# Patient Record
Sex: FEMALE | Race: WHITE | NOT HISPANIC OR LATINO | Employment: OTHER | URBAN - METROPOLITAN AREA
[De-identification: names, ages, dates, MRNs, and addresses within clinical notes are randomized per-mention and may not be internally consistent; named-entity substitution may affect disease eponyms.]

---

## 2017-01-18 ENCOUNTER — OFFICE VISIT (OUTPATIENT)
Dept: OPTOMETRY | Facility: CLINIC | Age: 67
End: 2017-01-18
Payer: COMMERCIAL

## 2017-01-18 DIAGNOSIS — H04.123 BILATERAL DRY EYES: ICD-10-CM

## 2017-01-18 DIAGNOSIS — H52.7 EYE REFRACTION DISORDER: Primary | ICD-10-CM

## 2017-01-18 PROCEDURE — 99999 PR PBB SHADOW E&M-EST. PATIENT-LVL II: CPT | Mod: PBBFAC,,, | Performed by: OPTOMETRIST

## 2017-01-18 PROCEDURE — 99499 UNLISTED E&M SERVICE: CPT | Mod: S$GLB,,, | Performed by: OPTOMETRIST

## 2017-01-18 NOTE — PROGRESS NOTES
HPI     Contact Lens Follow Up    Additional comments: CFLU           Comments   DLS: 12/7/16    Pt states has a hard time putting contact in eyes, takes her 2-3 tries. +   contacts were bothering her yesterday and she had to remove in middle of   day but seem to be ok today.        Last edited by Cheryle Quintana on 1/18/2017  8:23 AM. (History)        ROS     Negative for: Constitutional, Gastrointestinal, Neurological, Skin,   Genitourinary, Musculoskeletal, HENT, Endocrine, Cardiovascular, Eyes,   Respiratory, Psychiatric, Allergic/Imm, Heme/Lymph    Last edited by Yuri Kerr, OD on 1/18/2017  8:34 AM. (History)        Assessment /Plan     For exam results, see Encounter Report.    Eye refraction disorder    Bilateral dry eyes      1. Contact lens Rx released to pt. Daily wear only advised, with education to risks of extended wear.  Discussed lens care, compliance and solutions. RTC yearly contact lens follow up.   2. Recommend rewet drops as needed. Chronicity of disease and treatment discussed.

## 2017-01-30 RX ORDER — LEVOTHYROXINE SODIUM 112 UG/1
112 TABLET ORAL
Qty: 30 TABLET | Refills: 3 | Status: SHIPPED | OUTPATIENT
Start: 2017-01-30 | End: 2017-02-24

## 2017-02-01 RX ORDER — OMEPRAZOLE 20 MG/1
20 CAPSULE, DELAYED RELEASE ORAL DAILY
Qty: 30 CAPSULE | Refills: 1 | Status: SHIPPED | OUTPATIENT
Start: 2017-02-01 | End: 2017-03-22 | Stop reason: SDUPTHER

## 2017-02-10 ENCOUNTER — DOCUMENTATION ONLY (OUTPATIENT)
Dept: ADMINISTRATIVE | Facility: HOSPITAL | Age: 67
End: 2017-02-10

## 2017-02-10 NOTE — PROGRESS NOTES
PRE-VISIT CHART REVIEW    Appointment Scheduled on 2/24/17    Department stratifications & guidelines reviewed:yes    Target Chronic Diagnosis: obesity    Chronic Diagnosis Intervention Due: no    Goals Updated:N/A    There are no preventive care reminders to display for this patient.    Advanced Directives:   65 years of age or older?  No  Directive on file?  N\A                                      Pre-visit patient communication:  In Person    Studies or screenings scheduled pre-visit: no    Educate patient on obesity (32.59)

## 2017-02-15 ENCOUNTER — HOSPITAL ENCOUNTER (OUTPATIENT)
Dept: RADIOLOGY | Facility: HOSPITAL | Age: 67
Discharge: HOME | End: 2017-02-15
Attending: OBSTETRICS & GYNECOLOGY
Payer: COMMERCIAL

## 2017-02-15 DIAGNOSIS — N64.4 MASTODYNIA OF LEFT BREAST: ICD-10-CM

## 2017-02-15 PROCEDURE — 76642 ULTRASOUND BREAST LIMITED: CPT | Mod: 26,LT,, | Performed by: RADIOLOGY

## 2017-02-15 PROCEDURE — 77061 BREAST TOMOSYNTHESIS UNI: CPT | Mod: TC,LT

## 2017-02-15 PROCEDURE — 77065 DX MAMMO INCL CAD UNI: CPT | Mod: 26,LT,, | Performed by: RADIOLOGY

## 2017-02-15 PROCEDURE — 77061 BREAST TOMOSYNTHESIS UNI: CPT | Mod: 26,LT,, | Performed by: RADIOLOGY

## 2017-02-15 PROCEDURE — 76642 ULTRASOUND BREAST LIMITED: CPT | Mod: TC,PO,LT

## 2017-02-17 ENCOUNTER — LAB VISIT (OUTPATIENT)
Dept: LAB | Facility: HOSPITAL | Age: 67
End: 2017-02-17
Attending: FAMILY MEDICINE
Payer: COMMERCIAL

## 2017-02-17 DIAGNOSIS — E03.4 HYPOTHYROIDISM DUE TO ACQUIRED ATROPHY OF THYROID: ICD-10-CM

## 2017-02-17 DIAGNOSIS — E78.5 HYPERLIPIDEMIA, UNSPECIFIED HYPERLIPIDEMIA TYPE: ICD-10-CM

## 2017-02-17 LAB
ALBUMIN SERPL BCP-MCNC: 4.1 G/DL
ALP SERPL-CCNC: 84 U/L
ALT SERPL W/O P-5'-P-CCNC: 24 U/L
ANION GAP SERPL CALC-SCNC: 7 MMOL/L
AST SERPL-CCNC: 23 U/L
BILIRUB SERPL-MCNC: 0.4 MG/DL
BUN SERPL-MCNC: 20 MG/DL
CALCIUM SERPL-MCNC: 10.5 MG/DL
CHLORIDE SERPL-SCNC: 105 MMOL/L
CHOLEST/HDLC SERPL: 3 {RATIO}
CO2 SERPL-SCNC: 30 MMOL/L
CREAT SERPL-MCNC: 1.2 MG/DL
EST. GFR  (AFRICAN AMERICAN): 55.2 ML/MIN/1.73 M^2
EST. GFR  (NON AFRICAN AMERICAN): 47.9 ML/MIN/1.73 M^2
GLUCOSE SERPL-MCNC: 95 MG/DL
HDL/CHOLESTEROL RATIO: 33.5 %
HDLC SERPL-MCNC: 164 MG/DL
HDLC SERPL-MCNC: 55 MG/DL
LDLC SERPL CALC-MCNC: 93 MG/DL
NONHDLC SERPL-MCNC: 109 MG/DL
POTASSIUM SERPL-SCNC: 4.4 MMOL/L
PROT SERPL-MCNC: 7.4 G/DL
SODIUM SERPL-SCNC: 142 MMOL/L
T4 FREE SERPL-MCNC: 1.3 NG/DL
TRIGL SERPL-MCNC: 80 MG/DL
TSH SERPL DL<=0.005 MIU/L-ACNC: 4.39 UIU/ML

## 2017-02-17 PROCEDURE — 80053 COMPREHEN METABOLIC PANEL: CPT

## 2017-02-17 PROCEDURE — 80061 LIPID PANEL: CPT

## 2017-02-17 PROCEDURE — 84443 ASSAY THYROID STIM HORMONE: CPT

## 2017-02-17 PROCEDURE — 36415 COLL VENOUS BLD VENIPUNCTURE: CPT | Mod: PO

## 2017-02-17 PROCEDURE — 84439 ASSAY OF FREE THYROXINE: CPT

## 2017-02-20 ENCOUNTER — PATIENT MESSAGE (OUTPATIENT)
Dept: FAMILY MEDICINE | Facility: CLINIC | Age: 67
End: 2017-02-20

## 2017-02-24 ENCOUNTER — TELEPHONE (OUTPATIENT)
Dept: FAMILY MEDICINE | Facility: CLINIC | Age: 67
End: 2017-02-24

## 2017-02-24 ENCOUNTER — OFFICE VISIT (OUTPATIENT)
Dept: FAMILY MEDICINE | Facility: CLINIC | Age: 67
End: 2017-02-24
Payer: COMMERCIAL

## 2017-02-24 VITALS
RESPIRATION RATE: 16 BRPM | HEART RATE: 84 BPM | TEMPERATURE: 98 F | HEIGHT: 63 IN | SYSTOLIC BLOOD PRESSURE: 140 MMHG | DIASTOLIC BLOOD PRESSURE: 82 MMHG | BODY MASS INDEX: 33.98 KG/M2 | WEIGHT: 191.81 LBS

## 2017-02-24 DIAGNOSIS — E04.1 THYROID NODULE: ICD-10-CM

## 2017-02-24 DIAGNOSIS — E78.5 HYPERLIPIDEMIA, UNSPECIFIED HYPERLIPIDEMIA TYPE: ICD-10-CM

## 2017-02-24 DIAGNOSIS — R49.0 HOARSENESS: ICD-10-CM

## 2017-02-24 DIAGNOSIS — R79.9 ABNORMAL BLOOD CHEMISTRY: ICD-10-CM

## 2017-02-24 DIAGNOSIS — E03.4 HYPOTHYROIDISM DUE TO ACQUIRED ATROPHY OF THYROID: Primary | ICD-10-CM

## 2017-02-24 PROCEDURE — 99214 OFFICE O/P EST MOD 30 MIN: CPT | Mod: S$GLB,,, | Performed by: FAMILY MEDICINE

## 2017-02-24 PROCEDURE — 1160F RVW MEDS BY RX/DR IN RCRD: CPT | Mod: S$GLB,,, | Performed by: FAMILY MEDICINE

## 2017-02-24 RX ORDER — FERROUS SULFATE, DRIED 160(50) MG
1 TABLET, EXTENDED RELEASE ORAL 2 TIMES DAILY WITH MEALS
COMMUNITY

## 2017-02-24 RX ORDER — IBUPROFEN 800 MG/1
800 TABLET ORAL
COMMUNITY
Start: 2017-02-14 | End: 2017-03-13 | Stop reason: ALTCHOICE

## 2017-02-24 RX ORDER — LEVOTHYROXINE SODIUM 125 UG/1
125 TABLET ORAL DAILY
Qty: 30 TABLET | Refills: 1 | Status: SHIPPED | OUTPATIENT
Start: 2017-02-24 | End: 2017-04-25 | Stop reason: SDUPTHER

## 2017-02-24 NOTE — MR AVS SNAPSHOT
[facility]  [address] [address] [address]  [facility] [address]  Phone: [phone]  Fax: [phone]                  Curtis Navarro   2017 8:30 AM   Office Visit    Description:  Female : 1950   Provider:  Komal Del Castillo MD   Department:  [facility]           Reason for Visit     Follow-up           Diagnoses this Visit        Comments    Hypothyroidism due to acquired atrophy of thyroid    -  Primary     Hyperlipidemia, unspecified hyperlipidemia type         Abnormal blood chemistry         Hoarseness         Thyroid nodule                To Do List           Future Appointments        Provider Department Dept Phone    3/2/2017 8:45 AM LAB, [place] [facility] [phone]    3/3/2017 2:20 PM NURSE, [facility] [phone]    3/13/2017 12:30 PM AUDIO, [facility] - Audiology [phone]    3/13/2017 1:40 PM Jaimie Lepe NP [facility] [phone]    2017 10:15 AM LAB, [place] [facility] [phone]      Goals (5 Years of Data)     None       These Medications        Disp Refills Start End    levothyroxine (SYNTHROID) 125 MCG tablet 30 tablet 1 2017    Take 1 tablet (125 mcg total) by mouth once daily. - Oral    Pharmacy: [place] [address] [address] Ph #: [phone]         [facility] On Call     [facility] On [facility] Nurse Care Line -  Assistance  Registered nurses in the [facility] provide clinical advisement, health education, appointment booking, and other advisory services.  Call for this free service at [phone].             Medications           Message regarding Medications     Verify the changes and/or additions to your medication regime listed below are the same as discussed with your clinician today.  If any of these changes or additions are incorrect, please notify your healthcare provider.         START taking these NEW medications        Refills    levothyroxine (SYNTHROID) 125 MCG tablet 1    Sig: Take 1 tablet (125 mcg total) by mouth once daily.    Class: Normal    Route: Oral      STOP taking these medications     calcium-vitamin D 500-125 mg-unit tablet Take 1 tablet by mouth 3 (three) times daily.     ranitidine (ZANTAC) 300 MG tablet Take 1 tablet (300 mg total) by mouth every evening.           Verify that the below list of medications is an accurate representation of the medications you are currently taking.  If none reported, the list may be blank. If incorrect, please contact your healthcare provider. Carry this list with you in case of emergency.           Current Medications     aspirin (ECOTRIN) 81 MG EC tablet Take 81 mg by mouth once daily.    atorvastatin (LIPITOR) 20 MG tablet Take 0.5 tablets (10 mg total) by mouth once daily. Half tablet    B INFANTIS/B ANI/B JOSE M/B BIFID (PROBIOTIC 4X ORAL) Take 1 tablet by mouth once daily.    biotin 5 mg Cap Take 5 mg by mouth once daily.    calcium-vitamin D3 500 mg(1,250mg) -200 unit per tablet Take 1 tablet by mouth 2 (two) times daily with meals.    DIVIGEL 0.5 mg (0.1 %) GlPk Apply 0.5 mg topically every other day.    glucosamine-chondroitin 500-400 mg tablet Take 1 tablet by mouth 3 (three) times daily.      ibuprofen (ADVIL,MOTRIN) 800 MG tablet Take 800 mg by mouth as needed.     KRILL/OM-3/DHA/EPA/PHOSPHO/AST (MEGARED OMEGA-3 KRILL OIL ORAL) Take 1 capsule by mouth once daily.    MULTIVITAMIN ORAL Take by mouth.      omeprazole (PRILOSEC) 20 MG capsule Take 1 capsule (20 mg total) by mouth once daily.    progesterone (PROMETRIUM) 100 MG capsule Take 100 mg by mouth once daily.      levothyroxine (SYNTHROID) 125 MCG tablet Take 1 tablet (125 mcg total) by mouth once daily.           Clinical Reference Information           Your Vitals Were     BP                   140/82           Blood Pressure          Most Recent Value    BP  (!)  140/82       Allergies as of 2/24/2017     Azithromycin    Metronidazole Hcl    Moxifloxacin    Phenytoin Sodium Extended    Sulfa (Sulfonamide Antibiotics)      Immunizations Administered on Date of Encounter - 2/24/2017     None      Orders Placed During Today's Visit      Normal Orders This Visit    Ambulatory referral to ENT     Future Labs/Procedures Expected by Expires    Basic metabolic panel  2/24/2017 2/25/2018    TSH  2/24/2017 2/25/2018    US Soft Tissue Head Neck Thyroid  2/24/2017 2/24/2018      Language Assistance Services     ATTENTION: Language assistance services are available, free of charge. Please call [phone].      ATENCIÓN: Si habla español, tiene a holm disposición servicios gratuitos de asistencia lingüística. Llame al [phone].     CHÚ Ý: N?u b?n nói Ti?ng Vi?t, có các d?ch v? h? tr? ngôn ng? mi?n phí dành cho b?n. G?i s? [phone].         [facility] complies with applicable Federal civil rights laws and does not discriminate on the basis of race, color, national origin, age, disability, or sex.

## 2017-02-24 NOTE — TELEPHONE ENCOUNTER
Spoke w/ pt , clarified w/ Dr Del Castillo, BMP sched 3-2-17 does not need to be fasting . Pt aware--lp

## 2017-02-24 NOTE — TELEPHONE ENCOUNTER
----- Message from Dorothea Trammell sent at 2/24/2017  9:58 AM CST -----  Contact: elf  Patient would like to speak to a nurse  Has questions regarding her lab order   Please call  to advise.    Thanks

## 2017-02-25 NOTE — PROGRESS NOTES
Subjective:       Patient ID: Curtis Navarro is a 66 y.o. female.    Chief Complaint: Follow-up    HPI   The patient is a 66-year-old who is here today for chronic follow-up.  Today we discussed the followin)  hypothyroidism.  We did review her recent thyroid tests.  She would be interested in increasing the dose of her medicine to see if she feels better with the higher dose of Synthroid.  She is due for repeat thyroid ultrasound today in May and would like to get this scheduled  2)  GERD.  After stopping the Prilosec 40 mg and changing to the Zantac 300 mg at night, she noticed that her acid reflux symptoms progressively worsened.  She has returned to using Prilosec but is using a lower dose at 20 mg once a day.  She finds that the Prilosec 20 mg once a day does control her acid reflux  3)  right wrist pain.  She has been experiencing some right wrist pain for the past few months.  She describes her pain as a dull ache.  It started after she had done a lot of gardening work planting flowers.  She recently took a course of Motrin and noticed that her symptoms improved while she took the Motrin but the wrist pain is starting to recur now that she has stopped the Motrin.  4)  abnormal chemistry.  We did discuss her recent GFR 47.9.  This has not been noted previously  5)  hyperlipidemia.  She is doing well with her Lipitor.  We did review her recent cholesterol numbers which looked good    Of note, her blood pressure is high today at 140/82 and we did discuss this finding.  She notes that her blood pressures at home have been in the 120s to 140s over 70s to 80s.  She has not been taking good care of herself lately because she has been focusing on her mom and her 's health issues.  She has been discouraged by her weight gain and is starting to make progress to lose weight and take better care of herself.    Review of Systems   Constitutional: Negative for appetite change, chills, diaphoresis, fatigue,  fever and unexpected weight change.   HENT: Positive for voice change (present for months). Negative for congestion, ear pain, postnasal drip, rhinorrhea, sinus pressure, sneezing, sore throat and trouble swallowing.    Eyes: Negative for pain, discharge and visual disturbance.   Respiratory: Negative for cough, chest tightness, shortness of breath and wheezing.    Cardiovascular: Negative for chest pain, palpitations and leg swelling.   Gastrointestinal: Negative for abdominal distention, abdominal pain, blood in stool, constipation, diarrhea, nausea and vomiting.   Skin: Negative for rash.       Objective:      Physical Exam   Constitutional: She is oriented to person, place, and time. She appears well-developed and well-nourished. No distress.   Her voice is hoarse   HENT:   Head: Normocephalic and atraumatic.   Right Ear: Hearing, tympanic membrane, external ear and ear canal normal.   Left Ear: Hearing, tympanic membrane, external ear and ear canal normal.   Nose: Nose normal.   Mouth/Throat: Oropharynx is clear and moist and mucous membranes are normal. No oral lesions. No oropharyngeal exudate, posterior oropharyngeal edema or posterior oropharyngeal erythema.   Eyes: Conjunctivae, EOM and lids are normal. Pupils are equal, round, and reactive to light. No scleral icterus.   Neck: Normal range of motion. Neck supple. Carotid bruit is not present. No thyroid mass and no thyromegaly present.   Cardiovascular: Normal rate, regular rhythm and normal heart sounds.   No extrasystoles are present. PMI is not displaced.  Exam reveals no gallop.    No murmur heard.  Pulmonary/Chest: Effort normal and breath sounds normal. No accessory muscle usage. No respiratory distress.   Clear to auscultation bilaterally.   Abdominal: Soft. Normal appearance and bowel sounds are normal. She exhibits no abdominal bruit. There is no hepatosplenomegaly. There is no tenderness. There is no rebound.   Musculoskeletal:   Her right upper  "extremity is neurovascularly intact.  She has no point tenderness involving the elbow forearm wrist or hand.  She is tender over the brachial radialis muscle.   Lymphadenopathy:        Head (right side): No submental and no submandibular adenopathy present.        Head (left side): No submental and no submandibular adenopathy present.        Right cervical: No superficial cervical, no deep cervical and no posterior cervical adenopathy present.       Left cervical: No superficial cervical, no deep cervical and no posterior cervical adenopathy present.        Right: No supraclavicular adenopathy present.        Left: No supraclavicular adenopathy present.   Neurological: She is alert and oriented to person, place, and time.   Skin: Skin is warm, dry and intact.   Psychiatric: She has a normal mood and affect.     Blood pressure (!) 140/82, pulse 84, temperature 98.4 °F (36.9 °C), temperature source Oral, resp. rate 16, height 5' 3" (1.6 m), weight 87 kg (191 lb 12.8 oz).Body mass index is 33.98 kg/(m^2).        A/P:  1)  hypothyroidism.  Uncontrolled.  We will increase her Synthroid 225 µg once a day.  We will check a TSH in 6 weeks  2)  thyroid nodule.  Status unknown.  We'll schedule thyroid ultrasound from May  3)  GERD.  Well-controlled with Prilosec.  She understands the long-term risks associated with PPI use  4)  right brachial radialis strain.  Improving.  If her symptoms persist longer than the next 2-4 weeks, she will let me know  5)  abnormal GFR.  New.  We will recheck this this non-fasting next week  6)  elevated blood pressure without the diagnosis of hypertension.  She'll work on diet and exercise and weight loss.  We will reevaluate this at her next visit.  If her blood pressure remains elevated in the future, we will consider a medication  7)  hyperlipidemia.  Well-controlled.  Continue Lipitor.  We will check fasting labs again in 6 months  8)  hoarseness.  Persistent.  We will refer her to ENT for " further evaluation  9)  health maintenance issues.  She will have her colonoscopy later this summer as previously planned

## 2017-03-02 ENCOUNTER — PATIENT MESSAGE (OUTPATIENT)
Dept: FAMILY MEDICINE | Facility: CLINIC | Age: 67
End: 2017-03-02

## 2017-03-02 ENCOUNTER — LAB VISIT (OUTPATIENT)
Dept: LAB | Facility: HOSPITAL | Age: 67
End: 2017-03-02
Attending: FAMILY MEDICINE
Payer: COMMERCIAL

## 2017-03-02 DIAGNOSIS — R79.9 ABNORMAL BLOOD CHEMISTRY: ICD-10-CM

## 2017-03-02 LAB
ANION GAP SERPL CALC-SCNC: 7 MMOL/L
BUN SERPL-MCNC: 13 MG/DL
CALCIUM SERPL-MCNC: 9.8 MG/DL
CHLORIDE SERPL-SCNC: 105 MMOL/L
CO2 SERPL-SCNC: 29 MMOL/L
CREAT SERPL-MCNC: 0.9 MG/DL
EST. GFR  (AFRICAN AMERICAN): >60 ML/MIN/1.73 M^2
EST. GFR  (NON AFRICAN AMERICAN): >60 ML/MIN/1.73 M^2
GLUCOSE SERPL-MCNC: 94 MG/DL
POTASSIUM SERPL-SCNC: 4.3 MMOL/L
SODIUM SERPL-SCNC: 141 MMOL/L

## 2017-03-02 PROCEDURE — 36415 COLL VENOUS BLD VENIPUNCTURE: CPT | Mod: PO

## 2017-03-02 PROCEDURE — 80048 BASIC METABOLIC PNL TOTAL CA: CPT

## 2017-03-03 ENCOUNTER — CLINICAL SUPPORT (OUTPATIENT)
Dept: FAMILY MEDICINE | Facility: CLINIC | Age: 67
End: 2017-03-03
Payer: OTHER GOVERNMENT

## 2017-03-03 VITALS — SYSTOLIC BLOOD PRESSURE: 150 MMHG | DIASTOLIC BLOOD PRESSURE: 84 MMHG

## 2017-03-03 NOTE — PROGRESS NOTES
Pt here for BP check. Pt BP was take on right arm and was 158/82. Pt stated she has been running around all day and thinks that may be why her BP is high. Pt stated she took BP last night and was 130/80. Took pt BP here again and was 150/84.

## 2017-03-04 ENCOUNTER — TELEPHONE (OUTPATIENT)
Dept: FAMILY MEDICINE | Facility: CLINIC | Age: 67
End: 2017-03-04

## 2017-03-05 NOTE — TELEPHONE ENCOUNTER
Pls notify pt:    BP was high at recent nurse visit  It might be time to consider BP med.  What do you think?

## 2017-03-06 NOTE — TELEPHONE ENCOUNTER
Spoke w/ pt, pt agrees to try medication . Pt would like specific instructions on the medication bottle if she should take the med in the am or evening. --lp

## 2017-03-07 ENCOUNTER — TELEPHONE (OUTPATIENT)
Dept: FAMILY MEDICINE | Facility: CLINIC | Age: 67
End: 2017-03-07

## 2017-03-07 RX ORDER — LISINOPRIL 10 MG/1
10 TABLET ORAL DAILY
Qty: 30 TABLET | Refills: 0 | Status: SHIPPED | OUTPATIENT
Start: 2017-03-07 | End: 2017-03-22

## 2017-03-07 NOTE — TELEPHONE ENCOUNTER
----- Message from Addie Fernandez sent at 3/7/2017 12:12 PM CST -----  Contact: Alleantia [phone]  She is calling to follow up about the script that you were to send yesterday.  The patient is there to pick it up.  Please send it ASAP.  Thank you!

## 2017-03-08 ENCOUNTER — DOCUMENTATION ONLY (OUTPATIENT)
Dept: ADMINISTRATIVE | Facility: HOSPITAL | Age: 67
End: 2017-03-08

## 2017-03-08 NOTE — PROGRESS NOTES
PRE-VISIT CHART REVIEW    Appointment Scheduled on 3/22/17    Department stratifications & guidelines reviewed:yes    Target Chronic Diagnosis: obesity    Chronic Diagnosis Intervention Due: no    Goals Updated:No    There are no preventive care reminders to display for this patient.    Advanced Directives:   65 years of age or older?  No  Directive on file?  N\A                                      Pre-visit patient communication:  In Person    Studies or screenings scheduled pre-visit: no    Educate patient on obesity (33.98)

## 2017-03-13 ENCOUNTER — INITIAL CONSULT (OUTPATIENT)
Dept: OTOLARYNGOLOGY | Facility: CLINIC | Age: 67
End: 2017-03-13
Payer: COMMERCIAL

## 2017-03-13 VITALS
BODY MASS INDEX: 34.61 KG/M2 | HEIGHT: 63 IN | WEIGHT: 195.31 LBS | DIASTOLIC BLOOD PRESSURE: 75 MMHG | SYSTOLIC BLOOD PRESSURE: 137 MMHG | HEART RATE: 82 BPM

## 2017-03-13 DIAGNOSIS — K21.9 LPRD (LARYNGOPHARYNGEAL REFLUX DISEASE): ICD-10-CM

## 2017-03-13 DIAGNOSIS — R49.0 DYSPHONIA: Primary | ICD-10-CM

## 2017-03-13 PROCEDURE — 99214 OFFICE O/P EST MOD 30 MIN: CPT | Mod: 25,S$GLB,, | Performed by: NURSE PRACTITIONER

## 2017-03-13 PROCEDURE — 1160F RVW MEDS BY RX/DR IN RCRD: CPT | Mod: S$GLB,,, | Performed by: NURSE PRACTITIONER

## 2017-03-13 PROCEDURE — 31575 DIAGNOSTIC LARYNGOSCOPY: CPT | Mod: S$GLB,,, | Performed by: NURSE PRACTITIONER

## 2017-03-13 PROCEDURE — 99999 PR PBB SHADOW E&M-EST. PATIENT-LVL IV: CPT | Mod: PBBFAC,,, | Performed by: NURSE PRACTITIONER

## 2017-03-13 NOTE — MR AVS SNAPSHOT
[facility]  [address]  [facility] [address]  Phone: [phone]  Fax: [phone]                  Curtis Navarro   3/13/2017 1:40 PM   Initial consult    Description:  Female : 1950   Provider:  Jaimie Lepe NP   Department:  [facility]           Reason for Visit     Hoarse                To Do List           Future Appointments        Provider Department Dept Phone    3/22/2017 10:30 AM [facility] [phone]    2017 10:15 AM LAB, [place] [facility] [phone]    2017 10:00 AM [facility] [phone]      Goals (5 Years of Data)     None      [facility] On Call     [facility] On Call Nurse Care Line - / Assistance  Registered nurses in the [facility] provide clinical advisement, health education, appointment booking, and other advisory services.  Call for this free service at [phone].             Medications           Message regarding Medications     Verify the changes and/or additions to your medication regime listed below are the same as discussed with your clinician today.  If any of these changes or additions are incorrect, please notify your healthcare provider.        STOP taking these medications     ibuprofen (ADVIL,MOTRIN) 800 MG tablet Take 800 mg by mouth as needed.            Verify that the below list of medications is an accurate representation of the medications you are currently taking.  If none reported, the list may be blank. If incorrect, please contact your healthcare provider. Carry this list with you in case of emergency.           Current Medications     aspirin (ECOTRIN) 81 MG EC tablet Take 81 mg by mouth once daily.    atorvastatin (LIPITOR) 20 MG tablet Take 0.5 tablets (10 mg total) by mouth once daily. Half tablet    B INFANTIS/B ANI/B JOSE M/B BIFID (PROBIOTIC 4X ORAL) Take 1 tablet by mouth once daily.    biotin 5 mg Cap Take 5 mg  "by mouth once daily.    calcium-vitamin D3 500 mg(1,250mg) -200 unit per tablet Take 1 tablet by mouth 2 (two) times daily with meals.    DIVIGEL 0.5 mg (0.1 %) GlPk Apply 0.5 mg topically every other day.    glucosamine-chondroitin 500-400 mg tablet Take 1 tablet by mouth 3 (three) times daily.      KRILL/OM-3/DHA/EPA/PHOSPHO/AST (MEGARED OMEGA-3 KRILL OIL ORAL) Take 1 capsule by mouth once daily.    levothyroxine (SYNTHROID) 125 MCG tablet Take 1 tablet (125 mcg total) by mouth once daily.    lisinopril 10 MG tablet Take 1 tablet (10 mg total) by mouth once daily.    MULTIVITAMIN ORAL Take by mouth.      omeprazole (PRILOSEC) 20 MG capsule Take 1 capsule (20 mg total) by mouth once daily.    progesterone (PROMETRIUM) 100 MG capsule Take 100 mg by mouth once daily.             Clinical Reference Information           Your Vitals Were     BP Pulse Height Weight BMI    137/75 82 5' 3" (1.6 m) 88.6 kg (195 lb 5.2 oz) 34.6 kg/m2      Blood Pressure          Most Recent Value    BP  137/75      Allergies as of 3/13/2017     Azithromycin    Metronidazole Hcl    Moxifloxacin    Phenytoin Sodium Extended    Sulfa (Sulfonamide Antibiotics)      Immunizations Administered on Date of Encounter - 3/13/2017     None      Instructions      How Acid Reflux Affects Your Throat    Do you have to clear your throat or cough often? Are you hoarse? Do you have trouble swallowing? If you have these or other throat symptoms, you may have acid reflux. This occurs when stomach acid flows back up and irritates your throat.  Why you have throat symptoms  There are muscles (esophageal sphincters) at both ends of the tube that carries food to your stomach (the esophagus). These muscles relax to let food pass. Then they tighten to keep stomach acid down. When the lower esophageal sphincter (LES) doesnt tighten enough, acid can flow back (reflux) from your stomach into your esophagus. This may cause heartburn. In some cases the upper esophageal " "sphincter (UES) also doesnt work well. Then acid can travel higher and enter your throat (pharynx). In many cases, this causes throat symptoms.  Common throat symptoms  · Need to clear your throat often  · Feeling like youre choking  · Long-term (chronic) cough  · Hoarseness  · Trouble swallowing  · Feel like you have a lump in your throat  · Sour or acid taste  · Sore throat that keeps coming back     LARYNGOPHARYNGEAL REFLUX  (SILENT OR ATYPICAL REFLUX)    If you have any of the following symptoms you may have laryngopharyngeal reflux (LPR):  hoarseness, thick or too much mucus, chronic throat pain/irritation, chronic throat clearing, chronic cough, especially cough that wake you up from sleep, chronic "postnasal drip" without the need to blow your nose.     Many people with LPR do not have symptoms of heartburn. Compared to the esophagus, the voice box and the back of the throat are significantly more sensitive to the effects of acid on surrounding tissue. Acid passing quickly through the esophagus does not have a chance to irritate the area for too long.  However acid that pools in the throat or voice box can cause prolonged irritation resulting in the symptoms of LPR.    The symptoms of LPR can consist of a dry cough and the sensation of something being stuck in the throat.  Some people will complain of heartburn while others may have intermittent hoarseness or loss of voice.  Another major symptom of LPR is "postnasal drip."  Patients are often told symptoms are due to abnormal nasal drainage or sinus infection; however this is rarely the cause of chronic throat irritation. For post nasal drip to cause the complaints described, signs and symptoms of an active nasal infection should be present.     Treatments for LPR include:  postural changes, weight reduction, diet modification, medication to reduce stomach acid and promote normal motility, and surgery to prevent reflux. Most patients will begin to notice " some relief in her symptoms about 2 weeks after starting the medication; however it is generally recommended the medication should be continued for 2 months. If the symptoms completely resolve, the medication can then be tapered.  Some people will remain symptom free while others may have relapses which required treatment again.    Things you can do to prevent reflux include:  Do not smoke.  Smoking will cause reflux.  Avoid tight fitting clothes or belts around the waist.  Avoid eating at least 2 hours prior to bedtime.  In fact avoid eating your largest meal at night.  Weight loss.  For patient's with recent weight gain, shedding a few pounds is all that is required to improve reflux.  Avoid caffeine, cola beverages, citrus beverages, mints, alcoholic beverages, particularly at night, cheese, fried foods, spicy foods, eggs, and chocolate.  Sleep with the head of bed elevated at least 6 inches.    Take Omeprazole / Prilosec (PPI) every morning on an empty stomach (30-60 minutes before eating) 20-40 MG. At bedtime take Zantac (H2-blocker) 150-300 mg.  All are available over-the-counter without a prescription. See a Gastro doctor (GI) for further evaluation and continued management.    [facility]ist (Laryngologist) is Dr. Yuri Davison at [phone].               Language Assistance Services     ATTENTION: Language assistance services are available, free of charge. Please call [phone].      ATENCIÓN: Si habla esprick, tiene a holm disposición servicios gratuitos de asistencia lingüística. Llame al [phone].     [facility] Ý: N?u b?n nói Ti?ng Vi?t, có các d?ch v? h? tr? ngôn ng? mi?n phí dành cho b?n. G?i s? [phone].         [facility] complies with applicable Federal civil rights laws and does not discriminate on the basis of race, color, national origin, age, disability, or sex.

## 2017-03-13 NOTE — PROGRESS NOTES
Subjective:       Patient ID: Curtis Navarro is a 66 y.o. female.    Chief Complaint: No chief complaint on file.    HPI   Patient seen by me 4 months ago for vertigo which had resolved spontaneously prior to her appt. She was encouraged to return to ENT at first sign of vertigo without vestibular suppressants. She is referred back today for dysphonia. She reports chronic dysphonia waxing and waning X many years. 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 She has been retired X 1.5 years but voice issues have persisted. She states she has gotten used to it, does not notice it any more until someone mentions it to her. She states Dr. Mary checked her vocal cords approx 5-6 years ago and noted small nodules. She has not had it checked since then. She had an EGD in 2014 by Dr. Ceballos who noted reflux esophagitis. She has been taking omeprazole 20 mg QAM.     Review of Systems   Constitutional: Negative.    HENT: Positive for voice change.    Eyes: Negative.    Respiratory: Negative.    Cardiovascular: Negative.    Gastrointestinal: Negative.    Musculoskeletal: Negative.    Skin: Negative.    Neurological: Negative.    Hematological: Negative.    Psychiatric/Behavioral: Negative.        Objective:      Physical Exam   Constitutional: She is oriented to person, place, and time. Vital signs are normal. She appears well-developed and well-nourished. She is cooperative. She does not appear ill. No distress.   HENT:   Head: Normocephalic and atraumatic.   Right Ear: Hearing, tympanic membrane, external ear and ear canal normal. Tympanic membrane is not erythematous. No middle ear effusion.   Left Ear: Hearing, tympanic membrane, external ear and ear canal normal. Tympanic membrane is not erythematous.  No middle ear effusion.   Nose: Nose normal. No mucosal edema or rhinorrhea. Right sinus exhibits no maxillary sinus tenderness and no frontal sinus tenderness. Left sinus exhibits no maxillary sinus  tenderness and no frontal sinus tenderness.   Mouth/Throat: Uvula is midline, oropharynx is clear and moist and mucous membranes are normal. Mucous membranes are not pale, not dry and not cyanotic. No oral lesions. No oropharyngeal exudate, posterior oropharyngeal edema or posterior oropharyngeal erythema.   Eyes: Conjunctivae, EOM and lids are normal. Pupils are equal, round, and reactive to light. Right eye exhibits no discharge. Left eye exhibits no discharge. No scleral icterus.   Neck: Trachea normal and normal range of motion. Neck supple. No tracheal deviation present. No thyroid mass and no thyromegaly present.   Cardiovascular: Normal rate.    Pulmonary/Chest: Effort normal. No stridor. No respiratory distress. She has no wheezes.   Musculoskeletal: Normal range of motion.   Lymphadenopathy:        Head (right side): No submental, no submandibular, no tonsillar, no preauricular and no posterior auricular adenopathy present.        Head (left side): No submental, no submandibular, no tonsillar, no preauricular and no posterior auricular adenopathy present.     She has no cervical adenopathy.        Right cervical: No superficial cervical and no posterior cervical adenopathy present.       Left cervical: No superficial cervical and no posterior cervical adenopathy present.   Neurological: She is alert and oriented to person, place, and time. She has normal strength. Coordination and gait normal.   Skin: Skin is warm, dry and intact. No lesion and no rash noted. She is not diaphoretic. No cyanosis. No pallor.   Psychiatric: She has a normal mood and affect. Her speech is normal and behavior is normal. Judgment and thought content normal. Cognition and memory are normal.   Nursing note and vitals reviewed.      Procedure: Flexible laryngoscopy    In order to fully examine the upper aerodigestive tract, including the larynx, in a patient with a hyperactive gag reflex, and suboptimal visualization with indirect  mirror exam,  flexible endoscopy is required.   After explaining the procedure and obtaining verbal consent, a timeout was performed with the patient's participation according to the universal protocol. Both nasal cavities were anesthetized with 4% Xylocaine spray mixed with Gavin-Synephrine. The flexible laryngoscope  was inserted into the nasal cavity and advanced to visualize the nasal cavity, nasopharynx, the posterior oropharynx, hypopharynx, and the endolarynx with the  findings noted. The scope was removed and the procedure terminated. The patient tolerated this procedure well without apparent complication.     OVERALL FINDINGS  Nasopharynx - the torus is clear. There are no lesions of the posterior wall.   Oropharynx - no lesions of the tongue base. There is no obvious fullness or asymmetry.  Hypopharynx - there are no lesions of the pyriform sinuses or postcricoid region   Larynx - there are no lesions of the supraglottic or glottic larynx.  Vocal fold mobility is normal.     SPECIFIC FINDINGS  Adenoid tissue - normal   Nasopharynx & eustachian tube orifices - normal   Posterior pharyngeal wall - normal   Base of tongue - normal   Epiglottis - normal   Valleculae - normal   Pyriform sinuses - normal   False vocal cords - normal   True vocal cords - normal  Arytenoids - normal   Interarytenoid space - erythema, edema   Larynx    Larynx    Assessment:     LPRD/GERD    Dysphonia  Plan:     Laryngoscope photos given and reviewed in detail with patient. Recommend omeprazole QAM on an empty stomach, ranitidine QHS, and establish care with GI for continued management and possible EGD. Handouts given on LPRD and GERD, and discussed at length with patient.     No vocal cord nodules noted today. But if voice concerns persist, please see Dr. Davison

## 2017-03-13 NOTE — LETTER
March 13, 2017      Komal Del Castillo MD  [address] [address] [address]           [facility]  [address] [facility] [address] [place] [address]  Phone: [phone]  Fax: [phone]          Patient: Curtis Navarro   MR Number: [id number]   YOB: 1950   Date of Visit: 3/13/2017       Dear Dr. Komal Del Castillo:    Thank you for referring Curtis Navarro to me for evaluation. Attached you will find relevant portions of my assessment and plan of care.    If you have questions, please do not hesitate to call me. I look forward to following Curtis Navarro along with you.    Sincerely,    Jaimie Lepe, MOE    Enclosure  CC:  No Recipients    If you would like to receive this communication electronically, please contact [email or web address] or [phone] to request more information on [facility] access.    For providers and/or their staff who would like to refer a patient to [facility], please contact us through our one-stop-shop provider referral line, [facility], at [phone].    If you feel you have received this communication in error or would no longer like to receive these types of communications, please e-mail [email or web address]

## 2017-03-13 NOTE — PATIENT INSTRUCTIONS
"  How Acid Reflux Affects Your Throat    Do you have to clear your throat or cough often? Are you hoarse? Do you have trouble swallowing? If you have these or other throat symptoms, you may have acid reflux. This occurs when stomach acid flows back up and irritates your throat.  Why you have throat symptoms  There are muscles (esophageal sphincters) at both ends of the tube that carries food to your stomach (the esophagus). These muscles relax to let food pass. Then they tighten to keep stomach acid down. When the lower esophageal sphincter (LES) doesnt tighten enough, acid can flow back (reflux) from your stomach into your esophagus. This may cause heartburn. In some cases the upper esophageal sphincter (UES) also doesnt work well. Then acid can travel higher and enter your throat (pharynx). In many cases, this causes throat symptoms.  Common throat symptoms  · Need to clear your throat often  · Feeling like youre choking  · Long-term (chronic) cough  · Hoarseness  · Trouble swallowing  · Feel like you have a lump in your throat  · Sour or acid taste  · Sore throat that keeps coming back     LARYNGOPHARYNGEAL REFLUX  (SILENT OR ATYPICAL REFLUX)    If you have any of the following symptoms you may have laryngopharyngeal reflux (LPR):  hoarseness, thick or too much mucus, chronic throat pain/irritation, chronic throat clearing, chronic cough, especially cough that wake you up from sleep, chronic "postnasal drip" without the need to blow your nose.     Many people with LPR do not have symptoms of heartburn. Compared to the esophagus, the voice box and the back of the throat are significantly more sensitive to the effects of acid on surrounding tissue. Acid passing quickly through the esophagus does not have a chance to irritate the area for too long.  However acid that pools in the throat or voice box can cause prolonged irritation resulting in the symptoms of LPR.    The symptoms of LPR can consist of a dry cough " "and the sensation of something being stuck in the throat.  Some people will complain of heartburn while others may have intermittent hoarseness or loss of voice.  Another major symptom of LPR is "postnasal drip."  Patients are often told symptoms are due to abnormal nasal drainage or sinus infection; however this is rarely the cause of chronic throat irritation. For post nasal drip to cause the complaints described, signs and symptoms of an active nasal infection should be present.     Treatments for LPR include:  postural changes, weight reduction, diet modification, medication to reduce stomach acid and promote normal motility, and surgery to prevent reflux. Most patients will begin to notice some relief in her symptoms about 2 weeks after starting the medication; however it is generally recommended the medication should be continued for 2 months. If the symptoms completely resolve, the medication can then be tapered.  Some people will remain symptom free while others may have relapses which required treatment again.    Things you can do to prevent reflux include:  Do not smoke.  Smoking will cause reflux.  Avoid tight fitting clothes or belts around the waist.  Avoid eating at least 2 hours prior to bedtime.  In fact avoid eating your largest meal at night.  Weight loss.  For patient's with recent weight gain, shedding a few pounds is all that is required to improve reflux.  Avoid caffeine, cola beverages, citrus beverages, mints, alcoholic beverages, particularly at night, cheese, fried foods, spicy foods, eggs, and chocolate.  Sleep with the head of bed elevated at least 6 inches.    Take Omeprazole / Prilosec (PPI) every morning on an empty stomach (30-60 minutes before eating) 20-40 MG. At bedtime take Zantac (H2-blocker) 150-300 mg.  All are available over-the-counter without a prescription. See a Gastro doctor (GI) for further evaluation and continued management.    [facility] " (Laryngologist) is Dr. Yuri Davison at [phone].

## 2017-03-22 ENCOUNTER — HOSPITAL ENCOUNTER (OUTPATIENT)
Dept: RADIOLOGY | Facility: HOSPITAL | Age: 67
Discharge: HOME | End: 2017-03-22
Attending: FAMILY MEDICINE
Payer: COMMERCIAL

## 2017-03-22 ENCOUNTER — OFFICE VISIT (OUTPATIENT)
Dept: FAMILY MEDICINE | Facility: CLINIC | Age: 67
End: 2017-03-22
Payer: COMMERCIAL

## 2017-03-22 ENCOUNTER — PATIENT MESSAGE (OUTPATIENT)
Dept: FAMILY MEDICINE | Facility: CLINIC | Age: 67
End: 2017-03-22

## 2017-03-22 VITALS
BODY MASS INDEX: 35.04 KG/M2 | DIASTOLIC BLOOD PRESSURE: 70 MMHG | HEART RATE: 82 BPM | HEIGHT: 63 IN | WEIGHT: 197.75 LBS | SYSTOLIC BLOOD PRESSURE: 139 MMHG

## 2017-03-22 DIAGNOSIS — R07.89 CHEST WALL PAIN: ICD-10-CM

## 2017-03-22 DIAGNOSIS — M25.512 LEFT SHOULDER PAIN, UNSPECIFIED CHRONICITY: ICD-10-CM

## 2017-03-22 DIAGNOSIS — E78.5 HYPERLIPIDEMIA, UNSPECIFIED HYPERLIPIDEMIA TYPE: ICD-10-CM

## 2017-03-22 DIAGNOSIS — M25.531 RIGHT WRIST PAIN: ICD-10-CM

## 2017-03-22 DIAGNOSIS — I10 HYPERTENSION, ESSENTIAL: Primary | ICD-10-CM

## 2017-03-22 PROCEDURE — 3078F DIAST BP <80 MM HG: CPT | Mod: S$GLB,,, | Performed by: FAMILY MEDICINE

## 2017-03-22 PROCEDURE — 3075F SYST BP GE 130 - 139MM HG: CPT | Mod: S$GLB,,, | Performed by: FAMILY MEDICINE

## 2017-03-22 PROCEDURE — 73110 X-RAY EXAM OF WRIST: CPT | Mod: TC,PO,RT

## 2017-03-22 PROCEDURE — 1160F RVW MEDS BY RX/DR IN RCRD: CPT | Mod: S$GLB,,, | Performed by: FAMILY MEDICINE

## 2017-03-22 PROCEDURE — 73030 X-RAY EXAM OF SHOULDER: CPT | Mod: 26,LT,, | Performed by: RADIOLOGY

## 2017-03-22 PROCEDURE — 73030 X-RAY EXAM OF SHOULDER: CPT | Mod: TC,PO,LT

## 2017-03-22 PROCEDURE — 71020 XR CHEST PA AND LATERAL: CPT | Mod: 26,,, | Performed by: RADIOLOGY

## 2017-03-22 PROCEDURE — 71020 XR CHEST PA AND LATERAL: CPT | Mod: TC,PO

## 2017-03-22 PROCEDURE — 73110 X-RAY EXAM OF WRIST: CPT | Mod: 26,RT,, | Performed by: RADIOLOGY

## 2017-03-22 PROCEDURE — 99214 OFFICE O/P EST MOD 30 MIN: CPT | Mod: S$GLB,,, | Performed by: FAMILY MEDICINE

## 2017-03-22 RX ORDER — OMEPRAZOLE 20 MG/1
20 CAPSULE, DELAYED RELEASE ORAL DAILY
Qty: 30 CAPSULE | Refills: 5 | Status: SHIPPED | OUTPATIENT
Start: 2017-03-22 | End: 2017-05-18 | Stop reason: SDUPTHER

## 2017-03-22 RX ORDER — ATORVASTATIN CALCIUM 20 MG/1
10 TABLET, FILM COATED ORAL DAILY
Qty: 30 TABLET | Refills: 2 | Status: SHIPPED | OUTPATIENT
Start: 2017-03-22 | End: 2017-05-26 | Stop reason: SDUPTHER

## 2017-03-22 RX ORDER — LISINOPRIL 20 MG/1
20 TABLET ORAL DAILY
Qty: 30 TABLET | Refills: 1 | Status: SHIPPED | OUTPATIENT
Start: 2017-03-22 | End: 2017-05-26 | Stop reason: SDUPTHER

## 2017-03-22 NOTE — MR AVS SNAPSHOT
[place] [address] [address]  [facility] [address]  Phone: [phone]  Fax: [phone]                  Curtis Navarro   3/22/2017 10:30 AM   Office Visit    Description:  Female : 1950   Provider:  Komal Del Castillo MD   Department:  [facility]           Diagnoses this Visit        Comments    Hypertension, essential    -  Primary     Hyperlipidemia, unspecified hyperlipidemia type         Left shoulder pain, unspecified chronicity         Chest wall pain         Right wrist pain                To Do List           Future Appointments        Provider Department Dept Phone    3/22/2017 12:00 PM [facility] XRFL1 [facility] [phone]    3/22/2017 12:15 PM [facility] XR3 [facility] [phone]    3/22/2017 1:00 PM [facility] XR3 [facility] [phone]    2017 2:00 PM NURSE, [facility] [phone]    2017 10:15 AM LAB, [place] [facility] [phone]      Goals (5 Years of Data)     None       These Medications        Disp Refills Start End    omeprazole (PRILOSEC) 20 MG capsule 30 capsule 5 3/22/2017     Take 1 capsule (20 mg total) by mouth once daily. - Oral    [facility] [place] [address] [facility] #: [phone]       atorvastatin (LIPITOR) 20 MG tablet 30 tablet 2 3/22/2017     Take 0.5 tablets (10 mg total) by mouth once daily. Half tablet - Oral    [facility] Ph #: [phone]       lisinopril (PRINIVIL,ZESTRIL) 20 MG tablet 30 tablet 1 3/22/2017 3/22/2018    Take 1 tablet (20 mg total) by mouth once daily. - Oral    Pharmacy: [place] [address] [facility] Ph #: [phone]         [facility]     [facility] Nurse Care [facility] - 24/7 Assistance  Registered nurses in the [facility] provide clinical advisement, health  education, appointment booking, and other advisory services.  Call for this free service at [phone].             Medications           Message regarding Medications     Verify the changes and/or additions to your medication regime listed below are the same as discussed with your clinician today.  If any of these changes or additions are incorrect, please notify your healthcare provider.        START taking these NEW medications        Refills    lisinopril (PRINIVIL,ZESTRIL) 20 MG tablet 1    Sig: Take 1 tablet (20 mg total) by mouth once daily.    Class: Normal    Route: Oral           Verify that the below list of medications is an accurate representation of the medications you are currently taking.  If none reported, the list may be blank. If incorrect, please contact your healthcare provider. Carry this list with you in case of emergency.           Current Medications     aspirin (ECOTRIN) 81 MG EC tablet Take 81 mg by mouth once daily.    atorvastatin (LIPITOR) 20 MG tablet Take 0.5 tablets (10 mg total) by mouth once daily. Half tablet    B INFANTIS/B ANI/B JOSE M/B BIFID (PROBIOTIC 4X ORAL) Take 1 tablet by mouth once daily.    biotin 5 mg Cap Take 5 mg by mouth once daily.    calcium-vitamin D3 500 mg(1,250mg) -200 unit per tablet Take 1 tablet by mouth 2 (two) times daily with meals.    DIVIGEL 0.5 mg (0.1 %) GlPk Apply 0.5 mg topically every other day.    glucosamine-chondroitin 500-400 mg tablet Take 1 tablet by mouth 3 (three) times daily.      KRILL/OM-3/DHA/EPA/PHOSPHO/AST (MEGARED OMEGA-3 KRILL OIL ORAL) Take 1 capsule by mouth once daily.    levothyroxine (SYNTHROID) 125 MCG tablet Take 1 tablet (125 mcg total) by mouth once daily.    MULTIVITAMIN ORAL Take by mouth.      omeprazole (PRILOSEC) 20 MG capsule Take 1 capsule (20 mg total) by mouth once daily.    progesterone (PROMETRIUM) 100 MG capsule Take 100 mg by mouth once daily.      ranitidine (ZANTAC) 300 MG tablet     lisinopril  "(PRINIVIL,ZESTRIL) 20 MG tablet Take 1 tablet (20 mg total) by mouth once daily.           Clinical Reference Information           Your Vitals Were     BP Pulse Height Weight BMI    139/70 82 5' 3" (1.6 m) 89.7 kg (197 lb 12 oz) 35.03 kg/m2      Blood Pressure          Most Recent Value    BP  139/70      Allergies as of 3/22/2017     Azithromycin    Metronidazole Hcl    Moxifloxacin    Phenytoin Sodium Extended    Sulfa (Sulfonamide Antibiotics)      Immunizations Administered on Date of Encounter - 3/22/2017     None      Orders Placed During Today's Visit      Normal Orders This Visit    Ambulatory consult to Physical Therapy     Future Labs/Procedures Expected by Expires    Basic metabolic panel  3/22/2017 3/23/2018    X-Ray Chest PA And Lateral  3/22/2017 3/22/2018    X-Ray Shoulder 2 or More Views Left  3/22/2017 3/22/2018    X-Ray Wrist Complete Right  3/22/2017 3/22/2018      Language Assistance Services     ATTENTION: Language assistance services are available, free of charge. Please call [phone].      ATENCIÓN: Si habla sonia, tiene a holm disposición servicios gratuitos de asistencia lingüística. Llame al [phone].     ALLISON Ý: N?u b?n nói Ti?ng Vi?t, có các d?ch v? h? tr? ngôn ng? mi?n phí dành cho b?n. G?i s? [phone].         [facility] complies with applicable Federal civil rights laws and does not discriminate on the basis of race, color, national origin, age, disability, or sex.        "

## 2017-03-23 NOTE — PROGRESS NOTES
Subjective:       Patient ID: Curtis Navarro is a 66 y.o. female.    Chief Complaint: Follow-up    HPI   The patient is a 66-year-old who is here today for chronic follow-up.  She is feeling better since her last visit here.  Today we discussed the followin)  hypothyroidism.  She has done well with the higher dose of her Synthroid.  She is currently taking Synthroid 112 µg once a day.  She does feel that she has more energy with this medication  2)  hypertension.  When she came to have the nurses check her blood pressure after her last visit, her blood pressure was high.  She was started on lisinopril 10 mg once a day.  She has felt better with the lisinopril.  She has not been checking her blood pressure outside of the office  3)  left chest and left shoulder discomfort.  She has been experiencing left chest and left shoulder discomfort for several months possibly back to December.  She feels as if something is pulling or popping in her left upper chest wall and she is having some discomfort in her left shoulder in the front of her shoulder and in the axillary region.  Her gynecologist did a breast ultrasound to make sure this was normal and it was.  She wonders if she may have injured this area with all of the physical activity she's been doing moving her mother.  She denies any paresthesias into her left upper extremity.  She denies any weakness of her left arm.  4)  right wrist pain.  She continues to experience right-sided wrist pain.  Again, she wonders if she may have injured this with moving her mom or with her gardening work.  She notices that her pain is worse with activity.  It has not improved since her last visit      Review of Systems   Constitutional: Negative for appetite change, chills, diaphoresis, fatigue, fever and unexpected weight change.   HENT: Negative for congestion, ear pain, postnasal drip, rhinorrhea, sinus pressure, sneezing, sore throat and trouble swallowing.    Eyes:  Negative for pain, discharge and visual disturbance.   Respiratory: Negative for cough, chest tightness, shortness of breath and wheezing.    Cardiovascular: Negative for chest pain, palpitations and leg swelling.   Gastrointestinal: Negative for abdominal distention, abdominal pain, blood in stool, constipation, diarrhea, nausea and vomiting.   Skin: Negative for rash.       Objective:      Physical Exam   Constitutional: She is oriented to person, place, and time. She appears well-developed and well-nourished. No distress.   HENT:   Head: Normocephalic and atraumatic.   Right Ear: Hearing, tympanic membrane, external ear and ear canal normal.   Left Ear: Hearing, tympanic membrane, external ear and ear canal normal.   Nose: Nose normal.   Mouth/Throat: Oropharynx is clear and moist and mucous membranes are normal. No oral lesions. No oropharyngeal exudate, posterior oropharyngeal edema or posterior oropharyngeal erythema.   Eyes: Conjunctivae, EOM and lids are normal. Pupils are equal, round, and reactive to light. No scleral icterus.   Neck: Normal range of motion. Neck supple. Carotid bruit is not present. No thyroid mass and no thyromegaly present.   Cardiovascular: Normal rate, regular rhythm and normal heart sounds.   No extrasystoles are present. PMI is not displaced.  Exam reveals no gallop.    No murmur heard.  Pulmonary/Chest: Effort normal and breath sounds normal. No accessory muscle usage. No respiratory distress.   Clear to auscultation bilaterally.   Abdominal: Soft. Normal appearance and bowel sounds are normal. She exhibits no abdominal bruit. There is no hepatosplenomegaly. There is no tenderness. There is no rebound.   Musculoskeletal:   Right hand is neurovascularly intact.  She is tender over the medial carpal bones diffusely with no point tenderness elicited.   Left shoulder with slightly limited range of passive but full range of active motion.  No point tenderness is elicited.  Left axillary  "region is normal with no masses   Lymphadenopathy:        Head (right side): No submental and no submandibular adenopathy present.        Head (left side): No submental and no submandibular adenopathy present.        Right cervical: No superficial cervical, no deep cervical and no posterior cervical adenopathy present.       Left cervical: No superficial cervical, no deep cervical and no posterior cervical adenopathy present.        Right: No supraclavicular adenopathy present.        Left: No supraclavicular adenopathy present.   Neurological: She is alert and oriented to person, place, and time.   Skin: Skin is warm, dry and intact.   Psychiatric: She has a normal mood and affect.     Blood pressure 139/70, pulse 82, height 5' 3" (1.6 m), weight 89.7 kg (197 lb 12 oz).Body mass index is 35.03 kg/(m^2).        A/P:  1)  hypothyroidism.  Status unknown.  We will check a TSH next month to reassess her response to the higher dose of Synthroid  2)  hypertension.  Uncontrolled.  We will increase her lisinopril to 20 mg once a day.  With her upcoming labs in 2 weeks, we will check a BMP to make sure this is normal with the ACE inhibitor.  She will come in 2 weeks to have her blood pressure checked with the nurses  3)  persistent left upper chest and left shoulder pain with some new limited range of motion of her left shoulder.  We will check an x-ray of her left shoulder and her chest.  If these are normal, we will proceed with physical therapy.  If her symptoms do not improve or if she develops any new or worsening symptoms, she will let me know  4)  right wrist pain.  Persistent.  We'll check an x-ray of her right wrist to evaluate this further  "

## 2017-03-27 ENCOUNTER — TELEPHONE (OUTPATIENT)
Dept: FAMILY MEDICINE | Facility: CLINIC | Age: 67
End: 2017-03-27

## 2017-03-27 NOTE — TELEPHONE ENCOUNTER
Pt had virus and has not increased her BP med yet. Pt will make the increase today . BP check resched in 2 weeks. --lp

## 2017-03-27 NOTE — TELEPHONE ENCOUNTER
----- Message from Lizbeth Rico sent at 3/27/2017  3:16 PM CDT -----  Contact: Curtis Ferreira states has not been feeling well so has not started taking medication so therefore wishes to have nurse visit and lab appointment rescheduled. Asking to speak to office first regarding this matter. Please call [phone] or [phone]. Thanks!

## 2017-04-06 ENCOUNTER — LAB VISIT (OUTPATIENT)
Dept: LAB | Facility: HOSPITAL | Age: 67
End: 2017-04-06
Attending: FAMILY MEDICINE
Payer: OTHER GOVERNMENT

## 2017-04-06 DIAGNOSIS — E03.4 HYPOTHYROIDISM DUE TO ACQUIRED ATROPHY OF THYROID: ICD-10-CM

## 2017-04-06 DIAGNOSIS — I10 HYPERTENSION, ESSENTIAL: ICD-10-CM

## 2017-04-06 LAB
ANION GAP SERPL CALC-SCNC: 7 MMOL/L
BUN SERPL-MCNC: 16 MG/DL
CALCIUM SERPL-MCNC: 10.3 MG/DL
CHLORIDE SERPL-SCNC: 104 MMOL/L
CO2 SERPL-SCNC: 29 MMOL/L
CREAT SERPL-MCNC: 1 MG/DL
EST. GFR  (AFRICAN AMERICAN): >60 ML/MIN/1.73 M^2
EST. GFR  (NON AFRICAN AMERICAN): 59.7 ML/MIN/1.73 M^2
GLUCOSE SERPL-MCNC: 92 MG/DL
POTASSIUM SERPL-SCNC: 4.4 MMOL/L
SODIUM SERPL-SCNC: 140 MMOL/L
TSH SERPL DL<=0.005 MIU/L-ACNC: 1.21 UIU/ML

## 2017-04-06 PROCEDURE — 84443 ASSAY THYROID STIM HORMONE: CPT

## 2017-04-06 PROCEDURE — 80048 BASIC METABOLIC PNL TOTAL CA: CPT

## 2017-04-06 PROCEDURE — 36415 COLL VENOUS BLD VENIPUNCTURE: CPT | Mod: PO

## 2017-04-07 ENCOUNTER — PATIENT MESSAGE (OUTPATIENT)
Dept: FAMILY MEDICINE | Facility: CLINIC | Age: 67
End: 2017-04-07

## 2017-04-10 ENCOUNTER — TELEPHONE (OUTPATIENT)
Dept: FAMILY MEDICINE | Facility: CLINIC | Age: 67
End: 2017-04-10

## 2017-04-10 ENCOUNTER — CLINICAL SUPPORT (OUTPATIENT)
Dept: FAMILY MEDICINE | Facility: CLINIC | Age: 67
End: 2017-04-10
Payer: COMMERCIAL

## 2017-04-10 VITALS — SYSTOLIC BLOOD PRESSURE: 132 MMHG | HEART RATE: 80 BPM | DIASTOLIC BLOOD PRESSURE: 66 MMHG

## 2017-04-10 PROCEDURE — 99211 OFF/OP EST MAY X REQ PHY/QHP: CPT | Mod: S$GLB,,, | Performed by: FAMILY MEDICINE

## 2017-04-25 RX ORDER — LEVOTHYROXINE SODIUM 125 UG/1
125 TABLET ORAL DAILY
Qty: 30 TABLET | Refills: 1 | Status: SHIPPED | OUTPATIENT
Start: 2017-04-25 | End: 2017-07-25 | Stop reason: SDUPTHER

## 2017-05-16 ENCOUNTER — HOSPITAL ENCOUNTER (OUTPATIENT)
Dept: RADIOLOGY | Facility: HOSPITAL | Age: 67
Discharge: HOME | End: 2017-05-16
Attending: FAMILY MEDICINE
Payer: COMMERCIAL

## 2017-05-16 DIAGNOSIS — E04.1 THYROID NODULE: ICD-10-CM

## 2017-05-16 PROCEDURE — 76536 US EXAM OF HEAD AND NECK: CPT | Mod: TC,PO

## 2017-05-16 PROCEDURE — 76536 US EXAM OF HEAD AND NECK: CPT | Mod: 26,,, | Performed by: RADIOLOGY

## 2017-05-17 ENCOUNTER — PATIENT MESSAGE (OUTPATIENT)
Dept: FAMILY MEDICINE | Facility: CLINIC | Age: 67
End: 2017-05-17

## 2017-05-17 NOTE — TELEPHONE ENCOUNTER
Attempted to reach patient via contact numbers provided, no answer. LM including contact information for return call/results discussion.

## 2017-05-17 NOTE — TELEPHONE ENCOUNTER
----- Message from Alanis Carvajal sent at 5/17/2017  8:48 AM CDT -----  Contact: self  Patient is returning call.  Please call patient at [phone].  Thanks!

## 2017-05-17 NOTE — TELEPHONE ENCOUNTER
Notified patient of results, denies sickness, fever, chills, sweats, most recent illness was 1 1/2 months ago. States she has recently had physical therapy for neck pain, denies any additional changes.

## 2017-05-18 ENCOUNTER — OFFICE VISIT (OUTPATIENT)
Dept: GASTROENTEROLOGY | Facility: CLINIC | Age: 67
End: 2017-05-18
Payer: COMMERCIAL

## 2017-05-18 VITALS
SYSTOLIC BLOOD PRESSURE: 128 MMHG | HEIGHT: 63 IN | HEART RATE: 86 BPM | RESPIRATION RATE: 18 BRPM | BODY MASS INDEX: 35.04 KG/M2 | DIASTOLIC BLOOD PRESSURE: 72 MMHG | WEIGHT: 197.75 LBS

## 2017-05-18 DIAGNOSIS — R49.0 HOARSENESS: ICD-10-CM

## 2017-05-18 DIAGNOSIS — R13.14 PHARYNGOESOPHAGEAL DYSPHAGIA: ICD-10-CM

## 2017-05-18 DIAGNOSIS — R05.9 COUGH: ICD-10-CM

## 2017-05-18 DIAGNOSIS — K21.00 GASTROESOPHAGEAL REFLUX DISEASE WITH ESOPHAGITIS: Primary | ICD-10-CM

## 2017-05-18 DIAGNOSIS — Z86.010 HISTORY OF COLON POLYPS: ICD-10-CM

## 2017-05-18 DIAGNOSIS — R09.A2 GLOBUS SENSATION: ICD-10-CM

## 2017-05-18 PROCEDURE — 3078F DIAST BP <80 MM HG: CPT | Mod: S$GLB,,, | Performed by: NURSE PRACTITIONER

## 2017-05-18 PROCEDURE — 99999 PR PBB SHADOW E&M-EST. PATIENT-LVL IV: CPT | Mod: PBBFAC,,, | Performed by: NURSE PRACTITIONER

## 2017-05-18 PROCEDURE — 99214 OFFICE O/P EST MOD 30 MIN: CPT | Mod: S$GLB,,, | Performed by: NURSE PRACTITIONER

## 2017-05-18 PROCEDURE — 1160F RVW MEDS BY RX/DR IN RCRD: CPT | Mod: S$GLB,,, | Performed by: NURSE PRACTITIONER

## 2017-05-18 PROCEDURE — 3074F SYST BP LT 130 MM HG: CPT | Mod: S$GLB,,, | Performed by: NURSE PRACTITIONER

## 2017-05-18 RX ORDER — OMEPRAZOLE 40 MG/1
40 CAPSULE, DELAYED RELEASE ORAL DAILY
Qty: 30 CAPSULE | Refills: 3 | Status: ON HOLD | OUTPATIENT
Start: 2017-05-18 | End: 2017-06-27

## 2017-05-18 NOTE — PROGRESS NOTES
Subjective:       Patient ID: Curtis Navarro is a 66 y.o. female Body mass index is 35.03 kg/(m^2).    Chief Complaint: Gastroesophageal Reflux    This patient is new to me.  Established patient of Dr. Ceballos.    Gastroesophageal Reflux   She complains of belching (not more than usual), coughing (nonproductive during the day; at night coughs up white sputum), dysphagia (feels like sputum gets stuck in back of throat, denies problems with liquids, pills, or food), globus sensation (frequent throat clearing), heartburn, a hoarse voice (history of thyroid nodule and took radioactive pill and told this can be one of the symptoms, ENT told her the cause is from LPRD) and water brash. She reports no abdominal pain, no chest pain, no choking, no early satiety, no nausea or no sore throat. This is a chronic problem. Episode onset: several years ago. The problem occurs frequently. The problem has been gradually worsening (6-12 months). The heartburn wakes her from sleep. The heartburn changes with position. The symptoms are aggravated by lying down (worse in the afternoon and at night). Pertinent negatives include no fatigue, melena or weight loss. Risk factors include obesity. She has tried a PPI, a histamine-2 antagonist and head elevation (prilosec 20 mg once daily, zantac 150 mg once daily at night) for the symptoms. Improvement on treatment: heartburn has resolved but other symptoms have persisted. Past procedures include an EGD. saw ENT and told had signs of GERD.     Review of Systems   Constitutional: Negative for appetite change, chills, fatigue, fever, unexpected weight change and weight loss.   HENT: Positive for hoarse voice (history of thyroid nodule and took radioactive pill and told this can be one of the symptoms, ENT told her the cause is from LPRD) and trouble swallowing (feels like sputum gets stuck in back of throat, denies problems with liquids, pills, or food). Negative for sore throat.     Respiratory: Positive for cough (nonproductive during the day; at night coughs up white sputum). Negative for choking and shortness of breath.    Cardiovascular: Negative for chest pain.   Gastrointestinal: Positive for dysphagia (feels like sputum gets stuck in back of throat, denies problems with liquids, pills, or food) and heartburn. Negative for abdominal pain, anal bleeding, blood in stool, constipation, diarrhea, melena, nausea, rectal pain and vomiting.   Neurological: Negative for weakness.       Past Medical History:   Diagnosis Date    Allergy     Arthritis     Cataract     Colon polyp     Diverticulosis     GERD (gastroesophageal reflux disease)     Graves disease     s/p radioactive iodine in 42    H/O esophagogastroduodenoscopy     8/14    H/O percutaneous left heart catheterization     normal 5/12    History of colon polyps     History of diverticulitis     History of esophagitis     egd 8/14    Hyperlipidemia     Hypothyroidism     s/p radioactive iodine    IBS (irritable bowel syndrome)     Mild luminal irregularity of carotid artery on ultrasound     noted on 5/16 usg with next due 5/18    Osteopenia     7/13, 7/15    S/P colonoscopy     7/10;  next due 7/17    Thyroid nodule     last usg 5/17;  next due 5/19     Past Surgical History:   Procedure Laterality Date    COLONOSCOPY  07/21/2010    Dr. Ceballos; in legacy, repeat in 6-7 years    TONSILLECTOMY      UPPER GASTROINTESTINAL ENDOSCOPY  08/04/2014    Dr. Ceballos     Family History   Problem Relation Age of Onset    Cataracts Mother     Colon polyps Mother      history of colitis- part colon removed    Cancer Sister     Cataracts Maternal Grandfather     Amblyopia Neg Hx     Blindness Neg Hx     Glaucoma Neg Hx     Hypertension Neg Hx     Macular degeneration Neg Hx     Retinal detachment Neg Hx     Strabismus Neg Hx     Stroke Neg Hx     Thyroid disease Neg Hx     Diabetes Neg Hx     Colon cancer Neg Hx       Wt Readings from Last 10 Encounters:   05/18/17 89.7 kg (197 lb 12 oz)   03/22/17 89.7 kg (197 lb 12 oz)   03/13/17 88.6 kg (195 lb 5.2 oz)   02/24/17 87 kg (191 lb 12.8 oz)   11/22/16 83.5 kg (184 lb)   09/06/16 86.1 kg (189 lb 13.1 oz)   05/09/16 88.4 kg (194 lb 14.2 oz)   01/20/16 90.6 kg (199 lb 13.6 oz)   07/01/15 85.5 kg (188 lb 9.6 oz)   05/23/15 85.7 kg (189 lb)     Lab Results   Component Value Date    WBC 7.25 08/09/2016    HGB 14.7 08/09/2016    HCT 44.3 08/09/2016    MCV 87 08/09/2016     08/09/2016     CMP  Sodium   Date Value Ref Range Status   04/06/2017 140 136 - 145 mmol/L Final     Potassium   Date Value Ref Range Status   04/06/2017 4.4 3.5 - 5.1 mmol/L Final     Chloride   Date Value Ref Range Status   04/06/2017 104 95 - 110 mmol/L Final     CO2   Date Value Ref Range Status   04/06/2017 29 23 - 29 mmol/L Final     Glucose   Date Value Ref Range Status   04/06/2017 92 70 - 110 mg/dL Final     BUN, Bld   Date Value Ref Range Status   04/06/2017 16 8 - 23 mg/dL Final     Creatinine   Date Value Ref Range Status   04/06/2017 1.0 0.5 - 1.4 mg/dL Final     Calcium   Date Value Ref Range Status   04/06/2017 10.3 8.7 - 10.5 mg/dL Final     Total Protein   Date Value Ref Range Status   02/17/2017 7.4 6.0 - 8.4 g/dL Final     Albumin   Date Value Ref Range Status   02/17/2017 4.1 3.5 - 5.2 g/dL Final     Total Bilirubin   Date Value Ref Range Status   02/17/2017 0.4 0.1 - 1.0 mg/dL Final     Comment:     For infants and newborns, interpretation of results should be based  on gestational age, weight and in agreement with clinical  observations.  Premature Infant recommended reference ranges:  Up to 24 hours.............<8.0 mg/dL  Up to 48 hours............<12.0 mg/dL  3-5 days..................<15.0 mg/dL  6-29 days.................<15.0 mg/dL       Alkaline Phosphatase   Date Value Ref Range Status   02/17/2017 84 55 - 135 U/L Final     AST   Date Value Ref Range Status   02/17/2017 23 10 -  "40 U/L Final     ALT   Date Value Ref Range Status   02/17/2017 24 10 - 44 U/L Final     Anion Gap   Date Value Ref Range Status   04/06/2017 7 (L) 8 - 16 mmol/L Final     eGFR if    Date Value Ref Range Status   04/06/2017 >60.0 >60 mL/min/1.73 m^2 Final     eGFR if non    Date Value Ref Range Status   04/06/2017 59.7 (A) >60 mL/min/1.73 m^2 Final     Comment:     Calculation used to obtain the estimated glomerular filtration  rate (eGFR) is the CKD-EPI equation. Since race is unknown   in our information system, the eGFR values for   -American and Non--American patients are given   for each creatinine result.       Reviewed prior medical records including radiology report of 5/16/17 thyroid ultrasound & endoscopy history (see surgical history).    8/4/14 EGD was reviewed and procedure report states:   " Impression:          - Normal oropharynx.                       - Normal upper third of esophagus and middle third                        of esophagus.                       - (Minimal) Reflux esophagitis.                       - (Non-erosive esophageal reflux (NERD) disease?.)                       - Z-line regular, 34 cm from the incisors.                       - A few gastric polyps.                       - Erythematous mucosa in the greater curvature of                        the gastric antrum. Biopsied.                       - Normal stomach otherwise.                       - Erythematous duodenopathy in the bulb.                       - Normal duodenum otherwise.  Recommendation:      - Discharge patient to home.                       - Await CLOtest results.                       - Follow an antireflux regimen.                       - Continue present medications.                       - Use Prilosec (omeprazole) 40 mg PO daily.                       - Return to primary care physician.                       - Return to GI clinic PRN. ".  Biopsy results:   Cindy " test negative    Objective:      Physical Exam   Constitutional: She is oriented to person, place, and time. She appears well-developed and well-nourished. No distress.   HENT:   Mouth/Throat: Oropharynx is clear and moist and mucous membranes are normal. No oral lesions. No oropharyngeal exudate.   Eyes: Conjunctivae are normal. Pupils are equal, round, and reactive to light. No scleral icterus.   Neck: Neck supple. No tracheal deviation present.   Cardiovascular: Normal rate.    Pulmonary/Chest: Effort normal and breath sounds normal. No respiratory distress. She has no wheezes.   Abdominal: Soft. Normal appearance and bowel sounds are normal. She exhibits no distension, no abdominal bruit and no mass. There is no hepatosplenomegaly. There is no tenderness. There is no rigidity, no rebound, no guarding, no tenderness at McBurney's point and negative Smith's sign. No hernia.   Lymphadenopathy:     She has no cervical adenopathy.   Neurological: She is alert and oriented to person, place, and time.   Skin: Skin is warm and dry. No rash noted. She is not diaphoretic. No erythema. No pallor.   Non-jaundiced   Psychiatric: She has a normal mood and affect. Her behavior is normal.   Nursing note and vitals reviewed.      Assessment:       1. Gastroesophageal reflux disease with esophagitis    2. Cough    3. Hoarseness    4. Globus sensation    5. History of colon polyps    6. Pharyngoesophageal dysphagia        Plan:       Gastroesophageal reflux disease with esophagitis  -  INCREASE to   ranitidine (ZANTAC) 300 MG tablet; Take 1 tablet (300 mg total) by mouth nightly.  Dispense: 30 tablet; Refill: 3  -  INCREASE to   omeprazole (PRILOSEC) 40 MG capsule; Take 1 capsule (40 mg total) by mouth once daily.  Dispense: 30 capsule; Refill: 3, - take in the morning before breakfast, discussed about possible long term use of medication (prefer to use lowest effective dose or discontinuing if possible) and discussed the risks &  benefits with taking a reflux medication long term, and to take OTC calcium and vitamin d supplements as directed (such as Citracal +D), pt verbalized understanding  -discussed about the different types of medications used to treat reflux and how to use them, antacids can be used PRN for breakthrough heartburn symptoms by reducing stomach acid that is already produced, H2 blockers (zantac) work by limiting the amount acid production, & PPI's work to block acid production and are taken daily, patient verbalized understanding.  -Educated patient on lifestyle modifications to help control/reduce reflux/abdominal pain including: avoid large meals, avoid eating within 2-3 hours of bedtime (avoid late night eating & lying down soon after eating), elevate head of bed if nocturnal symptoms are present, smoking cessation (if current smoker), & weight loss (if overweight).   -Educated to avoid known foods which trigger reflux symptoms & to minimize/avoid high-fat foods, chocolate, caffeine, citrus, alcohol, & tomato products.  -Advised to avoid/limit use of NSAID's, since they can cause GI upset, bleeding, and/or ulcers. If needed, take with food.  - discussed with patient about long term use of reflux medications and the risk of using these medications long term. Some research studies (not all) have shown decrease absorption of calcium when taking PPI's and H2 blockers, but those same research studies showed that adequate dietary (milk and cheese) and/or supplement of calcium and vitamin d supplement induces enough acid secretion for calcium absorption. Also, discussed about the risk vs benefit of untreated GERD such as grande's esophagus.  - recommend annual monitoring with blood work to include CMP, CBC, vitamin B12, and magnesium (to be done at follow-up if we determine long term use is needed)  - schedule EGD, discussed procedure with patient, verbalized understanding    Cough, Hoarseness, & Globus sensation  -   INCREASE to   omeprazole (PRILOSEC) 40 MG capsule; Take 1 capsule (40 mg total) by mouth once daily.  Dispense: 30 capsule; Refill: 3  -  INCREASE to   ranitidine (ZANTAC) 300 MG tablet; Take 1 tablet (300 mg total) by mouth nightly.  Dispense: 30 tablet; Refill: 3  - follow-up with PCP to rule out other etiologies    History of colon polyps  - schedule Colonoscopy, discussed procedure with the patient, verbalized understanding    Pharyngoesophageal dysphagia  - schedule EGD, discussed procedure with patient and possible esophageal dilation may be performed during procedure if indicated, patient verbalized understanding  - educated patient to eat smaller more frequent meals and to eat slowly and advised to eat a soft diet.  - possible UGI/esophagram/esophageal manometry if symptoms persist    Return in about 2 months (around 7/18/2017), or if symptoms worsen or fail to improve.      If no improvement in symptoms or symptoms worsen, call/follow-up at clinic or go to ER.

## 2017-05-18 NOTE — PATIENT INSTRUCTIONS

## 2017-05-18 NOTE — MR AVS SNAPSHOT
[facility]  [address]  [facility] [address]  Phone: [phone]                  Curtis Navarro   2017 9:30 AM   Office Visit    Description:  Female : 1950   Provider:  JAY Monsalve   Department:  [facility]           Reason for Visit     Gastroesophageal Reflux           Diagnoses this Visit        Comments    Gastroesophageal reflux disease with esophagitis    -  Primary     Cough         Hoarseness         Globus sensation         History of colon polyps         Pharyngoesophageal dysphagia                To Do List           Future Appointments        Provider Department Dept Phone    2017 10:10 AM Komal Del Castillo MD [facility] [phone]      Goals (5 Years of Data)     None      Follow-Up and Disposition     Return in about 2 months (around 2017), or if symptoms worsen or fail to improve.       These Medications        Disp Refills Start End    omeprazole (PRILOSEC) 40 MG capsule 30 capsule 3 2017     Take 1 capsule (40 mg total) by mouth once daily. - Oral    Pharmacy: [facility] [address] Ph #: [phone]       ranitidine (ZANTAC) 300 MG tablet 30 tablet 3 2017     Take 1 tablet (300 mg total) by mouth nightly. - Oral    Pharmacy: [facility] [address] Ph #: [phone]         [facility]     [facility] Nurse Care Line -  Assistance  Unless otherwise directed by your provider, please contact [facility], our nurse care line that is available for / assistance.     Registered nurses in the [facility] provide: appointment scheduling, clinical advisement, health education, and other advisory services.  Call: [phone] (toll free)               Medications           Message regarding Medications     Verify the changes and/or additions to your medication regime listed below are the same as  discussed with your clinician today.  If any of these changes or additions are incorrect, please notify your healthcare provider.        CHANGE how you are taking these medications     Start Taking Instead of    omeprazole (PRILOSEC) 40 MG capsule omeprazole (PRILOSEC) 20 MG capsule    Dosage:  Take 1 capsule (40 mg total) by mouth once daily. Dosage:  Take 1 capsule (20 mg total) by mouth once daily.    Reason for Change:  Reorder     ranitidine (ZANTAC) 300 MG tablet ranitidine (ZANTAC) 300 MG tablet    Dosage:  Take 1 tablet (300 mg total) by mouth nightly. Dosage:  Take 150 mg by mouth nightly.     Reason for Change:  Reorder            Verify that the below list of medications is an accurate representation of the medications you are currently taking.  If none reported, the list may be blank. If incorrect, please contact your healthcare provider. Carry this list with you in case of emergency.           Current Medications     aspirin (ECOTRIN) 81 MG EC tablet Take 81 mg by mouth once daily.    atorvastatin (LIPITOR) 20 MG tablet Take 0.5 tablets (10 mg total) by mouth once daily. Half tablet    B INFANTIS/B ANI/B JOSE M/B BIFID (PROBIOTIC 4X ORAL) Take 1 tablet by mouth once daily.    biotin 5 mg Cap Take 5 mg by mouth once daily.    calcium-vitamin D3 500 mg(1,250mg) -200 unit per tablet Take 1 tablet by mouth 2 (two) times daily with meals.    DIVIGEL 0.5 mg (0.1 %) GlPk Apply 0.5 mg topically every other day.    glucosamine-chondroitin 500-400 mg tablet Take 1 tablet by mouth 3 (three) times daily.      KRILL/OM-3/DHA/EPA/PHOSPHO/AST (MEGARED OMEGA-3 KRILL OIL ORAL) Take 1 capsule by mouth once daily.    levothyroxine (SYNTHROID) 125 MCG tablet Take 1 tablet (125 mcg total) by mouth once daily.    lisinopril (PRINIVIL,ZESTRIL) 20 MG tablet Take 1 tablet (20 mg total) by mouth once daily.    MULTIVITAMIN ORAL Take by mouth.      omeprazole (PRILOSEC) 40 MG capsule Take 1 capsule (40 mg total) by mouth once daily.  "   progesterone (PROMETRIUM) 100 MG capsule Take 100 mg by mouth once daily.      ranitidine (ZANTAC) 300 MG tablet Take 1 tablet (300 mg total) by mouth nightly.           Clinical Reference Information           Your Vitals Were     BP Pulse Resp Height Weight BMI    128/72 86 18 5' 3" (1.6 m) 89.7 kg (197 lb 12 oz) 35.03 kg/m2      Blood Pressure          Most Recent Value    BP  128/72      Allergies as of 5/18/2017     Azithromycin    Metronidazole Hcl    Moxifloxacin    Phenytoin Sodium Extended    Sulfa (Sulfonamide Antibiotics)      Immunizations Administered on Date of Encounter - 5/18/2017     None      Instructions      GERD (Adult)    The esophagus is a tube that carries food from the mouth to the stomach. A valve at the lower end of the esophagus prevents stomach acid from flowing upward. When this valve doesn't work properly, stomach contents may repeatedly flow back up (reflux) into the esophagus. This is called gastroesophageal reflux disease (GERD). GERD can irritate the esophagus. It can cause problems with swallowing or breathing. In severe cases, GERD can cause recurrent pneumonia or other serious problems.  Symptoms of reflux include burning, pressure or sharp pain in the upper abdomen or mid to lower chest. The pain can spread to the neck, back, or shoulder. There may be belching, an acid taste in the back of the throat, chronic cough, or sore throat or hoarseness. GERD symptoms often occur during the day after a big meal. They can also occur at night when lying down.   Home care  Lifestyle changes can help reduce symptoms. If needed, medicines may be prescribed. Symptoms often improve with treatment, but if treatment is stopped, the symptoms often return after a few months. So most persons with GERD will need to continue treatment.  Lifestyle changes  · Limit or avoid fatty, fried, and spicy foods, as well as coffee, chocolate, mint, and foods with high acid content such as tomatoes and citrus " "fruit and juices (orange, grapefruit, lemon).  · Dont eat large meals, especially at night. Frequent, smaller meals are best. Do not lie down right after eating. And dont eat anything 3 hours before going to bed.  · Avoid drinking alcohol and smoking. As much as possible, stay away from second hand smoke.  · If you are overweight, losing weight will reduce symptoms.   · Avoid wearing tight clothing around your stomach area.  · If your symptoms occur during sleep, use a foam wedge to elevate your upper body (not just your head.) Or, place 4" blocks under the head of your bed.  Medicines  If needed, medicines can help relieve the symptoms of GERD and prevent damage to the esophagus. Discuss a medicine plan with your healthcare provider. This may include one or more of the following medicines:  · Antacids to help neutralize the normal acids in your stomach.  · Acid blockers (H2 blockers) to decrease acid production.  · Acid inhibitors (PPIs) to decrease acid production in a different way than the blockers. They may work better, but can take a little longer to take effect.  Take an antacid 30-60 minutes after eating and at bedtime, but not at the same time as an acid blocker.  Try not to take medicines such as ibuprofen and aspirin. If you are taking aspirin for your heart or other medical reasons, talk to your healthcare provider about stopping it.  Follow-up care  Follow up with your healthcare provider or as advised by our staff.  When to seek medical advice  Call your healthcare provider if any of the following occur:  · Stomach pain gets worse or moves to the lower right abdomen (appendix area)  · Chest pain appears or gets worse, or spreads to the back, neck, shoulder, or arm  · Frequent vomiting (cant keep down liquids)  · Blood in the stool or vomit (red or black in color)  · Feeling weak or dizzy  · Fever of 100.4ºF (38ºC) or higher, or as directed by your healthcare provider  Date Last Reviewed: " 6/23/2015  © 5202-0122 [employer]. [address] [place] [address], [place] [address]. All rights reserved. This information is not intended as a substitute for professional medical care. Always follow your healthcare professional's instructions.             Language Assistance Services     ATTENTION: Language assistance services are available, free of charge. Please call [phone].      ATENCIÓN: Si habla español, tiene a holm disposición servicios gratuitos de asistencia lingüística. Llame al [phone].     CHÚ Ý: N?u b?n nói Ti?ng Vi?t, có các d?ch v? h? tr? ngôn ng? mi?n phí dành cho b?n. G?i s? [phone].         [facility] complies with applicable Federal civil rights laws and does not discriminate on the basis of race, color, national origin, age, disability, or sex.

## 2017-05-22 ENCOUNTER — DOCUMENTATION ONLY (OUTPATIENT)
Dept: ADMINISTRATIVE | Facility: HOSPITAL | Age: 67
End: 2017-05-22

## 2017-05-22 NOTE — PROGRESS NOTES
PRE-VISIT CHART REVIEW    Appointment Scheduled on 5/23/17    Department stratifications & guidelines reviewed:no    Target Chronic Diagnosis: obesity    Chronic Diagnosis Intervention Due: no    Goals Updated:No    Health Maintenance Due   Topic Date Due    Colonoscopy  07/21/2017       Advanced Directives:   65 years of age or older?  No  Directive on file?  N\A                                      Pre-visit patient communication:  In Person    Studies or screenings scheduled pre-visit: no    Educate patient on obesity (35.03)

## 2017-05-23 ENCOUNTER — OFFICE VISIT (OUTPATIENT)
Dept: FAMILY MEDICINE | Facility: CLINIC | Age: 67
End: 2017-05-23
Payer: COMMERCIAL

## 2017-05-23 VITALS
HEIGHT: 63 IN | BODY MASS INDEX: 34.34 KG/M2 | SYSTOLIC BLOOD PRESSURE: 130 MMHG | WEIGHT: 193.81 LBS | TEMPERATURE: 99 F | HEART RATE: 90 BPM | DIASTOLIC BLOOD PRESSURE: 70 MMHG | OXYGEN SATURATION: 98 %

## 2017-05-23 DIAGNOSIS — I10 HYPERTENSION, ESSENTIAL: ICD-10-CM

## 2017-05-23 DIAGNOSIS — R59.1 LYMPHADENOPATHY: Primary | ICD-10-CM

## 2017-05-23 DIAGNOSIS — R05.9 COUGH: ICD-10-CM

## 2017-05-23 PROCEDURE — 99214 OFFICE O/P EST MOD 30 MIN: CPT | Mod: S$GLB,,, | Performed by: FAMILY MEDICINE

## 2017-05-23 RX ORDER — AMOXICILLIN AND CLAVULANATE POTASSIUM 875; 125 MG/1; MG/1
1 TABLET, FILM COATED ORAL 2 TIMES DAILY
Qty: 20 TABLET | Refills: 0 | Status: SHIPPED | OUTPATIENT
Start: 2017-05-23 | End: 2017-06-06 | Stop reason: ALTCHOICE

## 2017-05-25 DIAGNOSIS — E78.5 HYPERLIPIDEMIA, UNSPECIFIED HYPERLIPIDEMIA TYPE: ICD-10-CM

## 2017-05-26 DIAGNOSIS — E78.5 HYPERLIPIDEMIA, UNSPECIFIED HYPERLIPIDEMIA TYPE: ICD-10-CM

## 2017-05-26 RX ORDER — ATORVASTATIN CALCIUM 20 MG/1
10 TABLET, FILM COATED ORAL DAILY
Qty: 30 TABLET | Refills: 2 | OUTPATIENT
Start: 2017-05-26

## 2017-05-26 RX ORDER — LEVOTHYROXINE SODIUM 125 UG/1
125 TABLET ORAL DAILY
Qty: 30 TABLET | Refills: 2 | OUTPATIENT
Start: 2017-05-26 | End: 2018-05-26

## 2017-05-26 RX ORDER — LISINOPRIL 20 MG/1
TABLET ORAL
Qty: 30 TABLET | Refills: 2 | Status: SHIPPED | OUTPATIENT
Start: 2017-05-26 | End: 2017-06-24

## 2017-05-26 RX ORDER — LISINOPRIL 20 MG/1
20 TABLET ORAL DAILY
Qty: 30 TABLET | Refills: 2 | OUTPATIENT
Start: 2017-05-26 | End: 2018-05-26

## 2017-05-26 RX ORDER — ATORVASTATIN CALCIUM 20 MG/1
TABLET, FILM COATED ORAL
Qty: 15 TABLET | Refills: 2 | Status: SHIPPED | OUTPATIENT
Start: 2017-05-26 | End: 2017-10-24 | Stop reason: SDUPTHER

## 2017-05-26 NOTE — TELEPHONE ENCOUNTER
Mandeep from [facility] pharmacy called to get a verbal order for pt's lisinopril, synthroid and lipitor. MD prescribed in march with 2 refills. Verbal given for one fill only and no refills. BERRY Irwin LPN

## 2017-05-27 NOTE — PROGRESS NOTES
Subjective:       Patient ID: Curtis Navarro is a 66 y.o. female.    Chief Complaint: Results    HPI     The patient is a 66-year-old who is here today to discuss her recent ultrasound findings.  Recently, she had a 1 year thyroid ultrasound study to assess thyroid nodules.  The thyroid nodules were stable but several small normal sized lymph nodes were noted with 1 lymph node on the left in particular being addressed by the radiologist.  She has not felt any lumps or bumps in her neck or anywhere else.  She has not had any fevers or chills.  She has not had any night sweats.  She's not had any change or appetite or her weight    On questioning, she has had some issues with phlegm in her throat over the past week.  She has had some sinus congestion, sneezing, clear rhinorrhea and nonproductive cough for the past 2 weeks.    She does continue to have hoarseness.  She did see the ENT and had a nasal laryngoscopy that showed LPRD.  She was instructed to take Prilosec in the morning and Zantac in the evening.  She did meet with the gastroenterologist and she is scheduled for an EGD soon.    Since I've seen her last, her blood pressure was consistently elevated and she was started on lisinopril for hypertension.  She has tolerated this well with no significant side effects      Review of Systems   Constitutional: Negative for appetite change, chills, diaphoresis, fatigue, fever and unexpected weight change.   HENT: Positive for congestion, postnasal drip, rhinorrhea, sneezing and voice change. Negative for ear pain, sinus pressure, sore throat and trouble swallowing.    Eyes: Negative for pain, discharge and visual disturbance.   Respiratory: Positive for cough. Negative for chest tightness, shortness of breath and wheezing.    Cardiovascular: Negative for chest pain, palpitations and leg swelling.   Gastrointestinal: Negative for abdominal distention, abdominal pain, blood in stool, constipation, diarrhea, nausea  and vomiting.   Skin: Negative for rash.       Objective:      Physical Exam   Constitutional: She is oriented to person, place, and time. She appears well-developed and well-nourished. No distress.   HENT:   Head: Normocephalic and atraumatic.   Right Ear: Hearing, tympanic membrane, external ear and ear canal normal.   Left Ear: Hearing, tympanic membrane, external ear and ear canal normal.   Nose: Nose normal.   Mouth/Throat: Oropharynx is clear and moist and mucous membranes are normal. No oral lesions. No oropharyngeal exudate, posterior oropharyngeal edema or posterior oropharyngeal erythema.   Eyes: Conjunctivae, EOM and lids are normal. Pupils are equal, round, and reactive to light. No scleral icterus.   Neck: Normal range of motion. Neck supple. Carotid bruit is not present. No thyroid mass and no thyromegaly present.   Cardiovascular: Normal rate, regular rhythm and normal heart sounds.   No extrasystoles are present. PMI is not displaced.  Exam reveals no gallop.    No murmur heard.  Pulmonary/Chest: Effort normal and breath sounds normal. No accessory muscle usage. No respiratory distress.   Clear to auscultation bilaterally.   Abdominal: Soft. Normal appearance and bowel sounds are normal. She exhibits no abdominal bruit. There is no hepatosplenomegaly. There is no tenderness. There is no rebound.   Lymphadenopathy:        Head (right side): No submental and no submandibular adenopathy present.        Head (left side): No submental and no submandibular adenopathy present.        Right cervical: No superficial cervical, no deep cervical and no posterior cervical adenopathy present.       Left cervical: No superficial cervical, no deep cervical and no posterior cervical adenopathy present.        Right: No supraclavicular adenopathy present.        Left: No supraclavicular adenopathy present.   Neurological: She is alert and oriented to person, place, and time.   Skin: Skin is warm, dry and intact.  "  Psychiatric: She has a normal mood and affect.     Blood pressure 130/70, pulse 90, temperature 98.6 °F (37 °C), temperature source Oral, height 5' 3" (1.6 m), weight 87.9 kg (193 lb 12.6 oz), SpO2 98 %.Body mass index is 34.33 kg/m².          A/P:  1)  cervical lymphadenopathy.  New.  I am going to treat her with a course of Augmentin.  I will see her back in 2 weeks for a repeat exam.  At that point, we will likely consider repeating an ultrasound to reevaluate the lymphadenopathy.  If she develops any new or worsening symptoms (lumps or bumps, fevers or chills, night sweats, anorexia, weight loss) she will let me know  2)  URI versus ALLERGIC rhinitis.  Acute.  We will continue with symptomatic/supportive care.  If her symptoms do not improve or if they worsen, she will let me know  3)  LPRD/GERD.  Persistent.  Continue current medication.  Follow up with EGD as planned  4)  hypertension.  Well-controlled.  Continue with lisinopril  5)  hypothyroidism with thyroid nodules.  Stable.  We will repeat a TSH in 6 months and a thyroid ultrasound in one to 2 years  "

## 2017-06-06 ENCOUNTER — OFFICE VISIT (OUTPATIENT)
Dept: FAMILY MEDICINE | Facility: CLINIC | Age: 67
End: 2017-06-06
Payer: MEDICARE

## 2017-06-06 VITALS
SYSTOLIC BLOOD PRESSURE: 124 MMHG | TEMPERATURE: 99 F | HEART RATE: 72 BPM | WEIGHT: 195.31 LBS | RESPIRATION RATE: 16 BRPM | DIASTOLIC BLOOD PRESSURE: 78 MMHG | HEIGHT: 63 IN | BODY MASS INDEX: 34.61 KG/M2

## 2017-06-06 DIAGNOSIS — N32.81 OAB (OVERACTIVE BLADDER): ICD-10-CM

## 2017-06-06 DIAGNOSIS — R59.1 LAD (LYMPHADENOPATHY): Primary | ICD-10-CM

## 2017-06-06 DIAGNOSIS — R39.89 OTHER SYMPTOMS AND SIGNS INVOLVING THE GENITOURINARY SYSTEM: ICD-10-CM

## 2017-06-06 DIAGNOSIS — M25.512 LEFT SHOULDER PAIN, UNSPECIFIED CHRONICITY: ICD-10-CM

## 2017-06-06 LAB
BILIRUB UR QL STRIP: NEGATIVE
CLARITY UR REFRACT.AUTO: CLEAR
COLOR UR AUTO: YELLOW
GLUCOSE UR QL STRIP: NEGATIVE
HGB UR QL STRIP: NEGATIVE
KETONES UR QL STRIP: NEGATIVE
LEUKOCYTE ESTERASE UR QL STRIP: NEGATIVE
NITRITE UR QL STRIP: NEGATIVE
PH UR STRIP: 6 [PH] (ref 5–8)
PROT UR QL STRIP: NEGATIVE
SP GR UR STRIP: 1.01 (ref 1–1.03)
URN SPEC COLLECT METH UR: NORMAL
UROBILINOGEN UR STRIP-ACNC: NEGATIVE EU/DL

## 2017-06-06 PROCEDURE — 99214 OFFICE O/P EST MOD 30 MIN: CPT | Mod: S$GLB,,, | Performed by: FAMILY MEDICINE

## 2017-06-06 PROCEDURE — 87086 URINE CULTURE/COLONY COUNT: CPT

## 2017-06-06 PROCEDURE — 87077 CULTURE AEROBIC IDENTIFY: CPT

## 2017-06-06 PROCEDURE — 87186 SC STD MICRODIL/AGAR DIL: CPT

## 2017-06-06 PROCEDURE — 87088 URINE BACTERIA CULTURE: CPT

## 2017-06-06 PROCEDURE — 81003 URINALYSIS AUTO W/O SCOPE: CPT | Mod: PO

## 2017-06-06 RX ORDER — TOLTERODINE 4 MG/1
4 CAPSULE, EXTENDED RELEASE ORAL DAILY
Qty: 30 CAPSULE | Refills: 1 | Status: SHIPPED | OUTPATIENT
Start: 2017-06-06 | End: 2017-08-07 | Stop reason: SDUPTHER

## 2017-06-09 LAB — BACTERIA UR CULT: NORMAL

## 2017-06-12 ENCOUNTER — PATIENT MESSAGE (OUTPATIENT)
Dept: FAMILY MEDICINE | Facility: CLINIC | Age: 67
End: 2017-06-12

## 2017-06-12 ENCOUNTER — OFFICE VISIT (OUTPATIENT)
Dept: ORTHOPEDICS | Facility: CLINIC | Age: 67
End: 2017-06-12
Payer: MEDICARE

## 2017-06-12 VITALS — WEIGHT: 195 LBS | BODY MASS INDEX: 34.55 KG/M2 | HEIGHT: 63 IN

## 2017-06-12 DIAGNOSIS — M25.512 ACUTE PAIN OF LEFT SHOULDER: Primary | ICD-10-CM

## 2017-06-12 DIAGNOSIS — M75.02 ADHESIVE CAPSULITIS OF LEFT SHOULDER: ICD-10-CM

## 2017-06-12 PROCEDURE — 99999 PR PBB SHADOW E&M-EST. PATIENT-LVL III: CPT | Mod: PBBFAC,,, | Performed by: ORTHOPAEDIC SURGERY

## 2017-06-12 PROCEDURE — 99203 OFFICE O/P NEW LOW 30 MIN: CPT | Mod: S$GLB,,, | Performed by: ORTHOPAEDIC SURGERY

## 2017-06-12 RX ORDER — NITROFURANTOIN 25; 75 MG/1; MG/1
100 CAPSULE ORAL 2 TIMES DAILY
Qty: 14 CAPSULE | Refills: 0 | Status: SHIPPED | OUTPATIENT
Start: 2017-06-12 | End: 2017-06-19

## 2017-06-12 NOTE — PROGRESS NOTES
Subjective:       Patient ID: Curtis Navarro is a 66 y.o. female.    Chief Complaint: Follow-up    HPI   The patient is a 66-year-old who is here today for follow-up.  At her last visit, we started a course of Augmentin for her cervical lymphadenopathy which was noted on a thyroid ultrasound.  She has not been able to feel any lymphadenopathy in her neck or anywhere else.  She denies any fevers or chills or night sweats.  She denies any change in her appetite or her weight    She does feel as if the phlegm in her throat has improved since her last visit.  She is not having significant sinus issues currently and denies any significant congestion or rhinorrhea.  She denies any significant cough    She also mentions that she's been having bladder problems.  She has to go to the bathroom frequently which is particularly problematic during the night.  Sometimes, the sense of needing to urinate which comes upon her quickly and she is in a rush to find the bathroom.  She will occasionally have an accident if she can't get to the bathroom quick enough.  She denies any stress incontinence.  She denies any dysuria hematuria.  These bladder issues have been ongoing for quite some time     She continues to have pain in her left shoulder which has been present for months.  She has completed physical therapy but has not found that helpful.  We did discuss meeting with the orthopedist which she is willing to do    Review of Systems   Constitutional: Negative for appetite change, chills, diaphoresis, fatigue, fever and unexpected weight change.   HENT: Negative for congestion, ear pain, postnasal drip, rhinorrhea, sinus pressure, sneezing, sore throat and trouble swallowing.    Eyes: Negative for pain, discharge and visual disturbance.   Respiratory: Negative for cough, chest tightness, shortness of breath and wheezing.    Cardiovascular: Negative for chest pain, palpitations and leg swelling.   Gastrointestinal: Negative for  abdominal distention, abdominal pain, blood in stool, constipation, diarrhea, nausea and vomiting.   Genitourinary: Positive for frequency and urgency. Negative for dysuria, hematuria and pelvic pain.   Musculoskeletal:        Per HPI   Skin: Negative for rash.       Objective:      Physical Exam   Constitutional: She is oriented to person, place, and time. She appears well-developed and well-nourished. No distress.   HENT:   Head: Normocephalic and atraumatic.   Right Ear: Hearing, tympanic membrane, external ear and ear canal normal.   Left Ear: Hearing, tympanic membrane, external ear and ear canal normal.   Nose: Nose normal.   Mouth/Throat: Oropharynx is clear and moist and mucous membranes are normal. No oral lesions. No oropharyngeal exudate, posterior oropharyngeal edema or posterior oropharyngeal erythema.   Eyes: Conjunctivae, EOM and lids are normal. Pupils are equal, round, and reactive to light. No scleral icterus.   Neck: Normal range of motion. Neck supple. Carotid bruit is not present. No thyroid mass and no thyromegaly present.   Cardiovascular: Normal rate, regular rhythm and normal heart sounds.   No extrasystoles are present. PMI is not displaced.  Exam reveals no gallop.    No murmur heard.  Pulmonary/Chest: Effort normal and breath sounds normal. No accessory muscle usage. No respiratory distress.   Clear to auscultation bilaterally.   Abdominal: Soft. Normal appearance and bowel sounds are normal. She exhibits no abdominal bruit. There is no hepatosplenomegaly. There is no tenderness. There is no rebound.   Lymphadenopathy:        Head (right side): No submental and no submandibular adenopathy present.        Head (left side): No submental and no submandibular adenopathy present.     She has cervical adenopathy.        Right cervical: No superficial cervical, no deep cervical and no posterior cervical adenopathy present.       Left cervical: Superficial cervical (one prominent soft non-tender  "node is noted) adenopathy present. No deep cervical and no posterior cervical adenopathy present.        Right: No supraclavicular adenopathy present.        Left: No supraclavicular adenopathy present.   Neurological: She is alert and oriented to person, place, and time.   Skin: Skin is warm, dry and intact.   Psychiatric: She has a normal mood and affect.     Blood pressure 124/78, pulse 72, temperature 98.8 °F (37.1 °C), temperature source Oral, resp. rate 16, height 5' 3" (1.6 m), weight 88.6 kg (195 lb 5.2 oz).Body mass index is 34.6 kg/m².          A/P:  1)  cervical lymphadenopathy.  Partially improved but partially unchanged.  We will check an ultrasound to evaluate this further.  If the ultrasound continues to show lymphadenopathy, we will pursue this further  2)  OAB and urge incontinence.  Chronic but new to me.  We will check a UA and urine culture.  We will try Detrol 4 mg once a day.  If this does not improve her symptoms or she develops any new or worsening symptoms, she will let me know  3)  left shoulder pain.  Persistent.  We will refer her to the orthopedist    "

## 2017-06-12 NOTE — LETTER
June 12, 2017      Komal Del Castillo MD  [id number] [address] [address]           [facility] [address] [facility] [address] [place] [address]  Phone: [phone]          Patient: Curtis Navarro   MR Number: [id number]   YOB: 1950   Date of Visit: 6/12/2017       Dear Dr. Komal Del Castillo:    Thank you for referring Curtis Navarro to me for evaluation. Attached you will find relevant portions of my assessment and plan of care.    If you have questions, please do not hesitate to call me. I look forward to following Curtis Navarro along with you.    Sincerely,    Jaime Irwin MD    Enclosure  CC:  No Recipients    If you would like to receive this communication electronically, please contact [email or web address] or [phone] to request more information on [employer] Link access.    For providers and/or their staff who would like to refer a patient to [facility], please contact us through our one-stop-shop provider referral line, [facility], at [phone].    If you feel you have received this communication in error or would no longer like to receive these types of communications, please e-mail [email or web address]

## 2017-06-13 NOTE — MEDICAL/APP STUDENT
"DATE: 6/12/2017  PATIENT: Curtis Navarro  REFERRING MD:   CHIEF COMPLAINT:   Chief Complaint   Patient presents with    Left Shoulder - Pain       HISTORY:  Curtis Navarro is a 66 y.o. right hand dominant female  who presents for initial evaluation of left shoulder and neck pain/stiffness that began approximately 7-8 months ago.  Patient does not recall any injury or trauma to the area.  She states she did help her mother move in December, but can't remember if the pain started prior to that or after.  Initially the pain started in her central chest and felt like it was "pulling" out over her left breast.  This prompted her to see her OB-GYN who completed work-up to rule out breast pathology.  Pain then began to occur more in her left shoulder and trapezius area which she describes as "pulling" and "tightness".  Pain is not constant and only occurrs with certain movements/positions.  The pain resolves when she moves out of these positions.  She has not taken any medications for the pain as it is not constant.  She has completed 6 weeks of physical therapy with some improvement of ROM and strength of the left UE.  Patient denies numbness, tingling, burning, change in sensation of LUE.    PAST MEDICAL/SURGICAL HISTORY:  Past Medical History:   Diagnosis Date    Allergy     Arthritis     Cataract     Colon polyp     Diverticulosis     GERD (gastroesophageal reflux disease)     Graves disease     s/p radioactive iodine in 42    H/O esophagogastroduodenoscopy     8/14    H/O percutaneous left heart catheterization     normal 5/12    History of colon polyps     History of diverticulitis     History of esophagitis     egd 8/14    Hyperlipidemia     Hypothyroidism     s/p radioactive iodine    IBS (irritable bowel syndrome)     Mild luminal irregularity of carotid artery on ultrasound     noted on 5/16 usg with next due 5/18    Osteopenia     7/13, 7/15    S/P colonoscopy     7/10;  next due " 7/17    Thyroid nodule     last usg 5/17;  next due 5/19     Past Surgical History:   Procedure Laterality Date    COLONOSCOPY  07/21/2010    Dr. Ceballos; in legacy, repeat in 6-7 years    TONSILLECTOMY      UPPER GASTROINTESTINAL ENDOSCOPY  08/04/2014    Dr. Ceballos       Current Medications:   Current Outpatient Prescriptions:     aspirin (ECOTRIN) 81 MG EC tablet, Take 81 mg by mouth once daily., Disp: , Rfl:     atorvastatin (LIPITOR) 20 MG tablet, TAKE 1/2 TABLET DAILY FOR CHOLESTEROL., Disp: 15 tablet, Rfl: 2    B INFANTIS/B ANI/B JOSE M/B BIFID (PROBIOTIC 4X ORAL), Take 1 tablet by mouth once daily., Disp: , Rfl:     biotin 5 mg Cap, Take 5 mg by mouth once daily., Disp: , Rfl:     calcium-vitamin D3 500 mg(1,250mg) -200 unit per tablet, Take 1 tablet by mouth 2 (two) times daily with meals., Disp: , Rfl:     DIVIGEL 0.5 mg (0.1 %) GlPk, Apply 0.5 mg topically every other day., Disp: , Rfl:     GLUCOSA HAHN 2KCL/CHONDROITIN HAHN (GLUCOSAMINE-CHONDROITIN DS ORAL), Take by mouth 2 (two) times daily., Disp: , Rfl:     KRILL/OM-3/DHA/EPA/PHOSPHO/AST (MEGARED OMEGA-3 KRILL OIL ORAL), Take 1 capsule by mouth once daily., Disp: , Rfl:     levothyroxine (SYNTHROID) 125 MCG tablet, Take 1 tablet (125 mcg total) by mouth once daily., Disp: 30 tablet, Rfl: 1    lisinopril (PRINIVIL,ZESTRIL) 20 MG tablet, TAKE ONE TABLET DAILY FOR BLOOD PRESSURE., Disp: 30 tablet, Rfl: 2    MULTIVITAMIN ORAL, Take by mouth.  , Disp: , Rfl:     nitrofurantoin, macrocrystal-monohydrate, (MACROBID) 100 MG capsule, Take 1 capsule (100 mg total) by mouth 2 (two) times daily., Disp: 14 capsule, Rfl: 0    omeprazole (PRILOSEC) 40 MG capsule, Take 1 capsule (40 mg total) by mouth once daily., Disp: 30 capsule, Rfl: 3    progesterone (PROMETRIUM) 100 MG capsule, Take 100 mg by mouth once daily.  , Disp: , Rfl:     ranitidine (ZANTAC) 300 MG tablet, Take 1 tablet (300 mg total) by mouth nightly., Disp: 30 tablet, Rfl: 3    tolterodine  "(DETROL LA) 4 MG 24 hr capsule, Take 1 capsule (4 mg total) by mouth once daily., Disp: 30 capsule, Rfl: 1    Social History:   Social History     Social History    Marital status:      Spouse name: N/A    Number of children: N/A    Years of education: N/A     Occupational History    Not on file.     Social History Main Topics    Smoking status: Never Smoker    Smokeless tobacco: Never Used    Alcohol use No    Drug use: No    Sexual activity: Yes     Partners: Male     Birth control/ protection: Post-menopausal     Other Topics Concern    Not on file     Social History Narrative           ROS:  Constitution: Negative for chills, fever, and sweats. Negative for unexplained weight loss.  HENT: Negative for headaches and blurry vision.   Cardiovascular: Negative for chest pain, irregular heartbeat, leg swelling and palpitations.   Respiratory: Negative for cough and shortness of breath.   Gastrointestinal: Negative for abdominal pain, heartburn, nausea and vomiting.   Genitourinary: Negative for bladder incontinence and dysuria.   Musculoskeletal: Positive for myalgias and muscle stiffness/weakness. Negative for systemic arthritis, joint swelling.   Neurological: Negative for numbness.   Psychiatric/Behavioral: Negative for depression.   Endocrine: Negative for polyuria.   Hematologic/Lymphatic: Negative for bleeding disorders.  Skin: Negative for poor wound healing.     PHYSICAL EXAM:  Ht 5' 3" (1.6 m)   Wt 88.5 kg (195 lb)   BMI 34.54 kg/m²   General Appearance: well-developed, well-nourished, no distress  Shoulder Exam: No asymmetry, no deformity; normal ROM bilaterally on external an internal rotation; loss of 5 degrees of ROM on abduction and flexion of LUE; 5/5 strength bilaterally; no pain elicited during ROM and strength testing; Юлия test negative; winged scapula negative.    IMAGING:   X-Ray Shoulder 2 Views - 3/22/2017:  - No fracture, subluxation, or other significant abnormality " identified.  - Normal left shoulder.    ASSESSMENT:   Curtis Navarro is a 66 y.o. woman with 7-8 month history of atraumatic, positional left shoulder and trapezius pain and tightness.    The most likely diagnosis is mild-moderate frozen shoulder or adhesive capsulitis.  DDx includes shoulder girdle and trapezius muscle spasm or muscle strain with possible elements of costochondritis with pulling pain at the left sternal border.    PLAN:  The nature of the diagnosis, using models and diagrams when appropriate, was explained to the patient in detail.  Treatment options discussed included physical therapy, muscle relaxants, pain medications, or MRI for further assessment of soft tissues.  All questions answered and the patient wishes to move forward with physical therapy.  Patient has been referred to shoulder PT specialist and will follow-up in 4-6 weeks to assess progress.      Anabel Taylor, MS4

## 2017-06-13 NOTE — PROGRESS NOTES
"Curtis Navarro is a 66 y.o. right hand dominant female  who presents for initial evaluation of left shoulder and neck pain/stiffness that began approximately 7-8 months ago.  Patient does not recall any injury or trauma to the area.  She states she did help her mother move in December, but can't remember if the pain started prior to that or after.  Initially the pain started in her central chest and felt like it was "pulling" out over her left breast.  This prompted her to see her OB-GYN who completed work-up to rule out breast pathology.  Pain then began to occur more in her left shoulder and trapezius area which she describes as "pulling" and "tightness".  Pain is not constant and only occurrs with certain movements/positions.  The pain resolves when she moves out of these positions.  She has not taken any medications for the pain as it is not constant.  She has completed 6 weeks of physical therapy with some improvement of ROM and strength of the left UE.  Patient denies numbness, tingling, burning, change in sensation of LUE.     PAST MEDICAL/SURGICAL HISTORY:       Past Medical History:   Diagnosis Date    Allergy      Arthritis      Cataract      Colon polyp      Diverticulosis      GERD (gastroesophageal reflux disease)      Graves disease       s/p radioactive iodine in 42    H/O esophagogastroduodenoscopy       8/14    H/O percutaneous left heart catheterization       normal 5/12    History of colon polyps      History of diverticulitis      History of esophagitis       egd 8/14    Hyperlipidemia      Hypothyroidism       s/p radioactive iodine    IBS (irritable bowel syndrome)      Mild luminal irregularity of carotid artery on ultrasound       noted on 5/16 usg with next due 5/18    Osteopenia       7/13, 7/15    S/P colonoscopy       7/10;  next due 7/17    Thyroid nodule       last usg 5/17;  next due 5/19            Past Surgical History:   Procedure Laterality Date    " COLONOSCOPY   07/21/2010     Dr. Ceballos; in legacy, repeat in 6-7 years    TONSILLECTOMY        UPPER GASTROINTESTINAL ENDOSCOPY   08/04/2014     Dr. Ceballos         Current Medications:   Current Outpatient Prescriptions:     aspirin (ECOTRIN) 81 MG EC tablet, Take 81 mg by mouth once daily., Disp: , Rfl:     atorvastatin (LIPITOR) 20 MG tablet, TAKE 1/2 TABLET DAILY FOR CHOLESTEROL., Disp: 15 tablet, Rfl: 2    B INFANTIS/B ANI/B JOSE M/B BIFID (PROBIOTIC 4X ORAL), Take 1 tablet by mouth once daily., Disp: , Rfl:     biotin 5 mg Cap, Take 5 mg by mouth once daily., Disp: , Rfl:     calcium-vitamin D3 500 mg(1,250mg) -200 unit per tablet, Take 1 tablet by mouth 2 (two) times daily with meals., Disp: , Rfl:     DIVIGEL 0.5 mg (0.1 %) GlPk, Apply 0.5 mg topically every other day., Disp: , Rfl:     GLUCOSA HAHN 2KCL/CHONDROITIN HAHN (GLUCOSAMINE-CHONDROITIN DS ORAL), Take by mouth 2 (two) times daily., Disp: , Rfl:     KRILL/OM-3/DHA/EPA/PHOSPHO/AST (MEGARED OMEGA-3 KRILL OIL ORAL), Take 1 capsule by mouth once daily., Disp: , Rfl:     levothyroxine (SYNTHROID) 125 MCG tablet, Take 1 tablet (125 mcg total) by mouth once daily., Disp: 30 tablet, Rfl: 1    lisinopril (PRINIVIL,ZESTRIL) 20 MG tablet, TAKE ONE TABLET DAILY FOR BLOOD PRESSURE., Disp: 30 tablet, Rfl: 2    MULTIVITAMIN ORAL, Take by mouth.  , Disp: , Rfl:     nitrofurantoin, macrocrystal-monohydrate, (MACROBID) 100 MG capsule, Take 1 capsule (100 mg total) by mouth 2 (two) times daily., Disp: 14 capsule, Rfl: 0    omeprazole (PRILOSEC) 40 MG capsule, Take 1 capsule (40 mg total) by mouth once daily., Disp: 30 capsule, Rfl: 3    progesterone (PROMETRIUM) 100 MG capsule, Take 100 mg by mouth once daily.  , Disp: , Rfl:     ranitidine (ZANTAC) 300 MG tablet, Take 1 tablet (300 mg total) by mouth nightly., Disp: 30 tablet, Rfl: 3    tolterodine (DETROL LA) 4 MG 24 hr capsule, Take 1 capsule (4 mg total) by mouth once daily., Disp: 30 capsule, Rfl:  "1     Social History:    Social History    Social History            Social History    Marital status:        Spouse name: N/A    Number of children: N/A    Years of education: N/A          Occupational History    Not on file.            Social History Main Topics    Smoking status: Never Smoker    Smokeless tobacco: Never Used    Alcohol use No    Drug use: No    Sexual activity: Yes       Partners: Male       Birth control/ protection: Post-menopausal           Other Topics Concern    Not on file      Social History Narrative                 ROS:  Constitution: Negative for chills, fever, and sweats. Negative for unexplained weight loss.  HENT: Negative for headaches and blurry vision.   Cardiovascular: Negative for chest pain, irregular heartbeat, leg swelling and palpitations.   Respiratory: Negative for cough and shortness of breath.   Gastrointestinal: Negative for abdominal pain, heartburn, nausea and vomiting.   Genitourinary: Negative for bladder incontinence and dysuria.   Musculoskeletal: Positive for myalgias and muscle stiffness/weakness. Negative for systemic arthritis, joint swelling.   Neurological: Negative for numbness.   Psychiatric/Behavioral: Negative for depression.   Endocrine: Negative for polyuria.   Hematologic/Lymphatic: Negative for bleeding disorders.  Skin: Negative for poor wound healing.      PHYSICAL EXAM:  Ht 5' 3" (1.6 m)   Wt 88.5 kg (195 lb)   BMI 34.54 kg/m²   Curtis Navarro is a well developed, well nourished female in no acute distress. Physical examination of the left shoulder evaluated the following:    Inspection, palpation and ROM of the cervical spine  Disc compression testing bilaterally  Inspection for swelling, ecchymosis, erythema, deformity and atrophy  Tenderness to palpation of the soft tissue and bony structures  Active and passive range of motion  Sensation of the shoulder and upper extremity  Motor strength in the deltoid, " supraspinatus, internal rotators and external rotators  Impingement, apprehension, relocation and Speed's tests  Upper extremity vascular exam (skin temp,color, capillary refill)  Inspection for pseudomotor signs    Remarkable findings included:  Full range of motion of cervical spine.  Negative disc compression sign bilaterally.  Examination left shoulder reveals normal contour.  Sensation intact C5-T1.  Range of motion shoulder is 160° forward elevation, 140° of abduction, external rotation with the arm abducted 90° is 70° internal rotation to L3.  Motor strength 5/5 in the supraspinous and external rotators.  Negative impingement sign on exam.         IMAGING:   X-Ray Shoulder 2 Views - 3/22/2017:  - No fracture, subluxation, or other significant abnormality identified.  - Normal left shoulder.     ASSESSMENT:   Adhesive capsulitis left shoulder     PLAN:  The nature of the diagnosis, using models and diagrams when appropriate, was explained to the patient in detail.   I recommended physical therapy for aggressive range of motion, especially to external rotation.  Follow-up in 4-6 weeks' time for reexam.

## 2017-06-14 NOTE — TELEPHONE ENCOUNTER
----- Message from Vita Diallo sent at 6/13/2017  8:25 AM CDT -----  Contact: Melisa Acevedo, patient [phone] cell, Calling to check on if the new medication would be ok to take before the testing on Friday 6/16/17, has taken only one dose. Please advise. Thanks.

## 2017-06-15 ENCOUNTER — HOSPITAL ENCOUNTER (OUTPATIENT)
Dept: RADIOLOGY | Facility: HOSPITAL | Age: 67
Discharge: HOME | End: 2017-06-15
Attending: FAMILY MEDICINE
Payer: MEDICARE

## 2017-06-15 DIAGNOSIS — R59.1 LAD (LYMPHADENOPATHY): ICD-10-CM

## 2017-06-15 PROCEDURE — 76536 US EXAM OF HEAD AND NECK: CPT | Mod: 26,,, | Performed by: RADIOLOGY

## 2017-06-15 PROCEDURE — 76536 US EXAM OF HEAD AND NECK: CPT | Mod: TC,PO

## 2017-06-16 ENCOUNTER — PATIENT MESSAGE (OUTPATIENT)
Dept: FAMILY MEDICINE | Facility: CLINIC | Age: 67
End: 2017-06-16

## 2017-06-22 RX ORDER — GUAIFENESIN 600 MG/1
600 TABLET, EXTENDED RELEASE ORAL
COMMUNITY
End: 2018-04-20

## 2017-06-23 ENCOUNTER — TELEPHONE (OUTPATIENT)
Dept: FAMILY MEDICINE | Facility: CLINIC | Age: 67
End: 2017-06-23

## 2017-06-23 RX ORDER — FLUTICASONE PROPIONATE 50 MCG
1 SPRAY, SUSPENSION (ML) NASAL DAILY
COMMUNITY
End: 2017-09-26

## 2017-06-23 NOTE — TELEPHONE ENCOUNTER
----- Message from Namrata Gonsales sent at 6/23/2017  8:56 AM CDT -----  Contact: josh   Testing next week   Has a dry cough,   Concerned it may be new medication   Call back

## 2017-06-23 NOTE — TELEPHONE ENCOUNTER
"Spoke with pt. Pt stated, "I've been having this cough since I started taking lisinopril. I've been coughing up thick, clear mucus. It's been keeping me up at night. I'm concerned if it's related to the lisinopril or if it's reflux. Should I wait to find out the results after my tests on Monday to change the medication or change it now? Also, I was wondering if Dr. Del Castillo received an e-mail I sent to her?" Please advise.  "

## 2017-06-24 RX ORDER — VALSARTAN 40 MG/1
40 TABLET ORAL DAILY
Qty: 30 TABLET | Refills: 0 | Status: SHIPPED | OUTPATIENT
Start: 2017-06-24 | End: 2017-07-25 | Stop reason: SDUPTHER

## 2017-06-24 NOTE — TELEPHONE ENCOUNTER
We can stop the lisinopril and try something else to see if that improves her sx  Let's start diovan and bi fu with me in  2 - 3 weeks    Email response sent

## 2017-06-26 ENCOUNTER — PATIENT MESSAGE (OUTPATIENT)
Dept: FAMILY MEDICINE | Facility: CLINIC | Age: 67
End: 2017-06-26

## 2017-06-26 NOTE — H&P
History & Physical - Short Stay  Gastroenterology      SUBJECTIVE:     Procedure: Gastroscopy and Colonoscopy    Chief Complaint/Indication for Procedure:  GERD.  Globus sensation.  Dysphagia.  Hx of colon polyps.    History of Present Illness:  Office Visit     5/18/2017  [facility]   Venita Hussein, [facility]   Gastroenterology   Gastroesophageal reflux disease with esophagitis +5 more   Dx   Gastroesophageal Reflux   Reason for Visit    Progress Notes        Subjective:       Patient ID: Curtis Navarro is a 66 y.o. female Body mass index is 35.03 kg/(m^2).     Chief Complaint: Gastroesophageal Reflux     This patient is new to me.  Established patient of Dr. Ceballos.     Gastroesophageal Reflux   She complains of belching (not more than usual), coughing (nonproductive during the day; at night coughs up white sputum), dysphagia (feels like sputum gets stuck in back of throat, denies problems with liquids, pills, or food), globus sensation (frequent throat clearing), heartburn, a hoarse voice (history of thyroid nodule and took radioactive pill and told this can be one of the symptoms, ENT told her the cause is from LPRD) and water brash. She reports no abdominal pain, no chest pain, no choking, no early satiety, no nausea or no sore throat. This is a chronic problem. Episode onset: several years ago. The problem occurs frequently. The problem has been gradually worsening (6-12 months). The heartburn wakes her from sleep. The heartburn changes with position. The symptoms are aggravated by lying down (worse in the afternoon and at night). Pertinent negatives include no fatigue, melena or weight loss. Risk factors include obesity. She has tried a PPI, a histamine-2 antagonist and head elevation (prilosec 20 mg once daily, zantac 150 mg once daily at night) for the symptoms. Improvement on treatment: heartburn has resolved but other symptoms have persisted. Past procedures include an EGD. saw ENT  "and told had signs of GERD.      8/4/14 EGD was reviewed and procedure report states:   " Impression:- Normal oropharynx.                       - Normal upper third of esophagus and middle third of esophagus.                       - (Minimal) Reflux esophagitis.                       - (Non-erosive esophageal reflux (NERD) disease?.)                       - Z-line regular, 34 cm from the incisors.                       - A few gastric polyps.                       - Erythematous mucosa in the greater curvature of the gastric antrum. Biopsied.                       - Normal stomach otherwise.                       - Erythematous duodenopathy in the bulb.                       - Normal duodenum otherwise.  Recommendation:      - Discharge patient to home.                       - Await CLOtest results.                       - Follow an antireflux regimen.                       - Continue present medications.                       - Use Prilosec (omeprazole) 40 mg PO daily.                       - Return to primary care physician.                       - Return to GI clinic PRN. ".  Biopsy results:   Cindy test negative    Assessment:       1. Gastroesophageal reflux disease with esophagitis    2. Cough    3. Hoarseness    4. Globus sensation    5. History of colon polyps    6. Pharyngoesophageal dysphagia        Plan:       Gastroesophageal reflux disease with esophagitis  -  INCREASE to   ranitidine (ZANTAC) 300 MG tablet; Take 1 tablet (300 mg total) by mouth nightly.  Dispense: 30 tablet; Refill: 3  -  INCREASE to   omeprazole (PRILOSEC) 40 MG capsule; Take 1 capsule (40 mg total) by mouth once daily.  Dispense: 30 capsule; Refill: 3, - take in the morning before breakfast, discussed about possible long term use of medication (prefer to use lowest effective dose or discontinuing if possible) and discussed the risks & benefits with taking a reflux medication long term, and to take OTC calcium and vitamin d supplements " as directed (such as Citracal +D), pt verbalized understanding  -discussed about the different types of medications used to treat reflux and how to use them, antacids can be used PRN for breakthrough heartburn symptoms by reducing stomach acid that is already produced, H2 blockers (zantac) work by limiting the amount acid production, & PPI's work to block acid production and are taken daily, patient verbalized understanding.  -Educated patient on lifestyle modifications to help control/reduce reflux/abdominal pain including: avoid large meals, avoid eating within 2-3 hours of bedtime (avoid late night eating & lying down soon after eating), elevate head of bed if nocturnal symptoms are present, smoking cessation (if current smoker), & weight loss (if overweight).   -Educated to avoid known foods which trigger reflux symptoms & to minimize/avoid high-fat foods, chocolate, caffeine, citrus, alcohol, & tomato products.  -Advised to avoid/limit use of NSAID's, since they can cause GI upset, bleeding, and/or ulcers. If needed, take with food.  - discussed with patient about long term use of reflux medications and the risk of using these medications long term. Some research studies (not all) have shown decrease absorption of calcium when taking PPI's and H2 blockers, but those same research studies showed that adequate dietary (milk and cheese) and/or supplement of calcium and vitamin d supplement induces enough acid secretion for calcium absorption. Also, discussed about the risk vs benefit of untreated GERD such as grande's esophagus.  - recommend annual monitoring with blood work to include CMP, CBC, vitamin B12, and magnesium (to be done at follow-up if we determine long term use is needed)  - schedule EGD, discussed procedure with patient, verbalized understanding     Cough, Hoarseness, & Globus sensation  -  INCREASE to   omeprazole (PRILOSEC) 40 MG capsule; Take 1 capsule (40 mg total) by mouth once daily.   Dispense: 30 capsule; Refill: 3  -  INCREASE to   ranitidine (ZANTAC) 300 MG tablet; Take 1 tablet (300 mg total) by mouth nightly.  Dispense: 30 tablet; Refill: 3  - follow-up with PCP to rule out other etiologies     History of colon polyps  - schedule Colonoscopy, discussed procedure with the patient, verbalized understanding     Pharyngoesophageal dysphagia  - schedule EGD, discussed procedure with patient and possible esophageal dilation may be performed during procedure if indicated, patient verbalized understanding  - educated patient to eat smaller more frequent meals and to eat slowly and advised to eat a soft diet.  - possible UGI/esophagram/esophageal manometry if symptoms persist     Return in about 2 months (around 7/18/2017), or if symptoms worsen or fail to improve.      If no improvement in symptoms or symptoms worsen, call/follow-up at clinic or go to ER.              PTA Medications   Medication Sig    aspirin (ECOTRIN) 81 MG EC tablet Take 81 mg by mouth once daily.    atorvastatin (LIPITOR) 20 MG tablet TAKE 1/2 TABLET DAILY FOR CHOLESTEROL.    B INFANTIS/B ANI/B JOSE M/B BIFID (PROBIOTIC 4X ORAL) Take 1 tablet by mouth once daily.    biotin 5 mg Cap Take 5 mg by mouth once daily.    calcium-vitamin D3 500 mg(1,250mg) -200 unit per tablet Take 1 tablet by mouth 2 (two) times daily with meals.    DIVIGEL 0.5 mg (0.1 %) GlPk Apply 0.5 mg topically every other day.    fluticasone (FLONASE) 50 mcg/actuation nasal spray 1 spray by Each Nare route once daily.    GLUCOSA HAHN 2KCL/CHONDROITIN HAHN (GLUCOSAMINE-CHONDROITIN DS ORAL) Take by mouth 2 (two) times daily.    guaifenesin (MUCINEX) 600 mg 12 hr tablet Take 600 mg by mouth as needed for Congestion.    KRILL/OM-3/DHA/EPA/PHOSPHO/AST (MEGARED OMEGA-3 KRILL OIL ORAL) Take 1 capsule by mouth once daily.    levothyroxine (SYNTHROID) 125 MCG tablet Take 1 tablet (125 mcg total) by mouth once daily.    lisinopril (PRINIVIL,ZESTRIL) 20 MG tablet  Take 20 mg by mouth once daily.    MULTIVITAMIN ORAL Take by mouth.      omeprazole (PRILOSEC) 40 MG capsule Take 1 capsule (40 mg total) by mouth once daily.    progesterone (PROMETRIUM) 100 MG capsule Take 100 mg by mouth once daily.      ranitidine (ZANTAC) 300 MG tablet Take 1 tablet (300 mg total) by mouth nightly.    tolterodine (DETROL LA) 4 MG 24 hr capsule Take 1 capsule (4 mg total) by mouth once daily.    valsartan (DIOVAN) 40 MG tablet Take 1 tablet (40 mg total) by mouth once daily.       Review of patient's allergies indicates:   Allergen Reactions    Azithromycin      Other reaction(s): Nausea  Other reaction(s): Diarrhea    Metronidazole hcl      Other reaction(s): Rash  Other reaction(s): Itching    Moxifloxacin      Other reaction(s): Rash    Phenytoin sodium extended      Other reaction(s): Rash  Other reaction(s): Rash    Sulfa (sulfonamide antibiotics)      Other reaction(s): Itching        Past Medical History:   Diagnosis Date    Allergy     Arthritis     Cancer     skin CA    Cataract     Colon polyp     Diverticulosis     GERD (gastroesophageal reflux disease)     Graves disease     s/p radioactive iodine in 42    H/O esophagogastroduodenoscopy     8/14    H/O percutaneous left heart catheterization     normal 5/12    History of colon polyps     History of diverticulitis     History of esophagitis     egd 8/14    Hyperlipidemia     Hypertension     Hypothyroidism     s/p radioactive iodine    IBS (irritable bowel syndrome)     Mild luminal irregularity of carotid artery on ultrasound     noted on 5/16 usg with next due 5/18    Osteopenia     7/13, 7/15    S/P colonoscopy     7/10;  next due 7/17    Thyroid nodule     last usg 5/17;  next due 5/19     Past Surgical History:   Procedure Laterality Date    COLONOSCOPY  07/21/2010    Dr. Ceballos; in legacy, repeat in 6-7 years    COSMETIC SURGERY      Liposuction to neck    skin CA removed from face       "TONSILLECTOMY      UPPER GASTROINTESTINAL ENDOSCOPY  08/04/2014    Dr. Ceballos     Family History   Problem Relation Age of Onset    Cataracts Mother     Colon polyps Mother      history of colitis- part colon removed    Cancer Sister     Cataracts Maternal Grandfather     Amblyopia Neg Hx     Blindness Neg Hx     Glaucoma Neg Hx     Hypertension Neg Hx     Macular degeneration Neg Hx     Retinal detachment Neg Hx     Strabismus Neg Hx     Stroke Neg Hx     Thyroid disease Neg Hx     Diabetes Neg Hx     Colon cancer Neg Hx      Social History   Substance Use Topics    Smoking status: Never Smoker    Smokeless tobacco: Never Used    Alcohol use Yes      Comment: rarely         OBJECTIVE:     Vital Signs (Most Recent)  Temp: 97.5 °F (36.4 °C) (06/27/17 1135)  Pulse: 83 (06/27/17 1135)  Resp: 20 (06/27/17 1135)  BP: 137/64 (06/27/17 1135)  SpO2: 95 % (06/27/17 1135)    Physical Exam:        : Ht 5' 3" (1.6 m)    Wt 89.7 kg (197 lb 12 oz)    BMI 35.03 kg/m²                                          GENERAL:  Comfortable, in no acute distress.                                 HEENT EXAM:  Nonicteric.  No adenopathy.  Oropharynx is clear.               NECK:  Supple.                                                               LUNGS:  Clear.                                                               CARDIAC:  Regular rate and rhythm.  S1, S2.  No murmur.                      ABDOMEN:  Obese.  Soft, positive bowel sounds, nontender.  No hepatosplenomegaly or masses.  No rebound or guarding.                                             EXTREMITIES:  No edema.     MENTAL STATUS:  Alert and oriented.    ASSESSMENT/PLAN:     Assessment: GERD  Globus sensation.  Dysphagia.  .  Hx of colon polyps.    Plan: Gastroscopy and Colonoscopy    Anesthesia Plan:   MAC / General Anaesthesia    ASA Grade: ASA 2 - Patient with mild systemic disease with no functional limitations    MALLAMPATI SCORE: II (hard and soft " palate, upper portion of tonsils anduvula visible)

## 2017-06-26 NOTE — TELEPHONE ENCOUNTER
Notified pt of rx and instructions per provider. Pt stated verbal understanding. Booked follow up. Time and date confirmed with pt.

## 2017-06-27 ENCOUNTER — ANESTHESIA EVENT (OUTPATIENT)
Dept: ENDOSCOPY | Facility: HOSPITAL | Age: 67
End: 2017-06-27
Payer: MEDICARE

## 2017-06-27 ENCOUNTER — PATIENT OUTREACH (OUTPATIENT)
Dept: ADMINISTRATIVE | Facility: HOSPITAL | Age: 67
End: 2017-06-27

## 2017-06-27 ENCOUNTER — SURGERY (OUTPATIENT)
Age: 67
End: 2017-06-27

## 2017-06-27 ENCOUNTER — ANESTHESIA (OUTPATIENT)
Dept: ENDOSCOPY | Facility: HOSPITAL | Age: 67
End: 2017-06-27
Payer: MEDICARE

## 2017-06-27 ENCOUNTER — HOSPITAL ENCOUNTER (OUTPATIENT)
Facility: HOSPITAL | Age: 67
Discharge: HOME | End: 2017-06-27
Attending: INTERNAL MEDICINE | Admitting: INTERNAL MEDICINE
Payer: MEDICARE

## 2017-06-27 VITALS
RESPIRATION RATE: 18 BRPM | DIASTOLIC BLOOD PRESSURE: 59 MMHG | OXYGEN SATURATION: 100 % | SYSTOLIC BLOOD PRESSURE: 117 MMHG | TEMPERATURE: 98 F | HEART RATE: 86 BPM | BODY MASS INDEX: 33.66 KG/M2 | HEIGHT: 63 IN | WEIGHT: 190 LBS

## 2017-06-27 VITALS — RESPIRATION RATE: 25 BRPM

## 2017-06-27 DIAGNOSIS — K21.00 GASTROESOPHAGEAL REFLUX DISEASE WITH ESOPHAGITIS: ICD-10-CM

## 2017-06-27 DIAGNOSIS — R49.0 HOARSENESS: ICD-10-CM

## 2017-06-27 DIAGNOSIS — R05.9 COUGH: ICD-10-CM

## 2017-06-27 DIAGNOSIS — K21.9 GERD (GASTROESOPHAGEAL REFLUX DISEASE): Primary | ICD-10-CM

## 2017-06-27 DIAGNOSIS — R09.A2 GLOBUS SENSATION: ICD-10-CM

## 2017-06-27 PROCEDURE — 87449 NOS EACH ORGANISM AG IA: CPT | Mod: PO | Performed by: INTERNAL MEDICINE

## 2017-06-27 PROCEDURE — D9220A PRA ANESTHESIA: Mod: PT,ANES,, | Performed by: ANESTHESIOLOGY

## 2017-06-27 PROCEDURE — 43248 EGD GUIDE WIRE INSERTION: CPT | Mod: PO | Performed by: INTERNAL MEDICINE

## 2017-06-27 PROCEDURE — 43239 EGD BIOPSY SINGLE/MULTIPLE: CPT | Mod: PO | Performed by: INTERNAL MEDICINE

## 2017-06-27 PROCEDURE — 63600175 PHARM REV CODE 636 W HCPCS: Mod: PO | Performed by: NURSE ANESTHETIST, CERTIFIED REGISTERED

## 2017-06-27 PROCEDURE — 27201012 HC FORCEPS, HOT/COLD, DISP: Mod: PO | Performed by: INTERNAL MEDICINE

## 2017-06-27 PROCEDURE — 27201089 HC SNARE, DISP (ANY): Mod: PO | Performed by: INTERNAL MEDICINE

## 2017-06-27 PROCEDURE — 25000003 PHARM REV CODE 250: Mod: PO | Performed by: INTERNAL MEDICINE

## 2017-06-27 PROCEDURE — 45385 COLONOSCOPY W/LESION REMOVAL: CPT | Mod: PO | Performed by: INTERNAL MEDICINE

## 2017-06-27 PROCEDURE — 45385 COLONOSCOPY W/LESION REMOVAL: CPT | Mod: PT,,, | Performed by: INTERNAL MEDICINE

## 2017-06-27 PROCEDURE — 43239 EGD BIOPSY SINGLE/MULTIPLE: CPT | Mod: ,,, | Performed by: INTERNAL MEDICINE

## 2017-06-27 PROCEDURE — 88305 TISSUE EXAM BY PATHOLOGIST: CPT | Mod: 26,,, | Performed by: PATHOLOGY

## 2017-06-27 PROCEDURE — D9220A PRA ANESTHESIA: Mod: PT,CRNA,, | Performed by: NURSE ANESTHETIST, CERTIFIED REGISTERED

## 2017-06-27 PROCEDURE — 37000008 HC ANESTHESIA 1ST 15 MINUTES: Mod: PO | Performed by: INTERNAL MEDICINE

## 2017-06-27 PROCEDURE — 37000009 HC ANESTHESIA EA ADD 15 MINS: Mod: PO | Performed by: INTERNAL MEDICINE

## 2017-06-27 PROCEDURE — 88342 IMHCHEM/IMCYTCHM 1ST ANTB: CPT | Mod: 26,,, | Performed by: PATHOLOGY

## 2017-06-27 PROCEDURE — 88305 TISSUE EXAM BY PATHOLOGIST: CPT | Performed by: PATHOLOGY

## 2017-06-27 PROCEDURE — 43248 EGD GUIDE WIRE INSERTION: CPT | Mod: 51,,, | Performed by: INTERNAL MEDICINE

## 2017-06-27 PROCEDURE — 25000003 PHARM REV CODE 250: Mod: PO | Performed by: NURSE ANESTHETIST, CERTIFIED REGISTERED

## 2017-06-27 RX ORDER — SODIUM CHLORIDE, SODIUM LACTATE, POTASSIUM CHLORIDE, CALCIUM CHLORIDE 600; 310; 30; 20 MG/100ML; MG/100ML; MG/100ML; MG/100ML
INJECTION, SOLUTION INTRAVENOUS CONTINUOUS
Status: DISCONTINUED | OUTPATIENT
Start: 2017-06-27 | End: 2017-06-27 | Stop reason: HOSPADM

## 2017-06-27 RX ORDER — LIDOCAINE HYDROCHLORIDE 10 MG/ML
1 INJECTION, SOLUTION EPIDURAL; INFILTRATION; INTRACAUDAL; PERINEURAL ONCE
Status: COMPLETED | OUTPATIENT
Start: 2017-06-27 | End: 2017-06-27

## 2017-06-27 RX ORDER — OMEPRAZOLE 40 MG/1
40 CAPSULE, DELAYED RELEASE ORAL
Qty: 60 CAPSULE | Refills: 6 | Status: SHIPPED | OUTPATIENT
Start: 2017-06-27 | End: 2017-08-17 | Stop reason: SDUPTHER

## 2017-06-27 RX ORDER — SODIUM CHLORIDE 0.9 % (FLUSH) 0.9 %
3 SYRINGE (ML) INJECTION
Status: CANCELLED | OUTPATIENT
Start: 2017-06-27

## 2017-06-27 RX ORDER — LISINOPRIL 20 MG/1
20 TABLET ORAL DAILY
COMMUNITY
End: 2017-07-11

## 2017-06-27 RX ORDER — LIDOCAINE HCL/PF 100 MG/5ML
SYRINGE (ML) INTRAVENOUS
Status: DISCONTINUED | OUTPATIENT
Start: 2017-06-27 | End: 2017-06-27

## 2017-06-27 RX ORDER — PROPOFOL 10 MG/ML
VIAL (ML) INTRAVENOUS
Status: DISCONTINUED | OUTPATIENT
Start: 2017-06-27 | End: 2017-06-27

## 2017-06-27 RX ADMIN — PROPOFOL 30 MG: 10 INJECTION, EMULSION INTRAVENOUS at 12:06

## 2017-06-27 RX ADMIN — LIDOCAINE HYDROCHLORIDE 1 MG: 10 INJECTION, SOLUTION EPIDURAL; INFILTRATION; INTRACAUDAL; PERINEURAL at 11:06

## 2017-06-27 RX ADMIN — SODIUM CHLORIDE, SODIUM LACTATE, POTASSIUM CHLORIDE, AND CALCIUM CHLORIDE: .6; .31; .03; .02 INJECTION, SOLUTION INTRAVENOUS at 11:06

## 2017-06-27 RX ADMIN — LIDOCAINE HYDROCHLORIDE 100 MG: 20 INJECTION, SOLUTION INTRAVENOUS at 12:06

## 2017-06-27 RX ADMIN — TOPICAL ANESTHETIC 1 EACH: 200 SPRAY DENTAL; PERIODONTAL at 12:06

## 2017-06-27 NOTE — TRANSFER OF CARE
"Anesthesia Transfer of Care Note    Patient: Curtis Navarro    Procedure(s) Performed: Procedure(s) (LRB):  ESOPHAGOGASTRODUODENOSCOPY (EGD) (N/A)  COLONOSCOPY (N/A)    Patient location: PACU    Anesthesia Type: general    Transport from OR: Transported from OR on room air with adequate spontaneous ventilation    Post pain: adequate analgesia    Post assessment: no apparent anesthetic complications and tolerated procedure well    Post vital signs: stable    Level of consciousness: awake and alert    Nausea/Vomiting: no nausea/vomiting    Complications: none    Transfer of care protocol was followed      Last vitals:   Visit Vitals  /64 (BP Location: Right arm, Patient Position: Lying, BP Method: Automatic)   Pulse 83   Temp 36.4 °C (97.5 °F) (Skin)   Resp 20   Ht 5' 3" (1.6 m)   Wt 86.2 kg (190 lb)   SpO2 95%   Breastfeeding? No   BMI 33.66 kg/m²     "

## 2017-06-27 NOTE — DISCHARGE INSTRUCTIONS
Procedural Sedation (Adult)  You have been given medicine by vein to make you sleep during your surgery. This may have included both a pain medicine and sleeping medicine. Most of the effects have worn off. But you may still have some drowsiness for the next 6 to 8 hours.  Home care  Follow these guidelines when you get home:  · For the next 8 hours, you should be watched by a responsible adult. This person should make sure your condition is not getting worse.  · Don't take any medicine by mouth for pain or for sleep during the next 4 hours. These might react with the medicines you were given in the hospital. This could cause a much stronger response than usual.  · Don't drink any alcohol for the next 24 hours.  · Don't drive, operate dangerous machinery, or make important business or personal decisions during the next 24 hours.  Follow-up care  Follow up with your healthcare provider if you are not alert and back to your usual level of activity within 12 hours.  When to seek medical advice  Call your healthcare provider right away if any of these occur:  · Drowsiness gets worse  · Weakness or dizziness gets worse  · Repeated vomiting  · You cannot be awakened   Date Last Reviewed: 10/18/2016  © 8643-6396 [employer]. [address] [place] [address]. All rights reserved. This information is not intended as a substitute for professional medical care. Always follow your healthcare professional's instructions.      PROBIOTICS:  Now that your colon is so cleaned out, now is a good time for a round of PROBIOTICS.  Eat a container of Greek Yogurt, such as OIKOS or CHOBANI,  Or Activia or Dannon    Greek Yogurt.    Or Take a similar Probiotic product such as Align or Culturelle or Zo-Q, every day for a month.                  (The products listed are non-prescription, but you may need to ask the pharmacist for their location.)Repeat this 4-6 times a year.        Tips to Control Acid  Reflux    To control acid reflux, youll need to make some basic diet and lifestyle changes. The simple steps outlined below may be all youll need to ease discomfort.  Watch what you eat  · Avoid fatty foods and spicy foods.  · Eat fewer acidic foods, such as citrus and tomato-based foods. These can increase symptoms.  · Limit drinking alcohol, caffeine, and fizzy beverages. All increase acid reflux.  · Try limiting chocolate, peppermint, and spearmint. These can worsen acid reflux in some people.  Watch when you eat  · Avoid lying down for 3 hours after eating.  · Do not snack before going to bed.  Raise your head  Raising your head and upper body by 4 to 6 inches helps limit reflux when youre lying down. Put blocks under the head of your bed frame to raise it.  Other changes  · Lose weight, if you need to  · Dont exercise near bedtime  · Avoid tight-fitting clothes  · Limit aspirin and ibuprofen  · Stop smoking   Date Last Reviewed: 7/1/2016  © 6593-8044 [employer]. [address] [place] [address], [place] [address]. All rights reserved. This information is not intended as a substitute for professional medical care. Always follow your healthcare professional's instructions.        High-Fiber Diet  Fiber is in fruits, vegetables, cereals, and grains. Fiber passes through your body undigested. A high-fiber diet helps food move through your intestinal tract. The added bulk is helpful in preventing constipation. In people with diverticulosis, fiber helps clean out the pouches along the colon wall. It also prevents new pouches from forming. A high-fiber diet reduces the risk of colon cancer. It also lowers blood cholesterol and prevents high blood sugar in people with diabetes.    The fiber-rich foods listed below should be part of your diet. If you are not used to high-fiber foods, start with 1 or 2 foods from this list. Every 3 to 4 days add a new one to your diet. Do this until you are eating 4 high-fiber  foods per day. This should give you 20 to 35 grams of fiber a day. It is also important to drink a lot of water when you are on this diet. You should have 6 to 8 glasses of water a day. Water makes the fiber swell and increases the benefit.  Foods high in dietary fiber  The following foods are high in dietary fiber:  · Breads. Breads made with 100% whole-wheat flour; joss, wheat, or rye crackers; whole-grain tortillas, bran muffins.  · Cereals. Whole-grain and bran cereals with bran (shredded wheat, wheat flakes, raisin bran, corn bran); oatmeal, rolled oats, granola, and brown rice.  · Fruits. Fresh fruits and their edible skins (pears, prunes, raisins, berries, apples, and apricots); bananas, citrus fruit, mangoes, pineapple; and prune juice.  · Nuts. Any nuts and seeds.  · Vegetables. Best served raw or lightly cooked. All types, especially: green peas, celery, eggplant, potatoes, spinach, broccoli, [place] sprouts, winter squash, carrots, cauliflower, soybeans, lentils, and fresh and dried beans of all kinds.  · Other. Popcorn, any spices.  Date Last Reviewed: 8/1/2016  © 8391-4860 [employer]. [address] [place] [address], [place] [address]. All rights reserved. This information is not intended as a substitute for professional medical care. Always follow your healthcare professional's instructions.

## 2017-06-27 NOTE — ANESTHESIA POSTPROCEDURE EVALUATION
"Anesthesia Post Evaluation    Patient: Curtis Navarro    Procedure(s) Performed: Procedure(s) (LRB):  ESOPHAGOGASTRODUODENOSCOPY (EGD) (N/A)  COLONOSCOPY (N/A)    Final Anesthesia Type: general  Patient location during evaluation: PACU  Patient participation: Yes- Able to Participate  Level of consciousness: awake and alert  Post-procedure vital signs: reviewed and stable  Pain management: adequate  Airway patency: patent  PONV status at discharge: No PONV  Anesthetic complications: no      Cardiovascular status: hemodynamically stable and blood pressure returned to baseline  Respiratory status: unassisted, spontaneous ventilation and room air  Hydration status: euvolemic  Follow-up not needed.        Visit Vitals  BP (!) 117/59   Pulse 93   Temp 36.8 °C (98.2 °F)   Resp 18   Ht 5' 3" (1.6 m)   Wt 86.2 kg (190 lb)   SpO2 97%   Breastfeeding? No   BMI 33.66 kg/m²       Pain/Judy Score: Pain Assessment Performed: Yes (6/27/2017  1:08 PM)  Presence of Pain: denies (6/27/2017  1:08 PM)  Judy Score: 9 (6/27/2017  1:08 PM)      "

## 2017-06-27 NOTE — ANESTHESIA PREPROCEDURE EVALUATION
06/27/2017  Curtis Navarro is a 66 y.o., female.    Anesthesia Evaluation      I have reviewed the Medications.     Review of Systems  Anesthesia Hx:  No problems with previous Anesthesia   Social:  Non-Smoker, No Alcohol Use    Cardiovascular:   Hypertension hyperlipidemia    Pulmonary:  Pulmonary Normal    Renal/:  Renal/ Normal     Hepatic/GI:   GERD    Neurological:  Neurology Normal    Endocrine:   Hypothyroidism        Physical Exam  General:  Obesity    Airway/Jaw/Neck:  Airway Findings: Mouth Opening: Normal General Airway Assessment: Adult  Mallampati: II  Jaw/Neck Findings:  Neck ROM: Extension Decreased, Mild       Chest/Lungs:  Chest/Lungs Findings: Clear to auscultation, Normal Respiratory Rate     Heart/Vascular:  Heart Findings: Rate: Normal  Rhythm: Regular Rhythm  Sounds: Normal  Heart murmur: negative Vascular Findings: Normal (No carotid bruits.)       Mental Status:  Mental Status Findings:  Cooperative, Alert and Oriented         Anesthesia Plan  Type of Anesthesia, risks & benefits discussed:  Anesthesia Type:  general  Patient's Preference:   Intra-op Monitoring Plan:   Intra-op Monitoring Plan Comments:   Post Op Pain Control Plan:   Post Op Pain Control Plan Comments:   Induction:   IV  Beta Blocker:  Patient is not currently on a Beta-Blocker (No further documentation required).       Informed Consent: Patient understands risks and agrees with Anesthesia plan.  Questions answered. Anesthesia consent signed with patient.  ASA Score: 2     Day of Surgery Review of History & Physical:        Anesthesia Plan Notes: Propofol general.        Ready For Surgery From Anesthesia Perspective.

## 2017-06-28 ENCOUNTER — TELEPHONE (OUTPATIENT)
Dept: ENDOSCOPY | Facility: HOSPITAL | Age: 67
End: 2017-06-28

## 2017-06-28 ENCOUNTER — PATIENT MESSAGE (OUTPATIENT)
Dept: GASTROENTEROLOGY | Facility: CLINIC | Age: 67
End: 2017-06-28

## 2017-07-10 ENCOUNTER — DOCUMENTATION ONLY (OUTPATIENT)
Dept: FAMILY MEDICINE | Facility: CLINIC | Age: 67
End: 2017-07-10

## 2017-07-11 ENCOUNTER — OFFICE VISIT (OUTPATIENT)
Dept: FAMILY MEDICINE | Facility: CLINIC | Age: 67
End: 2017-07-11
Payer: MEDICARE

## 2017-07-11 VITALS
SYSTOLIC BLOOD PRESSURE: 132 MMHG | OXYGEN SATURATION: 97 % | WEIGHT: 195.56 LBS | HEART RATE: 84 BPM | DIASTOLIC BLOOD PRESSURE: 72 MMHG | HEIGHT: 63 IN | TEMPERATURE: 99 F | RESPIRATION RATE: 18 BRPM | BODY MASS INDEX: 34.65 KG/M2

## 2017-07-11 DIAGNOSIS — I10 HYPERTENSION, ESSENTIAL: ICD-10-CM

## 2017-07-11 DIAGNOSIS — M62.81 MUSCLE WEAKNESS: ICD-10-CM

## 2017-07-11 DIAGNOSIS — R59.1 LAD (LYMPHADENOPATHY): Primary | ICD-10-CM

## 2017-07-11 LAB
CK SERPL-CCNC: 82 U/L
CRP SERPL-MCNC: 7.1 MG/L
ERYTHROCYTE [SEDIMENTATION RATE] IN BLOOD BY WESTERGREN METHOD: 26 MM/HR

## 2017-07-11 PROCEDURE — 86140 C-REACTIVE PROTEIN: CPT

## 2017-07-11 PROCEDURE — 36415 COLL VENOUS BLD VENIPUNCTURE: CPT | Mod: S$GLB,,, | Performed by: FAMILY MEDICINE

## 2017-07-11 PROCEDURE — G0009 ADMIN PNEUMOCOCCAL VACCINE: HCPCS | Mod: S$GLB,,, | Performed by: FAMILY MEDICINE

## 2017-07-11 PROCEDURE — 99214 OFFICE O/P EST MOD 30 MIN: CPT | Mod: S$GLB,,, | Performed by: FAMILY MEDICINE

## 2017-07-11 PROCEDURE — 90670 PCV13 VACCINE IM: CPT | Mod: S$GLB,,, | Performed by: FAMILY MEDICINE

## 2017-07-11 PROCEDURE — 82550 ASSAY OF CK (CPK): CPT

## 2017-07-11 PROCEDURE — 85651 RBC SED RATE NONAUTOMATED: CPT

## 2017-07-12 NOTE — PROGRESS NOTES
Subjective:       Patient ID: Curtis Navarro is a 67 y.o. female.    Chief Complaint: Follow-up    HPI   The patient is a 67-year-old who is here today with several issues.  Today we discussed the followin)  hypertension.  She recently changed to Diovan 40 mg once a day because she noticed a cough with lisinopril.  Almost as soon as she stopped the lisinopril and changed to the Diovan her cough went away.  She has tolerated the Diovan well with no side effects.  She has not felt as if her blood pressure has been high or low  2)  arm and leg weakness.  For about a month, she noticed that her arms and her legs were weak.  She noticed that her arms were weak because she had trouble drying her hair with a hair dryer and she localizes this weakness to her shoulders.  She noticed that her legs were weak because she had trouble walking and localizes this weakness to her upper legs.  She did not have any pain with the weakness.  She notices that the weakness seems to be improving.  3)  isolated lymph node in the left side of her neck.  We did review her recent imaging.  She does feel comfortable watching this for now.  She does note that she's previously had a skin cancer removed by Mohs surgery in this area wonders if that might account for the lymph node    Review of Systems   Constitutional: Negative for appetite change, chills, diaphoresis, fatigue, fever and unexpected weight change.   HENT: Negative for congestion, ear pain, postnasal drip, rhinorrhea, sinus pressure, sneezing, sore throat and trouble swallowing.    Eyes: Negative for pain, discharge and visual disturbance.   Respiratory: Negative for cough, chest tightness, shortness of breath and wheezing.    Cardiovascular: Negative for chest pain, palpitations and leg swelling.   Gastrointestinal: Negative for abdominal distention, abdominal pain, blood in stool, constipation, diarrhea, nausea and vomiting.   Musculoskeletal:        Per HPI   Skin:  Negative for rash.       Objective:      Physical Exam   Constitutional: She is oriented to person, place, and time. She appears well-developed and well-nourished. No distress.   HENT:   Head: Normocephalic and atraumatic.   Right Ear: Hearing, tympanic membrane, external ear and ear canal normal.   Left Ear: Hearing, tympanic membrane, external ear and ear canal normal.   Nose: Nose normal.   Mouth/Throat: Oropharynx is clear and moist and mucous membranes are normal. No oral lesions. No oropharyngeal exudate, posterior oropharyngeal edema or posterior oropharyngeal erythema.   Eyes: Conjunctivae, EOM and lids are normal. Pupils are equal, round, and reactive to light. No scleral icterus.   Neck: Normal range of motion. Neck supple. Carotid bruit is not present. No thyroid mass and no thyromegaly present.   Cardiovascular: Normal rate, regular rhythm and normal heart sounds.   No extrasystoles are present. PMI is not displaced.  Exam reveals no gallop.    No murmur heard.  Pulmonary/Chest: Effort normal and breath sounds normal. No accessory muscle usage. No respiratory distress.   Clear to auscultation bilaterally.   Abdominal: Soft. Normal appearance and bowel sounds are normal. She exhibits no abdominal bruit. There is no hepatosplenomegaly. There is no tenderness. There is no rebound.   Musculoskeletal:   No muscle pain or weakness is noted today on exam   Lymphadenopathy:        Head (right side): No submental and no submandibular adenopathy present.        Head (left side): No submental and no submandibular adenopathy present.        Right cervical: No superficial cervical, no deep cervical and no posterior cervical adenopathy present.       Left cervical: No superficial cervical, no deep cervical and no posterior cervical adenopathy present.        Right: No supraclavicular adenopathy present.        Left: No supraclavicular adenopathy present.   Neurological: She is alert and oriented to person, place, and  "time.   Skin: Skin is warm, dry and intact.   Psychiatric: She has a normal mood and affect.     Blood pressure 132/72, pulse 84, temperature 98.5 °F (36.9 °C), temperature source Oral, resp. rate 18, height 5' 3" (1.6 m), weight 88.7 kg (195 lb 8.8 oz), SpO2 97 %.Body mass index is 34.64 kg/m².        A/P:  1)  hypertension.  Well-controlled.  Continue with Diovan  2)  proximal weakness of the extremities.  New but improving.  We will check an ESR, CRP and CPK.  If her labs are normal and this continues to improve and completely resolves, she will let me know if develops recurrent symptoms.  If she develops any new or worsening symptoms, she will let me know  3)  isolated lymph node in the left side of her neck.  We will recheck an ultrasound as recommended in September.  If she notes any new or worsening changes, she will let me know  "

## 2017-07-14 ENCOUNTER — PATIENT MESSAGE (OUTPATIENT)
Dept: FAMILY MEDICINE | Facility: CLINIC | Age: 67
End: 2017-07-14

## 2017-07-26 RX ORDER — VALSARTAN 40 MG/1
40 TABLET ORAL DAILY
Qty: 30 TABLET | Refills: 3 | Status: SHIPPED | OUTPATIENT
Start: 2017-07-26 | End: 2017-11-27 | Stop reason: SDUPTHER

## 2017-07-26 RX ORDER — LEVOTHYROXINE SODIUM 125 UG/1
125 TABLET ORAL DAILY
Qty: 30 TABLET | Refills: 3 | Status: SHIPPED | OUTPATIENT
Start: 2017-07-26 | End: 2017-11-20 | Stop reason: SDUPTHER

## 2017-08-03 ENCOUNTER — OFFICE VISIT (OUTPATIENT)
Dept: ORTHOPEDICS | Facility: CLINIC | Age: 67
End: 2017-08-03
Payer: MEDICARE

## 2017-08-03 VITALS — BODY MASS INDEX: 34.65 KG/M2 | HEIGHT: 63 IN | WEIGHT: 195.56 LBS

## 2017-08-03 DIAGNOSIS — M75.02 ADHESIVE CAPSULITIS OF LEFT SHOULDER: Primary | ICD-10-CM

## 2017-08-03 PROCEDURE — 3008F BODY MASS INDEX DOCD: CPT | Mod: S$GLB,,, | Performed by: ORTHOPAEDIC SURGERY

## 2017-08-03 PROCEDURE — 99213 OFFICE O/P EST LOW 20 MIN: CPT | Mod: S$GLB,,, | Performed by: ORTHOPAEDIC SURGERY

## 2017-08-03 PROCEDURE — 99999 PR PBB SHADOW E&M-EST. PATIENT-LVL II: CPT | Mod: PBBFAC,,, | Performed by: ORTHOPAEDIC SURGERY

## 2017-08-03 NOTE — PROGRESS NOTES
"DATE: 8/3/2017  PATIENT: Curtis Navarro    Attending Physician: Jaime Irwin M.D.    HISTORY:  Curtis Navarro is a 67 y.o. female who returns for follow up evaluation of  her left shoulder.  She is previously seen and diagnosed with his capsulitis.  He has been known therapy now for approximately 2 weeks.  She notes mild improvement but still notes comfort in her shoulder as well as the trapezial region.  She also notes pain radiating to her chest wall.  Pain at rest is reported at 0/10.  She presents for follow-up exam today.    PMH/PSH/FamHx/SocHx:  Reviewed and unchanged from prior visit    ROS:  Constitution: Negative for chills, fever, and sweats. Negative for unexplained weight loss.  HENT: Negative for headaches and blurry vision.   Cardiovascular: Negative for chest pain, irregular heartbeat, leg swelling and palpitations.   Respiratory: Negative for cough and shortness of breath.   Gastrointestinal: Negative for abdominal pain, heartburn, nausea and vomiting.   Genitourinary: Negative for bladder incontinence and dysuria.   Musculoskeletal: Negative for systemic arthritis, joint swelling, muscle weakness and myalgias.   Neurological: Negative for numbness.   Psychiatric/Behavioral: Negative for depression.   Endocrine: Negative for polyuria.   Hematologic/Lymphatic: Negative for bleeding disorders.  Skin: Negative for poor wound healing.       EXAM:  Ht 5' 3" (1.6 m)   Wt 88.7 kg (195 lb 8.8 oz)   BMI 34.64 kg/m²   Curtis Navarro is a well developed, well nourished female in no acute distress. Physical examination of the left shoulder evaluated the following:    Inspection, palpation and ROM of the cervical spine  Disc compression testing bilaterally  Inspection for swelling, ecchymosis, erythema, deformity and atrophy  Tenderness to palpation of the soft tissue and bony structures  Active and passive range of motion  Sensation of the shoulder and upper extremity  Motor strength in " the deltoid, supraspinatus, internal rotators and external rotators  Impingement, apprehension, relocation and Speed's tests  Upper extremity vascular exam (skin temp,color, capillary refill)  Inspection for pseudomotor signs    Remarkable findings included:  Full range of motion cervical spine.  Negative disc compression sign.  Examination of the left shoulder reveals range of motion 100° of forward elevation, 150° of abduction, external rotation with the arm abducted 90° is 75°, internal rotation to L4.  Motor strength 5/5 in the supraspinatous and external rotators.  No impingement sign.        IMAGING:   No new x-rays are performed today.    ASSESSMENT:  Follow-up adhesive capsulitis left shoulder    PLAN:  The implications of the patient's evolution of symptoms and findings were explained to the patient in detail. Curtis has shown some improvement with motion mostly only been known therapy for 2 weeks.  I have recommended continued therapy to work on terminal motion.  Follow-up in 8 weeks' time for reexam.  However, should she plateau therapy and still has significant pain, she'll contact the office to schedule an MRI of the left shoulder to evaluate for other causes of stiffness (such as a rotator cuff tear).  Follow-up in 8 weeks' time for reexam.           This note was dictated using voice recognition software and may contain grammatical errors

## 2017-08-07 RX ORDER — TOLTERODINE 4 MG/1
4 CAPSULE, EXTENDED RELEASE ORAL DAILY
Qty: 30 CAPSULE | Refills: 5 | Status: SHIPPED | OUTPATIENT
Start: 2017-08-07 | End: 2017-09-26

## 2017-08-08 RX ORDER — LEVOTHYROXINE SODIUM 125 UG/1
125 TABLET ORAL DAILY
Qty: 30 TABLET | Refills: 3 | Status: CANCELLED | OUTPATIENT
Start: 2017-08-08 | End: 2018-08-08

## 2017-08-17 ENCOUNTER — OFFICE VISIT (OUTPATIENT)
Dept: GASTROENTEROLOGY | Facility: CLINIC | Age: 67
End: 2017-08-17
Payer: MEDICARE

## 2017-08-17 VITALS
RESPIRATION RATE: 16 BRPM | HEIGHT: 63 IN | DIASTOLIC BLOOD PRESSURE: 62 MMHG | BODY MASS INDEX: 35.66 KG/M2 | SYSTOLIC BLOOD PRESSURE: 142 MMHG | WEIGHT: 201.25 LBS

## 2017-08-17 DIAGNOSIS — R49.0 HOARSENESS: ICD-10-CM

## 2017-08-17 DIAGNOSIS — K21.00 GASTROESOPHAGEAL REFLUX DISEASE WITH ESOPHAGITIS: ICD-10-CM

## 2017-08-17 PROCEDURE — 99214 OFFICE O/P EST MOD 30 MIN: CPT | Mod: S$GLB,,, | Performed by: NURSE PRACTITIONER

## 2017-08-17 PROCEDURE — 99999 PR PBB SHADOW E&M-EST. PATIENT-LVL IV: CPT | Mod: PBBFAC,,, | Performed by: NURSE PRACTITIONER

## 2017-08-17 PROCEDURE — 3078F DIAST BP <80 MM HG: CPT | Mod: S$GLB,,, | Performed by: NURSE PRACTITIONER

## 2017-08-17 PROCEDURE — 3077F SYST BP >= 140 MM HG: CPT | Mod: S$GLB,,, | Performed by: NURSE PRACTITIONER

## 2017-08-17 PROCEDURE — 3008F BODY MASS INDEX DOCD: CPT | Mod: S$GLB,,, | Performed by: NURSE PRACTITIONER

## 2017-08-17 RX ORDER — OMEPRAZOLE 40 MG/1
40 CAPSULE, DELAYED RELEASE ORAL EVERY MORNING
Qty: 30 CAPSULE | Refills: 0
Start: 2017-08-17 | End: 2018-05-14 | Stop reason: ALTCHOICE

## 2017-08-17 NOTE — PROGRESS NOTES
Subjective:       Patient ID: Curtis Navarro is a 67 y.o. female Body mass index is 35.66 kg/m².    Chief Complaint: Follow-up    Established patient of Dr. Ceballos & myself    Gastroesophageal Reflux   She complains of belching (not more than usual), globus sensation (frequent throat clearing- improved) and a hoarse voice (history of thyroid nodule and took radioactive pill and told this can be one of the symptoms, ENT told her the cause is from LPRD; improved). She reports no abdominal pain, no chest pain, no choking, no coughing (resolved- since off lisinopril and better control of GERD), no dysphagia (resolved), no early satiety, no heartburn (resolved), no nausea, no sore throat or no water brash (resolved). This is a chronic problem. Episode onset: several years ago. The problem occurs rarely. The problem has been rapidly improving. The heartburn does not wake her from sleep. The heartburn doesn't change with position. Exacerbated by: worse in the afternoon and at night. Pertinent negatives include no fatigue, melena or weight loss. Risk factors include obesity. She has tried a PPI, a histamine-2 antagonist and head elevation (prilosec 40 mg twice daily, zantac 300 mg once daily at night) for the symptoms. The treatment provided significant relief. Past procedures include an EGD. saw ENT and told had signs of GERD.     Review of Systems   Constitutional: Negative for appetite change, chills, fatigue, fever, unexpected weight change and weight loss.   HENT: Positive for hoarse voice (history of thyroid nodule and took radioactive pill and told this can be one of the symptoms, ENT told her the cause is from LPRD; improved). Negative for sore throat and trouble swallowing (resolved).    Respiratory: Negative for cough (resolved- since off lisinopril and better control of GERD), choking and shortness of breath.    Cardiovascular: Negative for chest pain.   Gastrointestinal: Negative for abdominal pain, anal  bleeding, blood in stool, constipation, diarrhea, dysphagia (resolved), heartburn (resolved), melena, nausea, rectal pain and vomiting.   Neurological: Negative for weakness.       Past Medical History:   Diagnosis Date    Allergy     Arthritis     Cataract     Colon polyp     Diverticulosis     GERD (gastroesophageal reflux disease)     Graves disease     s/p radioactive iodine in 42    H/O esophagogastroduodenoscopy     8/14    H/O percutaneous left heart catheterization     normal 5/12    History of colon polyps     History of diverticulitis     History of esophagitis     egd 8/14    History of skin cancer     L neck    Hyperlipidemia     Hypertension     Hypothyroidism     s/p radioactive iodine    IBS (irritable bowel syndrome)     Mild luminal irregularity of carotid artery on ultrasound     noted on 5/16 usg with next due 5/18    Osteopenia     7/13, 7/15    S/P colonoscopy     7/10;  next due 7/17    Thyroid nodule     last usg 5/17;  next due 5/19     Past Surgical History:   Procedure Laterality Date    COLONOSCOPY  07/21/2010    Dr. Terrazas; in legacy, repeat in 6-7 years    COLONOSCOPY N/A 6/27/2017    Procedure: COLONOSCOPY;  Surgeon: Hamlet Terrazas Jr., MD;  Location: [facility];  Service: Endoscopy;  Laterality: N/A; repeat in 5 years for surveillance    COSMETIC SURGERY      Liposuction to neck    SKIN CANCER EXCISION      with Mohs on left neck    TONSILLECTOMY      UPPER GASTROINTESTINAL ENDOSCOPY  08/04/2014    Dr. Terrazas    UPPER GASTROINTESTINAL ENDOSCOPY  6/27/17dR. Terrazas     Family History   Problem Relation Age of Onset    Cataracts Mother     Colon polyps Mother      history of colitis- part colon removed    Cancer Sister     Cataracts Maternal Grandfather     Amblyopia Neg Hx     Blindness Neg Hx     Glaucoma Neg Hx     Hypertension Neg Hx     Macular degeneration Neg Hx     Retinal detachment Neg Hx     Strabismus Neg Hx     Stroke Neg Hx     Thyroid  disease Neg Hx     Diabetes Neg Hx     Colon cancer Neg Hx      Wt Readings from Last 10 Encounters:   08/17/17 91.3 kg (201 lb 4.5 oz)   08/03/17 88.7 kg (195 lb 8.8 oz)   07/11/17 88.7 kg (195 lb 8.8 oz)   06/22/17 86.2 kg (190 lb)   06/12/17 88.5 kg (195 lb)   06/06/17 88.6 kg (195 lb 5.2 oz)   05/23/17 87.9 kg (193 lb 12.6 oz)   05/18/17 89.7 kg (197 lb 12 oz)   03/22/17 89.7 kg (197 lb 12 oz)   03/13/17 88.6 kg (195 lb 5.2 oz)     Lab Results   Component Value Date    WBC 7.25 08/09/2016    HGB 14.7 08/09/2016    HCT 44.3 08/09/2016    MCV 87 08/09/2016     08/09/2016     CMP  Sodium   Date Value Ref Range Status   04/06/2017 140 136 - 145 mmol/L Final     Potassium   Date Value Ref Range Status   04/06/2017 4.4 3.5 - 5.1 mmol/L Final     Chloride   Date Value Ref Range Status   04/06/2017 104 95 - 110 mmol/L Final     CO2   Date Value Ref Range Status   04/06/2017 29 23 - 29 mmol/L Final     Glucose   Date Value Ref Range Status   04/06/2017 92 70 - 110 mg/dL Final     BUN, Bld   Date Value Ref Range Status   04/06/2017 16 8 - 23 mg/dL Final     Creatinine   Date Value Ref Range Status   04/06/2017 1.0 0.5 - 1.4 mg/dL Final     Calcium   Date Value Ref Range Status   04/06/2017 10.3 8.7 - 10.5 mg/dL Final     Total Protein   Date Value Ref Range Status   02/17/2017 7.4 6.0 - 8.4 g/dL Final     Albumin   Date Value Ref Range Status   02/17/2017 4.1 3.5 - 5.2 g/dL Final     Total Bilirubin   Date Value Ref Range Status   02/17/2017 0.4 0.1 - 1.0 mg/dL Final     Comment:     For infants and newborns, interpretation of results should be based  on gestational age, weight and in agreement with clinical  observations.  Premature Infant recommended reference ranges:  Up to 24 hours.............<8.0 mg/dL  Up to 48 hours............<12.0 mg/dL  3-5 days..................<15.0 mg/dL  6-29 days.................<15.0 mg/dL       Alkaline Phosphatase   Date Value Ref Range Status   02/17/2017 84 55 - 135 U/L Final  "    AST   Date Value Ref Range Status   02/17/2017 23 10 - 40 U/L Final     ALT   Date Value Ref Range Status   02/17/2017 24 10 - 44 U/L Final     Anion Gap   Date Value Ref Range Status   04/06/2017 7 (L) 8 - 16 mmol/L Final     eGFR if    Date Value Ref Range Status   04/06/2017 >60.0 >60 mL/min/1.73 m^2 Final     eGFR if non    Date Value Ref Range Status   04/06/2017 59.7 (A) >60 mL/min/1.73 m^2 Final     Comment:     Calculation used to obtain the estimated glomerular filtration  rate (eGFR) is the CKD-EPI equation. Since race is unknown   in our information system, the eGFR values for   -American and Non--American patients are given   for each creatinine result.       Reviewed prior medical records including radiology report of 5/16/17 thyroid ultrasound & endoscopy history (see surgical history).    6/27/17 EGD was reviewed and procedure report states:   " Impression:          - Normal oropharynx.                       - White nummular lesions in esophageal mucosa.                        Biopsied.                       - Normal esophagus otherwise.                       - Z-line regular, 35 cm from the incisors.                       - No endoscopic esophageal abnormality to explain                        patient's dysphagia. Esophagus dilated. Dilated, 51                        Fr.                       - Non-erosive esophageal reflux (NERD) disease                        present.                       - Bilious gastric fluid.                       - Multiple fundic gland polyps. Biopsied.                       - Normal antrum. Biopsied.                       - Normal examined duodenum.  Recommendation:      - Perform a colonoscopy as previously scheduled.                       - Discharge patient to home.                       - Await pathology and CLOtest results.                       - Follow an antireflux regimen.                       - Continue present " "medications.                       - But increase Prilosec (omeprazole) to 40 mg PO BID.                       - Use Zantac (ranitidine) 300 mg PO nightly.                       - Call the G.I. clinic in 2 weeks for reports (if                        you haven't heard from us sooner) [phone].                       - Return to GI clinic in 6-8 weeks.                       - If fails to improve, then next perform a modified                        barium swallow. ".  Biopsy results:   Cindy test negative  "1. ESOPHAGUS (BIOPSY):  -Esophagus squamous mucosa with sebaceous metaplasia  -Negative for significant active inflammation  -Negative for intestinal metaplasia  -Negative for dysplasia  2. STOMACH, POLYPS (BIOPSY):  -Fundic gland polyps  3. STOMACH (BIOPSY):  -Antrum with features of reactive/chemical gastropathy  -Negative for active inflammation  -Negative for Helicobacter pylori organisms on H&E stain"    6/27/17 Colonoscopy was reviewed and procedure report states:   "Impression:          - One 2 mm by 4 mm polyp in the cecum, removed with                        a cold snare. Resected and retrieved.                       - Diverticulosis in the sigmoid colon and in the                        descending colon.                       - Diverticulosis in the transverse colon, at the                        hepatic flexure, in the ascending colon and in the                        cecum.                       - Mild colonic spasm consistent with irritable bowel                        syndrome.                       - Non-bleeding internal hemorrhoids.                       - The examination was otherwise normal.                       - The examined portion of the ileum was normal.  Recommendation:      - Discharge patient to home.                       - Await pathology results.                       - If the pathology report reveals adenomatous                        tissue, then repeat the colonoscopy for surveillance " "                       in 5 years.                       - If the pathology report indicates hyperplastic                        polyp, then repeat colonoscopy for surveillance in 7                        years.                       - High fiber diet.                       - Take a PROBIOTIC, such as a carton of GREEK YOGURT                        (Chobani or Oikos, or Activia or Dannon); or tablets                        of ALIGN or CULTURELLE or LITA-Q (all                        non-prescription), every day for a month.                       - Call the G.I. clinic in 2 weeks for reports (if                        you haven't heard from us sooner) [phone].                       - Continue present medications. ".  Biopsy results:   "4. COLON, CECAL POLYPS (BIOPSY):  - Tubular adenoma"  Objective:      Physical Exam   Constitutional: She is oriented to person, place, and time. She appears well-developed and well-nourished. No distress.   HENT:   Mouth/Throat: Oropharynx is clear and moist and mucous membranes are normal. No oral lesions. No oropharyngeal exudate.   Eyes: Conjunctivae are normal. Pupils are equal, round, and reactive to light. No scleral icterus.   Neck: Neck supple. No tracheal deviation present.   Cardiovascular: Normal rate.    Pulmonary/Chest: Effort normal and breath sounds normal. No respiratory distress. She has no wheezes.   Abdominal: Soft. Normal appearance and bowel sounds are normal. She exhibits no distension, no abdominal bruit and no mass. There is no hepatosplenomegaly. There is no tenderness. There is no rigidity, no rebound, no guarding, no tenderness at McBurney's point and negative Smith's sign. No hernia.   Lymphadenopathy:     She has no cervical adenopathy.   Neurological: She is alert and oriented to person, place, and time.   Skin: Skin is warm and dry. No rash noted. She is not diaphoretic. No erythema. No pallor.   Non-jaundiced   Psychiatric: She has a normal mood and " affect. Her behavior is normal.   Nursing note and vitals reviewed.      Assessment:       1. Gastroesophageal reflux disease with esophagitis    2. Hoarseness        Plan:     discussed results and case with Dr. Ceballos, he reports benign findings on EGD    Gastroesophageal reflux disease with esophagitis  -  DECREASE to   omeprazole (PRILOSEC) 40 MG capsule; Take 1 capsule (40 mg total) by mouth every morning.  Dispense: 30 capsule; Refill: 0,  - take in the morning before breakfast, discussed about possible long term use of medication (prefer to use lowest effective dose or discontinuing if possible) and discussed the risks & benefits with taking a reflux medication long term, and to take OTC calcium and vitamin d supplements as directed (such as Citracal +D), pt verbalized understanding  -  CONTINUE   ranitidine (ZANTAC) 300 MG tablet; Take 1 tablet (300 mg total) by mouth nightly.  Dispense: 30 tablet; Refill: 3  - continue lifestyle modifications to help control/reduce reflux/abdominal pain including: avoid large meals, avoid eating within 2-3 hours of bedtime (avoid late night eating & lying down soon after eating), elevate head of bed if nocturnal symptoms are present, smoking cessation (if current smoker), & weight loss (if overweight).   - avoid known foods which trigger reflux symptoms & to minimize/avoid high-fat foods, chocolate, caffeine, citrus, alcohol, & tomato products.  - avoid/limit use of NSAID's, since they can cause GI upset, bleeding, and/or ulcers. If needed, take with food.    Hoarseness  - DECREASE to    omeprazole (PRILOSEC) 40 MG capsule; Take 1 capsule (40 mg total) by mouth every morning.  Dispense: 30 capsule; Refill: 0  Recommend follow-up with Primary Care Provider for continued evaluation and management.    Return in about 2 months (around 10/17/2017), or if symptoms worsen or fail to improve.      If no improvement in symptoms or symptoms worsen, call/follow-up at clinic or go to  [facility]

## 2017-09-05 ENCOUNTER — HOSPITAL ENCOUNTER (OUTPATIENT)
Dept: RADIOLOGY | Facility: HOSPITAL | Age: 67
Discharge: HOME | End: 2017-09-05
Attending: OBSTETRICS & GYNECOLOGY
Payer: MEDICARE

## 2017-09-05 DIAGNOSIS — M85.80 OSTEOPENIA: ICD-10-CM

## 2017-09-05 PROCEDURE — 77080 DXA BONE DENSITY AXIAL: CPT | Mod: TC,PO

## 2017-09-05 PROCEDURE — 77080 DXA BONE DENSITY AXIAL: CPT | Mod: 26,,, | Performed by: RADIOLOGY

## 2017-09-13 ENCOUNTER — HOSPITAL ENCOUNTER (OUTPATIENT)
Dept: RADIOLOGY | Facility: HOSPITAL | Age: 67
Discharge: HOME | End: 2017-09-13
Attending: OBSTETRICS & GYNECOLOGY
Payer: MEDICARE

## 2017-09-13 DIAGNOSIS — Z12.31 VISIT FOR SCREENING MAMMOGRAM: ICD-10-CM

## 2017-09-13 PROCEDURE — 77067 SCR MAMMO BI INCL CAD: CPT | Mod: 26,,, | Performed by: RADIOLOGY

## 2017-09-13 PROCEDURE — 77067 SCR MAMMO BI INCL CAD: CPT | Mod: TC

## 2017-09-13 PROCEDURE — 77063 BREAST TOMOSYNTHESIS BI: CPT | Mod: 26,,, | Performed by: RADIOLOGY

## 2017-09-18 ENCOUNTER — HOSPITAL ENCOUNTER (OUTPATIENT)
Dept: RADIOLOGY | Facility: HOSPITAL | Age: 67
Discharge: HOME | End: 2017-09-18
Attending: FAMILY MEDICINE
Payer: MEDICARE

## 2017-09-18 DIAGNOSIS — R59.1 LAD (LYMPHADENOPATHY): ICD-10-CM

## 2017-09-18 PROCEDURE — 76536 US EXAM OF HEAD AND NECK: CPT | Mod: TC,PO

## 2017-09-18 PROCEDURE — 76536 US EXAM OF HEAD AND NECK: CPT | Mod: 26,,, | Performed by: RADIOLOGY

## 2017-09-26 ENCOUNTER — OFFICE VISIT (OUTPATIENT)
Dept: FAMILY MEDICINE | Facility: CLINIC | Age: 67
End: 2017-09-26
Payer: MEDICARE

## 2017-09-26 VITALS
HEIGHT: 63 IN | HEART RATE: 87 BPM | RESPIRATION RATE: 16 BRPM | BODY MASS INDEX: 35.39 KG/M2 | SYSTOLIC BLOOD PRESSURE: 134 MMHG | WEIGHT: 199.75 LBS | OXYGEN SATURATION: 97 % | DIASTOLIC BLOOD PRESSURE: 70 MMHG | TEMPERATURE: 99 F

## 2017-09-26 DIAGNOSIS — I10 HYPERTENSION, ESSENTIAL: ICD-10-CM

## 2017-09-26 DIAGNOSIS — M79.641 PAIN OF RIGHT HAND: Primary | ICD-10-CM

## 2017-09-26 DIAGNOSIS — E78.5 HYPERLIPIDEMIA, UNSPECIFIED HYPERLIPIDEMIA TYPE: ICD-10-CM

## 2017-09-26 DIAGNOSIS — G25.81 RLS (RESTLESS LEGS SYNDROME): ICD-10-CM

## 2017-09-26 DIAGNOSIS — E04.1 THYROID NODULE: ICD-10-CM

## 2017-09-26 DIAGNOSIS — R79.9 ABNORMAL FINDING OF BLOOD CHEMISTRY: ICD-10-CM

## 2017-09-26 PROCEDURE — 99214 OFFICE O/P EST MOD 30 MIN: CPT | Mod: S$GLB,,, | Performed by: FAMILY MEDICINE

## 2017-09-26 PROCEDURE — G0008 ADMIN INFLUENZA VIRUS VAC: HCPCS | Mod: S$GLB,,, | Performed by: FAMILY MEDICINE

## 2017-09-26 PROCEDURE — 3078F DIAST BP <80 MM HG: CPT | Mod: S$GLB,,, | Performed by: FAMILY MEDICINE

## 2017-09-26 PROCEDURE — 3008F BODY MASS INDEX DOCD: CPT | Mod: S$GLB,,, | Performed by: FAMILY MEDICINE

## 2017-09-26 PROCEDURE — 90662 IIV NO PRSV INCREASED AG IM: CPT | Mod: S$GLB,,, | Performed by: FAMILY MEDICINE

## 2017-09-26 PROCEDURE — 3075F SYST BP GE 130 - 139MM HG: CPT | Mod: S$GLB,,, | Performed by: FAMILY MEDICINE

## 2017-09-29 ENCOUNTER — LAB VISIT (OUTPATIENT)
Dept: LAB | Facility: HOSPITAL | Age: 67
End: 2017-09-29
Attending: FAMILY MEDICINE
Payer: MEDICARE

## 2017-09-29 ENCOUNTER — PATIENT MESSAGE (OUTPATIENT)
Dept: FAMILY MEDICINE | Facility: CLINIC | Age: 67
End: 2017-09-29

## 2017-09-29 DIAGNOSIS — I10 HYPERTENSION, ESSENTIAL: ICD-10-CM

## 2017-09-29 DIAGNOSIS — G25.81 RLS (RESTLESS LEGS SYNDROME): ICD-10-CM

## 2017-09-29 DIAGNOSIS — R79.9 ABNORMAL FINDING OF BLOOD CHEMISTRY: ICD-10-CM

## 2017-09-29 LAB
ALBUMIN SERPL BCP-MCNC: 3.7 G/DL
ALP SERPL-CCNC: 112 U/L
ALT SERPL W/O P-5'-P-CCNC: 27 U/L
ANION GAP SERPL CALC-SCNC: 7 MMOL/L
AST SERPL-CCNC: 25 U/L
BILIRUB SERPL-MCNC: 0.4 MG/DL
BUN SERPL-MCNC: 20 MG/DL
CALCIUM SERPL-MCNC: 9.6 MG/DL
CHLORIDE SERPL-SCNC: 105 MMOL/L
CHOLEST SERPL-MCNC: 188 MG/DL
CHOLEST/HDLC SERPL: 3.3 {RATIO}
CO2 SERPL-SCNC: 28 MMOL/L
CREAT SERPL-MCNC: 1 MG/DL
EST. GFR  (AFRICAN AMERICAN): >60 ML/MIN/1.73 M^2
EST. GFR  (NON AFRICAN AMERICAN): 59.3 ML/MIN/1.73 M^2
FERRITIN SERPL-MCNC: 60 NG/ML
GLUCOSE SERPL-MCNC: 99 MG/DL
HDLC SERPL-MCNC: 57 MG/DL
HDLC SERPL: 30.3 %
LDLC SERPL CALC-MCNC: 112.6 MG/DL
NONHDLC SERPL-MCNC: 131 MG/DL
POTASSIUM SERPL-SCNC: 4.2 MMOL/L
PROT SERPL-MCNC: 7.4 G/DL
SODIUM SERPL-SCNC: 140 MMOL/L
TRIGL SERPL-MCNC: 92 MG/DL

## 2017-09-29 PROCEDURE — 80053 COMPREHEN METABOLIC PANEL: CPT

## 2017-09-29 PROCEDURE — 82728 ASSAY OF FERRITIN: CPT

## 2017-09-29 PROCEDURE — 36415 COLL VENOUS BLD VENIPUNCTURE: CPT | Mod: PO

## 2017-09-29 PROCEDURE — 80061 LIPID PANEL: CPT

## 2017-10-01 NOTE — PROGRESS NOTES
Subjective:       Patient ID: Curtis Navarro is a 67 y.o. female.    Chief Complaint: Follow-up    HPI   The patient is a 67-year-old who is here today for follow-up.  Overall, she is doing well.  We did discuss the followin)  recent ultrasound of the neck.  We did discuss her recent ultrasound.  She denies any lumps or bumps.  She denies any night sweats.  She denies any fevers or chills.  She denies any trouble swallowing  2) left shoulder pain and adhesive capsulitis.  Her shoulder is better with physical therapy.  She is no longer having any pain moving down from her shoulder into her chest.  Her range of motion has improved  3)  weakness in the arms and legs.  She notes that this is better and no longer a problem  4)  restless legs.  She notes that her legs are restless in the evening when she settles down at the end of the day.  She wonders if she might have restless leg syndrome  5)  hyperlipidemia.  She is due for labs and is fasting today.  She is doing well with the Lipitor  6)  right wrist pain.  She continues to have pain in her right wrist which is really problematic at times.  The pain is worse with activity.  She is interested in meeting with somebody about this    Review of Systems   Constitutional: Negative for appetite change, chills, diaphoresis, fatigue, fever and unexpected weight change.   HENT: Negative for congestion, ear pain, postnasal drip, rhinorrhea, sinus pressure, sneezing, sore throat and trouble swallowing.    Eyes: Negative for pain, discharge and visual disturbance.   Respiratory: Negative for cough, chest tightness, shortness of breath and wheezing.    Cardiovascular: Negative for chest pain, palpitations and leg swelling.   Gastrointestinal: Negative for abdominal distention, abdominal pain, blood in stool, constipation, diarrhea, nausea and vomiting.   Skin: Negative for rash.       Objective:      Physical Exam   Constitutional: She is oriented to person, place, and  time. She appears well-developed and well-nourished. No distress.   HENT:   Head: Normocephalic and atraumatic.   Right Ear: Hearing, tympanic membrane, external ear and ear canal normal.   Left Ear: Hearing, tympanic membrane, external ear and ear canal normal.   Nose: Nose normal.   Mouth/Throat: Oropharynx is clear and moist and mucous membranes are normal. No oral lesions. No oropharyngeal exudate, posterior oropharyngeal edema or posterior oropharyngeal erythema.   Eyes: Conjunctivae, EOM and lids are normal. Pupils are equal, round, and reactive to light. No scleral icterus.   Neck: Normal range of motion. Neck supple. Carotid bruit is not present. No thyroid mass and no thyromegaly present.   Cardiovascular: Normal rate, regular rhythm and normal heart sounds.   No extrasystoles are present. PMI is not displaced.  Exam reveals no gallop.    No murmur heard.  Pulmonary/Chest: Effort normal and breath sounds normal. No accessory muscle usage. No respiratory distress.   Clear to auscultation bilaterally.   Abdominal: Soft. Normal appearance and bowel sounds are normal. She exhibits no abdominal bruit. There is no hepatosplenomegaly. There is no tenderness. There is no rebound.   Lymphadenopathy:        Head (right side): No submental and no submandibular adenopathy present.        Head (left side): No submental and no submandibular adenopathy present.        Right cervical: No superficial cervical, no deep cervical and no posterior cervical adenopathy present.       Left cervical: No superficial cervical, no deep cervical and no posterior cervical adenopathy present.        Right: No supraclavicular adenopathy present.        Left: No supraclavicular adenopathy present.   Neurological: She is alert and oriented to person, place, and time.   Skin: Skin is warm, dry and intact.   Psychiatric: She has a normal mood and affect.     Blood pressure 134/70, pulse 87, temperature 98.7 °F (37.1 °C), temperature source  "Oral, resp. rate 16, height 5' 3" (1.6 m), weight 90.6 kg (199 lb 11.8 oz), SpO2 97 %.Body mass index is 35.38 kg/m².            A/P:  1)  thyroid nodules.  Stable.  We'll recheck a thyroid ultrasound in 6 months  2)  lymphadenopathy in the neck.  Stable with no concerning features on ultrasound.  No specific follow-up is recommended  3)  left shoulder pain and adhesive capsulitis.  Improved.  If she has recurrent symptoms, she will let me know  4)  weakness in the arms and legs.  Resolved.  If this recurs, she will let me know  5)  RLS.  New to me.  We will check ferritin level.  If this is normal, we'll consider medications for this  6)  hyperlipidemia.  Status unknown.  We will check fasting labs today   7)  right wrist pain.  Persistent.  We will refer her to the orthopedic hand surgeon  8)  hypertension.  Well-controlled.  Continue current medication    As long as she does well, I will see her back in 6 months or sooner if needed  "

## 2017-10-02 ENCOUNTER — TELEPHONE (OUTPATIENT)
Dept: FAMILY MEDICINE | Facility: CLINIC | Age: 67
End: 2017-10-02

## 2017-10-02 NOTE — TELEPHONE ENCOUNTER
----- Message from Komal Del Castillo MD sent at 9/30/2017  9:39 PM CDT -----  Let's bi her with [facility] with [facility] [place] for the ref I submitted at her appt  Thanks!

## 2017-10-06 NOTE — TELEPHONE ENCOUNTER
Spoke w/ pt , pt has decided that it is not convenient to go to [facility] . Pt will see someone on the [facility] outside of [facility] . She will let us know who she decides on .--lp

## 2017-10-12 ENCOUNTER — OFFICE VISIT (OUTPATIENT)
Dept: ORTHOPEDICS | Facility: CLINIC | Age: 67
End: 2017-10-12
Payer: MEDICARE

## 2017-10-12 VITALS — BODY MASS INDEX: 35.26 KG/M2 | HEIGHT: 63 IN | WEIGHT: 199 LBS

## 2017-10-12 DIAGNOSIS — M75.02 ADHESIVE CAPSULITIS OF LEFT SHOULDER: Primary | ICD-10-CM

## 2017-10-12 PROCEDURE — 99999 PR PBB SHADOW E&M-EST. PATIENT-LVL II: CPT | Mod: PBBFAC,,, | Performed by: ORTHOPAEDIC SURGERY

## 2017-10-12 PROCEDURE — 99213 OFFICE O/P EST LOW 20 MIN: CPT | Mod: S$GLB,,, | Performed by: ORTHOPAEDIC SURGERY

## 2017-10-12 RX ORDER — MIRABEGRON 25 MG/1
TABLET, FILM COATED, EXTENDED RELEASE ORAL
COMMUNITY
Start: 2017-10-06 | End: 2018-04-20

## 2017-10-12 NOTE — LETTER
October 12, 2017      Komal Del Castillo MD  [address] [address] [address]           [facility] [address] [facility] [address] [place] [address]  Phone: [phone]          Patient: Curtis Navarro   MR Number: [id number]   YOB: 1950   Date of Visit: 10/12/2017       Dear Dr. Komal Del Castillo:    Thank you for referring Curtis Navarro to me for evaluation. Attached you will find relevant portions of my assessment and plan of care.    If you have questions, please do not hesitate to call me. I look forward to following Curtis Navarro along with you.    Sincerely,    Jaime Irwin MD    Enclosure  CC:  No Recipients    If you would like to receive this communication electronically, please contact [email or web address] or [phone] to request more information on [employer] Link access.    For providers and/or their staff who would like to refer a patient to [facility], please contact us through our one-stop-shop provider referral line, [facility], at [phone].    If you feel you have received this communication in error or would no longer like to receive these types of communications, please e-mail [email or web address]

## 2017-10-12 NOTE — PROGRESS NOTES
"DATE: 10/12/2017  PATIENT: Curtis Navarro    Attending Physician: Jaime Irwin M.D.    HISTORY:  Curtis Navarro is a 67 y.o. female who returns for follow up evaluation of  her left shoulder.  She is diagnosed with adhesive capsulitis.  She has done therapy and notes good progress with motion.  She reports her pain at 0/10 today.    PMH/PSH/FamHx/SocHx:  Reviewed and unchanged from prior visit    ROS:  Constitution: Negative for chills, fever, and sweats. Negative for unexplained weight loss.  HENT: Negative for headaches and blurry vision.   Cardiovascular: Negative for chest pain, irregular heartbeat, leg swelling and palpitations.   Respiratory: Negative for cough and shortness of breath.   Gastrointestinal: Negative for abdominal pain, heartburn, nausea and vomiting.   Genitourinary: Negative for bladder incontinence and dysuria.   Musculoskeletal: Negative for systemic arthritis, joint swelling, muscle weakness and myalgias.   Neurological: Negative for numbness.   Psychiatric/Behavioral: Negative for depression.   Endocrine: Negative for polyuria.   Hematologic/Lymphatic: Negative for bleeding disorders.  Skin: Negative for poor wound healing.       EXAM:  Ht 5' 3" (1.6 m)   Wt 90.3 kg (199 lb)   BMI 35.25 kg/m²   Curtis Navarro is a well developed, well nourished female in no acute distress. Physical examination of the left shoulder evaluated the following:    Inspection, palpation and ROM of the cervical spine  Disc compression testing bilaterally  Inspection for swelling, ecchymosis, erythema, deformity and atrophy  Tenderness to palpation of the soft tissue and bony structures  Active and passive range of motion  Sensation of the shoulder and upper extremity  Motor strength in the deltoid, supraspinatus, internal rotators and external rotators  Impingement, apprehension, relocation and Speed's tests  Upper extremity vascular exam (skin temp,color, capillary refill)  Inspection for " pseudomotor signs    Remarkable findings included:  Full range of motion cervical spine.  Negative disc compression sign.  Normal contour of the left shoulder.  No tenderness palpation.  Range of motion 160° of forward elevation, 160° of abduction, X rotation with the arm abducted 90° is 80°.  This is 10° less than the other side.  Internal rotation to L2.  Motor strength 5/5 in the supraspinatus and external rotators.  Negative impingement sign on exam.        IMAGING:   No x-rays are performed today.    ASSESSMENT:  Follow-up adhesive capsulitis left shoulder    PLAN:  The implications of the patient's evolution of symptoms and findings were explained to the patient in detail.  Overall, Curtis is doing well.  I have encouraged her to work on terminal external rotation.  Activities may be performed as tolerated.  Should she have further problems, I will see her back.  Otherwise, follow-up as needed.     This note was dictated using voice recognition software. Please excuse any grammatical or typographical errors.

## 2017-10-13 ENCOUNTER — TELEPHONE (OUTPATIENT)
Dept: FAMILY MEDICINE | Facility: CLINIC | Age: 67
End: 2017-10-13

## 2017-10-13 DIAGNOSIS — M79.641 PAIN OF RIGHT HAND: Primary | ICD-10-CM

## 2017-10-13 NOTE — TELEPHONE ENCOUNTER
Patient requesting to see Dr. Amezquita instead of going to [facility]- pended referral for your review. Will fax to his office when signed.

## 2017-10-13 NOTE — TELEPHONE ENCOUNTER
----- Message from Lisa Alas sent at 10/12/2017 12:55 PM CDT -----  Patient states she is returning a call from office, she states she made a appt with   Dr Monteiro, [phone]

## 2017-10-24 DIAGNOSIS — E78.5 HYPERLIPIDEMIA, UNSPECIFIED HYPERLIPIDEMIA TYPE: ICD-10-CM

## 2017-10-24 RX ORDER — ATORVASTATIN CALCIUM 20 MG/1
TABLET, FILM COATED ORAL
Qty: 15 TABLET | Refills: 4 | Status: SHIPPED | OUTPATIENT
Start: 2017-10-24 | End: 2018-02-22 | Stop reason: SDUPTHER

## 2017-11-20 RX ORDER — LEVOTHYROXINE SODIUM 125 UG/1
125 TABLET ORAL DAILY
Qty: 30 TABLET | Refills: 3 | Status: SHIPPED | OUTPATIENT
Start: 2017-11-20 | End: 2018-02-19 | Stop reason: SDUPTHER

## 2017-11-21 ENCOUNTER — TELEPHONE (OUTPATIENT)
Dept: FAMILY MEDICINE | Facility: CLINIC | Age: 67
End: 2017-11-21

## 2017-11-21 NOTE — TELEPHONE ENCOUNTER
----- Message from Ela Renteria sent at 11/21/2017  1:46 PM CST -----  Contact: self  [phone]  Patient sent a message via [facility] for new rx with [facility]. The patient want to know if the nurse received the message for new rx.. Thanks!

## 2017-11-27 RX ORDER — VALSARTAN 40 MG/1
40 TABLET ORAL DAILY
Qty: 30 TABLET | Refills: 3 | Status: SHIPPED | OUTPATIENT
Start: 2017-11-27 | End: 2018-04-05 | Stop reason: SDUPTHER

## 2017-12-21 ENCOUNTER — TELEPHONE (OUTPATIENT)
Dept: GASTROENTEROLOGY | Facility: CLINIC | Age: 67
End: 2017-12-21

## 2017-12-21 DIAGNOSIS — K21.00 GASTROESOPHAGEAL REFLUX DISEASE WITH ESOPHAGITIS: ICD-10-CM

## 2017-12-21 DIAGNOSIS — R09.A2 GLOBUS SENSATION: ICD-10-CM

## 2017-12-21 DIAGNOSIS — R05.9 COUGH: ICD-10-CM

## 2017-12-21 DIAGNOSIS — R49.0 HOARSENESS: ICD-10-CM

## 2018-02-19 RX ORDER — LEVOTHYROXINE SODIUM 125 UG/1
125 TABLET ORAL DAILY
Qty: 30 TABLET | Refills: 2 | Status: SHIPPED | OUTPATIENT
Start: 2018-02-19 | End: 2018-02-22 | Stop reason: SDUPTHER

## 2018-02-22 DIAGNOSIS — E78.5 HYPERLIPIDEMIA, UNSPECIFIED HYPERLIPIDEMIA TYPE: ICD-10-CM

## 2018-02-23 RX ORDER — ATORVASTATIN CALCIUM 20 MG/1
TABLET, FILM COATED ORAL
Qty: 15 TABLET | Refills: 0 | Status: SHIPPED | OUTPATIENT
Start: 2018-02-23 | End: 2018-08-21 | Stop reason: SDUPTHER

## 2018-02-23 RX ORDER — LEVOTHYROXINE SODIUM 125 UG/1
TABLET ORAL
Qty: 30 TABLET | Refills: 0 | Status: SHIPPED | OUTPATIENT
Start: 2018-02-23 | End: 2018-04-23 | Stop reason: SDUPTHER

## 2018-03-25 ENCOUNTER — NURSE TRIAGE (OUTPATIENT)
Dept: ADMINISTRATIVE | Facility: CLINIC | Age: 68
End: 2018-03-25

## 2018-03-25 NOTE — TELEPHONE ENCOUNTER
"Pt called re this am mid morning low fever 99, stomach feels strange, anorexia. No CP, no SOB. Ate bkft sandwich this am.       Reason for Disposition   Fever present > 3 days (72 hours)    Answer Assessment - Initial Assessment Questions  1. TEMPERATURE: "What is the most recent temperature?"  "How was it measured?"     99   2. ONSET: "When did the fever start?"      Oral   3. SYMPTOMS: "Do you have any other symptoms besides the fever?"  (e.g., colds, headache, sore throat, earache, cough, rash, diarrhea, vomiting, abdominal pain)     dont feel like eating   4. CAUSE: If there are no symptoms, ask: "What do you think is causing the fever?"      Unsure   5. CONTACTS: "Does anyone else in the family have an infection?"     Sons wife had flu a week ago   6. TREATMENT: "What have you done so far to treat this fever?" (e.g., medications)      No , aches and pain, wrist bothering, knee bothering, no palps not dizzy   7. IMMUNOCOMPROMISE: "Do you have of the following: diabetes, HIV positive, splenectomy, cancer chemotherapy, chronic steroid treatment, transplant patient, etc."     No   8. PREGNANCY: "Is there any chance you are pregnant?" "When was your last menstrual period?"     n/a  9. TRAVEL: "Have you traveled out of the country in the last month?" (e.g., travel history, exposures)     No    Protocols used: ST FEVER-A-AH    Good uop- clear yellow , drinking fluids, mouth a little dry. rec UC/OV within 24 hours. Call back with questions.   "

## 2018-03-26 ENCOUNTER — TELEPHONE (OUTPATIENT)
Dept: FAMILY MEDICINE | Facility: CLINIC | Age: 68
End: 2018-03-26

## 2018-03-26 ENCOUNTER — OFFICE VISIT (OUTPATIENT)
Dept: FAMILY MEDICINE | Facility: CLINIC | Age: 68
End: 2018-03-26
Payer: MEDICARE

## 2018-03-26 VITALS
BODY MASS INDEX: 34.25 KG/M2 | RESPIRATION RATE: 18 BRPM | TEMPERATURE: 99 F | SYSTOLIC BLOOD PRESSURE: 132 MMHG | WEIGHT: 193.31 LBS | HEIGHT: 63 IN | DIASTOLIC BLOOD PRESSURE: 65 MMHG | OXYGEN SATURATION: 97 % | HEART RATE: 88 BPM

## 2018-03-26 DIAGNOSIS — B34.9 VIRAL ILLNESS: Primary | ICD-10-CM

## 2018-03-26 PROCEDURE — 3075F SYST BP GE 130 - 139MM HG: CPT | Mod: CPTII,S$GLB,, | Performed by: INTERNAL MEDICINE

## 2018-03-26 PROCEDURE — 3078F DIAST BP <80 MM HG: CPT | Mod: CPTII,S$GLB,, | Performed by: INTERNAL MEDICINE

## 2018-03-26 PROCEDURE — 99213 OFFICE O/P EST LOW 20 MIN: CPT | Mod: S$GLB,,, | Performed by: INTERNAL MEDICINE

## 2018-03-26 NOTE — TELEPHONE ENCOUNTER
----- Message from Skye Dumont sent at 3/26/2018  8:13 AM CDT -----  Contact: pt [phone] cell   Patient called and asked if she can be seen this morning for the FLU  Like symptoms I offered her the appointment for this afternoon she said no. She is asking for  A call back from the nurse.

## 2018-03-26 NOTE — TELEPHONE ENCOUNTER
Pt contacted as requested from phone message, pt reports she has an appointment at 12:40 today. Verified that an appointment has been made, no documentation of the scheduling in chart. Pt was scheduled by phone room

## 2018-03-26 NOTE — PROGRESS NOTES
Subjective:       Patient ID: Curtis Navarro is a 67 y.o. female.    Medication List with Changes/Refills   Current Medications    ASPIRIN (ECOTRIN) 81 MG EC TABLET    Take 81 mg by mouth once daily.    ATORVASTATIN (LIPITOR) 20 MG TABLET    TAKE 1/2 TABLET AT BEDTIME FOR CHOLESTEROL    B INFANTIS/B ANI/B JOSE M/B BIFID (PROBIOTIC 4X ORAL)    Take 1 tablet by mouth once daily.    BIOTIN 5 MG CAP    Take 5 mg by mouth once daily.    CALCIUM-VITAMIN D3 500 MG(1,250MG) -200 UNIT PER TABLET    Take 1 tablet by mouth 2 (two) times daily with meals.    DIVIGEL 0.5 MG (0.1 %) GLPK    Apply 0.5 mg topically every other day.    GLUCOSA HAHN 2KCL/CHONDROITIN HAHN (GLUCOSAMINE-CHONDROITIN DS ORAL)    Take by mouth 2 (two) times daily.    GUAIFENESIN (MUCINEX) 600 MG 12 HR TABLET    Take 600 mg by mouth as needed for Congestion.    KRILL/OM-3/DHA/EPA/PHOSPHO/AST (MEGARED OMEGA-3 KRILL OIL ORAL)    Take 1 capsule by mouth once daily.    MULTIVITAMIN ORAL    Take by mouth.      MYRBETRIQ 25 MG TB24 ER TABLET        OMEPRAZOLE (PRILOSEC) 40 MG CAPSULE    Take 1 capsule (40 mg total) by mouth every morning.    PROGESTERONE (PROMETRIUM) 100 MG CAPSULE    Take 100 mg by mouth once daily.      RANITIDINE (ZANTAC) 300 MG TABLET    Take 1 tablet (300 mg total) by mouth nightly.    SYNTHROID 125 MCG TABLET    TAKE 1 TABLET IN THE MORNING ON EMPTY STOMACH FOR THYROID    VALSARTAN (DIOVAN) 40 MG TABLET    Take 1 tablet (40 mg total) by mouth once daily.       Chief Complaint: Nausea x 1 day; Fever, up to 100.6; and Headache  She presents with one day of fever to 100.6 last pm, nausea with poor appetite and body aches. She denies coughing, congestion, sore throat or ear pain. No diarrhea. No vomiting.  She feels a little better than yesterday but no back to baseline.  No rashes.  She does have a headache.  No sinus pressure.      Review of Systems   Constitutional: Positive for appetite change, fatigue and fever. Negative for activity change  "and chills.   HENT: Negative for congestion, ear discharge, ear pain, mouth sores, postnasal drip, rhinorrhea, sinus pressure and sore throat.    Eyes: Negative for pain, discharge and redness.   Respiratory: Negative for cough, chest tightness, shortness of breath and wheezing.    Gastrointestinal: Positive for nausea. Negative for abdominal pain, constipation, diarrhea and vomiting.   Genitourinary: Negative for dysuria.   Musculoskeletal: Positive for myalgias. Negative for arthralgias and neck stiffness.   Skin: Negative for rash.   Neurological: Positive for headaches.   Hematological: Negative for adenopathy.       Objective:      Vitals:    03/26/18 1248   BP: 132/65   BP Location: Right arm   Patient Position: Sitting   BP Method: Large (Manual)   Pulse: 88   Resp: 18   Temp: 98.8 °F (37.1 °C)   TempSrc: Oral   SpO2: 97%   Weight: 87.7 kg (193 lb 5.5 oz)   Height: 5' 3" (1.6 m)     Body mass index is 34.25 kg/m².  Physical Exam    General appearance: alert, no acute distress  Head: atraumatic  Eyes: PERRL, EMOI, normal conjunctiva, no drainage  Ears: tm normal with good visualization of landmarks, no erythema or pus, canals normal, external ear normal  Nose: normal mucosa, no polyps or sores, no rhinorrhea  Throat: no erythema, no exudates, tonsils appear normal  Mouth: no sores or lesion, moist mucous membranes  Neck: supple, FROM, no masses, no tenderness  Lymph: no posterior or cervical adenopathy  Lungs: no distress, no retractions, clear to ascultation bilaterally, no wheezing, no rales, no rhonchi  Heart:: Regular rate and rhythm, no murmur  Abdomen: soft, non-tender, no guarding, no rebound, no peritoneal signs, bowel sounds normal, no hepatosplenomegaly, no masses  Skin: no rashes or lesion  Perfusion: good capillary refill, normal pulses      Assessment:       1. Viral illness        Plan:       Viral illness  Reassurance and supportive care. No need for antibiotics at this point. Advised of the " signs of worsening to return to clinic.      Follow-up if symptoms worsen or fail to improve.

## 2018-03-27 ENCOUNTER — TELEPHONE (OUTPATIENT)
Dept: FAMILY MEDICINE | Facility: CLINIC | Age: 68
End: 2018-03-27

## 2018-03-27 NOTE — TELEPHONE ENCOUNTER
"Patient states she began to notice a rash on chest, around mouth, redness around eyes. Patient also c/o neck soreness, states she "feels something's not right".   "

## 2018-03-27 NOTE — TELEPHONE ENCOUNTER
----- Message from Maria Cao sent at 3/27/2018  9:24 AM CDT -----  Contact: Patient  Patient is calling to speak with someone because she was in yesterday and the doctor asked her if she had a rash and she didn't yesterday but she woke up this morning with a rash on her chest and red around her mouth and in the middle of her face and her neck is sore.  Call Back#[phone]  Thanks

## 2018-03-28 ENCOUNTER — NURSE TRIAGE (OUTPATIENT)
Dept: ADMINISTRATIVE | Facility: CLINIC | Age: 68
End: 2018-03-28

## 2018-03-28 NOTE — TELEPHONE ENCOUNTER
----- Message from Martin Patel sent at 3/28/2018  1:44 PM CDT -----  Contact: self   Patient want to speak with a nurse regarding a rash she has on wrist and neck. Please call back at [phone], also transferred call to oncall nurse.

## 2018-03-28 NOTE — TELEPHONE ENCOUNTER
Reason for Disposition   Widespread hives    Protocols used:  HIVES-JUVENAL-OH    Curtis is calling to report hives have returned.  She took an allergy medication and seems better.  Hives are on wrist and neck.  No difficulty breathing.  No difficulty swallowing.   is also having hives to chest and back.  Will go to urgent care for check.

## 2018-03-28 NOTE — TELEPHONE ENCOUNTER
Called her yesterday and discussed her symptoms and rash.  History still consistent with viral illness and reassurance given.

## 2018-03-28 NOTE — TELEPHONE ENCOUNTER
Pt contacted and reviewed symptoms, pt reports much worse, and now  has broken out in large half dollar size whelps and hives. Pt and  denies any new environmental changes. Pt advised to seek treatment at urgent care or emergency room, pt reports she has the RN triage nurse on the other line.     Advised pt to be aware if any respiratory changes occur. Pt verbalized understanding stating she will return to the RN call and see what her suggestions will be. Advised that provider is out of office an there are no available appoinmtnets

## 2018-04-03 ENCOUNTER — HOSPITAL ENCOUNTER (OUTPATIENT)
Dept: RADIOLOGY | Facility: HOSPITAL | Age: 68
Discharge: HOME | End: 2018-04-03
Attending: FAMILY MEDICINE
Payer: MEDICARE

## 2018-04-03 DIAGNOSIS — E04.1 THYROID NODULE: ICD-10-CM

## 2018-04-03 PROCEDURE — 76536 US EXAM OF HEAD AND NECK: CPT | Mod: 26,,, | Performed by: RADIOLOGY

## 2018-04-03 PROCEDURE — 76536 US EXAM OF HEAD AND NECK: CPT | Mod: TC,PO

## 2018-04-05 RX ORDER — VALSARTAN 40 MG/1
40 TABLET ORAL DAILY
Qty: 30 TABLET | Refills: 3 | Status: SHIPPED | OUTPATIENT
Start: 2018-04-05 | End: 2018-04-06 | Stop reason: SDUPTHER

## 2018-04-06 ENCOUNTER — PATIENT MESSAGE (OUTPATIENT)
Dept: FAMILY MEDICINE | Facility: CLINIC | Age: 68
End: 2018-04-06

## 2018-04-06 RX ORDER — VALSARTAN 40 MG/1
40 TABLET ORAL DAILY
Qty: 30 TABLET | Refills: 0 | Status: SHIPPED | OUTPATIENT
Start: 2018-04-06 | End: 2018-07-18

## 2018-04-10 ENCOUNTER — PATIENT MESSAGE (OUTPATIENT)
Dept: FAMILY MEDICINE | Facility: CLINIC | Age: 68
End: 2018-04-10

## 2018-04-20 ENCOUNTER — PATIENT MESSAGE (OUTPATIENT)
Dept: FAMILY MEDICINE | Facility: CLINIC | Age: 68
End: 2018-04-20

## 2018-04-20 ENCOUNTER — OFFICE VISIT (OUTPATIENT)
Dept: FAMILY MEDICINE | Facility: CLINIC | Age: 68
End: 2018-04-20
Payer: MEDICARE

## 2018-04-20 VITALS
DIASTOLIC BLOOD PRESSURE: 70 MMHG | TEMPERATURE: 99 F | HEART RATE: 88 BPM | SYSTOLIC BLOOD PRESSURE: 130 MMHG | BODY MASS INDEX: 34.38 KG/M2 | HEIGHT: 63 IN | RESPIRATION RATE: 18 BRPM | WEIGHT: 194 LBS

## 2018-04-20 DIAGNOSIS — E03.4 HYPOTHYROIDISM DUE TO ACQUIRED ATROPHY OF THYROID: ICD-10-CM

## 2018-04-20 DIAGNOSIS — N32.81 OAB (OVERACTIVE BLADDER): ICD-10-CM

## 2018-04-20 DIAGNOSIS — R79.9 ABNORMAL FINDING OF BLOOD CHEMISTRY: ICD-10-CM

## 2018-04-20 DIAGNOSIS — E04.1 THYROID NODULE: ICD-10-CM

## 2018-04-20 DIAGNOSIS — E78.5 HYPERLIPIDEMIA, UNSPECIFIED HYPERLIPIDEMIA TYPE: Primary | ICD-10-CM

## 2018-04-20 PROCEDURE — 3078F DIAST BP <80 MM HG: CPT | Mod: CPTII,S$GLB,, | Performed by: FAMILY MEDICINE

## 2018-04-20 PROCEDURE — 3075F SYST BP GE 130 - 139MM HG: CPT | Mod: CPTII,S$GLB,, | Performed by: FAMILY MEDICINE

## 2018-04-20 PROCEDURE — 99214 OFFICE O/P EST MOD 30 MIN: CPT | Mod: S$GLB,,, | Performed by: FAMILY MEDICINE

## 2018-04-22 NOTE — PROGRESS NOTES
Subjective:       Patient ID: Curtis Navarro is a 67 y.o. female.    Chief Complaint: Hypothyroidism (Follow up; had a US done)    HPI   The patient is a 65-year-old who is here today for follow-up.  Overall, she is doing well.  Today we discussed the followin) hypothyroidism with thyroid nodules.  She is doing well with her Synthroid.  She is due for a TSH and would be willing to have that done.  She recently had a thyroid ultrasound which we discussed today  2)  hypertension.  She is doing well with Diovan 40 mg once a day.  She has not felt as if her blood pressure has been high or low   3)  hyperlipidemia.  She is due for fasting labs and would be willing to have that done.  She is taking Lipitor which she is tolerating well  4)  OAB.  Her gynecologist recently started her on mybetriq 25 mg once a day.  With the mybetriq, she typically urinates once or twice at night but urinates much more frequently during the day.  The mybetriq has helped although not as much as she would like it to have helped.  She wonders about adjusting the dose and seeing a specialist for her bladder      Review of Systems   Constitutional: Negative for activity change, appetite change, chills, diaphoresis, fatigue, fever and unexpected weight change.   HENT: Negative for congestion, ear pain, hearing loss, postnasal drip, rhinorrhea, sinus pressure, sneezing, sore throat and trouble swallowing.    Eyes: Negative for pain, discharge and visual disturbance.   Respiratory: Negative for cough, chest tightness, shortness of breath and wheezing.    Cardiovascular: Negative for chest pain, palpitations and leg swelling.   Gastrointestinal: Negative for abdominal distention, abdominal pain, blood in stool, constipation, diarrhea, nausea and vomiting.   Endocrine: Negative for polydipsia and polyuria.   Genitourinary: Negative for difficulty urinating, dysuria, hematuria and menstrual problem.   Musculoskeletal: Negative for  arthralgias, joint swelling and neck pain.   Skin: Negative for rash.   Neurological: Negative for weakness and headaches.   Psychiatric/Behavioral: Negative for confusion and dysphoric mood.       Objective:      Physical Exam   Constitutional: She is oriented to person, place, and time. She appears well-developed and well-nourished. No distress.   HENT:   Head: Normocephalic and atraumatic.   Right Ear: Hearing, tympanic membrane, external ear and ear canal normal.   Left Ear: Hearing, tympanic membrane, external ear and ear canal normal.   Nose: Nose normal.   Mouth/Throat: Oropharynx is clear and moist and mucous membranes are normal. No oral lesions. No oropharyngeal exudate, posterior oropharyngeal edema or posterior oropharyngeal erythema.   Eyes: Conjunctivae, EOM and lids are normal. Pupils are equal, round, and reactive to light. No scleral icterus.   Neck: Normal range of motion. Neck supple. Carotid bruit is not present. No thyroid mass and no thyromegaly present.   Cardiovascular: Normal rate, regular rhythm and normal heart sounds.   No extrasystoles are present. PMI is not displaced.  Exam reveals no gallop.    No murmur heard.  Pulmonary/Chest: Effort normal and breath sounds normal. No accessory muscle usage. No respiratory distress.   Clear to auscultation bilaterally.   Abdominal: Soft. Normal appearance and bowel sounds are normal. She exhibits no abdominal bruit. There is no hepatosplenomegaly. There is no tenderness. There is no rebound.   Lymphadenopathy:        Head (right side): No submental and no submandibular adenopathy present.        Head (left side): No submental and no submandibular adenopathy present.        Right cervical: No superficial cervical, no deep cervical and no posterior cervical adenopathy present.       Left cervical: No superficial cervical, no deep cervical and no posterior cervical adenopathy present.        Right: No supraclavicular adenopathy present.        Left:  "No supraclavicular adenopathy present.   Neurological: She is alert and oriented to person, place, and time.   Skin: Skin is warm, dry and intact.   Psychiatric: She has a normal mood and affect.     Blood pressure 130/70, pulse 88, temperature 98.5 °F (36.9 °C), temperature source Oral, resp. rate 18, height 5' 3" (1.6 m), weight 88 kg (194 lb).Body mass index is 34.37 kg/m².        A/P:  1)  hypothyroidism with thyroid nodules.  Stable.  We will check a TSH soon.  We will check yearly thyroid ultrasounds   2)  hypertension.  Well-controlled.  Continue with Diovan  3)  hyperlipidemia.  Status unknown.  We'll check fasting labs soon.  She will continue with Lipitor  4)  OAB.  New to me.  We will increase her my mybetriq 50 mg once a day.  We will refer her to urologist for further evaluation    As long as she does well, I will see her back in 1 year or sooner if needed  "

## 2018-04-23 DIAGNOSIS — R05.9 COUGH: ICD-10-CM

## 2018-04-23 DIAGNOSIS — R09.A2 GLOBUS SENSATION: ICD-10-CM

## 2018-04-23 DIAGNOSIS — R49.0 HOARSENESS: ICD-10-CM

## 2018-04-23 DIAGNOSIS — K21.00 GASTROESOPHAGEAL REFLUX DISEASE WITH ESOPHAGITIS: ICD-10-CM

## 2018-04-24 RX ORDER — LEVOTHYROXINE SODIUM 125 UG/1
125 TABLET ORAL
Qty: 30 TABLET | Refills: 0 | Status: SHIPPED | OUTPATIENT
Start: 2018-04-24 | End: 2018-06-18 | Stop reason: SDUPTHER

## 2018-04-25 ENCOUNTER — LAB VISIT (OUTPATIENT)
Dept: LAB | Facility: HOSPITAL | Age: 68
End: 2018-04-25
Attending: FAMILY MEDICINE
Payer: MEDICARE

## 2018-04-25 DIAGNOSIS — R79.9 ABNORMAL FINDING OF BLOOD CHEMISTRY: ICD-10-CM

## 2018-04-25 DIAGNOSIS — E78.5 HYPERLIPIDEMIA, UNSPECIFIED HYPERLIPIDEMIA TYPE: ICD-10-CM

## 2018-04-25 DIAGNOSIS — E03.4 HYPOTHYROIDISM DUE TO ACQUIRED ATROPHY OF THYROID: ICD-10-CM

## 2018-04-25 LAB
ALBUMIN SERPL BCP-MCNC: 4 G/DL
ALP SERPL-CCNC: 106 U/L
ALT SERPL W/O P-5'-P-CCNC: 29 U/L
ANION GAP SERPL CALC-SCNC: 6 MMOL/L
AST SERPL-CCNC: 23 U/L
BASOPHILS # BLD AUTO: 0.05 K/UL
BASOPHILS NFR BLD: 0.8 %
BILIRUB SERPL-MCNC: 0.4 MG/DL
BUN SERPL-MCNC: 15 MG/DL
CALCIUM SERPL-MCNC: 10.6 MG/DL
CHLORIDE SERPL-SCNC: 105 MMOL/L
CHOLEST SERPL-MCNC: 182 MG/DL
CHOLEST/HDLC SERPL: 3.3 {RATIO}
CO2 SERPL-SCNC: 31 MMOL/L
CREAT SERPL-MCNC: 0.9 MG/DL
DIFFERENTIAL METHOD: ABNORMAL
EOSINOPHIL # BLD AUTO: 0.2 K/UL
EOSINOPHIL NFR BLD: 2.8 %
ERYTHROCYTE [DISTWIDTH] IN BLOOD BY AUTOMATED COUNT: 13.1 %
EST. GFR  (AFRICAN AMERICAN): >60 ML/MIN/1.73 M^2
EST. GFR  (NON AFRICAN AMERICAN): >60 ML/MIN/1.73 M^2
ESTIMATED AVG GLUCOSE: 105 MG/DL
GLUCOSE SERPL-MCNC: 100 MG/DL
HBA1C MFR BLD HPLC: 5.3 %
HCT VFR BLD AUTO: 43.9 %
HDLC SERPL-MCNC: 55 MG/DL
HDLC SERPL: 30.2 %
HGB BLD-MCNC: 13.9 G/DL
IMM GRANULOCYTES # BLD AUTO: 0.01 K/UL
IMM GRANULOCYTES NFR BLD AUTO: 0.2 %
LDLC SERPL CALC-MCNC: 109.6 MG/DL
LYMPHOCYTES # BLD AUTO: 2.8 K/UL
LYMPHOCYTES NFR BLD: 43.8 %
MCH RBC QN AUTO: 28.3 PG
MCHC RBC AUTO-ENTMCNC: 31.7 G/DL
MCV RBC AUTO: 89 FL
MONOCYTES # BLD AUTO: 0.7 K/UL
MONOCYTES NFR BLD: 10.5 %
NEUTROPHILS # BLD AUTO: 2.7 K/UL
NEUTROPHILS NFR BLD: 41.9 %
NONHDLC SERPL-MCNC: 127 MG/DL
NRBC BLD-RTO: 0 /100 WBC
PLATELET # BLD AUTO: 272 K/UL
PMV BLD AUTO: 10.7 FL
POTASSIUM SERPL-SCNC: 4.7 MMOL/L
PROT SERPL-MCNC: 7.3 G/DL
RBC # BLD AUTO: 4.92 M/UL
SODIUM SERPL-SCNC: 142 MMOL/L
TRIGL SERPL-MCNC: 87 MG/DL
TSH SERPL DL<=0.005 MIU/L-ACNC: 0.87 UIU/ML
WBC # BLD AUTO: 6.4 K/UL

## 2018-04-25 PROCEDURE — 80061 LIPID PANEL: CPT

## 2018-04-25 PROCEDURE — 83036 HEMOGLOBIN GLYCOSYLATED A1C: CPT

## 2018-04-25 PROCEDURE — 36415 COLL VENOUS BLD VENIPUNCTURE: CPT | Mod: PO

## 2018-04-25 PROCEDURE — 85025 COMPLETE CBC W/AUTO DIFF WBC: CPT

## 2018-04-25 PROCEDURE — 84443 ASSAY THYROID STIM HORMONE: CPT

## 2018-04-25 PROCEDURE — 80053 COMPREHEN METABOLIC PANEL: CPT

## 2018-04-26 ENCOUNTER — PATIENT MESSAGE (OUTPATIENT)
Dept: FAMILY MEDICINE | Facility: CLINIC | Age: 68
End: 2018-04-26

## 2018-05-14 ENCOUNTER — OFFICE VISIT (OUTPATIENT)
Dept: UROLOGY | Facility: CLINIC | Age: 68
End: 2018-05-14
Payer: MEDICARE

## 2018-05-14 ENCOUNTER — APPOINTMENT (OUTPATIENT)
Dept: LAB | Facility: HOSPITAL | Age: 68
End: 2018-05-14
Attending: NURSE PRACTITIONER
Payer: MEDICARE

## 2018-05-14 VITALS
HEIGHT: 63 IN | RESPIRATION RATE: 18 BRPM | WEIGHT: 194.69 LBS | HEART RATE: 85 BPM | BODY MASS INDEX: 34.5 KG/M2 | DIASTOLIC BLOOD PRESSURE: 63 MMHG | SYSTOLIC BLOOD PRESSURE: 139 MMHG

## 2018-05-14 DIAGNOSIS — R39.15 URINARY URGENCY: ICD-10-CM

## 2018-05-14 DIAGNOSIS — N39.46 MIXED INCONTINENCE: ICD-10-CM

## 2018-05-14 DIAGNOSIS — R35.0 URINARY FREQUENCY: Primary | ICD-10-CM

## 2018-05-14 LAB
BACTERIA #/AREA URNS HPF: NORMAL /HPF
BILIRUB SERPL-MCNC: NORMAL MG/DL
BLOOD URINE, POC: NORMAL
COLOR, POC UA: YELLOW
GLUCOSE UR QL STRIP: NORMAL
KETONES UR QL STRIP: NORMAL
LEUKOCYTE ESTERASE URINE, POC: NORMAL
MICROSCOPIC COMMENT: NORMAL
NITRITE, POC UA: NORMAL
PH, POC UA: 7
PROTEIN, POC: NORMAL
RBC #/AREA URNS HPF: 0 /HPF (ref 0–4)
SPECIFIC GRAVITY, POC UA: 1
SQUAMOUS #/AREA URNS HPF: 1 /HPF
UROBILINOGEN, POC UA: NORMAL
WBC #/AREA URNS HPF: 1 /HPF (ref 0–5)

## 2018-05-14 PROCEDURE — 99204 OFFICE O/P NEW MOD 45 MIN: CPT | Mod: 25,S$GLB,, | Performed by: NURSE PRACTITIONER

## 2018-05-14 PROCEDURE — 99999 PR PBB SHADOW E&M-EST. PATIENT-LVL V: CPT | Mod: PBBFAC,,, | Performed by: NURSE PRACTITIONER

## 2018-05-14 PROCEDURE — 81001 URINALYSIS AUTO W/SCOPE: CPT | Mod: S$GLB,,, | Performed by: NURSE PRACTITIONER

## 2018-05-14 PROCEDURE — 3075F SYST BP GE 130 - 139MM HG: CPT | Mod: CPTII,S$GLB,, | Performed by: NURSE PRACTITIONER

## 2018-05-14 PROCEDURE — 81000 URINALYSIS NONAUTO W/SCOPE: CPT

## 2018-05-14 PROCEDURE — 3078F DIAST BP <80 MM HG: CPT | Mod: CPTII,S$GLB,, | Performed by: NURSE PRACTITIONER

## 2018-05-14 PROCEDURE — 3008F BODY MASS INDEX DOCD: CPT | Mod: CPTII,S$GLB,, | Performed by: NURSE PRACTITIONER

## 2018-05-14 PROCEDURE — 51701 INSERT BLADDER CATHETER: CPT | Mod: S$GLB,,, | Performed by: NURSE PRACTITIONER

## 2018-05-14 PROCEDURE — 87086 URINE CULTURE/COLONY COUNT: CPT

## 2018-05-14 RX ORDER — PROGESTERONE 100 MG/1
100 CAPSULE ORAL DAILY
COMMUNITY
Start: 2018-05-02 | End: 2022-05-25 | Stop reason: ALTCHOICE

## 2018-05-14 RX ORDER — TAMSULOSIN HYDROCHLORIDE 0.4 MG/1
0.4 CAPSULE ORAL DAILY
Qty: 30 CAPSULE | Refills: 3 | Status: SHIPPED | OUTPATIENT
Start: 2018-05-14 | End: 2018-07-06 | Stop reason: ALTCHOICE

## 2018-05-14 NOTE — PROGRESS NOTES
"[facility] Note  Staff: ODELL ConnerC    PCP: Komal Del Castillo    Chief Complaint: Overactive bladder symptoms    Subjective:        HPI: Curtis Navarro is a 67 y.o. female NEW PATIENT to Urology clinic today presents with urinary frequency for the last several years.  Pt was started on Myrbetriq 25 mg, then increased dosage to 50 mg daily by her OB/GYN over six weeks ago which did help improve her LUTS by 50% but she is still having increased incontinence.  In the past she mentions she also tried Detrol LA, but side affect to this caused her severe constipation.  She has not tried any other meds prior to ov today.    Questions asked pt during office visit today:  DTF:YES, NTF: 3 or more per night, but since myrbetriq 1-2 nightly  No dysuria  Urgency:Yes, incontinence with urgency? Yes - been going on for last year; bothersome Yes; .  Incontinence with laughing or straining: Yes - "not a lot";   Feel a bulge?No  G1, P 1, vaginal   Gross Hematuria:  No  History of UTI: No  Sexually active?: Yes - no discomfort  Constipation?  No, last BM was this am    Daily Caffeine intake?:  One cup of coffee in am, water rest of day  With meals she will have coke zero or diet coke.    REVIEW OF SYSTEMS:  Review of Systems   Constitutional: Negative for chills, diaphoresis, fever and weight loss.   HENT: Negative for congestion, hearing loss, nosebleeds and sore throat.    Eyes: Negative for blurred vision and pain.   Respiratory: Negative for cough and wheezing.    Cardiovascular: Negative for chest pain, palpitations and leg swelling.   Gastrointestinal: Negative for abdominal pain, heartburn, nausea and vomiting.        Hx of colon polyps, diverticulitis, esophagitis, GERD   Genitourinary: Positive for frequency and urgency. Negative for dysuria, flank pain and hematuria.        Urge and stress incontinence   Musculoskeletal: Negative for back pain, joint pain, myalgias and neck pain.   Skin: " Negative for itching and rash.   Neurological: Negative for dizziness, tremors, sensory change, seizures, loss of consciousness, weakness and headaches.   Endo/Heme/Allergies: Does not bruise/bleed easily.        +Graves Dx  Thyroid issues   Psychiatric/Behavioral: Negative for depression and suicidal ideas. The patient is not nervous/anxious.        PMHx:  Past Medical History:   Diagnosis Date    Allergy     Arthritis     Cataract     Colon polyp     Diverticulosis     GERD (gastroesophageal reflux disease)     Graves disease     s/p radioactive iodine in 42    H/O esophagogastroduodenoscopy     8/14 and 6/17    H/O percutaneous left heart catheterization     normal 5/12    History of colon polyps     History of diverticulitis     History of esophagitis     egd 8/14    History of skin cancer     L neck    Hyperlipidemia     Hypertension     Hypothyroidism     s/p radioactive iodine    IBS (irritable bowel syndrome)     Mild luminal irregularity of carotid artery on ultrasound     noted on 5/16 usg with next due 5/18    Osteopenia     7/13, 7/15    S/P colonoscopy     7/17;  next due 7/22    Thyroid nodule     last usg 5/17;  next due 5/19     Kidney stones: No    PSHx:  Past Surgical History:   Procedure Laterality Date    COLONOSCOPY  07/21/2010    Dr. Terrazas; in legacy, repeat in 6-7 years    COLONOSCOPY N/A 6/27/2017    Procedure: COLONOSCOPY;  Surgeon: Hamlet Terrazas Jr., MD;  [facility];  Service: Endoscopy;  Laterality: N/A; repeat in 5 years for surveillance    COSMETIC SURGERY      Liposuction to neck    SKIN CANCER EXCISION      with Mohs on left neck    TONSILLECTOMY      UPPER GASTROINTESTINAL ENDOSCOPY  08/04/2014    Dr. Terrazas    UPPER GASTROINTESTINAL ENDOSCOPY  6/27/17dR. Terrazas     Stents/Valves/Foreign Bodies: No  Cardiac Evaluation: No    Screening Studies  Colonoscopy: one year ago, benign polyps    Fam Hx:   malignancies: No , gyn malignancies: Yes - sister   at age 44 with ovarian  kidney stones: No     Social History     Social History    Marital status:      Spouse name: N/A    Number of children: N/A    Years of education: N/A     Social History Main Topics    Smoking status: Never Smoker    Smokeless tobacco: Never Used    Alcohol use Yes      Comment: rarely    Drug use: No    Sexual activity: Yes     Partners: Male     Birth control/ protection: Post-menopausal     Other Topics Concern    None     Social History Narrative         Allergies:  Lisinopril; Azithromycin; Metronidazole hcl; Moxifloxacin; Phenytoin sodium extended; and Sulfa (sulfonamide antibiotics)    Medications: reviewed   Anticoagulation: Yes - Ecotrin 81 mg one tablet daily    Objective:     Vitals:    18 1022   BP: 139/63   Pulse: 85   Resp: 18     Physical Exam   Vitals reviewed.  Constitutional: She is oriented to person, place, and time. She appears well-developed and well-nourished.   HENT:   Head: Normocephalic and atraumatic.   Eyes: Conjunctivae and EOM are normal. Pupils are equal, round, and reactive to light.   Neck: Normal range of motion. Neck supple.   Cardiovascular: Normal rate, regular rhythm, normal heart sounds and intact distal pulses.    Pulmonary/Chest: Effort normal and breath sounds normal.   Abdominal: Soft. Bowel sounds are normal.   Musculoskeletal: Normal range of motion.   Neurological: She is alert and oriented to person, place, and time. She has normal reflexes.   Skin: Skin is warm and dry.     Psychiatric: She has a normal mood and affect. Her behavior is normal. Judgment and thought content normal.     Pelvic exam  External Genitalia: normal hair distribution, no lesions  Urethral meatus: normal without prolapse or caruncle  Urethra: without tenderness or mass  Bladder: without fulness or tenderness  +POP observed-protrusion observed all the way to entrance way of vaginal vault especially when bearing down and coughing  exercises.  Anus and perineum: appear normal  In and out cath performed by me with 170 mL of residual collected  Levator ani tenderness: None during the exam    LABS REVIEW:  UA today:   Color:Clear, Yellow  Spec. Grav.  1.000  PH  7.0  Negative for leukocytes, nitrates, protein, glucose, ketones, urobili, bili, and blood.    Cr:   Lab Results   Component Value Date    CREATININE 0.9 04/25/2018     Assessment:       1. Urinary frequency    2. Urinary urgency    3. Mixed incontinence          Plan:     UA Micro, urine culture to be performed from cath'd specimen today.  Discontinue Myrbetriq today.  We will prescribe Flomax 0.4 mg one tablet daily as a trial for pt's symptoms and it was noted to pt to start new med this Thursday.  Decrease caffeine intake, including no caffeine after 4pm daily and nothing po one hour prior to bedtime.    F/u:  Pt is going to  Contact me next week via [employer] to inform me if her LUTS have improved or worsened since starting the Flomax and this will determine next POC at that time.     [facility]: Active    Cathleen Canales, JAY-C

## 2018-05-14 NOTE — LETTER
May 14, 2018      Komal Del Castillo MD  [address] [address] [address]           [facility]  [address] [place] [address] [address]  [facility] [address]  Phone: [phone]  Fax: [phone]          Patient: Curtis Navarro   MR Number: [id number]   YOB: 1950   Date of Visit: 5/14/2018       Dear Dr. Komal Del Castillo:    Thank you for referring Curtis Navarro to me for evaluation. Attached you will find relevant portions of my assessment and plan of care.    If you have questions, please do not hesitate to call me. I look forward to following Curtis Navarro along with you.    Sincerely,    Cathleen Canales, [facility]    Enclosure  CC:  No Recipients    If you would like to receive this communication electronically, please contact [email or web address] or [phone] to request more information on [employer] access.    For providers and/or their staff who would like to refer a patient to [facility], please contact us through our one-stop-shop provider referral line, [facility], at [phone].    If you feel you have received this communication in error or would no longer like to receive these types of communications, please e-mail [email or web address]

## 2018-05-16 ENCOUNTER — TELEPHONE (OUTPATIENT)
Dept: UROLOGY | Facility: CLINIC | Age: 68
End: 2018-05-16

## 2018-05-16 LAB — BACTERIA UR CULT: NO GROWTH

## 2018-05-16 NOTE — TELEPHONE ENCOUNTER
----- Message from JAY Yepez sent at 5/16/2018  9:39 AM CDT -----  Please let pt know their urine culture showed no growth

## 2018-05-28 ENCOUNTER — PATIENT MESSAGE (OUTPATIENT)
Dept: UROLOGY | Facility: CLINIC | Age: 68
End: 2018-05-28

## 2018-06-01 ENCOUNTER — PATIENT MESSAGE (OUTPATIENT)
Dept: UROLOGY | Facility: CLINIC | Age: 68
End: 2018-06-01

## 2018-06-13 ENCOUNTER — TELEPHONE (OUTPATIENT)
Dept: UROLOGY | Facility: CLINIC | Age: 68
End: 2018-06-13

## 2018-06-13 ENCOUNTER — LAB VISIT (OUTPATIENT)
Dept: LAB | Facility: HOSPITAL | Age: 68
End: 2018-06-13
Attending: NURSE PRACTITIONER
Payer: MEDICARE

## 2018-06-13 ENCOUNTER — OFFICE VISIT (OUTPATIENT)
Dept: GASTROENTEROLOGY | Facility: CLINIC | Age: 68
End: 2018-06-13
Payer: MEDICARE

## 2018-06-13 VITALS
BODY MASS INDEX: 34.5 KG/M2 | HEART RATE: 85 BPM | WEIGHT: 194.69 LBS | DIASTOLIC BLOOD PRESSURE: 68 MMHG | SYSTOLIC BLOOD PRESSURE: 124 MMHG | HEIGHT: 63 IN

## 2018-06-13 DIAGNOSIS — R09.A2 GLOBUS SENSATION: ICD-10-CM

## 2018-06-13 DIAGNOSIS — R49.0 HOARSENESS: ICD-10-CM

## 2018-06-13 DIAGNOSIS — K21.9 GASTRO-ESOPHAGEAL REFLUX DISEASE WITHOUT ESOPHAGITIS: Primary | ICD-10-CM

## 2018-06-13 DIAGNOSIS — R05.9 COUGH: ICD-10-CM

## 2018-06-13 DIAGNOSIS — N39.0 URINARY TRACT INFECTION WITHOUT HEMATURIA, SITE UNSPECIFIED: Primary | ICD-10-CM

## 2018-06-13 DIAGNOSIS — N39.0 URINARY TRACT INFECTION WITHOUT HEMATURIA, SITE UNSPECIFIED: ICD-10-CM

## 2018-06-13 PROCEDURE — 3008F BODY MASS INDEX DOCD: CPT | Mod: CPTII,S$GLB,, | Performed by: NURSE PRACTITIONER

## 2018-06-13 PROCEDURE — 87086 URINE CULTURE/COLONY COUNT: CPT

## 2018-06-13 PROCEDURE — 3078F DIAST BP <80 MM HG: CPT | Mod: CPTII,S$GLB,, | Performed by: NURSE PRACTITIONER

## 2018-06-13 PROCEDURE — 99214 OFFICE O/P EST MOD 30 MIN: CPT | Mod: S$GLB,,, | Performed by: NURSE PRACTITIONER

## 2018-06-13 PROCEDURE — 3074F SYST BP LT 130 MM HG: CPT | Mod: CPTII,S$GLB,, | Performed by: NURSE PRACTITIONER

## 2018-06-13 PROCEDURE — 99999 PR PBB SHADOW E&M-EST. PATIENT-LVL V: CPT | Mod: PBBFAC,,, | Performed by: NURSE PRACTITIONER

## 2018-06-13 RX ORDER — OMEPRAZOLE 40 MG/1
40 CAPSULE, DELAYED RELEASE ORAL EVERY MORNING
COMMUNITY
End: 2018-06-13 | Stop reason: SDUPTHER

## 2018-06-13 RX ORDER — OMEPRAZOLE 40 MG/1
40 CAPSULE, DELAYED RELEASE ORAL EVERY MORNING
Qty: 30 CAPSULE | Refills: 2 | Status: SHIPPED | OUTPATIENT
Start: 2018-06-13 | End: 2018-09-11 | Stop reason: SDUPTHER

## 2018-06-13 RX ORDER — FLUTICASONE PROPIONATE 50 MCG
SPRAY, SUSPENSION (ML) NASAL
COMMUNITY
End: 2018-10-12

## 2018-06-13 NOTE — PROGRESS NOTES
Subjective:       Patient ID: Curtis Navarro is a 67 y.o. female Body mass index is 34.48 kg/m².    Chief Complaint: Gastroesophageal Reflux    Established patient of Dr. Ceballos & myself    Gastroesophageal Reflux   She complains of belching (was increased when reflux first flared up), coughing (productive with clear sputum), globus sensation (frequent throat clearing- improving), heartburn and a hoarse voice (history of this and sees ENT). She reports no abdominal pain, no chest pain, no choking, no dysphagia, no early satiety, no nausea or no sore throat. This is a chronic problem. Episode onset: several years ago. Progression since onset: recurred in 5/2018, significantly improved since on prilosec. The heartburn does not wake her from sleep. The heartburn changes with position. The symptoms are aggravated by caffeine and lying down (worse in the afternoon and at night). Pertinent negatives include no fatigue, melena or weight loss. Risk factors include obesity and caffeine use. She has tried a PPI, a histamine-2 antagonist, head elevation and a diet change (probiotic; had weaned off of prilosec but restarted last week at 40 mg daily- significant relief; PAST: zantac) for the symptoms. The treatment provided significant relief. Past procedures include an EGD.     Review of Systems   Constitutional: Negative for appetite change, chills, fatigue, fever, unexpected weight change and weight loss.   HENT: Positive for hoarse voice (history of this and sees ENT). Negative for sore throat and trouble swallowing (resolved).    Respiratory: Positive for cough (productive with clear sputum). Negative for choking and shortness of breath.    Cardiovascular: Negative for chest pain.   Gastrointestinal: Positive for heartburn. Negative for abdominal pain, anal bleeding, blood in stool, constipation, diarrhea, dysphagia, melena, nausea, rectal pain and vomiting.   Neurological: Negative for weakness.       Past Medical  History:   Diagnosis Date    Allergy     Arthritis     Cataract     Colon polyp     Diverticulosis     GERD (gastroesophageal reflux disease)     Graves disease     s/p radioactive iodine in 42    H/O esophagogastroduodenoscopy     8/14 and 6/17    H/O percutaneous left heart catheterization     normal 5/12    History of colon polyps     History of diverticulitis     History of esophagitis     egd 8/14    History of skin cancer     L neck    Hyperlipidemia     Hypertension     Hypothyroidism     s/p radioactive iodine    IBS (irritable bowel syndrome)     Mild luminal irregularity of carotid artery on ultrasound     noted on 5/16 usg with next due 5/18    Osteopenia     7/13, 7/15    S/P colonoscopy     7/17;  next due 7/22    Thyroid nodule     last usg 5/17;  next due 5/19     Past Surgical History:   Procedure Laterality Date    COLONOSCOPY  07/21/2010    Dr. Velázquez; in [place], repeat in 6-7 years    COLONOSCOPY N/A 6/27/2017    Procedure: COLONOSCOPY;  Surgeon: Hamlet Velázquez Jr., MD;  Location: [facility];  Service: Endoscopy;  Laterality: N/A; repeat in 5 years for surveillance    COSMETIC SURGERY      Liposuction to neck    SKIN CANCER EXCISION      with Mohs on left neck    TONSILLECTOMY      UPPER GASTROINTESTINAL ENDOSCOPY  08/04/2014    Dr. Velázquez    UPPER GASTROINTESTINAL ENDOSCOPY  06/27/2017    DR. VELÁZQUEZ     Family History   Problem Relation Age of Onset    Cataracts Mother     Colon polyps Mother         history of colitis- part colon removed    Cancer Sister     Ovarian cancer Sister 40    Cataracts Maternal Grandfather     Amblyopia Neg Hx     Blindness Neg Hx     Glaucoma Neg Hx     Hypertension Neg Hx     Macular degeneration Neg Hx     Retinal detachment Neg Hx     Strabismus Neg Hx     Stroke Neg Hx     Thyroid disease Neg Hx     Diabetes Neg Hx     Colon cancer Neg Hx     Esophageal cancer Neg Hx     Stomach cancer Neg Hx      Wt Readings from Last  10 Encounters:   06/13/18 88.3 kg (194 lb 10.7 oz)   05/14/18 88.3 kg (194 lb 10.7 oz)   04/20/18 88 kg (194 lb)   03/26/18 87.7 kg (193 lb 5.5 oz)   10/12/17 90.3 kg (199 lb)   09/26/17 90.6 kg (199 lb 11.8 oz)   08/17/17 91.3 kg (201 lb 4.5 oz)   08/03/17 88.7 kg (195 lb 8.8 oz)   07/11/17 88.7 kg (195 lb 8.8 oz)   06/22/17 86.2 kg (190 lb)     Lab Results   Component Value Date    WBC 6.40 04/25/2018    HGB 13.9 04/25/2018    HCT 43.9 04/25/2018    MCV 89 04/25/2018     04/25/2018     CMP  Sodium   Date Value Ref Range Status   04/25/2018 142 136 - 145 mmol/L Final     Potassium   Date Value Ref Range Status   04/25/2018 4.7 3.5 - 5.1 mmol/L Final     Chloride   Date Value Ref Range Status   04/25/2018 105 95 - 110 mmol/L Final     CO2   Date Value Ref Range Status   04/25/2018 31 (H) 23 - 29 mmol/L Final     Glucose   Date Value Ref Range Status   04/25/2018 100 70 - 110 mg/dL Final     BUN, Bld   Date Value Ref Range Status   04/25/2018 15 8 - 23 mg/dL Final     Creatinine   Date Value Ref Range Status   04/25/2018 0.9 0.5 - 1.4 mg/dL Final     Calcium   Date Value Ref Range Status   04/25/2018 10.6 (H) 8.7 - 10.5 mg/dL Final     Total Protein   Date Value Ref Range Status   04/25/2018 7.3 6.0 - 8.4 g/dL Final     Albumin   Date Value Ref Range Status   04/25/2018 4.0 3.5 - 5.2 g/dL Final     Total Bilirubin   Date Value Ref Range Status   04/25/2018 0.4 0.1 - 1.0 mg/dL Final     Comment:     For infants and newborns, interpretation of results should be based  on gestational age, weight and in agreement with clinical  observations.  Premature Infant recommended reference ranges:  Up to 24 hours.............<8.0 mg/dL  Up to 48 hours............<12.0 mg/dL  3-5 days..................<15.0 mg/dL  6-29 days.................<15.0 mg/dL       Alkaline Phosphatase   Date Value Ref Range Status   04/25/2018 106 55 - 135 U/L Final     AST   Date Value Ref Range Status   04/25/2018 23 10 - 40 U/L Final     ALT  "  Date Value Ref Range Status   04/25/2018 29 10 - 44 U/L Final     Anion Gap   Date Value Ref Range Status   04/25/2018 6 (L) 8 - 16 mmol/L Final     eGFR if    Date Value Ref Range Status   04/25/2018 >60.0 >60 mL/min/1.73 m^2 Final     eGFR if non    Date Value Ref Range Status   04/25/2018 >60.0 >60 mL/min/1.73 m^2 Final     Comment:     Calculation used to obtain the estimated glomerular filtration  rate (eGFR) is the CKD-EPI equation.        6/27/17 EGD was reviewed and procedure report states:   " Findings:       The oropharynx was normal, except for perhaps slight erythema above        the cords.       Scattered small (1-2 mm) off-white nummular lesions were noted in        the entire esophagus. Biopsies were taken with a cold forceps for        histology.       The esophagus was otherwise grossly normal.       The Z-line was regular and was found 35 cm from the incisors.       Some laxness of the lower esophageal sphincter was noted, but I did        not see a definite hiatal hernia.       Slight bilious fluid was found in the gastric body.       Multiple medium sessile fundic gland polyps were found in the        gastric fundus and in the gastric body. A few biopsies were taken        with a cold forceps for histology.       The gastric antrum was normal. Biopsies were taken with a cold        forceps for Helicobacter pylori testing using CLOtest. Biopsies were        taken with a cold forceps for histology.       The examined duodenum was normal.       No endoscopic abnormality was evident in the esophagus to explain        the patient's complaint of dysphagia. It was decided, however, to        proceed with dilation of the entire esophagus. A guidewire was        placed and the scope was withdrawn. Dilation was performed with a        Savary dilator with no resistance at 51 Fr.  Impression:          - Normal oropharynx.                       - White nummular lesions in " "esophageal mucosa.                        Biopsied.                       - Normal esophagus otherwise.                       - Z-line regular, 35 cm from the incisors.                       - No endoscopic esophageal abnormality to explain                        patient's dysphagia. Esophagus dilated. Dilated, 51                        Fr.                       - Non-erosive esophageal reflux (NERD) disease                        present.                       - Bilious gastric fluid.                       - Multiple fundic gland polyps. Biopsied.                       - Normal antrum. Biopsied.                       - Normal examined duodenum.  Recommendation:      - Perform a colonoscopy as previously scheduled.                       - Discharge patient to home.                       - Await pathology and CLOtest results.                       - Follow an antireflux regimen.                       - Continue present medications.                       - But increase Prilosec (omeprazole) to 40 mg PO BID.                       - Use Zantac (ranitidine) 300 mg PO nightly.                       - Call the G.I. clinic in 2 weeks for reports (if                        you haven't heard from us sooner) [phone].                       - Return to GI clinic in 6-8 weeks.                       - If fails to improve, then next perform a modified                        barium swallow. ".  Biopsy results:   Cindy test negative  "1. ESOPHAGUS (BIOPSY):  -Esophagus squamous mucosa with sebaceous metaplasia  -Negative for significant active inflammation  -Negative for intestinal metaplasia  -Negative for dysplasia  2. STOMACH, POLYPS (BIOPSY):  -Fundic gland polyps  3. STOMACH (BIOPSY):  -Antrum with features of reactive/chemical gastropathy  -Negative for active inflammation  -Negative for Helicobacter pylori organisms on H&E stain"    6/27/17 Colonoscopy was reviewed and procedure report states:   "Impression:          - One 2 mm " "by 4 mm polyp in the cecum, removed with                        a cold snare. Resected and retrieved.                       - Diverticulosis in the sigmoid colon and in the                        descending colon.                       - Diverticulosis in the transverse colon, at the                        hepatic flexure, in the ascending colon and in the                        cecum.                       - Mild colonic spasm consistent with irritable bowel                        syndrome.                       - Non-bleeding internal hemorrhoids.                       - The examination was otherwise normal.                       - The examined portion of the ileum was normal.  Recommendation:      - Discharge patient to home.                       - Await pathology results.                       - If the pathology report reveals adenomatous                        tissue, then repeat the colonoscopy for surveillance                        in 5 years.                       - If the pathology report indicates hyperplastic                        polyp, then repeat colonoscopy for surveillance in 7                        years.                       - High fiber diet.                       - Take a PROBIOTIC, such as a carton of GREEK YOGURT                        (Chobani or Oikos, or Activia or Dannon); or tablets                        of ALIGN or CULTURELLE or LITA-Q (all                        non-prescription), every day for a month.                       - Call the G.I. clinic in 2 weeks for reports (if                        you haven't heard from us sooner) [phone].                       - Continue present medications. ".  Biopsy results:   "4. COLON, CECAL POLYPS (BIOPSY):  - Tubular adenoma"  Objective:      Physical Exam   Constitutional: She is oriented to person, place, and time. She appears well-developed and well-nourished. No distress.   HENT:   Mouth/Throat: Oropharynx is clear and moist and mucous " membranes are normal. No oral lesions. No oropharyngeal exudate.   Eyes: Conjunctivae are normal. Pupils are equal, round, and reactive to light. No scleral icterus.   Cardiovascular: Normal rate.    Pulmonary/Chest: Effort normal and breath sounds normal. No respiratory distress. She has no wheezes.   Abdominal: Soft. Normal appearance and bowel sounds are normal. She exhibits no distension, no abdominal bruit and no mass. There is no hepatosplenomegaly. There is no tenderness. There is no rigidity, no rebound, no guarding, no tenderness at McBurney's point and negative Smith's sign. No hernia.   Neurological: She is alert and oriented to person, place, and time.   Skin: Skin is warm and dry. No rash noted. She is not diaphoretic. No erythema. No pallor.   Non-jaundiced   Psychiatric: She has a normal mood and affect. Her behavior is normal.   Nursing note and vitals reviewed.      Assessment:       1. Gastro-esophageal reflux disease without esophagitis     2. Cough    3. Hoarseness    4. Globus sensation        Plan:       Gastro-esophageal reflux disease without esophagitis   -     FL Upper GI Air Contrast With KUB; Future; Expected date: 06/13/2018  -     Fl Modified Barium Swallow Speech; Future; Expected date: 06/13/2018  -  REFILL   omeprazole (PRILOSEC) 40 MG capsule; Take 1 capsule (40 mg total) by mouth every morning.  Dispense: 30 capsule; Refill: 2, take in the morning before breakfast, discussed about possible long term use of medication (prefer to use lowest effective dose or discontinuing if possible) and discussed the risks & benefits with taking a reflux medication long term, and to take OTC calcium and vitamin d supplements as directed (such as Citracal +D), pt verbalized understanding and patient wants to continue current medication at current dosage  - continue lifestyle modifications to help control/reduce reflux/abdominal pain including: avoid large meals, avoid eating within 2-3 hours of  bedtime (avoid late night eating & lying down soon after eating), elevate head of bed if nocturnal symptoms are present, smoking cessation (if current smoker), & weight loss (if overweight).   - avoid known foods which trigger reflux symptoms & to minimize/avoid high-fat foods, chocolate, caffeine, citrus, alcohol, & tomato products.  - avoid/limit use of NSAID's, since they can cause GI upset, bleeding, and/or ulcers. If needed, take with food.    Globus sensation, Cough & Hoarseness  -     FL Upper GI Air Contrast With KUB; Future; Expected date: 06/13/2018  -     Fl Modified Barium Swallow Speech; Future; Expected date: 06/13/2018  Recommend follow-up with Primary Care Provider/ENT for continued evaluation and management.    Follow-up in about 1 month (around 7/13/2018), or if symptoms worsen or fail to improve.      If no improvement in symptoms or symptoms worsen, call/follow-up at clinic or go to ER.

## 2018-06-13 NOTE — TELEPHONE ENCOUNTER
----- Message from Josiah Leyva sent at 6/13/2018  1:06 PM CDT -----  Contact: Curtis  Calling to leave a urine specimen while in [place] today. She will be going to an appointment at [facility] in [place] at 2:30. She is needing order put in today. [phone]. Thanks!

## 2018-06-13 NOTE — PATIENT INSTRUCTIONS

## 2018-06-13 NOTE — TELEPHONE ENCOUNTER
Spoke with patient, she is having burning and urinary frequency, suspecting UTI,  Wants order for urine culture to be put in and she will do at the [place] location. Message given to NP to do, patient verbally understood.

## 2018-06-14 ENCOUNTER — TELEPHONE (OUTPATIENT)
Dept: UROLOGY | Facility: CLINIC | Age: 68
End: 2018-06-14

## 2018-06-14 NOTE — TELEPHONE ENCOUNTER
Returned call, informed that the culture takes 2-3 days to results, and we will contact once done, patient verbally understood.

## 2018-06-14 NOTE — TELEPHONE ENCOUNTER
----- Message from Lisette Ortiz sent at 6/14/2018  3:09 PM CDT -----  Contact: PT  Type:  Test Results    Who Called:  Curtis Navarro   Name of Test (Lab/Mammo/Etc):  Urine  Date of Test:  6/13/18  Ordering Provider:  morales fine  Where the test was performed:  n/a  Best Call Back Number:    Additional Information:  n/a

## 2018-06-15 ENCOUNTER — TELEPHONE (OUTPATIENT)
Dept: UROLOGY | Facility: CLINIC | Age: 68
End: 2018-06-15

## 2018-06-15 LAB
BACTERIA UR CULT: NORMAL
BACTERIA UR CULT: NORMAL

## 2018-06-15 NOTE — TELEPHONE ENCOUNTER
Spoke with patient, urine culture results given with recommendation. She states right now she feels fine, she will give it a couple of days and if needed she will contact to come do the catheterized urine collection, patient verbally understood.

## 2018-06-15 NOTE — TELEPHONE ENCOUNTER
"----- Message from JAY Yepez sent at 6/15/2018  8:06 AM CDT -----  Please let pt know their urine culture showed "multiple organisms" within specimen, therefore if she is still having UTI symptoms, she needs to come in on NURSE VISIT schedule for in and out cath.  "

## 2018-06-17 ENCOUNTER — PATIENT MESSAGE (OUTPATIENT)
Dept: FAMILY MEDICINE | Facility: CLINIC | Age: 68
End: 2018-06-17

## 2018-06-18 RX ORDER — LEVOTHYROXINE SODIUM 125 UG/1
125 TABLET ORAL
Qty: 30 TABLET | Refills: 0 | Status: CANCELLED | OUTPATIENT
Start: 2018-06-18

## 2018-06-19 RX ORDER — LEVOTHYROXINE SODIUM 125 UG/1
125 TABLET ORAL
Qty: 30 TABLET | Refills: 11 | Status: SHIPPED | OUTPATIENT
Start: 2018-06-19 | End: 2019-06-10 | Stop reason: SDUPTHER

## 2018-06-19 RX ORDER — LEVOTHYROXINE SODIUM 125 UG/1
125 TABLET ORAL
Qty: 30 TABLET | Refills: 2 | OUTPATIENT
Start: 2018-06-19

## 2018-06-22 ENCOUNTER — TELEPHONE (OUTPATIENT)
Dept: GASTROENTEROLOGY | Facility: CLINIC | Age: 68
End: 2018-06-22

## 2018-06-22 ENCOUNTER — PATIENT MESSAGE (OUTPATIENT)
Dept: GASTROENTEROLOGY | Facility: CLINIC | Age: 68
End: 2018-06-22

## 2018-06-22 ENCOUNTER — HOSPITAL ENCOUNTER (OUTPATIENT)
Dept: RADIOLOGY | Facility: HOSPITAL | Age: 68
Discharge: HOME | End: 2018-06-22
Attending: NURSE PRACTITIONER
Payer: MEDICARE

## 2018-06-22 DIAGNOSIS — R49.0 HOARSENESS: ICD-10-CM

## 2018-06-22 DIAGNOSIS — R05.9 COUGH: ICD-10-CM

## 2018-06-22 DIAGNOSIS — K21.9 GASTRO-ESOPHAGEAL REFLUX DISEASE WITHOUT ESOPHAGITIS: ICD-10-CM

## 2018-06-22 DIAGNOSIS — R09.A2 GLOBUS SENSATION: ICD-10-CM

## 2018-06-22 PROCEDURE — 74247 FL UPPER GI AIR CONTRAST WITH KUB: CPT | Mod: TC,FY,PO

## 2018-06-22 PROCEDURE — 74247 FL UPPER GI AIR CONTRAST WITH KUB: CPT | Mod: 26,,, | Performed by: RADIOLOGY

## 2018-06-22 NOTE — TELEPHONE ENCOUNTER
Please call to inform & review the results with the patient- radiology report of the UGI showed small hiatal hernia and moderate gastroesophageal reflux with provocative maneuvering. Continue GERD recommendations. Multiple filling defects throughout the gastric body which statistically are most likely hyperplastic polyps. Recommend EGD to further evaluate these findings.  Other findings showed: Diverticulosis coli. Recommend high fiber diet (20-30 grams of fiber daily)/OTC fiber supplements daily as directed.  Otherwise, unremarkable findings.  Continue with previous recommendations. If no improvement in symptoms or symptoms worsen, call/follow-up at clinic or go to ER.  Please release results to patient's mychart once you have discussed results and recommendations with patient.  Thanks,  Venita SALGADO

## 2018-07-02 ENCOUNTER — TELEPHONE (OUTPATIENT)
Dept: FAMILY MEDICINE | Facility: CLINIC | Age: 68
End: 2018-07-02

## 2018-07-02 NOTE — TELEPHONE ENCOUNTER
Returned patient's call.  Patient was wondering when her next thyroid ultrasound and next Dr. Del Castillo appointment should be.  I explained to her that they both should be in April of 2019, unless she feels like she needs to see Dr. Del Castillo sooner.  Patient denied needing to see her now.

## 2018-07-02 NOTE — TELEPHONE ENCOUNTER
----- Message from Lizbeth Mcfarland sent at 7/2/2018 10:23 AM CDT -----  Contact: self  Patient [phone] is calling about some test that she is needing to have done and is asking to speak with the nurse/please call

## 2018-07-06 ENCOUNTER — OFFICE VISIT (OUTPATIENT)
Dept: UROLOGY | Facility: CLINIC | Age: 68
End: 2018-07-06
Payer: MEDICARE

## 2018-07-06 VITALS
HEART RATE: 101 BPM | WEIGHT: 196.88 LBS | DIASTOLIC BLOOD PRESSURE: 76 MMHG | SYSTOLIC BLOOD PRESSURE: 153 MMHG | HEIGHT: 63 IN | BODY MASS INDEX: 34.88 KG/M2

## 2018-07-06 DIAGNOSIS — R32 URINARY INCONTINENCE, UNSPECIFIED TYPE: ICD-10-CM

## 2018-07-06 DIAGNOSIS — N32.81 OVERACTIVE BLADDER: Primary | ICD-10-CM

## 2018-07-06 DIAGNOSIS — R35.0 URINARY FREQUENCY: ICD-10-CM

## 2018-07-06 LAB
BILIRUB SERPL-MCNC: NORMAL MG/DL
BLOOD URINE, POC: NORMAL
COLOR, POC UA: NORMAL
GLUCOSE UR QL STRIP: NORMAL
KETONES UR QL STRIP: NORMAL
LEUKOCYTE ESTERASE URINE, POC: NORMAL
NITRITE, POC UA: NORMAL
PH, POC UA: 5
PROTEIN, POC: NORMAL
SPECIFIC GRAVITY, POC UA: 1.01
UROBILINOGEN, POC UA: NORMAL

## 2018-07-06 PROCEDURE — 81001 URINALYSIS AUTO W/SCOPE: CPT | Mod: S$GLB,,, | Performed by: NURSE PRACTITIONER

## 2018-07-06 PROCEDURE — 99213 OFFICE O/P EST LOW 20 MIN: CPT | Mod: 25,S$GLB,, | Performed by: NURSE PRACTITIONER

## 2018-07-06 PROCEDURE — 3077F SYST BP >= 140 MM HG: CPT | Mod: CPTII,S$GLB,, | Performed by: NURSE PRACTITIONER

## 2018-07-06 PROCEDURE — 99999 PR PBB SHADOW E&M-EST. PATIENT-LVL IV: CPT | Mod: PBBFAC,,, | Performed by: NURSE PRACTITIONER

## 2018-07-06 PROCEDURE — 3078F DIAST BP <80 MM HG: CPT | Mod: CPTII,S$GLB,, | Performed by: NURSE PRACTITIONER

## 2018-07-06 RX ORDER — SOLIFENACIN SUCCINATE 5 MG/1
5 TABLET, FILM COATED ORAL DAILY
Qty: 30 TABLET | Refills: 12 | Status: SHIPPED | OUTPATIENT
Start: 2018-07-06 | End: 2018-08-05

## 2018-07-06 RX ORDER — LEVOTHYROXINE SODIUM 125 UG/1
TABLET ORAL
Refills: 11 | COMMUNITY
Start: 2018-06-19 | End: 2018-07-09 | Stop reason: SDUPTHER

## 2018-07-06 NOTE — PROGRESS NOTES
"[facility] Note  Staff: DAMIAN Conner    Referring provider and please cc:   PCP: Komal Del Castillo    Chief Complaint: Follow-up: OAB symptoms    Subjective:        HPI: Curtis Navarro is a 68 y.o. female presents today for routine recheck.  I last saw this patient on 05/14/18 as a NEW patient for urinary frequency x several years.  After examination of pt at last visit, pt was found to have incomplete bladder emptying, therefore we started her on Flomax 0.4 mg daily as a trial period for improvement in her symptoms.  Pt states today when she initially started the Flomax it did improve her symptoms, then her symptoms returned to previous urinary frequency and incontinence issues.    In the past:  Pt was started on Myrbetriq 25 mg, then increased dosage to 50 mg daily by her OB/GYN over six weeks ago prior to her last visit with me which did help improve her LUTS by 50% but she was still having increased incontinence.  In the past she mentions she also tried Detrol LA, but side affect to this caused her severe constipation.  She has not tried any other meds prior to ov today.     Questions asked pt during FIRST office visit:  DTF:YES, NTF: 3 or more per night, but since myrbetriq 1-2 nightly  No dysuria  Urgency:Yes, incontinence with urgency? Yes - been going on for last year; bothersome Yes; .  Incontinence with laughing or straining: Yes - "not a lot";   Feel a bulge?No  G1, P 1, vaginal   Gross Hematuria:  No  History of UTI: No  Sexually active?: Yes - no discomfort  Constipation?  No, last BM was this am     Daily Caffeine intake?:  One cup of coffee in am, water rest of day  With meals she will have coke zero or diet coke.    REVIEW OF SYSTEMS:  Review of Systems   Constitutional: Negative for chills, diaphoresis, fever and weight loss.   HENT: Negative for congestion, hearing loss, nosebleeds and sore throat.    Eyes: Negative for blurred vision and pain.   Respiratory: " Negative for cough and wheezing.    Cardiovascular: Negative for chest pain, palpitations and leg swelling.   Gastrointestinal: Negative for abdominal pain, heartburn, nausea and vomiting.        GERD  Diverticulosis   Genitourinary: Positive for frequency and urgency. Negative for dysuria, flank pain and hematuria.        Incontinence   Musculoskeletal: Negative for back pain, joint pain, myalgias and neck pain.        Arthritis   Skin: Negative for itching and rash.   Neurological: Negative for dizziness, tremors, sensory change, seizures, loss of consciousness, weakness and headaches.   Endo/Heme/Allergies: Does not bruise/bleed easily.        Graves disease   Psychiatric/Behavioral: Negative for depression and suicidal ideas. The patient is not nervous/anxious.      PMHx:  Past Medical History:   Diagnosis Date    Allergy     Arthritis     Cataract     Colon polyp     Diverticulosis     GERD (gastroesophageal reflux disease)     Graves disease     s/p radioactive iodine in 42    H/O esophagogastroduodenoscopy     8/14 and 6/17    H/O percutaneous left heart catheterization     normal 5/12    History of colon polyps     History of diverticulitis     History of esophagitis     egd 8/14    History of skin cancer     L neck    Hyperlipidemia     Hypertension     Hypothyroidism     s/p radioactive iodine    IBS (irritable bowel syndrome)     Mild luminal irregularity of carotid artery on ultrasound     noted on 5/16 usg with next due 5/18    Osteopenia     7/13, 7/15    S/P colonoscopy     7/17;  next due 7/22    Thyroid nodule     last usg 5/17;  next due 5/19     PSHx:  Past Surgical History:   Procedure Laterality Date    COLONOSCOPY  07/21/2010    Dr. Ceballos; in legacy, repeat in 6-7 years    COLONOSCOPY N/A 6/27/2017    Procedure: COLONOSCOPY;  Surgeon: Hamlet Ceballos Jr., MD;  Location: [facility];  Service: Endoscopy;  Laterality: N/A; repeat in 5 years for surveillance    COSMETIC  SURGERY      Liposuction to neck    SKIN CANCER EXCISION      with Mohs on left neck    TONSILLECTOMY      UPPER GASTROINTESTINAL ENDOSCOPY  08/04/2014    Dr. Velázquez    UPPER GASTROINTESTINAL ENDOSCOPY  06/27/2017    DR. VELÁZQUEZ     Social History     Social History    Marital status:      Spouse name: N/A    Number of children: N/A    Years of education: N/A     Social History Main Topics    Smoking status: Never Smoker    Smokeless tobacco: Never Used    Alcohol use Yes      Comment: rarely    Drug use: No    Sexual activity: Yes     Partners: Male     Birth control/ protection: Post-menopausal     Other Topics Concern    None     Social History Narrative         Allergies:  Lisinopril; Azithromycin; Metronidazole hcl; Moxifloxacin; and Sulfa (sulfonamide antibiotics)    Medications: reviewed   Anticoagulation: Yes - Ecotrin 81 mg one tablet daily    Objective:     Vitals:    07/06/18 0845   BP: (!) 153/76   Pulse: 101     Physical Exam   Vitals reviewed.  Constitutional: She is oriented to person, place, and time. She appears well-developed and well-nourished.   HENT:   Head: Normocephalic and atraumatic.   Eyes: Conjunctivae and EOM are normal. Pupils are equal, round, and reactive to light.   Neck: Normal range of motion. Neck supple.   Cardiovascular: Normal rate, regular rhythm, normal heart sounds and intact distal pulses.    Pulmonary/Chest: Effort normal and breath sounds normal.   Abdominal: Soft. Bowel sounds are normal.   Musculoskeletal: Normal range of motion.   Neurological: She is alert and oriented to person, place, and time. She has normal reflexes.   Skin: Skin is warm and dry.     Psychiatric: She has a normal mood and affect. Her behavior is normal. Judgment and thought content normal.     LABS REVIEW:  UA today: Color:Clear, Yellow  Spec. Grav.  1.015  PH  5.0  Negative for leukocytes, nitrates, protein, glucose, ketones, urobili, bili, and blood.    UCx:   Results for  orders placed or performed in visit on 06/13/18   Urine culture   Result Value Ref Range    Urine Culture, Routine       Multiple organisms isolated. None in predominance.  Repeat if    Urine Culture, Routine clinically necessary.    Results for orders placed or performed in visit on 05/14/18   Urine culture   Result Value Ref Range    Urine Culture, Routine No growth    Results for orders placed or performed in visit on 06/06/17   Urine culture   Result Value Ref Range    Urine Culture, Routine ESCHERICHIA COLI  10,000 - 49,999 cfu/ml          Susceptibility    Escherichia coli - CULTURE, URINE     Amp/Sulbactam >16/8 Resistant mcg/mL     Ampicillin >16 Resistant mcg/mL     Amox/K Clav'ate >16/8 Resistant mcg/mL     Ceftriaxone <=8 Sensitive mcg/mL     Cefazolin 16 Intermediate mcg/mL     Ciprofloxacin <=1 Sensitive mcg/mL     Cefepime <=8 Sensitive mcg/mL     Ertapenem <=2 Sensitive mcg/mL     Nitrofurantoin <=32 Sensitive mcg/mL     Gentamicin <=4 Sensitive mcg/mL     Meropenem <=4 Sensitive mcg/mL     Piperacillin/Tazo <=16 Sensitive mcg/mL     Trimeth/Sulfa <=2/38 Sensitive mcg/mL     Tetracycline >8 Resistant mcg/mL     Tobramycin <=4 Sensitive mcg/mL   Results for orders placed or performed in visit on 03/20/15   Urine culture   Result Value Ref Range    Urine Culture, Routine No significant growth    Results for orders placed or performed in visit on 11/27/06   Urine culture   Result Value Ref Range    Urine Culture, Routine       MULTIPLE ORGANISMS ISOLATED. NONE IN PREDOMINANCE REPEAT IF CLINICALLY NECESSARY.   Urine culture   Result Value Ref Range    Urine Culture, Routine       MULTIPLE ORGANISMS ISOLATED. NONE IN PREDOMINANCE REPEAT IF CLINICALLY NECESSARY.   Urine culture   Result Value Ref Range    Urine Culture, Routine       MULTIPLE ORGANISMS ISOLATED. NONE IN PREDOMINANCE REPEAT IF CLINICALLY NECESSARY.     Cr:   Lab Results   Component Value Date    CREATININE 0.9 04/25/2018     Assessment:        1. Overactive bladder    2. Urinary frequency    3. Urinary incontinence, unspecified type          Plan:   OAB symptoms:    Discontinue the Flomax  Vesicare 5 mg one tablet daily prescribed to pt today.  Pelvic floor and kegal exercises with information booklet explained to pt today with all questions answered.    F/u--Pt will contact me regarding in few weeks if vesicare has improved any of her LUTS.  If no improvement, pt may need UDS and Cysto for further eval of her symptoms.    [facility]: Active    Cathleen Canales, LUDIVINAP-C

## 2018-07-06 NOTE — PATIENT INSTRUCTIONS
Solifenacin tablets  What is this medicine?  SOLIFENACIN (sol randi lawson rubia) is used to treat overactive bladder. This medicine reduces the amount of bathroom visits. It may also help to control wetting accidents.  How should I use this medicine?  Take this medicine by mouth with a glass of water. Follow the directions on the prescription label. This medicine can be taken with or without food. Take your doses at regular intervals. Do not take your medicine more often than directed. Do not stop taking except on the advice of your doctor or health care professional.  Talk to your pediatrician regarding the use of this medicine in children. Special care may be needed.  What side effects may I notice from receiving this medicine?  Side effects that you should report to your doctor or health care professional as soon as possible:  · allergic reactions like skin rash, itching or hives, swelling of the face, lips, or tongue  · blurred vision or difficulty focusing vision  · breathing problems  · chest pain or palpitations  · confusion  · fast, irregular heartbeat  · feeling faint or lightheaded, falls  · hallucinations  · severe dizziness  · trouble passing urine or change in the amount of urine  · unusually weak or tired  Side effects that usually do not require medical attention (report to your doctor or health care professional if they continue or are bothersome):  · constipation  · drowsiness  · headache  · indigestion or stomach upset  · nausea  What may interact with this medicine?  Do not take this medicine with any of the following medications:  · certain medicines for fungal infections like fluconazole, itraconazole, ketoconazole, posaconazole, voriconazole  · cisapride  · dofetilide  · dronedarone  · pimozide  · thioridazine  · ziprasidone  This medicine may also interact with the following medications:  · atropine  · other medicines that prolong the QT interval (cause an abnormal heart  rhythm)  · oxybutynin  · scopolamine  What if I miss a dose?  If you miss a dose, take it as soon as you can. If it is almost time for your next dose, take only that dose. Do not take double or extra doses.  Where should I keep my medicine?  Keep out of the reach of children.  Store at room temperature between 15 and 30 degrees C (59 and 86 degrees F). Throw away any unused medicine after the expiration date.  What should I tell my health care provider before I take this medicine?  They need to know if you have any of these conditions:  · difficulty passing urine  · glaucoma  · intestinal obstruction  · kidney disease  · liver disease  · an unusual or allergic reaction to solifenacin, other medicines, foods, dyes, or preservatives  · pregnant or trying to get pregnant  · breast-feeding  What should I watch for while using this medicine?  It may take 2 or 3 months to notice the full benefit from this medicine.  You may need to limit your intake tea, coffee, caffeinated sodas, and alcohol. These drinks may make your symptoms worse.  You may get drowsy or dizzy. Do not drive, use machinery, or do anything that needs mental alertness until you know how this medicine affects you. Do not stand or sit up quickly, especially if you are an older patient. This reduces the risk of dizzy or fainting spells. Alcohol may interfere with the effect of this medicine. Avoid alcoholic drinks.  Your mouth may get dry. Chewing sugarless gum or sucking hard candy, and drinking plenty of water may help. Contact your doctor if the problem does not go away or is severe.  This medicine may cause dry eyes and blurred vision. If you wear contact lenses, you may feel some discomfort. Lubricating drops may help. See your eye care professional if the problem does not go away or is severe.  Avoid extreme heat. This medicine can cause you to sweat less than normal. Your body temperature could increase to dangerous levels, which may lead to heat  stroke.  NOTE:This sheet is a summary. It may not cover all possible information. If you have questions about this medicine, talk to your doctor, pharmacist, or health care provider. Copyright© 2017 [employer]

## 2018-07-09 ENCOUNTER — OFFICE VISIT (OUTPATIENT)
Dept: GASTROENTEROLOGY | Facility: CLINIC | Age: 68
End: 2018-07-09
Payer: MEDICARE

## 2018-07-09 VITALS
HEIGHT: 63 IN | SYSTOLIC BLOOD PRESSURE: 137 MMHG | DIASTOLIC BLOOD PRESSURE: 72 MMHG | HEART RATE: 78 BPM | BODY MASS INDEX: 34.68 KG/M2 | WEIGHT: 195.75 LBS

## 2018-07-09 DIAGNOSIS — R09.A2 GLOBUS SENSATION: ICD-10-CM

## 2018-07-09 DIAGNOSIS — K21.9 GASTRO-ESOPHAGEAL REFLUX DISEASE WITHOUT ESOPHAGITIS: ICD-10-CM

## 2018-07-09 DIAGNOSIS — Z51.81 ENCOUNTER FOR MONITORING LONG-TERM PROTON PUMP INHIBITOR THERAPY: ICD-10-CM

## 2018-07-09 DIAGNOSIS — Z87.19 HISTORY OF DIVERTICULOSIS: ICD-10-CM

## 2018-07-09 DIAGNOSIS — Z79.899 ENCOUNTER FOR MONITORING LONG-TERM PROTON PUMP INHIBITOR THERAPY: ICD-10-CM

## 2018-07-09 DIAGNOSIS — R14.3 FLATULENCE: ICD-10-CM

## 2018-07-09 DIAGNOSIS — R05.9 COUGH: ICD-10-CM

## 2018-07-09 DIAGNOSIS — R93.3 ABNORMAL UGI SERIES: Primary | ICD-10-CM

## 2018-07-09 DIAGNOSIS — R49.0 HOARSENESS: ICD-10-CM

## 2018-07-09 DIAGNOSIS — K44.9 HIATAL HERNIA: ICD-10-CM

## 2018-07-09 PROCEDURE — 3078F DIAST BP <80 MM HG: CPT | Mod: CPTII,S$GLB,, | Performed by: NURSE PRACTITIONER

## 2018-07-09 PROCEDURE — 99999 PR PBB SHADOW E&M-EST. PATIENT-LVL IV: CPT | Mod: PBBFAC,,, | Performed by: NURSE PRACTITIONER

## 2018-07-09 PROCEDURE — 99214 OFFICE O/P EST MOD 30 MIN: CPT | Mod: S$GLB,,, | Performed by: NURSE PRACTITIONER

## 2018-07-09 PROCEDURE — 3075F SYST BP GE 130 - 139MM HG: CPT | Mod: CPTII,S$GLB,, | Performed by: NURSE PRACTITIONER

## 2018-07-09 NOTE — PATIENT INSTRUCTIONS
"  GERD (Adult)    The esophagus is a tube that carries food from the mouth to the stomach. A valve at the lower end of the esophagus prevents stomach acid from flowing upward. When this valve doesn't work properly, stomach contents may repeatedly flow back up (reflux) into the esophagus. This is called gastroesophageal reflux disease (GERD). GERD can irritate the esophagus. It can cause problems with swallowing or breathing. In severe cases, GERD can cause recurrent pneumonia or other serious problems.  Symptoms of reflux include burning, pressure or sharp pain in the upper abdomen or mid to lower chest. The pain can spread to the neck, back, or shoulder. There may be belching, an acid taste in the back of the throat, chronic cough, or sore throat or hoarseness. GERD symptoms often occur during the day after a big meal. They can also occur at night when lying down.   Home care  Lifestyle changes can help reduce symptoms. If needed, medicines may be prescribed. Symptoms often improve with treatment, but if treatment is stopped, the symptoms often return after a few months. So most persons with GERD will need to continue treatment.  Lifestyle changes  · Limit or avoid fatty, fried, and spicy foods, as well as coffee, chocolate, mint, and foods with high acid content such as tomatoes and citrus fruit and juices (orange, grapefruit, lemon).  · Dont eat large meals, especially at night. Frequent, smaller meals are best. Do not lie down right after eating. And dont eat anything 3 hours before going to bed.  · Avoid drinking alcohol and smoking. As much as possible, stay away from second hand smoke.  · If you are overweight, losing weight will reduce symptoms.   · Avoid wearing tight clothing around your stomach area.  · If your symptoms occur during sleep, use a foam wedge to elevate your upper body (not just your head.) Or, place 4" blocks under the head of your bed.  Medicines  If needed, medicines can help relieve " the symptoms of GERD and prevent damage to the esophagus. Discuss a medicine plan with your healthcare provider. This may include one or more of the following medicines:  · Antacids to help neutralize the normal acids in your stomach.  · Acid blockers (H2 blockers) to decrease acid production.  · Acid inhibitors (PPIs) to decrease acid production in a different way than the blockers. They may work better, but can take a little longer to take effect.  Take an antacid 30-60 minutes after eating and at bedtime, but not at the same time as an acid blocker.  Try not to take medicines such as ibuprofen and aspirin. If you are taking aspirin for your heart or other medical reasons, talk to your healthcare provider about stopping it.  Follow-up care  Follow up with your healthcare provider or as advised by our staff.  When to seek medical advice  Call your healthcare provider if any of the following occur:  · Stomach pain gets worse or moves to the lower right abdomen (appendix area)  · Chest pain appears or gets worse, or spreads to the back, neck, shoulder, or arm  · Frequent vomiting (cant keep down liquids)  · Blood in the stool or vomit (red or black in color)  · Feeling weak or dizzy  · Fever of 100.4ºF (38ºC) or higher, or as directed by your healthcare provider  Date Last Reviewed: 6/23/2015 © 2000-2017 [employer]. [address] [place] [address], [place] [address]. All rights reserved. This information is not intended as a substitute for professional medical care. Always follow your healthcare professional's instructions.        What Is a Hiatal Hernia?    Hiatal hernia is when the area where the stomach and esophagus meet bulges up through the diaphragm into the chest cavity. In some cases, part of the stomach may bulge above the diaphragm. Stomach acid may move up into the esophagus and cause symptoms. The symptoms are often blamed on gastroesophageal reflux disease (GERD). You may only know about the  hernia when it shows up on an X-ray taken for other reasons.   What you may feel  The hiatus is a normal hole in the diaphragm. The esophagus passes through this hole and leads to the stomach. In some cases, part of the stomach may bulge above the diaphragm. This bulge is called a hernia. Stomach acid may move up into the esophagus and cause symptoms.  When you eat, the muscle at the hiatus relaxes to allow food to pass into the stomach. It tightens again to keep food and digestive acids in the stomach.  Many people with hiatal hernias have mild symptoms. You may notice the following GERD symptoms:  · Heartburn or other chest discomfort  · A feeling of chest fullness after a meal  · Frequent burping  · Acid taste in the mouth  · Trouble swallowing  Treating symptoms  If you have been diagnosed with hiatal hernia, these suggestions may help improve symptoms:  · Lose excess weight. Extra weight puts pressure on the stomach and esophagus.  · Dont lie down after eating. Sit up for at least an hour after eating. Lying down after eating can increase symptoms.  · Avoid certain foods and drinks. These include fatty foods, chocolate, coffee, mint, and other foods that cause symptoms for you.  · Dont smoke or drink alcohol. These can worsen symptoms.  · Look at your medicines. Discuss your medicines with your healthcare provider. Many medicines can cause symptoms.  · Consider an antacid medicine. Ask your healthcare provider about over-the-counter and prescription medicines that may help.  · Ask about surgery, if needed. Surgery is a treatment choice for some people. Your healthcare provider can determine if surgery is an option for you.    Date Last Reviewed: 10/1/2016  © 5695-0272 [employer]. [address] [place] [address], [place] [address]. All rights reserved. This information is not intended as a substitute for professional medical care. Always follow your healthcare professional's instructions.        Excess  Gas  Certain foods produce gas when digested. In some people, these foods make an excessive amount of gas. This may cause bloating, burping, or increased gas passing through the rectum (flatulence).     Foods that cause gas  The following foods are more likely to cause this problem. Limit them, or remove them from your diet:  · Broccoli  · Cauliflower  · Wilmot sprouts  · Cabbage  · Cooked dried beans  · Fizzy (carbonated) drinks, such as sparkling water, soda, beer, and champagne  Other causes  Other causes of excess gas include:  · Eating too fast or talking while you chew. This may cause you to swallow air. This increases the amount of gas in your stomach. And it may make your symptoms worse. Chew each mouthful completely before you swallow. Take your time.  · Chewing on gum or sucking on hard candy. These cause you to swallow more often. And some of what you are swallowing is air. This leads to more gas in your stomach. Avoid chewing gum and hard candy.  · Overeating. This may increase the feeling of being bloated and cause more gas. When you are full, stop eating.   · Being constipated. This can increase the amount of normal intestinal gas. Avoid constipation by getting more fiber in your diet. Good sources of fiber include whole-grain cereal, fresh vegetables (except those in the above list), and fresh fruits. High-fiber foods absorb water and carry it out of the body. When adding more fiber to your diet, you also need to drink more water. You should drink at least 8, 8-ounce glasses of water (2 quarts) per day.  Date Last Reviewed: 8/1/2016  © 8962-1249 [employer]. [address] [place] [address]. All rights reserved. This information is not intended as a substitute for professional medical care. Always follow your healthcare professional's instructions.

## 2018-07-09 NOTE — PROGRESS NOTES
Subjective:       Patient ID: Curtis Navarro is a 68 y.o. female Body mass index is 34.68 kg/m².    Chief Complaint: Follow-up    Established patient of Dr. Ceballos & myself    Gastroesophageal Reflux   She complains of belching (occasional), coughing (nonproductive; significantly improved), globus sensation (occasional frequent throat clearing- significantly improved), heartburn (rarely) and a hoarse voice (occasional; history of this and sees ENT). She reports no abdominal pain, no chest pain, no choking, no dysphagia, no early satiety, no nausea or no sore throat. flatulence. This is a chronic problem. Episode onset: several years ago. The problem occurs rarely. The problem has been rapidly improving (recurred in 5/2018, significantly improved since on prilosec; no symptoms when on prilosec). The heartburn does not wake her from sleep. The heartburn changes with position. The symptoms are aggravated by caffeine and lying down (worse in the afternoon and at night). Pertinent negatives include no fatigue, melena or weight loss. Risk factors include obesity and caffeine use. She has tried a PPI, a histamine-2 antagonist, head elevation and a diet change (probiotic; prilosec 40 mg daily- significant relief; PAST: zantac) for the symptoms. The treatment provided significant relief. Past procedures include an EGD and a UGI.     Review of Systems   Constitutional: Negative for appetite change, chills, fatigue, fever, unexpected weight change and weight loss.   HENT: Positive for hoarse voice (occasional; history of this and sees ENT). Negative for sore throat and trouble swallowing.    Respiratory: Positive for cough (nonproductive; significantly improved). Negative for choking and shortness of breath.    Cardiovascular: Negative for chest pain.   Gastrointestinal: Positive for heartburn (rarely). Negative for abdominal pain, anal bleeding, blood in stool, constipation, diarrhea, dysphagia, melena, nausea, rectal  pain and vomiting.   Neurological: Negative for weakness.       Past Medical History:   Diagnosis Date    Allergy     Arthritis     Cataract     Colon polyp     Diverticulosis     GERD (gastroesophageal reflux disease)     Graves disease     s/p radioactive iodine in 42    H/O esophagogastroduodenoscopy     8/14 and 6/17    H/O percutaneous left heart catheterization     normal 5/12    History of colon polyps     History of diverticulitis     History of esophagitis     egd 8/14    History of skin cancer     L neck    Hyperlipidemia     Hypertension     Hypothyroidism     s/p radioactive iodine    IBS (irritable bowel syndrome)     Mild luminal irregularity of carotid artery on ultrasound     noted on 5/16 usg with next due 5/18    Osteopenia     7/13, 7/15    S/P colonoscopy     7/17;  next due 7/22    Thyroid nodule     last usg 5/17;  next due 5/19     Past Surgical History:   Procedure Laterality Date    CARDIAC SURGERY  2013    angiogram (negative)    COLONOSCOPY  07/21/2010    Dr. Velázquez; in legacy, repeat in 6-7 years    COLONOSCOPY N/A 6/27/2017    Procedure: COLONOSCOPY;  Surgeon: Hamlet Velázquez Jr., MD;  Location: [facility];  Service: Endoscopy;  Laterality: N/A; repeat in 5 years for surveillance    COSMETIC SURGERY      Liposuction to neck    SKIN CANCER EXCISION      with Mohs on left neck    TONSILLECTOMY      UPPER GASTROINTESTINAL ENDOSCOPY  08/04/2014    Dr. Velázquez    UPPER GASTROINTESTINAL ENDOSCOPY  06/27/2017    DR. VELÁZQUEZ     Family History   Problem Relation Age of Onset    Cataracts Mother     Colon polyps Mother         history of colitis- part colon removed    Cancer Sister     Ovarian cancer Sister 40    Cataracts Maternal Grandfather     Amblyopia Neg Hx     Blindness Neg Hx     Glaucoma Neg Hx     Hypertension Neg Hx     Macular degeneration Neg Hx     Retinal detachment Neg Hx     Strabismus Neg Hx     Stroke Neg Hx     Thyroid disease Neg Hx      Diabetes Neg Hx     Colon cancer Neg Hx     Esophageal cancer Neg Hx     Stomach cancer Neg Hx      Wt Readings from Last 10 Encounters:   07/09/18 88.8 kg (195 lb 12.3 oz)   07/06/18 89.3 kg (196 lb 13.9 oz)   06/13/18 88.3 kg (194 lb 10.7 oz)   05/14/18 88.3 kg (194 lb 10.7 oz)   04/20/18 88 kg (194 lb)   03/26/18 87.7 kg (193 lb 5.5 oz)   10/12/17 90.3 kg (199 lb)   09/26/17 90.6 kg (199 lb 11.8 oz)   08/17/17 91.3 kg (201 lb 4.5 oz)   08/03/17 88.7 kg (195 lb 8.8 oz)     Lab Results   Component Value Date    WBC 6.40 04/25/2018    HGB 13.9 04/25/2018    HCT 43.9 04/25/2018    MCV 89 04/25/2018     04/25/2018     CMP  Sodium   Date Value Ref Range Status   04/25/2018 142 136 - 145 mmol/L Final     Potassium   Date Value Ref Range Status   04/25/2018 4.7 3.5 - 5.1 mmol/L Final     Chloride   Date Value Ref Range Status   04/25/2018 105 95 - 110 mmol/L Final     CO2   Date Value Ref Range Status   04/25/2018 31 (H) 23 - 29 mmol/L Final     Glucose   Date Value Ref Range Status   04/25/2018 100 70 - 110 mg/dL Final     BUN, Bld   Date Value Ref Range Status   04/25/2018 15 8 - 23 mg/dL Final     Creatinine   Date Value Ref Range Status   04/25/2018 0.9 0.5 - 1.4 mg/dL Final     Calcium   Date Value Ref Range Status   04/25/2018 10.6 (H) 8.7 - 10.5 mg/dL Final     Total Protein   Date Value Ref Range Status   04/25/2018 7.3 6.0 - 8.4 g/dL Final     Albumin   Date Value Ref Range Status   04/25/2018 4.0 3.5 - 5.2 g/dL Final     Total Bilirubin   Date Value Ref Range Status   04/25/2018 0.4 0.1 - 1.0 mg/dL Final     Comment:     For infants and newborns, interpretation of results should be based  on gestational age, weight and in agreement with clinical  observations.  Premature Infant recommended reference ranges:  Up to 24 hours.............<8.0 mg/dL  Up to 48 hours............<12.0 mg/dL  3-5 days..................<15.0 mg/dL  6-29 days.................<15.0 mg/dL       Alkaline Phosphatase   Date Value  "Ref Range Status   04/25/2018 106 55 - 135 U/L Final     AST   Date Value Ref Range Status   04/25/2018 23 10 - 40 U/L Final     ALT   Date Value Ref Range Status   04/25/2018 29 10 - 44 U/L Final     Anion Gap   Date Value Ref Range Status   04/25/2018 6 (L) 8 - 16 mmol/L Final     eGFR if    Date Value Ref Range Status   04/25/2018 >60.0 >60 mL/min/1.73 m^2 Final     eGFR if non    Date Value Ref Range Status   04/25/2018 >60.0 >60 mL/min/1.73 m^2 Final     Comment:     Calculation used to obtain the estimated glomerular filtration  rate (eGFR) is the CKD-EPI equation.        Reviewed prior medical records including radiology report of 6/22/18 UGI; 6/20/18 modified barium swallow study; & endoscopy history (see surgical history).    6/27/17 EGD was reviewed and procedure report states:   " Findings:       The oropharynx was normal, except for perhaps slight erythema above        the cords.       Scattered small (1-2 mm) off-white nummular lesions were noted in        the entire esophagus. Biopsies were taken with a cold forceps for        histology.       The esophagus was otherwise grossly normal.       The Z-line was regular and was found 35 cm from the incisors.       Some laxness of the lower esophageal sphincter was noted, but I did        not see a definite hiatal hernia.       Slight bilious fluid was found in the gastric body.       Multiple medium sessile fundic gland polyps were found in the        gastric fundus and in the gastric body. A few biopsies were taken        with a cold forceps for histology.       The gastric antrum was normal. Biopsies were taken with a cold        forceps for Helicobacter pylori testing using CLOtest. Biopsies were        taken with a cold forceps for histology.       The examined duodenum was normal.       No endoscopic abnormality was evident in the esophagus to explain        the patient's complaint of dysphagia. It was decided, however, " "to        proceed with dilation of the entire esophagus. A guidewire was        placed and the scope was withdrawn. Dilation was performed with a        Savary dilator with no resistance at 51 Fr.  Impression:          - Normal oropharynx.                       - White nummular lesions in esophageal mucosa.                        Biopsied.                       - Normal esophagus otherwise.                       - Z-line regular, 35 cm from the incisors.                       - No endoscopic esophageal abnormality to explain                        patient's dysphagia. Esophagus dilated. Dilated, 51                        Fr.                       - Non-erosive esophageal reflux (NERD) disease                        present.                       - Bilious gastric fluid.                       - Multiple fundic gland polyps. Biopsied.                       - Normal antrum. Biopsied.                       - Normal examined duodenum.  Recommendation:      - Perform a colonoscopy as previously scheduled.                       - Discharge patient to home.                       - Await pathology and CLOtest results.                       - Follow an antireflux regimen.                       - Continue present medications.                       - But increase Prilosec (omeprazole) to 40 mg PO BID.                       - Use Zantac (ranitidine) 300 mg PO nightly.                       - Call the G.I. clinic in 2 weeks for reports (if                        you haven't heard from us sooner) [phone].                       - Return to GI clinic in 6-8 weeks.                       - If fails to improve, then next perform a modified                        barium swallow. ".  Biopsy results:   Cindy test negative  "1. ESOPHAGUS (BIOPSY):  -Esophagus squamous mucosa with sebaceous metaplasia  -Negative for significant active inflammation  -Negative for intestinal metaplasia  -Negative for dysplasia  2. STOMACH, POLYPS " "(BIOPSY):  -Fundic gland polyps  3. STOMACH (BIOPSY):  -Antrum with features of reactive/chemical gastropathy  -Negative for active inflammation  -Negative for Helicobacter pylori organisms on H&E stain"    6/27/17 Colonoscopy was reviewed and procedure report states:   "Impression:          - One 2 mm by 4 mm polyp in the cecum, removed with                        a cold snare. Resected and retrieved.                       - Diverticulosis in the sigmoid colon and in the                        descending colon.                       - Diverticulosis in the transverse colon, at the                        hepatic flexure, in the ascending colon and in the                        cecum.                       - Mild colonic spasm consistent with irritable bowel                        syndrome.                       - Non-bleeding internal hemorrhoids.                       - The examination was otherwise normal.                       - The examined portion of the ileum was normal.  Recommendation:      - Discharge patient to home.                       - Await pathology results.                       - If the pathology report reveals adenomatous                        tissue, then repeat the colonoscopy for surveillance                        in 5 years.                       - If the pathology report indicates hyperplastic                        polyp, then repeat colonoscopy for surveillance in 7                        years.                       - High fiber diet.                       - Take a PROBIOTIC, such as a carton of GREEK YOGURT                        (Chobani or Oikos, or Activia or Dannon); or tablets                        of ALIGN or CULTURELLE or LITA-Q (all                        non-prescription), every day for a month.                       - Call the G.I. clinic in 2 weeks for reports (if                        you haven't heard from us sooner) [phone].                       - Continue present " "medications. ".  Biopsy results:   "4. COLON, CECAL POLYPS (BIOPSY):  - Tubular adenoma"  Objective:      Physical Exam   Constitutional: She is oriented to person, place, and time. She appears well-developed and well-nourished. No distress.   HENT:   Mouth/Throat: Oropharynx is clear and moist and mucous membranes are normal. No oral lesions. No oropharyngeal exudate.   Eyes: Conjunctivae are normal. Pupils are equal, round, and reactive to light. No scleral icterus.   Cardiovascular: Normal rate.    Pulmonary/Chest: Effort normal and breath sounds normal. No respiratory distress. She has no wheezes.   Abdominal: Soft. Normal appearance and bowel sounds are normal. She exhibits no distension, no abdominal bruit and no mass. There is no hepatosplenomegaly. There is no tenderness. There is no rigidity, no rebound, no guarding, no tenderness at McBurney's point and negative Smith's sign. No hernia.   Neurological: She is alert and oriented to person, place, and time.   Skin: Skin is warm and dry. No rash noted. She is not diaphoretic. No erythema. No pallor.   Non-jaundiced   Psychiatric: She has a normal mood and affect. Her behavior is normal.   Nursing note and vitals reviewed.      Assessment:       1. Abnormal UGI series    2. Hiatal hernia    3. Gastro-esophageal reflux disease without esophagitis     4. Cough    5. Hoarseness    6. Globus sensation    7. Encounter for monitoring long-term proton pump inhibitor therapy    8. History of diverticulosis    9. Flatulence        Plan:       Abnormal UGI series  - schedule EGD, discussed procedure with patient, patient verbalized understanding    Hiatal hernia  -discussed diagnosis with patient & that it is usually managed by controlling reflux symptoms, surgery is an option, but usually performed if reflux is uncontrolled by medication management and lifestyle/dietary modifications; if symptoms persist despite medication management and lifestyle/dietary " modifications, we can refer to general surgery to consult about surgical options, patient verbalized understanding  - schedule EGD, discussed procedure with patient, patient verbalized understanding    Gastro-esophageal reflux disease without esophagitis   - schedule EGD, discussed procedure with patient, patient verbalized understanding  -  continue   omeprazole (PRILOSEC) 40 MG capsule; Take 1 capsule (40 mg total) by mouth every morning, take in the morning before breakfast, discussed about possible long term use of medication (prefer to use lowest effective dose or discontinuing if possible) and discussed the risks & benefits with taking a reflux medication long term, and to take OTC calcium and vitamin d supplements as directed (such as Citracal +D), pt verbalized understanding and patient wants to continue current medication at current dosage  - continue lifestyle modifications to help control/reduce reflux/abdominal pain including: avoid large meals, avoid eating within 2-3 hours of bedtime (avoid late night eating & lying down soon after eating), elevate head of bed if nocturnal symptoms are present, smoking cessation (if current smoker), & weight loss (if overweight).   - avoid known foods which trigger reflux symptoms & to minimize/avoid high-fat foods, chocolate, caffeine, citrus, alcohol, & tomato products.  - avoid/limit use of NSAID's, since they can cause GI upset, bleeding, and/or ulcers. If needed, take with food.    Cough & Hoarseness  Recommend follow-up with Primary Care Provider/ENT for continued evaluation and management.    Globus sensation  - schedule EGD, discussed procedure with patient, patient verbalized understanding    Encounter for monitoring long-term proton pump inhibitor therapy  -     Vitamin B12; Future; Expected date: 07/09/2018  -     Magnesium; Future; Expected date: 07/09/2018    History of diverticulosis  - discussed the diagnosis of diverticulosis and diverticulitis, & to  prevent diverticulitis, high fiber diet is recommended.  - Recommended daily exercise, adequate water intake (six 8-oz glasses of water daily), and high fiber diet. OTC fiber supplements are recommended if diet does not reach daily fiber goal (25 grams daily), such as Metamucil, Citrucel, or FiberCon (take as directed, separate from other oral medications by >2 hours).  - Advised to avoid/minimize popcorn, corn, seeds, and nuts.    Flatulence  - recommended OTC simethicone as directed, such as Phazyme or Gas-x  -discussed with patient about low gas diet: Reduce or eliminate these foods from your diet: Broccoli, Cauliflower, [place] sprouts, Cabbage, Cooked dried beans, Carbonated beverages (sparkling water, soda, beer, champagne)  Other Causes Of Excess Gas Include:   1) EATING TOO FAST or TALKING WHILE YOU CHEW may cause you to swallow air. This increases the amount of gas in the stomach and may worsen your symptoms.  --> Chew each mouthful completely before swallowing. Take your time.  2) OVEREATING may increase the feeling of being bloated and cause more gas.  --> When you are full, stop eating.  3) CONSTIPATION can increase the amount of normal intestinal gas.  --> Avoid constipation by increasing the amount of fiber in your diet by including whole cereal grains, fresh vegetables (except those in the above list) and fresh fruits. High-fiber foods absorb water and carry it out of the body. When increasing the amount of fiber in your diet, you also need to increase the amount of water that you drink. You should drink at least eight 8-ounce glasses of water (two quarts) per day.  - schedule EGD, discussed procedure with patient, patient verbalized understanding    Follow-up in about 1 month (around 8/9/2018), or if symptoms worsen or fail to improve.      If no improvement in symptoms or symptoms worsen, call/follow-up at clinic or go to ER.

## 2018-07-11 ENCOUNTER — ANESTHESIA EVENT (OUTPATIENT)
Dept: ENDOSCOPY | Facility: HOSPITAL | Age: 68
End: 2018-07-11
Payer: MEDICARE

## 2018-07-11 NOTE — H&P
"History & Physical - Short Stay  Gastroenterology      SUBJECTIVE:     Procedure: Gastroscopy    Chief Complaint/Indication for Procedure: GERD.  Abnl UGI.    History of Present Illness:  Office Visit     6/13/2018  [place] - Gastroenterology   JAY Monsalve   Gastroenterology   Gastro-esophageal reflux disease without esophagitis  +3 more   Dx   Gastroesophageal Reflux   Reason for Visit        Progress Notes        Subjective:       Patient ID: Curtis Navarro is a 67 y.o. female Body mass index is 34.48 kg/m².     Chief Complaint: Gastroesophageal Reflux     Established patient of Dr. Ceballos & myself     Gastroesophageal Reflux   She complains of belching (was increased when reflux first flared up), coughing (productive with clear sputum), globus sensation (frequent throat clearing- improving), heartburn and a hoarse voice (history of this and sees ENT). She reports no abdominal pain, no chest pain, no choking, no dysphagia, no early satiety, no nausea or no sore throat. This is a chronic problem. Episode onset: several years ago. Progression since onset: recurred in 5/2018, significantly improved since on prilosec. The heartburn does not wake her from sleep. The heartburn changes with position. The symptoms are aggravated by caffeine and lying down (worse in the afternoon and at night). Pertinent negatives include no fatigue, melena or weight loss. Risk factors include obesity and caffeine use. She has tried a PPI, a histamine-2 antagonist, head elevation and a diet change (probiotic; had weaned off of prilosec but restarted last week at 40 mg daily- significant relief; PAST: zantac) for the symptoms. The treatment provided significant relief. Past procedures include an EGD.      6/27/17 EGD was reviewed and procedure report states:   " Findings:       The oropharynx was normal, except for perhaps slight erythema above        the cords.       Scattered small (1-2 mm) off-white nummular lesions " were noted in        the entire esophagus. Biopsies were taken with a cold forceps for        histology.       The esophagus was otherwise grossly normal.       The Z-line was regular and was found 35 cm from the incisors.       Some laxness of the lower esophageal sphincter was noted, but I did        not see a definite hiatal hernia.       Slight bilious fluid was found in the gastric body.       Multiple medium sessile fundic gland polyps were found in the        gastric fundus and in the gastric body. A few biopsies were taken        with a cold forceps for histology.       The gastric antrum was normal. Biopsies were taken with a cold        forceps for Helicobacter pylori testing using CLOtest. Biopsies were        taken with a cold forceps for histology.       The examined duodenum was normal.       No endoscopic abnormality was evident in the esophagus to explain        the patient's complaint of dysphagia. It was decided, however, to        proceed with dilation of the entire esophagus. A guidewire was        placed and the scope was withdrawn. Dilation was performed with a        Savary dilator with no resistance at 51 Fr.  Impression:  - Normal oropharynx.                       - White nummular lesions in esophageal mucosa.                        Biopsied.                       - Normal esophagus otherwise.                       - Z-line regular, 35 cm from the incisors.                       - No endoscopic esophageal abnormality to explain                        patient's dysphagia. Esophagus dilated. Dilated, 51                        Fr.                       - Non-erosive esophageal reflux (NERD) disease                        present.                       - Bilious gastric fluid.                       - Multiple fundic gland polyps. Biopsied.                       - Normal antrum. Biopsied.                       - Normal examined duodenum.  Recommendation:                                             "                                                          - Perform a colonoscopy as previously scheduled.                       - Discharge patient to home.                       - Await pathology and CLOtest results.                       - Follow an antireflux regimen.                       - Continue present medications.                       - But increase Prilosec (omeprazole) to 40 mg PO BID.                       - Use Zantac (ranitidine) 300 mg PO nightly.                       - Call the G.I. clinic in 2 weeks for reports (if                        you haven't heard from us sooner) [phone].                       - Return to GI clinic in 6-8 weeks.                       - If fails to improve, then next perform a modified                        barium swallow. ".  Biopsy results:   Cindy test negative  "1. ESOPHAGUS (BIOPSY):  -Esophagus squamous mucosa with sebaceous metaplasia  -Negative for significant active inflammation  -Negative for intestinal metaplasia  -Negative for dysplasia  2. STOMACH, POLYPS (BIOPSY):  -Fundic gland polyps  3. STOMACH (BIOPSY):  -Antrum with features of reactive/chemical gastropathy  -Negative for active inflammation  -Negative for Helicobacter pylori organisms on H&E stain"    Assessment:       1. Gastro-esophageal reflux disease without esophagitis     2. Cough    3. Hoarseness    4. Globus sensation        Plan:       Gastro-esophageal reflux disease without esophagitis   -     FL Upper GI Air Contrast With KUB; Future; Expected date: 06/13/2018  -     Fl Modified Barium Swallow Speech; Future; Expected date: 06/13/2018  -  REFILL   omeprazole (PRILOSEC) 40 MG capsule; Take 1 capsule (40 mg total) by mouth every morning.  Dispense: 30 capsule; Refill: 2, take in the morning before breakfast, discussed about possible long term use of medication (prefer to use lowest effective dose or discontinuing if possible) and discussed the risks & benefits with taking a reflux medication " long term, and to take OTC calcium and vitamin d supplements as directed (such as Citracal +D), pt verbalized understanding and patient wants to continue current medication at current dosage  - continue lifestyle modifications to help control/reduce reflux/abdominal pain including: avoid large meals, avoid eating within 2-3 hours of bedtime (avoid late night eating & lying down soon after eating), elevate head of bed if nocturnal symptoms are present, smoking cessation (if current smoker), & weight loss (if overweight).   - avoid known foods which trigger reflux symptoms & to minimize/avoid high-fat foods, chocolate, caffeine, citrus, alcohol, & tomato products.  - avoid/limit use of NSAID's, since they can cause GI upset, bleeding, and/or ulcers. If needed, take with food.     Globus sensation, Cough & Hoarseness  -     FL Upper GI Air Contrast With KUB; Future; Expected date: 06/13/2018  -     Fl Modified Barium Swallow Speech; Future; Expected date: 06/13/2018  Recommend follow-up with Primary Care Provider/ENT for continued evaluation and management.     Follow-up in about 1 month (around 7/13/2018), or if symptoms worsen or fail to improve.          UGI 6/22/2018  Esophageal motility is normal and the esophageal mucosa shows no evidence of mass, stricture, esophagitis, ulceration, or stricture.  There is a very small hiatal hernia present at the gastroesophageal junction.  There was moderate gastroesophageal reflux with provocative maneuvering.    No abnormal gastric fold thickening.  There are multiple punctate filling defects present within the gastric body compatible with gastric polyps which appear to measure less than 1 cm.  Statistically, these most likely represent hyperplastic polyps.  Additional evaluation with endoscopy should be considered.  No gastric mass or ulcer formation.    The duodenal bulb and C-loop are unremarkable.    There are multiple incidentally observed colonic diverticula present.    Impression       1. Small hiatal hernia and moderate gastroesophageal reflux with provocative maneuvering.  2. Multiple filling defects throughout the gastric body which measure < 1 cm, statistically most likely hyperplastic polyps.  Nevertheless, additional evaluation with endoscopy is recommended  3. Diverticulosis coli.  This report was flagged in Epic as abnormal.      Electronically signed by: Greg Watson MD  Date: 06/22/2018         PTA Medications   Medication Sig    aspirin (ECOTRIN) 81 MG EC tablet Take 81 mg by mouth once daily.    atorvastatin (LIPITOR) 20 MG tablet TAKE 1/2 TABLET AT BEDTIME FOR CHOLESTEROL    B INFANTIS/B ANI/B JOSE M/B BIFID (PROBIOTIC 4X ORAL) Take 1 tablet by mouth once daily.    biotin 5 mg Cap Take 5 mg by mouth once daily.    calcium-vitamin D3 500 mg(1,250mg) -200 unit per tablet Take 1 tablet by mouth 2 (two) times daily with meals.    GLUCOSA HAHN 2KCL/CHONDROITIN HAHN (GLUCOSAMINE-CHONDROITIN DS ORAL) Take by mouth 2 (two) times daily.    KRILL/OM-3/DHA/EPA/PHOSPHO/AST (MEGARED OMEGA-3 KRILL OIL ORAL) Take 1 capsule by mouth once daily.    MULTIVITAMIN ORAL Take by mouth.      omeprazole (PRILOSEC) 40 MG capsule Take 1 capsule (40 mg total) by mouth every morning.    progesterone (PROMETRIUM) 100 MG capsule     solifenacin (VESICARE) 5 MG tablet Take 1 tablet (5 mg total) by mouth once daily.    SYNTHROID 125 mcg tablet Take 1 tablet (125 mcg total) by mouth before breakfast.    valsartan (DIOVAN) 40 MG tablet Take 1 tablet (40 mg total) by mouth once daily.    fluticasone (FLONASE) 50 mcg/actuation nasal spray fluticasone 50 mcg/actuation nasal spray,suspension   Spray 1 spray every day by intranasal route.       Review of patient's allergies indicates:   Allergen Reactions    Lisinopril Other (See Comments)     Cough    Azithromycin      Other reaction(s): Nausea  Other reaction(s): Diarrhea    Metronidazole hcl      Other reaction(s): Rash  Other reaction(s):  Itching    Moxifloxacin      Other reaction(s): Rash    Sulfa (sulfonamide antibiotics)      Other reaction(s): Itching        Past Medical History:   Diagnosis Date    Allergy     Arthritis     Cataract     Colon polyp     Diverticulosis     GERD (gastroesophageal reflux disease)     Graves disease     s/p radioactive iodine in 42    H/O esophagogastroduodenoscopy     8/14 and 6/17    H/O percutaneous left heart catheterization     normal 5/12    History of colon polyps     History of diverticulitis     History of esophagitis     egd 8/14    History of skin cancer     L neck    Hyperlipidemia     Hypertension     Hypothyroidism     s/p radioactive iodine    IBS (irritable bowel syndrome)     Mild luminal irregularity of carotid artery on ultrasound     noted on 5/16 usg with next due 5/18    Osteopenia     7/13, 7/15    S/P colonoscopy     7/17;  next due 7/22    Thyroid nodule     last usg 5/17;  next due 5/19     Past Surgical History:   Procedure Laterality Date    CARDIAC SURGERY  2013    angiogram (negative)    COLONOSCOPY  07/21/2010    Dr. Velázquez; in legacy, repeat in 6-7 years    COLONOSCOPY N/A 6/27/2017    Procedure: COLONOSCOPY;  Surgeon: Hamlet Velázquez Jr., MD;  [facility]: [facility];  Service: Endoscopy;  Laterality: N/A; repeat in 5 years for surveillance    COSMETIC SURGERY      Liposuction to neck    SKIN CANCER EXCISION      with Mohs on left neck    TONSILLECTOMY      UPPER GASTROINTESTINAL ENDOSCOPY  08/04/2014    Dr. Velázquez    UPPER GASTROINTESTINAL ENDOSCOPY  06/27/2017    DR. VELÁZQUEZ     Family History   Problem Relation Age of Onset    Cataracts Mother     Colon polyps Mother         history of colitis- part colon removed    Cancer Sister     Ovarian cancer Sister 40    Cataracts Maternal Grandfather     Amblyopia Neg Hx     Blindness Neg Hx     Glaucoma Neg Hx     Hypertension Neg Hx     Macular degeneration Neg Hx     Retinal detachment Neg Hx      "Strabismus Neg Hx     Stroke Neg Hx     Thyroid disease Neg Hx     Diabetes Neg Hx     Colon cancer Neg Hx     Esophageal cancer Neg Hx     Stomach cancer Neg Hx      Social History   Substance Use Topics    Smoking status: Never Smoker    Smokeless tobacco: Never Used    Alcohol use Yes      Comment: rarely         OBJECTIVE:     Vital Signs (Most Recent)  Temp: 97.9 °F (36.6 °C) (07/12/18 0817)  Pulse: 68 (07/12/18 0755)  Resp: 18 (07/12/18 0755)  BP: (!) 127/58 (07/12/18 0755)  SpO2: 97 % (07/12/18 0755)    Physical Exam:          : Ht 5' 3" (1.6 m)   Wt 88.3 kg (194 lb 10.7 oz)   BMI 34.48 kg/m²   GENERAL:  Comfortable, in no acute distress.                                 HEENT EXAM:  Nonicteric.  No adenopathy.  Oropharynx is clear.               NECK:  Supple.                                                               LUNGS:  Clear.                                                               CARDIAC:  Regular rate and rhythm.  S1, S2.  No murmur.                      ABDOMEN:  Obese.  Soft, positive bowel sounds, nontender.  No hepatosplenomegaly or masses.  No rebound or guarding.                                             EXTREMITIES:  No edema.     MENTAL STATUS:  Alert and oriented.    ASSESSMENT/PLAN:     Assessment: GERD.  Abnl UGI.    Plan: Gastroscopy    Anesthesia Plan:   MAC / General Anaesthesia    ASA Grade: ASA 2 - Patient with mild systemic disease with no functional limitations    MALLAMPATI SCORE: II (hard and soft palate, upper portion of tonsils anduvula visible)    "

## 2018-07-12 ENCOUNTER — ANESTHESIA (OUTPATIENT)
Dept: ENDOSCOPY | Facility: HOSPITAL | Age: 68
End: 2018-07-12
Payer: MEDICARE

## 2018-07-12 ENCOUNTER — SURGERY (OUTPATIENT)
Age: 68
End: 2018-07-12

## 2018-07-12 ENCOUNTER — HOSPITAL ENCOUNTER (OUTPATIENT)
Facility: HOSPITAL | Age: 68
Discharge: HOME | End: 2018-07-12
Attending: INTERNAL MEDICINE | Admitting: INTERNAL MEDICINE
Payer: MEDICARE

## 2018-07-12 VITALS
RESPIRATION RATE: 16 BRPM | HEIGHT: 63 IN | WEIGHT: 196 LBS | SYSTOLIC BLOOD PRESSURE: 158 MMHG | DIASTOLIC BLOOD PRESSURE: 70 MMHG | TEMPERATURE: 97 F | BODY MASS INDEX: 34.73 KG/M2 | OXYGEN SATURATION: 97 % | HEART RATE: 69 BPM

## 2018-07-12 DIAGNOSIS — K21.9 GERD (GASTROESOPHAGEAL REFLUX DISEASE): ICD-10-CM

## 2018-07-12 LAB — H PYLORI INDEX VALUE: NEGATIVE

## 2018-07-12 PROCEDURE — D9220A PRA ANESTHESIA: Mod: CRNA,,,

## 2018-07-12 PROCEDURE — 87449 NOS EACH ORGANISM AG IA: CPT | Mod: PO | Performed by: INTERNAL MEDICINE

## 2018-07-12 PROCEDURE — 43251 EGD REMOVE LESION SNARE: CPT | Mod: ,,, | Performed by: INTERNAL MEDICINE

## 2018-07-12 PROCEDURE — 27201089 HC SNARE, DISP (ANY): Mod: PO | Performed by: INTERNAL MEDICINE

## 2018-07-12 PROCEDURE — 43239 EGD BIOPSY SINGLE/MULTIPLE: CPT | Mod: 59,,, | Performed by: INTERNAL MEDICINE

## 2018-07-12 PROCEDURE — D9220A PRA ANESTHESIA: Mod: ANES,,, | Performed by: ANESTHESIOLOGY

## 2018-07-12 PROCEDURE — 27201012 HC FORCEPS, HOT/COLD, DISP: Mod: PO | Performed by: INTERNAL MEDICINE

## 2018-07-12 PROCEDURE — 88305 TISSUE EXAM BY PATHOLOGIST: CPT | Mod: 59 | Performed by: PATHOLOGY

## 2018-07-12 PROCEDURE — 37000009 HC ANESTHESIA EA ADD 15 MINS: Mod: PO | Performed by: INTERNAL MEDICINE

## 2018-07-12 PROCEDURE — 25000003 PHARM REV CODE 250: Mod: PO | Performed by: INTERNAL MEDICINE

## 2018-07-12 PROCEDURE — 37000008 HC ANESTHESIA 1ST 15 MINUTES: Mod: PO | Performed by: INTERNAL MEDICINE

## 2018-07-12 PROCEDURE — 43239 EGD BIOPSY SINGLE/MULTIPLE: CPT | Mod: PO | Performed by: INTERNAL MEDICINE

## 2018-07-12 PROCEDURE — 63600175 PHARM REV CODE 636 W HCPCS: Mod: PO | Performed by: NURSE ANESTHETIST, CERTIFIED REGISTERED

## 2018-07-12 PROCEDURE — 43251 EGD REMOVE LESION SNARE: CPT | Mod: PO | Performed by: INTERNAL MEDICINE

## 2018-07-12 PROCEDURE — 88305 TISSUE EXAM BY PATHOLOGIST: CPT | Mod: 26,,, | Performed by: PATHOLOGY

## 2018-07-12 RX ORDER — LIDOCAINE HCL/PF 100 MG/5ML
SYRINGE (ML) INTRAVENOUS
Status: DISCONTINUED | OUTPATIENT
Start: 2018-07-12 | End: 2018-07-12

## 2018-07-12 RX ORDER — LIDOCAINE HYDROCHLORIDE 10 MG/ML
1 INJECTION, SOLUTION EPIDURAL; INFILTRATION; INTRACAUDAL; PERINEURAL ONCE
Status: DISCONTINUED | OUTPATIENT
Start: 2018-07-12 | End: 2018-07-12 | Stop reason: HOSPADM

## 2018-07-12 RX ORDER — SODIUM CHLORIDE 0.9 % (FLUSH) 0.9 %
3 SYRINGE (ML) INJECTION
Status: DISCONTINUED | OUTPATIENT
Start: 2018-07-12 | End: 2018-07-12 | Stop reason: HOSPADM

## 2018-07-12 RX ORDER — SODIUM CHLORIDE, SODIUM LACTATE, POTASSIUM CHLORIDE, CALCIUM CHLORIDE 600; 310; 30; 20 MG/100ML; MG/100ML; MG/100ML; MG/100ML
INJECTION, SOLUTION INTRAVENOUS CONTINUOUS
Status: DISCONTINUED | OUTPATIENT
Start: 2018-07-12 | End: 2018-07-12 | Stop reason: HOSPADM

## 2018-07-12 RX ORDER — PROPOFOL 10 MG/ML
VIAL (ML) INTRAVENOUS
Status: DISCONTINUED | OUTPATIENT
Start: 2018-07-12 | End: 2018-07-12

## 2018-07-12 RX ADMIN — PROPOFOL 30 MG: 10 INJECTION, EMULSION INTRAVENOUS at 08:07

## 2018-07-12 RX ADMIN — PROPOFOL 40 MG: 10 INJECTION, EMULSION INTRAVENOUS at 08:07

## 2018-07-12 RX ADMIN — LIDOCAINE HYDROCHLORIDE 75 MG: 20 INJECTION, SOLUTION INTRAVENOUS at 08:07

## 2018-07-12 RX ADMIN — LIDOCAINE HYDROCHLORIDE 100 MG: 20 INJECTION, SOLUTION INTRAVENOUS at 08:07

## 2018-07-12 RX ADMIN — PROPOFOL 120 MG: 10 INJECTION, EMULSION INTRAVENOUS at 08:07

## 2018-07-12 RX ADMIN — SODIUM CHLORIDE, SODIUM LACTATE, POTASSIUM CHLORIDE, AND CALCIUM CHLORIDE: .6; .31; .03; .02 INJECTION, SOLUTION INTRAVENOUS at 08:07

## 2018-07-12 NOTE — DISCHARGE INSTRUCTIONS
Recovery After Procedural Sedation (Adult)  You have been given medicine by vein to make you sleep during your surgery. This may have included both a pain medicine and sleeping medicine. Most of the effects have worn off. But you may still have some drowsiness for the next 6 to 8 hours.  Home care  Follow these guidelines when you get home:  · For the next 8 hours, you should be watched by a responsible adult. This person should make sure your condition is not getting worse.  · Don't drink any alcohol for the next 24 hours.  · Don't drive, operate dangerous machinery, or make important business or personal decisions during the next 24 hours.  Note: Your healthcare provider may tell you not to take any medicine by mouth for pain or sleep in the next 4 hours. These medicines may react with the medicines you were given in the hospital. This could cause a much stronger response than usual.  Follow-up care  Follow up with your healthcare provider if you are not alert and back to your usual level of activity within 12 hours.  When to seek medical advice  Call your healthcare provider right away if any of these occur:  · Drowsiness gets worse  · Weakness or dizziness gets worse  · Repeated vomiting  · You can't be awakened   Date Last Reviewed: 10/18/2016  © 2435-5474 [employer] [address] [place] [address], [place] [address]. All rights reserved. This information is not intended as a substitute for professional medical care. Always follow your healthcare professional's instructions.        Tips to Control Acid Reflux    To control acid reflux, youll need to make some basic diet and lifestyle changes. The simple steps outlined below may be all youll need to ease discomfort.  Watch what you eat  · Avoid fatty foods and spicy foods.  · Eat fewer acidic foods, such as citrus and tomato-based foods. These can increase symptoms.  · Limit drinking alcohol, caffeine, and fizzy beverages. All increase acid  reflux.  · Try limiting chocolate, peppermint, and spearmint. These can worsen acid reflux in some people.  Watch when you eat  · Avoid lying down for 3 hours after eating.  · Do not snack before going to bed.  Raise your head  Raising your head and upper body by 4 to 6 inches helps limit reflux when youre lying down. Put blocks under the head of your bed frame to raise it.  Other changes  · Lose weight, if you need to  · Dont exercise near bedtime  · Avoid tight-fitting clothes  · Limit aspirin and ibuprofen  · Stop smoking   Date Last Reviewed: 7/1/2016  © 4837-1476 [employer] [address] [place] [address], [place] [address]. All rights reserved. This information is not intended as a substitute for professional medical care. Always follow your healthcare professional's instructions.

## 2018-07-12 NOTE — BRIEF OP NOTE
Discharge Note  Short Stay      SUMMARY     Admit Date: 7/12/2018    Attending Physician: Hamlet Ceballos Jr., MD     Discharge Physician: Hamlet Ceballos Jr., MD    Discharge Date: 7/12/2018 9:46 AM    Final Diagnosis: Gastroesophageal reflux disease, esophagitis presence not specified [K21.9]  Cough [R05]  Impression:          - Normal oropharynx.                       - White nummular lesions in esophageal mucosa                        (benign).                       - Normal esophagus otherwise.                       - Z-line regular, 35 cm from the incisors.                       - Patulous lower esophageal sphincter (NERD).                       - Multiple gastric polyps. Incomplete resection.                        Resected tissue retrieved.                       - Minimal Antritis. Biopsied.                       - Normal stomach otherwise.                       - Normal examined duodenum.  Recommendation:      - Discharge patient to home.                       - Await pathology results.                       - Follow an antireflux regimen.                       - Continue present medications.                       - Use Prilosec (omeprazole) 40 mg PO daily.                       - Add Zantac (ranitidine) 300 mg PO nightly for 1-2                        months.                       - Resume aspirin at prior dose in 10-14 days.                       - Call the G.I. clinic in 2 weeks for reports (if                        you haven't heard from us sooner) [phone].                       - Repeat upper endoscopy PRN.  Hamlet Ceballos MD  7/12/2018   Disposition: HOME OR SELF CARE    Patient Instructions:   Current Discharge Medication List      START taking these medications    Details   ranitidine (ZANTAC) 300 MG tablet Take 1 tablet (300 mg total) by mouth every evening. , about 30-60 minutes before bedtime.  Qty: 60 tablet, Refills: 5         CONTINUE these medications which have NOT CHANGED     Details   aspirin (ECOTRIN) 81 MG EC tablet Take 81 mg by mouth once daily.      atorvastatin (LIPITOR) 20 MG tablet TAKE 1/2 TABLET AT BEDTIME FOR CHOLESTEROL  Qty: 15 tablet, Refills: 0    Associated Diagnoses: Hyperlipidemia, unspecified hyperlipidemia type      B INFANTIS/B ANI/B JOSE M/B BIFID (PROBIOTIC 4X ORAL) Take 1 tablet by mouth once daily.      biotin 5 mg Cap Take 5 mg by mouth once daily.      calcium-vitamin D3 500 mg(1,250mg) -200 unit per tablet Take 1 tablet by mouth 2 (two) times daily with meals.      GLUCOSA HAHN 2KCL/CHONDROITIN HAHN (GLUCOSAMINE-CHONDROITIN DS ORAL) Take by mouth 2 (two) times daily.      KRILL/OM-3/DHA/EPA/PHOSPHO/AST (MEGARED OMEGA-3 KRILL OIL ORAL) Take 1 capsule by mouth once daily.      MULTIVITAMIN ORAL Take by mouth.        omeprazole (PRILOSEC) 40 MG capsule Take 1 capsule (40 mg total) by mouth every morning.  Qty: 30 capsule, Refills: 2    Associated Diagnoses: Gastro-esophageal reflux disease without esophagitis      progesterone (PROMETRIUM) 100 MG capsule       solifenacin (VESICARE) 5 MG tablet Take 1 tablet (5 mg total) by mouth once daily.  Qty: 30 tablet, Refills: 12      SYNTHROID 125 mcg tablet Take 1 tablet (125 mcg total) by mouth before breakfast.  Qty: 30 tablet, Refills: 11      valsartan (DIOVAN) 40 MG tablet Take 1 tablet (40 mg total) by mouth once daily.  Qty: 30 tablet, Refills: 0      fluticasone (FLONASE) 50 mcg/actuation nasal spray fluticasone 50 mcg/actuation nasal spray,suspension   Spray 1 spray every day by intranasal route.             Discharge Procedure Orders (must include Diet, Follow-up, Activity)    Follow Up:  Follow up with PCP as per your routine.  Please follow an anti reflux diet.  Activity as tolerated.    No driving day of procedure.    PROBIOTICS:  Now that your colon is so cleaned out, now is a good time for a round of PROBIOTICS.  Eat a container of Greek Yogurt, such as OIKOS or CHOBANI,  Or Activia or Dannon    Greek Yogurt.     Or Take a similar Probiotic product such as Align or Culturelle or Zo-Q, every day for a month.                  (The products listed are non-prescription, but you may need to ask the pharmacist for their location.)  Repeat this at least 5-6 times a year.

## 2018-07-12 NOTE — ANESTHESIA PREPROCEDURE EVALUATION
07/12/2018  Curtis Navarro is a 68 y.o., female.    Pre-op Assessment    I have reviewed the Patient Summary Reports.     I have reviewed the Nursing Notes.   I have reviewed the Medications.     Review of Systems  Anesthesia Hx:  No problems with previous Anesthesia  Denies Family Hx of Anesthesia complications.   Denies Personal Hx of Anesthesia complications.   Social:  Non-Smoker, No Alcohol Use    Cardiovascular:   Hypertension hyperlipidemia    Pulmonary:  Pulmonary Normal    Renal/:  Renal/ Normal     Hepatic/GI:   GERD    Musculoskeletal:   Arthritis     Neurological:  Neurology Normal    Endocrine:   Hypothyroidism        Physical Exam  General:  Obesity    Airway/Jaw/Neck:  Airway Findings: Mouth Opening: Normal General Airway Assessment: Adult  Mallampati: II  Jaw/Neck Findings:  Neck ROM: Extension Decreased, Mild       Chest/Lungs:  Chest/Lungs Findings: Clear to auscultation, Normal Respiratory Rate     Heart/Vascular:  Heart Findings: Rate: Normal  Rhythm: Regular Rhythm  Sounds: Normal  Heart murmur: negative Vascular Findings: Normal (No carotid bruits.)       Mental Status:  Mental Status Findings:  Cooperative, Alert and Oriented         Anesthesia Plan  Type of Anesthesia, risks & benefits discussed:  Anesthesia Type:  general  Patient's Preference:   Intra-op Monitoring Plan:   Intra-op Monitoring Plan Comments:   Post Op Pain Control Plan:   Post Op Pain Control Plan Comments:   Induction:   IV  Beta Blocker:  Patient is not currently on a Beta-Blocker (No further documentation required).       Informed Consent: Patient understands risks and agrees with Anesthesia plan.  Questions answered. Anesthesia consent signed with patient.  ASA Score: 2     Day of Surgery Review of History & Physical:    H&P update referred to the surgeon.     Anesthesia Plan Notes: Propofol  general.        Ready For Surgery From Anesthesia Perspective.

## 2018-07-12 NOTE — TRANSFER OF CARE
"Anesthesia Transfer of Care Note    Patient: Curtis Navarro    Procedure(s) Performed: Procedure(s) (LRB):  EGD (ESOPHAGOGASTRODUODENOSCOPY) (N/A)    Patient location: PACU    Anesthesia Type: general    Transport from OR: Transported from OR on room air with adequate spontaneous ventilation    Post pain: adequate analgesia    Post assessment: no apparent anesthetic complications    Post vital signs: stable    Level of consciousness: awake and sedated    Nausea/Vomiting: no nausea/vomiting    Complications: none    Transfer of care protocol was followed      Last vitals:   Visit Vitals  BP (!) 127/58 (BP Location: Right arm, Patient Position: Lying)   Pulse 68   Temp 36.6 °C (97.9 °F) (Temporal)   Resp 18   Ht 5' 3" (1.6 m)   Wt 88.9 kg (196 lb)   SpO2 97%   Breastfeeding? No   BMI 34.72 kg/m²     "

## 2018-07-12 NOTE — ANESTHESIA POSTPROCEDURE EVALUATION
"Anesthesia Post Evaluation    Patient: Curtis Navarro    Procedure(s) Performed: Procedure(s) (LRB):  EGD (ESOPHAGOGASTRODUODENOSCOPY) (N/A)    Final Anesthesia Type: general  Patient location during evaluation: PACU  Patient participation: Yes- Able to Participate  Level of consciousness: awake and alert  Post-procedure vital signs: reviewed and stable  Pain management: adequate  Airway patency: patent  PONV status at discharge: No PONV  Anesthetic complications: no      Cardiovascular status: blood pressure returned to baseline  Respiratory status: unassisted  Hydration status: euvolemic  Follow-up not needed.        Visit Vitals  BP (!) 158/70 (BP Location: Left arm, Patient Position: Sitting)   Pulse 69   Temp 36.2 °C (97.2 °F) (Skin)   Resp 16   Ht 5' 3" (1.6 m)   Wt 88.9 kg (196 lb)   SpO2 97%   Breastfeeding? No   BMI 34.72 kg/m²       Pain/Judy Score: Pain Assessment Performed: Yes (7/12/2018  9:02 AM)  Presence of Pain: denies (7/12/2018  9:49 AM)  Judy Score: 10 (7/12/2018  9:49 AM)      "

## 2018-07-12 NOTE — [employer]OVATION PATIENT INSTRUCTIONS
Discharge Summary/Instructions after an Endoscopic Procedure  Patient Name: Curtis Navarro  Patient MRN: [id number]  Patient YOB: 1950 Thursday, July 12, 2018  Hamlet Ceballos MD  RESTRICTIONS:  During your procedure today, you received medications for sedation.  These   medications may affect your judgment, balance and coordination.  Therefore,   for 24 hours, you have the following restrictions:   - DO NOT drive a car, operate machinery, make legal/financial decisions,   sign important papers or drink alcohol.    ACTIVITY:  Today: no heavy lifting, straining or running due to procedural   sedation/anesthesia.  The following day: return to full activity including work.  DIET:  Eat and drink normally unless instructed otherwise.     TREATMENT FOR COMMON SIDE EFFECTS:  - Mild abdominal pain, nausea, belching, bloating or excessive gas:  rest,   eat lightly and use a heating pad.  - Sore Throat: treat with throat lozenges and/or gargle with warm salt   water.  - Because air was used during the procedure, expelling large amounts of air   from your rectum or belching is normal.  - If a bowel prep was taken, you may not have a bowel movement for 1-3 days.    This is normal.  SYMPTOMS TO WATCH FOR AND REPORT TO YOUR PHYSICIAN:  1. Abdominal pain or bloating, other than gas cramps.  2. Chest pain.  3. Back pain.  4. Signs of infection such as: chills or fever occurring within 24 hours   after the procedure.  5. Rectal bleeding, which would show as bright red, maroon, or black stools.   (A tablespoon of blood from the rectum is not serious, especially if   hemorrhoids are present.)  6. Vomiting.  7. Weakness or dizziness.  GO DIRECTLY TO THE NEAREST EMERGENCY ROOM IF YOU HAVE ANY OF THE FOLLOWING:      Difficulty breathing              Chills and/or fever over 101 F   Persistent vomiting and/or vomiting blood   Severe abdominal pain   Severe chest pain   Black, tarry stools   Bleeding- more than one  tablespoon   Any other symptom or condition that you feel may need urgent attention  Your doctor recommends these additional instructions:  If any biopsies were taken, your doctors clinic will contact you in 1 to 2   weeks with any results.  - Discharge patient to home.   - Await pathology results.   - Follow an antireflux regimen.   - Continue present medications.   - Use Prilosec (omeprazole) 40 mg PO daily.   - Add Zantac (ranitidine) 300 mg PO nightly for 1-2 months.   - Resume aspirin at prior dose in 10-14 days.   - Call the G.I. clinic in 2 weeks for reports (if you haven't heard from us   sooner)  [phone].  - Repeat upper endoscopy PRN.  For questions, problems or results please call your physician - Hamlet Ceballos MD at Work:  [phone].  EMERGENCY PHONE NUMBER: [phone], LAB RESULTS: [phone]  IF A COMPLICATION OR EMERGENCY SITUATION ARISES AND YOU ARE UNABLE TO REACH   YOUR PHYSICIAN - GO DIRECTLY TO THE EMERGENCY ROOM.  ___________________________________________  Nurse Signature  ___________________________________________  Patient/Designated Responsible Party Signature  Hamlet Ceballos MD  7/12/2018 9:43:36 AM  This report has been verified and signed electronically.  [employer]

## 2018-07-18 ENCOUNTER — PATIENT MESSAGE (OUTPATIENT)
Dept: FAMILY MEDICINE | Facility: CLINIC | Age: 68
End: 2018-07-18

## 2018-07-18 ENCOUNTER — LAB VISIT (OUTPATIENT)
Dept: LAB | Facility: HOSPITAL | Age: 68
End: 2018-07-18
Attending: NURSE PRACTITIONER
Payer: MEDICARE

## 2018-07-18 DIAGNOSIS — Z79.899 ENCOUNTER FOR MONITORING LONG-TERM PROTON PUMP INHIBITOR THERAPY: ICD-10-CM

## 2018-07-18 DIAGNOSIS — Z51.81 ENCOUNTER FOR MONITORING LONG-TERM PROTON PUMP INHIBITOR THERAPY: ICD-10-CM

## 2018-07-18 LAB
MAGNESIUM SERPL-MCNC: 1.9 MG/DL
VIT B12 SERPL-MCNC: 597 PG/ML

## 2018-07-18 PROCEDURE — 36415 COLL VENOUS BLD VENIPUNCTURE: CPT | Mod: PO

## 2018-07-18 PROCEDURE — 82607 VITAMIN B-12: CPT

## 2018-07-18 PROCEDURE — 83735 ASSAY OF MAGNESIUM: CPT

## 2018-07-18 RX ORDER — LOSARTAN POTASSIUM 50 MG/1
50 TABLET ORAL DAILY
Qty: 30 TABLET | Refills: 1 | Status: SHIPPED | OUTPATIENT
Start: 2018-07-18 | End: 2018-08-06

## 2018-07-19 NOTE — TELEPHONE ENCOUNTER
Left message to call clinic to sched BP check. [employer]hart message sent.--lp

## 2018-07-23 ENCOUNTER — TELEPHONE (OUTPATIENT)
Dept: FAMILY MEDICINE | Facility: CLINIC | Age: 68
End: 2018-07-23

## 2018-07-23 NOTE — TELEPHONE ENCOUNTER
When I called pt to reschedule her nurse visit, she informed me that yesterday she took her losartan 50mg in the morning.  He BP with her machine was 108 over 60 something she says.  Then a few hours later, she got very dizzy and had to sit down for a bit until it passed.  She did not recheck her BP since then.  Today, she only took 25mg of the losartan because she does not want to get dizzy again and wants to know what to do....continue taking the 50mg or 25mg?  Expalined to pt that she needs to take her BP in the AM and PM and keep a log for us.   Please advise.

## 2018-07-24 NOTE — TELEPHONE ENCOUNTER
May try the 25 mg once a day and monitor BP   If she has recurrent sx, let us know  Damari BP check with nurses in ~ 2 weeks

## 2018-07-25 ENCOUNTER — TELEPHONE (OUTPATIENT)
Dept: GASTROENTEROLOGY | Facility: CLINIC | Age: 68
End: 2018-07-25

## 2018-07-25 NOTE — TELEPHONE ENCOUNTER
----- Message from Addie Fernandez sent at 7/25/2018 12:27 PM CDT -----  Contact: self [phone]  Please call her with the EGD results.  Thank you!

## 2018-08-02 ENCOUNTER — CLINICAL SUPPORT (OUTPATIENT)
Dept: FAMILY MEDICINE | Facility: CLINIC | Age: 68
End: 2018-08-02
Payer: MEDICARE

## 2018-08-02 VITALS — HEART RATE: 72 BPM | DIASTOLIC BLOOD PRESSURE: 68 MMHG | SYSTOLIC BLOOD PRESSURE: 132 MMHG

## 2018-08-02 RX ORDER — ESTRADIOL 0.5 MG/.5G
GEL TOPICAL EVERY OTHER DAY
Refills: 11 | COMMUNITY
Start: 2018-07-15 | End: 2022-05-25 | Stop reason: ALTCHOICE

## 2018-08-02 NOTE — PROGRESS NOTES
Pt here for BP check.  BP in right arm is 132/68 with HR of 72.  Pt states she is only taking 25mg Losartan daily.  Pts BP home logs put in Dr. Del Castillo's in box.

## 2018-08-06 ENCOUNTER — TELEPHONE (OUTPATIENT)
Dept: FAMILY MEDICINE | Facility: CLINIC | Age: 68
End: 2018-08-06

## 2018-08-06 RX ORDER — LOSARTAN POTASSIUM 25 MG/1
25 TABLET ORAL DAILY
Qty: 90 TABLET | Refills: 1 | Status: SHIPPED | OUTPATIENT
Start: 2018-08-06 | End: 2018-11-06

## 2018-08-06 NOTE — TELEPHONE ENCOUNTER
Pls notify pt:    Your home BP readings look good  Are you feeling better with the cozaar 25?  If so,let's continue with that for now

## 2018-08-06 NOTE — TELEPHONE ENCOUNTER
Spoke w/ pt , pt feels good . Pt feels a little fatigued but nothing that is real bad . Pt will continue the 25 mg .--lp

## 2018-08-15 ENCOUNTER — OFFICE VISIT (OUTPATIENT)
Dept: UROLOGY | Facility: CLINIC | Age: 68
End: 2018-08-15
Payer: MEDICARE

## 2018-08-15 VITALS
BODY MASS INDEX: 34.73 KG/M2 | WEIGHT: 196 LBS | SYSTOLIC BLOOD PRESSURE: 144 MMHG | HEIGHT: 63 IN | HEART RATE: 75 BPM | DIASTOLIC BLOOD PRESSURE: 71 MMHG | RESPIRATION RATE: 18 BRPM | TEMPERATURE: 99 F

## 2018-08-15 DIAGNOSIS — R35.0 URINARY FREQUENCY: Primary | ICD-10-CM

## 2018-08-15 DIAGNOSIS — R31.29 HEMATURIA, MICROSCOPIC: ICD-10-CM

## 2018-08-15 DIAGNOSIS — R32 URINARY INCONTINENCE, UNSPECIFIED TYPE: ICD-10-CM

## 2018-08-15 LAB
BILIRUB SERPL-MCNC: NORMAL MG/DL
BLOOD URINE, POC: NORMAL
COLOR, POC UA: CLEAR
GLUCOSE UR QL STRIP: NORMAL
KETONES UR QL STRIP: NORMAL
LEUKOCYTE ESTERASE URINE, POC: NORMAL
NITRITE, POC UA: NORMAL
PH, POC UA: 5
PROTEIN, POC: NORMAL
SPECIFIC GRAVITY, POC UA: 1
UROBILINOGEN, POC UA: NORMAL

## 2018-08-15 PROCEDURE — 3078F DIAST BP <80 MM HG: CPT | Mod: CPTII,S$GLB,, | Performed by: NURSE PRACTITIONER

## 2018-08-15 PROCEDURE — 99213 OFFICE O/P EST LOW 20 MIN: CPT | Mod: 25,S$GLB,, | Performed by: NURSE PRACTITIONER

## 2018-08-15 PROCEDURE — 81002 URINALYSIS NONAUTO W/O SCOPE: CPT | Mod: S$GLB,,, | Performed by: NURSE PRACTITIONER

## 2018-08-15 PROCEDURE — 3077F SYST BP >= 140 MM HG: CPT | Mod: CPTII,S$GLB,, | Performed by: NURSE PRACTITIONER

## 2018-08-15 PROCEDURE — 99999 PR PBB SHADOW E&M-EST. PATIENT-LVL IV: CPT | Mod: PBBFAC,,, | Performed by: NURSE PRACTITIONER

## 2018-08-15 NOTE — PROGRESS NOTES
"[facility] Progress Note  Staff: DAMIAN Conner    Chief Complaint:   Chief Complaint   Patient presents with    Follow-up      HPI: Curtis Navarro is a 68 y.o. female here for recent microhematuria episode at her OB/GYN's office and f/up with her hx of OAB symptoms.    Microhematuria:  Pt was recently seen and treated by her OB/GYN 1-2 weeks ago for vaginal/yeast infection.  During office visit they told the pt she had microscopic hematuria and suggested she come here for further evaluation.  UA today shows no hematuria per dip.    Urinary Frequency/Nocturia/OAB:  Last seen: by me on 07/06/18 for hx of overactive bladder symptoms.  I saw this patient on 05/14/18 as a NEW patient for urinary frequency x several years.  After examination of pt at "1st" visit, pt was found to have incomplete bladder emptying, therefore we started her on Flomax 0.4 mg daily as a trial period for improvement in her symptoms.  Pt stated at last visit that when she initially started the Flomax it did improve her symptoms greatly, but then her symptoms returned to previous urinary frequency and incontinence issues.  Therefore at last visit, I started the pt on Vesicare 5 mg one tablet daily.    Pt states today with the Vesicare she does see improvement, now her nocturia is 1-2x nightly, but she also admitted to me today she forgot to mention last office visit that she has been "spraying the toilet" and that it is not one steady stream and it goes "all over".     In the past:  Pt was started on Myrbetriq 25 mg, then increased dosage to 50 mg daily by her OB/GYN over six weeks ago prior to her last visit with me which did help improve her LUTS by 50% but she was still having increased incontinence.  In the past she mentions she also tried Detrol LA, but side affect to this caused her severe constipation.  She has not tried any other meds prior to ov today.     Questions asked pt during FIRST office " "visit:  DTF:YES, NTF: 3 or more per night, but since myrbetriq 1-2 nightly  No dysuria  Urgency:Yes, incontinence with urgency? Yes - been going on for last year; bothersome Yes; .  Incontinence with laughing or straining: Yes - "not a lot";   Feel a bulge?No  G1, P 1, vaginal   Gross Hematuria:  No  History of UTI: No  Sexually active?: Yes - no discomfort  Constipation?  No, last BM was this am     Daily Caffeine intake?:  One cup of coffee in am, water rest of day  With meals she will have coke zero or diet coke.     Urologic meds: Vesicare 5 mg one tablet daily  Anticoagulant meds: Ecotrin 81 mg one tablet daily    Relevant Labs:   UA today:    Color:  Clear, Yellow  Spec. Grav. 1.000  PH  5.0  Negative for leukocytes, nitrites, protein, glucose, ketones, bilirubin, and blood.    Rads: None    Review of Systems   Constitutional: Negative for chills, diaphoresis, fever and weight loss.   HENT: Negative for congestion, hearing loss, nosebleeds and sore throat.    Eyes: Negative for blurred vision and pain.   Respiratory: Negative for cough and wheezing.    Cardiovascular: Negative for chest pain, palpitations and leg swelling.   Gastrointestinal: Negative for abdominal pain, heartburn, nausea and vomiting.   Genitourinary: Positive for hematuria (microhematuria). Negative for dysuria, flank pain, frequency and urgency.        +OAB symptoms   Musculoskeletal: Negative for back pain, joint pain, myalgias and neck pain.   Skin: Negative for itching and rash.   Neurological: Negative for dizziness, tremors, sensory change, seizures, loss of consciousness, weakness and headaches.   Endo/Heme/Allergies: Does not bruise/bleed easily.   Psychiatric/Behavioral: Negative for depression and suicidal ideas. The patient is not nervous/anxious.      Physical Exam   Vitals reviewed.  Constitutional: She is oriented to person, place, and time. She appears well-developed and well-nourished.   HENT:   Head: Normocephalic and " atraumatic.   Eyes: Conjunctivae and EOM are normal. Pupils are equal, round, and reactive to light.   Neck: Normal range of motion. Neck supple.   Cardiovascular: Normal rate, regular rhythm, normal heart sounds and intact distal pulses.    Pulmonary/Chest: Effort normal and breath sounds normal.   Abdominal: Soft. Bowel sounds are normal.   Musculoskeletal: Normal range of motion.   Neurological: She is alert and oriented to person, place, and time. She has normal reflexes.   Skin: Skin is warm and dry.     Psychiatric: She has a normal mood and affect. Her behavior is normal. Judgment and thought content normal.     A) Curtis Navarro is a 68 y.o. female with   1. Urinary frequency    2. Hematuria, microscopic    3. Urinary incontinence, unspecified type        Plan)   Urinary frequency/OAB issues:  Pt will stop taking Vesicare to see overall how her LUTS are, improve vs. Worsen.  Then she will do a 3-day Voiding Diary for documentation purposes.    Microhematuria:  Urine normal findings today, f/up prn.    Pt will be referred to Urologist for further evaluation of her lower urinary tract symptoms.  May need UDS with cysto in the future.    I have spent >30 minutes with this patient and over 50% of visit was spent counseling with the patient.    Cathleen SALGDAO

## 2018-08-21 DIAGNOSIS — E78.5 HYPERLIPIDEMIA, UNSPECIFIED HYPERLIPIDEMIA TYPE: ICD-10-CM

## 2018-08-22 RX ORDER — ATORVASTATIN CALCIUM 20 MG/1
TABLET, FILM COATED ORAL
Qty: 45 TABLET | Refills: 3 | Status: SHIPPED | OUTPATIENT
Start: 2018-08-22 | End: 2019-05-06 | Stop reason: SDUPTHER

## 2018-09-11 ENCOUNTER — PATIENT MESSAGE (OUTPATIENT)
Dept: GASTROENTEROLOGY | Facility: CLINIC | Age: 68
End: 2018-09-11

## 2018-09-11 DIAGNOSIS — K21.9 GASTRO-ESOPHAGEAL REFLUX DISEASE WITHOUT ESOPHAGITIS: ICD-10-CM

## 2018-09-12 RX ORDER — OMEPRAZOLE 40 MG/1
40 CAPSULE, DELAYED RELEASE ORAL EVERY MORNING
Qty: 30 CAPSULE | Refills: 2 | Status: SHIPPED | OUTPATIENT
Start: 2018-09-12 | End: 2019-03-20 | Stop reason: SDUPTHER

## 2018-09-13 ENCOUNTER — PATIENT MESSAGE (OUTPATIENT)
Dept: UROLOGY | Facility: CLINIC | Age: 68
End: 2018-09-13

## 2018-09-18 ENCOUNTER — HOSPITAL ENCOUNTER (OUTPATIENT)
Dept: RADIOLOGY | Facility: HOSPITAL | Age: 68
Discharge: HOME | End: 2018-09-18
Attending: OBSTETRICS & GYNECOLOGY
Payer: MEDICARE

## 2018-09-18 DIAGNOSIS — Z12.39 BREAST CANCER SCREENING: ICD-10-CM

## 2018-09-18 PROCEDURE — 77063 BREAST TOMOSYNTHESIS BI: CPT | Mod: TC,PO

## 2018-09-18 PROCEDURE — 77067 SCR MAMMO BI INCL CAD: CPT | Mod: 26,,, | Performed by: RADIOLOGY

## 2018-09-18 PROCEDURE — 77063 BREAST TOMOSYNTHESIS BI: CPT | Mod: 26,,, | Performed by: RADIOLOGY

## 2018-10-09 ENCOUNTER — HOSPITAL ENCOUNTER (OUTPATIENT)
Dept: RADIOLOGY | Facility: HOSPITAL | Age: 68
Discharge: HOME | End: 2018-10-09
Attending: OBSTETRICS & GYNECOLOGY
Payer: MEDICARE

## 2018-10-09 DIAGNOSIS — R92.8 ABNORMAL MAMMOGRAM OF RIGHT BREAST: ICD-10-CM

## 2018-10-09 PROCEDURE — 77065 DX MAMMO INCL CAD UNI: CPT | Mod: TC,PO

## 2018-10-09 PROCEDURE — 76642 ULTRASOUND BREAST LIMITED: CPT | Mod: 26,RT,, | Performed by: RADIOLOGY

## 2018-10-09 PROCEDURE — 77061 BREAST TOMOSYNTHESIS UNI: CPT | Mod: TC,PO

## 2018-10-09 PROCEDURE — 76642 ULTRASOUND BREAST LIMITED: CPT | Mod: TC,PO,RT

## 2018-10-09 PROCEDURE — 77061 BREAST TOMOSYNTHESIS UNI: CPT | Mod: 26,,, | Performed by: RADIOLOGY

## 2018-10-09 PROCEDURE — 77065 DX MAMMO INCL CAD UNI: CPT | Mod: 26,,, | Performed by: RADIOLOGY

## 2018-10-11 ENCOUNTER — PATIENT MESSAGE (OUTPATIENT)
Dept: UROLOGY | Facility: CLINIC | Age: 68
End: 2018-10-11

## 2018-10-12 ENCOUNTER — OFFICE VISIT (OUTPATIENT)
Dept: UROLOGY | Facility: CLINIC | Age: 68
End: 2018-10-12
Payer: MEDICARE

## 2018-10-12 ENCOUNTER — PATIENT MESSAGE (OUTPATIENT)
Dept: FAMILY MEDICINE | Facility: CLINIC | Age: 68
End: 2018-10-12

## 2018-10-12 ENCOUNTER — APPOINTMENT (OUTPATIENT)
Dept: LAB | Facility: HOSPITAL | Age: 68
End: 2018-10-12
Attending: UROLOGY
Payer: MEDICARE

## 2018-10-12 VITALS
SYSTOLIC BLOOD PRESSURE: 152 MMHG | HEIGHT: 63 IN | WEIGHT: 197.31 LBS | DIASTOLIC BLOOD PRESSURE: 79 MMHG | BODY MASS INDEX: 34.96 KG/M2 | HEART RATE: 85 BPM

## 2018-10-12 DIAGNOSIS — R31.29 MICROHEMATURIA: ICD-10-CM

## 2018-10-12 DIAGNOSIS — N39.46 MIXED INCONTINENCE: ICD-10-CM

## 2018-10-12 DIAGNOSIS — N32.81 OAB (OVERACTIVE BLADDER): Primary | ICD-10-CM

## 2018-10-12 LAB
BILIRUB SERPL-MCNC: NEGATIVE MG/DL
BILIRUB UR QL STRIP: NEGATIVE
BLOOD URINE, POC: ABNORMAL
CLARITY UR: CLEAR
COLOR UR: YELLOW
COLOR, POC UA: ABNORMAL
GLUCOSE UR QL STRIP: NEGATIVE
GLUCOSE UR QL STRIP: NORMAL
HGB UR QL STRIP: ABNORMAL
KETONES UR QL STRIP: NEGATIVE
KETONES UR QL STRIP: NEGATIVE
LEUKOCYTE ESTERASE UR QL STRIP: NEGATIVE
LEUKOCYTE ESTERASE URINE, POC: NEGATIVE
MICROSCOPIC COMMENT: NORMAL
NITRITE UR QL STRIP: NEGATIVE
NITRITE, POC UA: NEGATIVE
PH UR STRIP: 5 [PH] (ref 5–8)
PH, POC UA: 5
PROT UR QL STRIP: NEGATIVE
PROTEIN, POC: ABNORMAL
RBC #/AREA URNS HPF: 2 /HPF (ref 0–4)
SP GR UR STRIP: 1.01 (ref 1–1.03)
SPECIFIC GRAVITY, POC UA: 1.01
SQUAMOUS #/AREA URNS HPF: 3 /HPF
URN SPEC COLLECT METH UR: ABNORMAL
UROBILINOGEN UR STRIP-ACNC: NEGATIVE EU/DL
UROBILINOGEN, POC UA: NORMAL

## 2018-10-12 PROCEDURE — 1101F PT FALLS ASSESS-DOCD LE1/YR: CPT | Mod: CPTII,,, | Performed by: UROLOGY

## 2018-10-12 PROCEDURE — 81002 URINALYSIS NONAUTO W/O SCOPE: CPT | Mod: PBBFAC,PN | Performed by: UROLOGY

## 2018-10-12 PROCEDURE — 3077F SYST BP >= 140 MM HG: CPT | Mod: CPTII,,, | Performed by: UROLOGY

## 2018-10-12 PROCEDURE — 51725 SIMPLE CYSTOMETROGRAM: CPT | Mod: 26,S$PBB,, | Performed by: UROLOGY

## 2018-10-12 PROCEDURE — 99215 OFFICE O/P EST HI 40 MIN: CPT | Mod: S$PBB,25,, | Performed by: UROLOGY

## 2018-10-12 PROCEDURE — 81000 URINALYSIS NONAUTO W/SCOPE: CPT

## 2018-10-12 PROCEDURE — 99999 PR PBB SHADOW E&M-EST. PATIENT-LVL IV: CPT | Mod: PBBFAC,,, | Performed by: UROLOGY

## 2018-10-12 PROCEDURE — 99214 OFFICE O/P EST MOD 30 MIN: CPT | Mod: PBBFAC,PN | Performed by: UROLOGY

## 2018-10-12 PROCEDURE — 51725 SIMPLE CYSTOMETROGRAM: CPT | Mod: PBBFAC,PN | Performed by: UROLOGY

## 2018-10-12 PROCEDURE — 3078F DIAST BP <80 MM HG: CPT | Mod: CPTII,,, | Performed by: UROLOGY

## 2018-10-12 RX ORDER — CONJUGATED ESTROGENS 0.62 MG/G
CREAM VAGINAL
Refills: 11 | COMMUNITY
Start: 2018-08-03 | End: 2018-11-06

## 2018-10-12 RX ORDER — FLUCONAZOLE 150 MG/1
TABLET ORAL
Refills: 0 | COMMUNITY
Start: 2018-09-11 | End: 2018-10-12 | Stop reason: ALTCHOICE

## 2018-10-12 RX ORDER — LEVOTHYROXINE SODIUM 125 UG/1
TABLET ORAL
Refills: 11 | COMMUNITY
Start: 2018-08-21 | End: 2018-11-06

## 2018-10-12 RX ORDER — SOLIFENACIN SUCCINATE 5 MG/1
TABLET, FILM COATED ORAL
Refills: 12 | COMMUNITY
Start: 2018-09-08 | End: 2018-10-12 | Stop reason: ALTCHOICE

## 2018-10-12 NOTE — PROGRESS NOTES
[facility] Note - [place]  Staff: MD Ashley    Referring provider and please cc: Carlyn  PCP: [facility]: [facility]     Chief Complaint: oab    Subjective:        HPI: Curtis Navarro is a 68 y.o. female presents with     oab  -she's had this issue for 20 years  -she initially saw her ob gyn for this who tried her on myrbetriq 25 and 50 and had 50 %improvement in frequency but was still having incontinence. Prior to this she had tried detrol at some point. She then saw jemal on 5/14/18 and pvr was 170, myrbetriq dc'd and started on flomax. She was then seen on 7/6/18 and although sx improved some she they returned and jemal discontinued the flomax and started vesicare 5mg daily which improved her nocturia to1-2x a night. She had her then stop the vesicare for voiding diary which she brought with her today. Prior to starting the medicine per day: 14x a day or more, night time voids: 3x a night, 1-2 leaks of drops to large volume, urgency: 5, 2 pads a day.  Since starting vesicare she has had 50% improvement - voids q1-2 hours, nocturia 2x a night, no leaks, urgency down to 3, 0 pads. +dry mouth (not that bothersome) and constipation but takes metamucil and has a soft bm daily.   -no significant SHAWN   -She also was drinking coffee every morning and 1-2 cokes a day. Now she drinks 1 decaf coffee in the morning and coke zero 1-2x a week. No recurrent utis. G1, P 1, vaginal     Microhematuria  -had a yeast infection a few weeks ago. Today's test is subjective.    Ua: 1.015/5/tr prot/tr blood - send for automated urinalysis, pvr by in and out: 20  Urine history :   8/15/18 No cx, void: neg  7/6/18  No cx, void: neg  6/13/18 Multiple org, void: neg  5/14/18 Ng, Void: neg, pvr by I&o: 170  6/6/17  E.coli, void: neg  3/20/15 ng, void: neg  11/27/06 Multiple org, void: 1+ blood    ECOG Status: 0         Gross HematuriaNo    REVIEW OF SYSTEMS:  General ROS: no fevers, no  chills  Psychological ROS: no depression  Endocrine ROS: no heat or cold  Respiratory ROS: no SOB  Cardiovascular ROS: no CP  Gastrointestinal ROS: no abdominal pain, no constipation, no diarrhea, noBRBPR  Musculoskeletal ROS: no muscle pain  Neurological ROS: non headaches  Dermatological ROS: no rashes  HEENT: noglasses, no sinus   ROS: per HPI     PMHx:  Past Medical History:   Diagnosis Date    Allergy     Arthritis     Cataract     Colon polyp     Diverticulosis     GERD (gastroesophageal reflux disease)     Graves disease     s/p radioactive iodine in 42    H/O esophagogastroduodenoscopy     8/14 and 6/17    H/O percutaneous left heart catheterization     normal 5/12    History of colon polyps     History of diverticulitis     History of esophagitis     egd 8/14    History of skin cancer     L neck    Hyperlipidemia     Hypertension     Hypothyroidism     s/p radioactive iodine    IBS (irritable bowel syndrome)     Mild luminal irregularity of carotid artery on ultrasound     noted on 5/16 usg with next due 5/18    Osteopenia     7/13, 7/15    S/P colonoscopy     7/17;  next due 7/22    Thyroid nodule     last usg 5/17;  next due 5/19     Kidney stones: No    PSHx:  Past Surgical History:   Procedure Laterality Date    CARDIAC SURGERY  2013    angiogram (negative)    COLONOSCOPY  07/21/2010    Dr. Ceballos; in legacy, repeat in 6-7 years    COLONOSCOPY N/A 6/27/2017    Procedure: COLONOSCOPY;  Surgeon: Hamlet Ceballos Jr., MD;  Location: [facility];  Service: Endoscopy;  Laterality: N/A; repeat in 5 years for surveillance    COLONOSCOPY N/A 6/27/2017    Performed by Hamlet Ceballos Jr., MD at [facility]    COSMETIC SURGERY      Liposuction to neck    EGD (ESOPHAGOGASTRODUODENOSCOPY) N/A 7/12/2018    Performed by Hamlet Ceballos Jr., MD at [facility]    EGD (ESOPHAGOGASTRODUODENOSCOPY) N/A 7/16/2012    Performed by Hamlet Ceballos Jr., MD at [facility]    ESOPHAGOGASTRODUODENOSCOPY  "N/A 2018    Procedure: EGD (ESOPHAGOGASTRODUODENOSCOPY);  Surgeon: Hamlet Velázquez Jr., MD;  Location: [facility];  Service: Endoscopy;  Laterality: N/A;    ESOPHAGOGASTRODUODENOSCOPY (EGD) N/A 2017    Performed by Hamlet Velázquez Jr., MD at [facility]    ESOPHAGOGASTRODUODENOSCOPY (EGD) N/A 2014    Performed by Hamlet Velázquez Jr., MD at [facility]    SKIN CANCER EXCISION      with Mohs on left neck    TONSILLECTOMY      UPPER GASTROINTESTINAL ENDOSCOPY  2014    Dr. Velázquez    UPPER GASTROINTESTINAL ENDOSCOPY  2017    DR. VELÁZQUEZ     Urologic or Gynecologic Surgery: none    Stents/Valves/Foreign Bodies: No  Cardiac Evaluation: No     Screening Studies  Colonoscopy: one year ago, benign polyps     Fam Hx:   malignancies: No , gyn malignancies: Yes - sister  at age 44 with ovarian  kidney stones: No     Soc Hx:  No tobacco.    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 One of them actually caused her to "itch" in her ears and vagina/body and she had a "weird" feeling. May need to try some again if necessary with benadryl.     Urologic Medications: reviewed   Anticoagulation: Yes - asa 81mg    Objective:     Vitals:    10/12/18 0922   BP: (!) 152/79   Pulse: 85       General:WDWN in NAD  Eyes: PERRLA, normal conjunctiva  Respiratory: no increased work on breathing. No wheezing.   Cardiovascular: No obvious extremity edema. Warm and well perfused.   GI: no palpation of masses. No tenderness. No hepatosplenomegaly to palpation.  Musculoskeletal: normal range of motion of bilateral upper extremities. Normal muscle strength and tone.  Skin: no obvious rashes or lesions. No tightening of skin noted.  Neurologic: CN grossly normal. Normal sensation. "   Psychiatric: awake, alert and oriented x 3. Mood and affect normal. Cooperative.    Pelvic exam today:  negative stress test with coughing supine, negative stress test with valsalva supine  In and out cath performed with 20cc residual - urine was not sent for sample  Bladder filled to 150cc cc  Sensation to void felt at 90 cc  Catheter removed  +stress test with coughing supine, negative stress test with valsalva supine  Negative stress test with coughing or valsalva  Standing    Stage 2 cystocele         LABS REVIEW:      Lab Results   Component Value Date    WBC 6.40 04/25/2018    HGB 13.9 04/25/2018    HCT 43.9 04/25/2018    MCV 89 04/25/2018     04/25/2018     BMP  Lab Results   Component Value Date     04/25/2018    K 4.7 04/25/2018     04/25/2018    CO2 31 (H) 04/25/2018    BUN 15 04/25/2018    CREATININE 0.9 04/25/2018    CALCIUM 10.6 (H) 04/25/2018    ANIONGAP 6 (L) 04/25/2018    ESTGFRAFRICA >60.0 04/25/2018    EGFRNONAA >60.0 04/25/2018         PATHOLOGY REVIEW:  none    RADIOGRAPHIC REVIEW:  none      Assessment:       1. OAB (overactive bladder)    2. Microhematuria    3. Mixed incontinence          Plan:     oab with mixed incontinence  -stress test today shows she leaks with 150cc and she has grade 2 cystocele with significant with reduction of prolapse. If she ever had her prolapse reduced she would need a mid-urethral sling at the same time to prevent occult SHAWN.   -she has a stable bladder with decent capacity and normal sensation and she empties.  -she does not want to take 2 meds at one time. myrbetriq was cheaper and does not cause dry mouth which she is having  -discontinue vesicare  -start myrbetriq 50mg, previously took 25. Takes 4 to 6 weeks to work. Also she had been discontinued from this before bc of elevated urine residual so when she returns needs to bring 3 days condensed voiding dairy on medications and we will check a residual.  -conservative tretament first  before moving to any kinds of surgeries.  -at some point if symptoms worsen or do not repsond to oab meds could consider botox/interstim vs reduction of prolapse with sling since prolapse can cause oab too.   -no cokes    Microhematuria  -send for automated urinalysis. May need further workup.     Route this to her ob    F/u in 3 months with 3 day voiding diary on myrbetriq 50mg and pvr by in and out cath    I spent 60 minutes with the patient of which more than half was spent in direct consultation with the patient in regards to our treatment and plan.       Eliana Ramirez MD

## 2018-10-12 NOTE — PATIENT INSTRUCTIONS
oab with mixed incontinence (overactive bladder with stress and urge incontinence)  -stress test today shows she leaks with 150cc and she has grade 2 cystocele with significant with reduction of prolapse. If she ever had her prolapse reduced she would need a mid-urethral sling at the same time to prevent occult SHAWN.   -she has a stable bladder with decent capacity and normal sensation and she empties.  -she does not want to take 2 meds at one time. myrbetriq was cheaper and does not cause dry mouth which she is having  -discontinue vesicare  -start myrbetriq 50mg, previously took 25. Takes 4 to 6 weeks to work. Also she had been discontinued from this before bc of elevated urine residual so when she returns needs to bring 3 days condensed voiding dairy on medications and we will check a residual.  -conservative tretament first before moving to any kinds of surgeries.  -at some point if symptoms worsen or do not repsond to oab meds could consider botox/interstim vs reduction of prolapse with sling since prolapse can cause oab too.   -no cokes    Microhematuria  -send for automated urinalysis. May need further workup.     Route this to her ob    F/u in 3 months with 3 day voiding diary on myrbetriq 50mg and pvr by in and out cath

## 2018-10-15 ENCOUNTER — TELEPHONE (OUTPATIENT)
Dept: UROLOGY | Facility: CLINIC | Age: 68
End: 2018-10-15

## 2018-10-15 NOTE — TELEPHONE ENCOUNTER
----- Message from Britney Inman sent at 10/15/2018  4:24 PM CDT -----    Pt  Is calling  To  Get    testing Order put  Into  Computer // pt  Had  Blood in  Urine / may  Have to  Retest  Per pt // call //[phone]

## 2018-11-06 ENCOUNTER — TELEPHONE (OUTPATIENT)
Dept: FAMILY MEDICINE | Facility: CLINIC | Age: 68
End: 2018-11-06

## 2018-11-06 ENCOUNTER — OFFICE VISIT (OUTPATIENT)
Dept: FAMILY MEDICINE | Facility: CLINIC | Age: 68
End: 2018-11-06
Payer: MEDICARE

## 2018-11-06 VITALS
HEART RATE: 90 BPM | BODY MASS INDEX: 34.94 KG/M2 | HEIGHT: 63 IN | TEMPERATURE: 99 F | OXYGEN SATURATION: 97 % | DIASTOLIC BLOOD PRESSURE: 74 MMHG | SYSTOLIC BLOOD PRESSURE: 140 MMHG | WEIGHT: 197.19 LBS

## 2018-11-06 DIAGNOSIS — I77.9 CAROTID ARTERY DISEASE, UNSPECIFIED LATERALITY, UNSPECIFIED TYPE: ICD-10-CM

## 2018-11-06 DIAGNOSIS — R79.9 ABNORMAL FINDING OF BLOOD CHEMISTRY: ICD-10-CM

## 2018-11-06 DIAGNOSIS — H81.312: ICD-10-CM

## 2018-11-06 DIAGNOSIS — I10 HYPERTENSION, ESSENTIAL: Primary | ICD-10-CM

## 2018-11-06 DIAGNOSIS — E04.2 MULTIPLE THYROID NODULES: ICD-10-CM

## 2018-11-06 DIAGNOSIS — E03.4 HYPOTHYROIDISM DUE TO ACQUIRED ATROPHY OF THYROID: ICD-10-CM

## 2018-11-06 DIAGNOSIS — I67.2 CEREBRAL ATHEROSCLEROSIS: ICD-10-CM

## 2018-11-06 DIAGNOSIS — R42 DIZZINESS: ICD-10-CM

## 2018-11-06 PROCEDURE — 3077F SYST BP >= 140 MM HG: CPT | Mod: CPTII,S$GLB,, | Performed by: FAMILY MEDICINE

## 2018-11-06 PROCEDURE — 99214 OFFICE O/P EST MOD 30 MIN: CPT | Mod: S$GLB,,, | Performed by: FAMILY MEDICINE

## 2018-11-06 PROCEDURE — 90732 PPSV23 VACC 2 YRS+ SUBQ/IM: CPT | Mod: S$GLB,,, | Performed by: FAMILY MEDICINE

## 2018-11-06 PROCEDURE — 1101F PT FALLS ASSESS-DOCD LE1/YR: CPT | Mod: CPTII,S$GLB,, | Performed by: FAMILY MEDICINE

## 2018-11-06 PROCEDURE — G0009 ADMIN PNEUMOCOCCAL VACCINE: HCPCS | Mod: S$GLB,,, | Performed by: FAMILY MEDICINE

## 2018-11-06 PROCEDURE — 3078F DIAST BP <80 MM HG: CPT | Mod: CPTII,S$GLB,, | Performed by: FAMILY MEDICINE

## 2018-11-06 RX ORDER — LOSARTAN POTASSIUM 50 MG/1
50 TABLET ORAL DAILY
Qty: 90 TABLET | Refills: 1 | Status: SHIPPED | OUTPATIENT
Start: 2018-11-06 | End: 2019-05-20 | Stop reason: SDUPTHER

## 2018-11-06 NOTE — PATIENT INSTRUCTIONS
Electronic Weight Loss Tools                  Websites  http://www.Bnooki.com/  http://www.loseit.com/  Http://www.weightAdGent Digital.com                  Mobile Apps  Calorie Counter & Diet Tracker by [employer].com  Lose it!  Weight Watchers Mobile

## 2018-11-06 NOTE — TELEPHONE ENCOUNTER
When I scheduled her Thyroid US, ENT appointment with audiology, I received a message about registration and not enough something for authorization. Can you see if this will be covered?    drove

## 2018-11-06 NOTE — PROGRESS NOTES
Subjective:       Patient ID: Curtis Navarro is a 68 y.o. female.    Chief Complaint: Hypertension    HPI   The patient is a 70-year-old here today for follow-up.  Today we discussed the:    1) obesity.  She Is discouraged about her inability to lose weight.  She is exercising regularly.  She is going to [place] 3 times a week.  There, she is riding her bike for 15 min and then doing muscle exercises.  She is not watch her diet closely.  She is not strict on what she eats.  She does not count her calories and is unaware how many calories she consumes.    2) hypertension.  She is currently taking Cozaar 25 mg once a day.  Her blood pressure is high today and she is willing to adjust the medicine.  She was previously on Diovan but had to change the medicine due to the recall.  Initially when she changed to Cozaar, she had trouble tolerating the 50 mg tablet and did well with Cozaar 25.  She would be willing to try the higher strength of the Cozaar again  3) funny feeling in her head.  She has been getting a funny feeling in her head.  She has a hard time describing it.  She feels if she is out of whack.  This typically last a few seconds.  She wonders if she may be having an inner ear problem.  As we talk about this further, she has noticed that if she turns her head the wrong way or looks in a different direction suddenly, she will feel dizzy.  She is not certain if his dizziness from her head physician is the same thing that she experiences when she has the plan feeling in her head    She is otherwise doing well and has no acute questions or concerns regarding her health.        Review of Systems   Constitutional: Negative for activity change, appetite change, chills, diaphoresis, fatigue, fever and unexpected weight change.   HENT: Negative for congestion, ear pain, postnasal drip, rhinorrhea, sinus pressure, sneezing, sore throat and trouble swallowing.    Eyes: Negative for pain, discharge and visual  disturbance.   Respiratory: Negative for cough, chest tightness, shortness of breath and wheezing.    Cardiovascular: Negative for chest pain, palpitations and leg swelling.   Gastrointestinal: Negative for abdominal distention, abdominal pain, blood in stool, constipation, diarrhea, nausea and vomiting.   Genitourinary: Positive for hematuria. Negative for difficulty urinating.   Musculoskeletal: Negative for joint swelling.   Skin: Negative for rash.   Neurological: Positive for dizziness. Negative for headaches.   Psychiatric/Behavioral: Negative for confusion and dysphoric mood.       Objective:      Physical Exam   Constitutional: She is oriented to person, place, and time. She appears well-developed and well-nourished. No distress.   HENT:   Head: Normocephalic and atraumatic.   Right Ear: Hearing, tympanic membrane, external ear and ear canal normal.   Left Ear: Hearing, tympanic membrane, external ear and ear canal normal.   Nose: Nose normal.   Mouth/Throat: Oropharynx is clear and moist and mucous membranes are normal. No oral lesions. No oropharyngeal exudate, posterior oropharyngeal edema or posterior oropharyngeal erythema.   Eyes: Conjunctivae, EOM and lids are normal. Pupils are equal, round, and reactive to light. No scleral icterus.   Neck: Normal range of motion. Neck supple. Carotid bruit is not present. No thyroid mass and no thyromegaly present.   Cardiovascular: Normal rate, regular rhythm and normal heart sounds.  No extrasystoles are present. PMI is not displaced. Exam reveals no gallop.   No murmur heard.  Pulmonary/Chest: Effort normal and breath sounds normal. No accessory muscle usage. No respiratory distress.   Clear to auscultation bilaterally.   Abdominal: Soft. Normal appearance and bowel sounds are normal. She exhibits no abdominal bruit. There is no hepatosplenomegaly. There is no tenderness. There is no rebound.   Lymphadenopathy:        Head (right side): No submental and no  "submandibular adenopathy present.        Head (left side): No submental and no submandibular adenopathy present.        Right cervical: No superficial cervical, no deep cervical and no posterior cervical adenopathy present.       Left cervical: No superficial cervical, no deep cervical and no posterior cervical adenopathy present.        Right: No supraclavicular adenopathy present.        Left: No supraclavicular adenopathy present.   Neurological: She is alert and oriented to person, place, and time.   Skin: Skin is warm, dry and intact.   Psychiatric: She has a normal mood and affect.     Blood pressure (!) 140/74, pulse 90, temperature 98.6 °F (37 °C), temperature source Oral, height 5' 3" (1.6 m), weight 89.4 kg (197 lb 3.2 oz), SpO2 97 %.Body mass index is 34.93 kg/m².        A/P:  1)  obesity.  She will start tracking her calorie intake and try to stick to 1200 calories or less a day.  She will increase her aerobic exercise.  If she continues to have trouble with weight loss, she will let me know  2) hypertension.  Suboptimally controlled.  We are going to increase her Cozaar to 50 mg once a day.  We will recheck a blood pressure in 2 weeks with the nurses to reassess blood pressure control  3) dizziness/vertigo with a component of BPV.  New to me.  We will refer her to ENT and audiology for further evaluation  4) hypothyroidism with thyroid nodules.  Stable.  We will recheck imaging and labs in April  5) carotid artery disease.  Mild.  We will recheck a carotid ultrasound with her thyroid ultrasound in April       6)   Health maintenance issues.  We will administer Pneumovax today.  We will check labs again in April    As long as she does well, I will see her back in May or sooner if needed  "

## 2018-11-09 ENCOUNTER — TELEPHONE (OUTPATIENT)
Dept: FAMILY MEDICINE | Facility: CLINIC | Age: 68
End: 2018-11-09

## 2018-11-09 NOTE — TELEPHONE ENCOUNTER
Received message from Dr Rivera's office.Dr Rivera specializes in cancer of the ENT system. I had to reschedule pt. Left message for her to return my call.

## 2018-11-10 NOTE — TELEPHONE ENCOUNTER
----- Message from Chelsea Echols sent at 11/9/2018  4:54 PM CST -----  Type:  Patient Returning Call    Who Called:  Patient  Who Left Message for Patient:  Dayan  Does the patient know what this is regarding?:  yes  Best Call Back Number: [phone] (home)   Additional Information:  Requesting a call back about appt confirmation

## 2018-11-12 ENCOUNTER — APPOINTMENT (OUTPATIENT)
Dept: LAB | Facility: HOSPITAL | Age: 68
End: 2018-11-12
Attending: UROLOGY
Payer: MEDICARE

## 2018-11-12 ENCOUNTER — CLINICAL SUPPORT (OUTPATIENT)
Dept: UROLOGY | Facility: CLINIC | Age: 68
End: 2018-11-12
Payer: MEDICARE

## 2018-11-12 ENCOUNTER — CLINICAL SUPPORT (OUTPATIENT)
Dept: AUDIOLOGY | Facility: CLINIC | Age: 68
End: 2018-11-12
Payer: MEDICARE

## 2018-11-12 ENCOUNTER — OFFICE VISIT (OUTPATIENT)
Dept: OTOLARYNGOLOGY | Facility: CLINIC | Age: 68
End: 2018-11-12
Payer: MEDICARE

## 2018-11-12 VITALS
SYSTOLIC BLOOD PRESSURE: 144 MMHG | BODY MASS INDEX: 34.91 KG/M2 | DIASTOLIC BLOOD PRESSURE: 64 MMHG | HEIGHT: 63 IN | WEIGHT: 197.06 LBS

## 2018-11-12 DIAGNOSIS — R42 DIZZINESS: Primary | ICD-10-CM

## 2018-11-12 DIAGNOSIS — R82.998 CELLS AND CASTS IN URINE: Primary | ICD-10-CM

## 2018-11-12 LAB
BACTERIA #/AREA URNS HPF: NORMAL /HPF
BILIRUB UR QL STRIP: NEGATIVE
CLARITY UR: CLEAR
COLOR UR: YELLOW
GLUCOSE UR QL STRIP: NEGATIVE
HGB UR QL STRIP: NEGATIVE
KETONES UR QL STRIP: NEGATIVE
LEUKOCYTE ESTERASE UR QL STRIP: NEGATIVE
MICROSCOPIC COMMENT: NORMAL
NITRITE UR QL STRIP: NEGATIVE
PH UR STRIP: 6 [PH] (ref 5–8)
PROT UR QL STRIP: NEGATIVE
RBC #/AREA URNS HPF: 1 /HPF (ref 0–4)
SP GR UR STRIP: <=1.005 (ref 1–1.03)
SQUAMOUS #/AREA URNS HPF: 1 /HPF
URN SPEC COLLECT METH UR: ABNORMAL
UROBILINOGEN UR STRIP-ACNC: NEGATIVE EU/DL

## 2018-11-12 PROCEDURE — 3078F DIAST BP <80 MM HG: CPT | Mod: CPTII,HCWC,S$GLB, | Performed by: OTOLARYNGOLOGY

## 2018-11-12 PROCEDURE — 99999 PR PBB SHADOW E&M-EST. PATIENT-LVL III: CPT | Mod: PBBFAC,HCWC,, | Performed by: OTOLARYNGOLOGY

## 2018-11-12 PROCEDURE — 99214 OFFICE O/P EST MOD 30 MIN: CPT | Mod: HCWC,S$GLB,, | Performed by: OTOLARYNGOLOGY

## 2018-11-12 PROCEDURE — 81000 URINALYSIS NONAUTO W/SCOPE: CPT | Mod: HCWC

## 2018-11-12 PROCEDURE — 99999 PR PBB SHADOW E&M-EST. PATIENT-LVL I: CPT | Mod: PBBFAC,,,

## 2018-11-12 PROCEDURE — 92542 POSITIONAL NYSTAGMUS TEST: CPT | Mod: 59,S$GLB,, | Performed by: AUDIOLOGIST

## 2018-11-12 PROCEDURE — 1101F PT FALLS ASSESS-DOCD LE1/YR: CPT | Mod: CPTII,HCWC,S$GLB, | Performed by: OTOLARYNGOLOGY

## 2018-11-12 PROCEDURE — 3077F SYST BP >= 140 MM HG: CPT | Mod: CPTII,HCWC,S$GLB, | Performed by: OTOLARYNGOLOGY

## 2018-11-12 PROCEDURE — 92541 SPONTANEOUS NYSTAGMUS TEST: CPT | Mod: S$GLB,,, | Performed by: AUDIOLOGIST

## 2018-11-12 PROCEDURE — 99499 UNLISTED E&M SERVICE: CPT | Mod: HCWC,S$GLB,, | Performed by: UROLOGY

## 2018-11-12 NOTE — LETTER
November 13, 2018      Komal Del Castillo MD  [address] [address] [address]           [facility]  [address] [facility] [address] [place] [address]  Phone: [phone]  Fax: [phone]          Patient: Curtis Navarro   MR Number: [id number]   YOB: 1950   Date of Visit: 11/12/2018       Dear Dr. Komal Del Castillo:    Thank you for referring Curtis Navarro to me for evaluation. Attached you will find relevant portions of my assessment and plan of care.    If you have questions, please do not hesitate to call me. I look forward to following Curtis Navarro along with you.    Sincerely,    Melchor Chapa MD    Enclosure  CC:  No Recipients    If you would like to receive this communication electronically, please contact [email or web address] or [phone] to request more information on [employer] Link access.    For providers and/or their staff who would like to refer a patient to [facility], please contact us through our one-stop-shop provider referral line, [facility], at [phone].    If you feel you have received this communication in error or would no longer like to receive these types of communications, please e-mail [email or web address]

## 2018-11-12 NOTE — PROGRESS NOTES
Subjective:       Patient ID: Curtis Navarro is a 68 y.o. female.    Chief Complaint: Dizziness    Curtis is here for dizziness. Symptoms began a few months ago. She denies vertigo or dysequilibrium during the episode. It is difficult for her to describe symptoms. She reports a sudden feeling that comes over her head that lasts for a few seconds. No vision change, no imbalance. Occurs spontaneously. Has occurred 3-4 times in the past 3 months. She is unable to think of inciting or exacerbating issues. She tried to stay hydrated and denies CP or SOB. She recently adjusted her BP medications (had some dizziness with full strength dose, went down to half then back up to full dose with no persistent symptoms.) No presyncope.     Over 1 year ago, she had issues with vertigo c/w BPPV. This resolved on its own. She had vertigo precipitated by  Head movements.  She denies major issues with hearing. She does have some ET issues. She has intermittent tinnitus for brief periods of time. Mild allergies. No headaches or migraines. Denies neuropathy. No DM      Social History     Tobacco Use   Smoking Status Never Smoker   Smokeless Tobacco Never Used     Social History     Substance and Sexual Activity   Alcohol Use Yes    Comment: rarely          Review of Systems   Constitutional: Negative for activity change and appetite change.   Eyes: Negative for discharge.   Respiratory: Negative for difficulty breathing and wheezing   Cardiovascular: Negative for chest pain.   Gastrointestinal: Negative for abdominal distention and abdominal pain.   Endocrine: Negative for cold intolerance and heat intolerance.   Genitourinary: Negative for dysuria.   Musculoskeletal: Negative for gait problem and joint swelling.   Skin: Negative for color change and pallor.   Neurological: Negative for syncope and weakness.   Psychiatric/Behavioral: Negative for agitation and confusion.     Objective:        Constitutional:   She is oriented to  person, place, and time. She appears well-developed and well-nourished. She appears alert. She is active. Normal speech.      Head:  Normocephalic and atraumatic. Head is without TMJ tenderness. No scars. Salivary glands normal.  Facial strength is normal.    Unsteady with standing romberg     Ears:    Right Ear: No drainage or swelling. No middle ear effusion.   Left Ear: No drainage or swelling.  No middle ear effusion.   Right Windham-Hallpike - no vertigo, no rotary nystagmus   Left James-Hallpike - no vertigo, no rotary nystagmus   Horizontal Canal testing negative  VAST testing negative      Nose:  No mucosal edema, rhinorrhea or sinus tenderness. No turbinate hypertrophy.      Mouth/Throat  Oropharynx clear and moist without lesions or asymmetry, normal uvula midline and mirror exam normal. Normal dentition. No uvula swelling, lacerations or trismus. No oropharyngeal exudate. Tonsillar erythema, tonsillar exudate.      Neck:  Full range of motion with neck supple and no adenopathy. Thyroid tenderness is present. No tracheal deviation, no edema, no erythema, normal range of motion, no stridor, no crepitus and no neck rigidity present. No thyroid mass present.     Cardiovascular:   Intact distal pulses and normal pulses.      Pulmonary/Chest:   Effort normal and breath sounds normal. No stridor.     Psychiatric:   Her speech is normal and behavior is normal. Her mood appears not anxious. Her affect is not labile.     Neurological:   She is alert and oriented to person, place, and time. No sensory deficit.     Skin:   No abrasions, lacerations, lesions, or rashes. No abrasion and no bruising noted.         Tests / Results:  None    Assessment:       1. Dizziness          Plan:         No inner ear cause suspected to symptoms. She has difficulty describing symptoms, but I do not have a great explanation for them because they are so infrequent and short lasting. She denies TIA symptoms, presyncope, or consistent  symptoms that would occur with BP meds, so I discussed observation for now to see if they evolve. Exam is normal other than mild imbalance with standing Romberg, but her balance is typically very good so watch for now.   She will let me know if symptoms worsen and I can help investigate non-ear causes if needed

## 2018-11-12 NOTE — PROGRESS NOTES
"Curtis Navarro was seen 11/12/18 for a BPPV evaluation.    Patient had great difficulty describing her symptoms and there were some inconsistencies with the history I was given and the symptoms reported to Dr. Melchor Chapa.     She reported to me that it has been approximately one year since her last significant bout of vertigo - symptoms sound consistent with BPPV.  A few months ago she was bending down to smell some flowers while at her sister's house and became very dizzy but denied any vertigo or dysequilibrium.  She also had another instance where she woke up in bed and the room was spinning.  She had to sit up and, while the vertigo subsided soon after, she still felt "off" for about 45 minutes.  She denied any feeling of presyncope.  She recently adjusted her BP medication due to symptoms of dizziness with the higher dose.  No known hearing loss but some intermittent tinnitus. Negative for migraine headaches or DM.      VAST was negative right and negative left   Gaze nystagmus was absent  Spontaneous nystagmus was absent  Head Right Positional was negative  Head Left Positional was negative  Hallpike Right was negative  Hallpike Left was negative    Impression: Negative for BPPV    Recommend ENT consultation regarding symptoms.  No vestibular testing recommended at this time unless requested by ENT.   "

## 2018-11-19 ENCOUNTER — CLINICAL SUPPORT (OUTPATIENT)
Dept: FAMILY MEDICINE | Facility: CLINIC | Age: 68
End: 2018-11-19
Payer: MEDICARE

## 2018-11-19 VITALS — DIASTOLIC BLOOD PRESSURE: 66 MMHG | HEART RATE: 76 BPM | SYSTOLIC BLOOD PRESSURE: 124 MMHG

## 2018-11-19 DIAGNOSIS — I10 HYPERTENSION, ESSENTIAL: Primary | ICD-10-CM

## 2018-11-19 NOTE — PROGRESS NOTES
Curtis Navarro 68 y.o. female is here today for Blood Pressure check.   History of HTN yes.    Review of patient's allergies indicates:   Allergen Reactions    Lisinopril Other (See Comments)     Cough    Azithromycin      Other reaction(s): Nausea  Other reaction(s): Diarrhea    Metronidazole hcl      Other reaction(s): Rash  Other reaction(s): Itching    Moxifloxacin      Other reaction(s): Rash    Sulfa (sulfonamide antibiotics)      Other reaction(s): Itching     Creatinine   Date Value Ref Range Status   04/25/2018 0.9 0.5 - 1.4 mg/dL Final     Sodium   Date Value Ref Range Status   04/25/2018 142 136 - 145 mmol/L Final     Potassium   Date Value Ref Range Status   04/25/2018 4.7 3.5 - 5.1 mmol/L Final   ]  Patient verifies taking blood pressure medications on a regular basis at the same time of the day.     Current Outpatient Medications:     aspirin (ECOTRIN) 81 MG EC tablet, Take 81 mg by mouth once daily., Disp: , Rfl:     atorvastatin (LIPITOR) 20 MG tablet, TAKE 1/2 TABLET AT BEDTIME FOR CHOLESTEROL, Disp: 45 tablet, Rfl: 3    B INFANTIS/B ANI/B JOSE M/B BIFID (PROBIOTIC 4X ORAL), Take 1 tablet by mouth once daily., Disp: , Rfl:     biotin 5 mg Cap, Take 5 mg by mouth once daily., Disp: , Rfl:     calcium-vitamin D3 500 mg(1,250mg) -200 unit per tablet, Take 1 tablet by mouth 2 (two) times daily with meals., Disp: , Rfl:     DIVIGEL 0.5 mg/0.5 gram (0.1 %) GlPk, Apply topically every evening., Disp: , Rfl: 11    GLUCOSA HAHN 2KCL/CHONDROITIN HAHN (GLUCOSAMINE-CHONDROITIN DS ORAL), Take by mouth 2 (two) times daily., Disp: , Rfl:     influenza (FLUZONE HIGH-DOSE) 180 mcg/0.5 mL vaccine, Inject 0.5 mLs into the muscle., Disp: 0.5 mL, Rfl: 0    KRILL/OM-3/DHA/EPA/PHOSPHO/AST (MEGARED OMEGA-3 KRILL OIL ORAL), Take 1 capsule by mouth once daily., Disp: , Rfl:     losartan (COZAAR) 50 MG tablet, Take 1 tablet (50 mg total) by mouth once daily., Disp: 90 tablet, Rfl: 1    mirabegron (MYRBETRIQ) 50  mg Tb24, Take 1 tablet (50 mg total) by mouth once daily., Disp: 90 tablet, Rfl: 3    MULTIVITAMIN ORAL, Take by mouth.  , Disp: , Rfl:     omeprazole (PRILOSEC) 40 MG capsule, Take 1 capsule (40 mg total) by mouth every morning., Disp: 30 capsule, Rfl: 2    progesterone (PROMETRIUM) 100 MG capsule, , Disp: , Rfl:     ranitidine (ZANTAC) 300 MG tablet, Take 1 tablet (300 mg total) by mouth every evening. , about 30-60 minutes before bedtime., Disp: 60 tablet, Rfl: 5    SYNTHROID 125 mcg tablet, Take 1 tablet (125 mcg total) by mouth before breakfast., Disp: 30 tablet, Rfl: 11  Does patient have record of home blood pressure readings yes. Readings have been averaging .   Last dose of blood pressure medication was taken at 0800.  Patient is asymptomatic.         ,   .    Blood pressure reading was 124/66 Pulse 76  Dr. Del Castillo notified.

## 2018-11-21 ENCOUNTER — OFFICE VISIT (OUTPATIENT)
Dept: OPTOMETRY | Facility: CLINIC | Age: 68
End: 2018-11-21
Payer: MEDICARE

## 2018-11-21 DIAGNOSIS — H04.123 BILATERAL DRY EYES: ICD-10-CM

## 2018-11-21 DIAGNOSIS — H25.13 NUCLEAR SCLEROSIS, BILATERAL: Primary | ICD-10-CM

## 2018-11-21 DIAGNOSIS — Z46.0 FITTING AND ADJUSTMENT OF SPECTACLES AND CONTACT LENSES: Primary | ICD-10-CM

## 2018-11-21 PROCEDURE — 92014 COMPRE OPH EXAM EST PT 1/>: CPT | Mod: S$GLB,,, | Performed by: OPTOMETRIST

## 2018-11-21 PROCEDURE — 92310 CONTACT LENS FITTING OU: CPT | Mod: ,,, | Performed by: OPTOMETRIST

## 2018-11-21 PROCEDURE — 99999 PR PBB SHADOW E&M-EST. PATIENT-LVL II: CPT | Mod: PBBFAC,HCWC,, | Performed by: OPTOMETRIST

## 2018-11-21 NOTE — MEDICAL/APP STUDENT
Only tried wearing [employer] Focus Dailies for a couple of months after she got them. Lenses were uncomfortable, satisfied with vision. Wearing 5 hours/day. Pt reports blurry vision since last exam. Currently uses +3.25 OTC readers and +1.25/+1.50 OTC glasses for distance. Would like to try another daily lens. Currently doesn't use any eye drops.   ALEXANDRO: 1/2017 for contact lenses

## 2018-11-21 NOTE — PROGRESS NOTES
HPI     Blur ou at dist/near, x mos, no assoc pain or red, no relief over time,   constant    Last edited by Yuri Kerr, OD on 11/21/2018  1:22 PM. (History)              Assessment /Plan     For exam results, see Encounter Report.    Nuclear sclerosis, bilateral    Bilateral dry eyes      1. Educated pt on presence of cataracts and effects on vision. No surgery at this time. Recheck in one year.  2. Recommend artificial tears. 1 drop as needed per day. Chronicity of disease and treatment discussed.

## 2019-01-29 ENCOUNTER — OFFICE VISIT (OUTPATIENT)
Dept: UROLOGY | Facility: CLINIC | Age: 69
End: 2019-01-29
Payer: MEDICARE

## 2019-01-29 VITALS
WEIGHT: 200.81 LBS | BODY MASS INDEX: 35.58 KG/M2 | SYSTOLIC BLOOD PRESSURE: 141 MMHG | HEIGHT: 63 IN | HEART RATE: 75 BPM | DIASTOLIC BLOOD PRESSURE: 73 MMHG

## 2019-01-29 DIAGNOSIS — N39.46 MIXED INCONTINENCE: ICD-10-CM

## 2019-01-29 DIAGNOSIS — N32.81 OAB (OVERACTIVE BLADDER): Primary | ICD-10-CM

## 2019-01-29 LAB
BILIRUB SERPL-MCNC: NORMAL MG/DL
BLOOD URINE, POC: NORMAL
COLOR, POC UA: YELLOW
GLUCOSE UR QL STRIP: NORMAL
KETONES UR QL STRIP: NORMAL
LEUKOCYTE ESTERASE URINE, POC: NORMAL
NITRITE, POC UA: NORMAL
PH, POC UA: 6.5
PROTEIN, POC: NORMAL
SPECIFIC GRAVITY, POC UA: 1.01
UROBILINOGEN, POC UA: NORMAL

## 2019-01-29 PROCEDURE — 99213 OFFICE O/P EST LOW 20 MIN: CPT | Mod: HCNC,25,S$GLB, | Performed by: UROLOGY

## 2019-01-29 PROCEDURE — 3078F DIAST BP <80 MM HG: CPT | Mod: HCNC,CPTII,S$GLB, | Performed by: UROLOGY

## 2019-01-29 PROCEDURE — 3077F PR MOST RECENT SYSTOLIC BLOOD PRESSURE >= 140 MM HG: ICD-10-PCS | Mod: HCNC,CPTII,S$GLB, | Performed by: UROLOGY

## 2019-01-29 PROCEDURE — 1100F PTFALLS ASSESS-DOCD GE2>/YR: CPT | Mod: HCNC,CPTII,S$GLB, | Performed by: UROLOGY

## 2019-01-29 PROCEDURE — 3288F FALL RISK ASSESSMENT DOCD: CPT | Mod: HCNC,CPTII,S$GLB, | Performed by: UROLOGY

## 2019-01-29 PROCEDURE — 81002 URINALYSIS NONAUTO W/O SCOPE: CPT | Mod: HCNC,S$GLB,, | Performed by: UROLOGY

## 2019-01-29 PROCEDURE — 3077F SYST BP >= 140 MM HG: CPT | Mod: HCNC,CPTII,S$GLB, | Performed by: UROLOGY

## 2019-01-29 PROCEDURE — 99999 PR PBB SHADOW E&M-EST. PATIENT-LVL IV: CPT | Mod: PBBFAC,HCNC,, | Performed by: UROLOGY

## 2019-01-29 PROCEDURE — 3288F PR FALLS RISK ASSESSMENT DOCUMENTED: ICD-10-PCS | Mod: HCNC,CPTII,S$GLB, | Performed by: UROLOGY

## 2019-01-29 PROCEDURE — 3078F PR MOST RECENT DIASTOLIC BLOOD PRESSURE < 80 MM HG: ICD-10-PCS | Mod: HCNC,CPTII,S$GLB, | Performed by: UROLOGY

## 2019-01-29 PROCEDURE — 81002 POCT URINE DIPSTICK WITHOUT MICROSCOPE: ICD-10-PCS | Mod: HCNC,S$GLB,, | Performed by: UROLOGY

## 2019-01-29 PROCEDURE — 99999 PR PBB SHADOW E&M-EST. PATIENT-LVL IV: ICD-10-PCS | Mod: PBBFAC,HCNC,, | Performed by: UROLOGY

## 2019-01-29 PROCEDURE — 99213 PR OFFICE/OUTPT VISIT, EST, LEVL III, 20-29 MIN: ICD-10-PCS | Mod: HCNC,25,S$GLB, | Performed by: UROLOGY

## 2019-01-29 PROCEDURE — 1100F PR PT FALLS ASSESS DOC 2+ FALLS/FALL W/INJURY/YR: ICD-10-PCS | Mod: HCNC,CPTII,S$GLB, | Performed by: UROLOGY

## 2019-01-29 NOTE — PROGRESS NOTES
[facility] Note - [place]  Staff: MD Ashley    Referring provider and please cc: Carlyn  PCP: Richelle Schiro [facility]: active     Chief Complaint: oab    Subjective:        HPI: Curtis Navarro is a 68 y.o. female presents with     oab  -she's had this issue for 20 years  -she initially saw her ob gyn for this who tried her on myrbetriq 25 and 50 and had 50 %improvement in frequency but was still having incontinence. Prior to this she had tried detrol at some point. She then saw jemal on 5/14/18 and pvr was 170, myrbetriq dc'd and started on flomax. She was then seen on 7/6/18 and although sx improved some she they returned and jemal discontinued the flomax and started vesicare 5mg daily which improved her nocturia to1-2x a night. She had her then stop the vesicare for voiding diary which she brought with her today. Prior to starting the medicine per day: 14x a day or more, night time voids: 3x a night, 1-2 leaks of drops to large volume, urgency: 5, 2 pads a day.  Since starting vesicare she has had 50% improvement - voids q1-2 hours, nocturia 2x a night, no leaks, urgency down to 3, 0 pads. +dry mouth (not that bothersome) and constipation but takes metamucil and has a soft bm daily.   -no significant SHAWN   -She also was drinking coffee every morning and 1-2 cokes a day. Now she drinks 1 decaf coffee in the morning and coke zero 1-2x a week. No recurrent utis. G1, P 1, vaginal   -saw her 10/2018, stress test only showed leakage with 150cc but repeated standing and was negative. Stable bladder and discontinued vesicare bc of the dry mouth and constipation and started myrbetriq 50mg. She has had improvement with the meds an no caffeine.3d voiding Diary (uploaded) shows voids 6-8x a day. Nocturia 1x a night. 1-2 leaks with urge. Urgency 3-5 (still pretty strong), 0 pads. Much improved.     ROXY 6:   Do you usually experience frequent urination? 2.   Do you usually experience  urine leakage associated with a feeling of urgency; that is, a strong sensation of needing to go to the bathroom? 2.   Do you usually experience urine leakage related to coughing, sneezing, or laughing?2.   Do you experience small amounts of urine leakage (that is, drops)? 2.   Do you experience difficulty emptying your bladder? 1  Do you usually experience pain or discomfort in the lower abdomen or genital region? 1  Total Score: 9  Bother score: 6   Quality of Life: unhappy       Microhematuria  -had a yeast infection a few weeks ago. Today's test is subjective.  No blood in  Urine today     Ua: negative  Urine history :   11/12/18 No cx, void: neg, 1 rbc  10/12/18 No cx, void: tr prot/tr blood - send for automated urinalysis, pvr by in and out: 20  8/15/18 No cx, void: neg  7/6/18  No cx, void: neg  6/13/18 Multiple org, void: neg  5/14/18 Ng, Void: neg, pvr by I&o: 170  6/6/17  E.coli, void: neg  3/20/15 ng, void: neg  11/27/06 Multiple org, void: 1+ blood    ECOG Status: 0    Gross HematuriaNo    REVIEW OF SYSTEMS:  General ROS: no fevers, no chills  Psychological ROS: no depression  Endocrine ROS: no heat or cold  Respiratory ROS: no SOB  Cardiovascular ROS: no CP  Gastrointestinal ROS: no abdominal pain, no constipation, no diarrhea, noBRBPR  Musculoskeletal ROS: no muscle pain  Neurological ROS: non headaches  Dermatological ROS: no rashes  HEENT: noglasses, no sinus   ROS: per HPI     PMHx:  Past Medical History:   Diagnosis Date    Allergy     Arthritis     Cataract     Colon polyp     Diverticulosis     GERD (gastroesophageal reflux disease)     Graves disease     s/p radioactive iodine in 42    H/O esophagogastroduodenoscopy     8/14 and 6/17    H/O percutaneous left heart catheterization     normal 5/12    History of colon polyps     History of diverticulitis     History of esophagitis     egd 8/14    History of skin cancer     L neck    Hyperlipidemia     Hypertension      "Hypothyroidism     s/p radioactive iodine    IBS (irritable bowel syndrome)     Mild luminal irregularity of carotid artery on ultrasound     noted on  usg with next due     Osteopenia     , 7/15    S/P colonoscopy     ;  next due     Thyroid nodule     last usg ;  next due      Kidney stones: No    PSHx:  Past Surgical History:   Procedure Laterality Date    CARDIAC SURGERY      angiogram (negative)    COLONOSCOPY  2010    Dr. Velázquez; in legacy, repeat in 6-7 years    COLONOSCOPY N/A 2017    Performed by Hamlet Vel[facility]quez Jr[facility]    COSMETIC SURGERY      Liposuction to neck    EGD (ESOPHAGOGASTRODUODENOSCOPY) N/A 2018    Per[facility] Ham[facility] Jr[facility]    EGD (ESOPHAGOGASTRODUODENOSCOPY) N/A 2012    Per[facility] by Ham[facility] Jr[facility]    ESOPHAGOGASTRODUODENOSCOPY (EGD) N/A 2017    Per[facility] Ham[facility] Jr[facility]    ESOPHAGOGASTRODUODENOSCOPY (EGD) N/A 2014    Performed by Ham[facility] Jr[facility]    SKIN CANCER EXCISION      with Mohs on left neck    TONSILLECTOMY      UPPER GASTROINTESTINAL ENDOSCOPY  2014    Dr. Velázquez    UPPER GASTROINTESTINAL ENDOSCOPY  2017    DR. VELÁZQUEZ     Urologic or Gynecologic Surgery: none    Stents/Valves/Foreign Bodies: No  Cardiac Evaluation: No     Screening Studies  Colonoscopy: one year ago, benign polyps     Fam Hx:   malignancies: No , gyn malignancies: Yes - sister  at age 44 with ovarian  kidney stones: No     Soc Hx:  No tobacco.    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 One of them actually caused her to "itch" in her ears and vagina/body and she had a "weird" " feeling. May need to try some again if necessary with benadryl.     Urologic Medications: reviewed   Anticoagulation: Yes - asa 81mg    Objective:     Vitals:    01/29/19 0933   BP: (!) 141/73   Pulse: 75       General:WDWN in NAD  Eyes: PERRLA, normal conjunctiva  Respiratory: no increased work on breathing. No wheezing.   Cardiovascular: No obvious extremity edema. Warm and well perfused.   GI: no palpation of masses. No tenderness. No hepatosplenomegaly to palpation.  Musculoskeletal: normal range of motion of bilateral upper extremities. Normal muscle strength and tone.  Skin: no obvious rashes or lesions. No tightening of skin noted.  Neurologic: CN grossly normal. Normal sensation.   Psychiatric: awake, alert and oriented x 3. Mood and affect normal. Cooperative.    Pelvic exam 10/12/18:  negative stress test with coughing supine, negative stress test with valsalva supine  In and out cath performed with 20cc residual - urine was not sent for sample  Bladder filled to 150cc cc  Sensation to void felt at 90 cc  Catheter removed  +stress test with coughing supine, negative stress test with valsalva supine  Negative stress test with coughing or valsalva  Standing    Stage 2 cystocele         LABS REVIEW:      Lab Results   Component Value Date    WBC 6.40 04/25/2018    HGB 13.9 04/25/2018    HCT 43.9 04/25/2018    MCV 89 04/25/2018     04/25/2018     BMP  Lab Results   Component Value Date     04/25/2018    K 4.7 04/25/2018     04/25/2018    CO2 31 (H) 04/25/2018    BUN 15 04/25/2018    CREATININE 0.9 04/25/2018    CALCIUM 10.6 (H) 04/25/2018    ANIONGAP 6 (L) 04/25/2018    ESTGFRAFRICA >60.0 04/25/2018    EGFRNONAA >60.0 04/25/2018         PATHOLOGY REVIEW:  none    RADIOGRAPHIC REVIEW:  none      Assessment:       1. OAB (overactive bladder)    2. Mixed incontinence          Plan:     oab with mixed incontinence  -continue myrbetriq 50mg daily. Doing well on this with minimal side effects,  which is why we had changed from vesicare to myrbetriq.  She wants to continue on this for now with no caffeine (stopped cokes). Improved from 14x a day to 8x a day.   -conservative tretament first before moving to any kinds of surgeries.  -at some point if symptoms worsen or do not repsond to oab meds could consider botox/interstim vs reduction of prolapse with sling since prolapse can cause oab too.   -try kegel when she has urge to help suppress urge.     Microhematuria  -no blood in urine today    F/u in 6 months to see how she is doing   If symptoms become more bothersome could try interstim trial            Eliana Ramirez MD

## 2019-01-29 NOTE — PATIENT INSTRUCTIONS
oab with mixed incontinence  -continue myrbetriq 50mg daily. Doing well on this with minimal side effects, which is why we had changed from vesicare to myrbetriq.  She wants to continue on this for now with no caffeine. Improved from 14x a day a  -conservative tretament first before moving to any kinds of surgeries.  -at some point if symptoms worsen or do not repsond to oab meds could consider botox/interstim vs reduction of prolapse with sling since prolapse can cause oab too.     Microhematuria  -no blood in urine today    F/u in 6 months to see how she is doing

## 2019-03-20 ENCOUNTER — PATIENT MESSAGE (OUTPATIENT)
Dept: GASTROENTEROLOGY | Facility: CLINIC | Age: 69
End: 2019-03-20

## 2019-03-20 ENCOUNTER — TELEPHONE (OUTPATIENT)
Dept: GASTROENTEROLOGY | Facility: CLINIC | Age: 69
End: 2019-03-20

## 2019-03-20 DIAGNOSIS — K21.9 GASTRO-ESOPHAGEAL REFLUX DISEASE WITHOUT ESOPHAGITIS: ICD-10-CM

## 2019-03-20 RX ORDER — OMEPRAZOLE 40 MG/1
40 CAPSULE, DELAYED RELEASE ORAL EVERY MORNING
Qty: 30 CAPSULE | Refills: 2 | Status: SHIPPED | OUTPATIENT
Start: 2019-03-20 | End: 2019-04-04 | Stop reason: SDUPTHER

## 2019-03-20 RX ORDER — OMEPRAZOLE 40 MG/1
40 CAPSULE, DELAYED RELEASE ORAL EVERY MORNING
Qty: 30 CAPSULE | Refills: 2 | Status: CANCELLED | OUTPATIENT
Start: 2019-03-20

## 2019-04-04 ENCOUNTER — OFFICE VISIT (OUTPATIENT)
Dept: GASTROENTEROLOGY | Facility: CLINIC | Age: 69
End: 2019-04-04
Payer: MEDICARE

## 2019-04-04 VITALS
SYSTOLIC BLOOD PRESSURE: 133 MMHG | WEIGHT: 199.94 LBS | RESPIRATION RATE: 18 BRPM | BODY MASS INDEX: 35.43 KG/M2 | HEIGHT: 63 IN | HEART RATE: 95 BPM | DIASTOLIC BLOOD PRESSURE: 76 MMHG

## 2019-04-04 DIAGNOSIS — R05.9 COUGH: ICD-10-CM

## 2019-04-04 DIAGNOSIS — Z79.899 ENCOUNTER FOR MONITORING LONG-TERM PROTON PUMP INHIBITOR THERAPY: Primary | ICD-10-CM

## 2019-04-04 DIAGNOSIS — K21.9 GASTROESOPHAGEAL REFLUX DISEASE WITHOUT ESOPHAGITIS: ICD-10-CM

## 2019-04-04 DIAGNOSIS — Z51.81 ENCOUNTER FOR MONITORING LONG-TERM PROTON PUMP INHIBITOR THERAPY: Primary | ICD-10-CM

## 2019-04-04 DIAGNOSIS — R49.0 HOARSENESS: ICD-10-CM

## 2019-04-04 PROCEDURE — 3078F DIAST BP <80 MM HG: CPT | Mod: HCNC,CPTII,S$GLB, | Performed by: NURSE PRACTITIONER

## 2019-04-04 PROCEDURE — 1101F PT FALLS ASSESS-DOCD LE1/YR: CPT | Mod: HCNC,CPTII,S$GLB, | Performed by: NURSE PRACTITIONER

## 2019-04-04 PROCEDURE — 3075F PR MOST RECENT SYSTOLIC BLOOD PRESS GE 130-139MM HG: ICD-10-PCS | Mod: HCNC,CPTII,S$GLB, | Performed by: NURSE PRACTITIONER

## 2019-04-04 PROCEDURE — 1101F PR PT FALLS ASSESS DOC 0-1 FALLS W/OUT INJ PAST YR: ICD-10-PCS | Mod: HCNC,CPTII,S$GLB, | Performed by: NURSE PRACTITIONER

## 2019-04-04 PROCEDURE — 99999 PR PBB SHADOW E&M-EST. PATIENT-LVL V: ICD-10-PCS | Mod: PBBFAC,HCNC,, | Performed by: NURSE PRACTITIONER

## 2019-04-04 PROCEDURE — 99214 PR OFFICE/OUTPT VISIT, EST, LEVL IV, 30-39 MIN: ICD-10-PCS | Mod: HCNC,S$GLB,, | Performed by: NURSE PRACTITIONER

## 2019-04-04 PROCEDURE — 3078F PR MOST RECENT DIASTOLIC BLOOD PRESSURE < 80 MM HG: ICD-10-PCS | Mod: HCNC,CPTII,S$GLB, | Performed by: NURSE PRACTITIONER

## 2019-04-04 PROCEDURE — 99999 PR PBB SHADOW E&M-EST. PATIENT-LVL V: CPT | Mod: PBBFAC,HCNC,, | Performed by: NURSE PRACTITIONER

## 2019-04-04 PROCEDURE — 99214 OFFICE O/P EST MOD 30 MIN: CPT | Mod: HCNC,S$GLB,, | Performed by: NURSE PRACTITIONER

## 2019-04-04 PROCEDURE — 3075F SYST BP GE 130 - 139MM HG: CPT | Mod: HCNC,CPTII,S$GLB, | Performed by: NURSE PRACTITIONER

## 2019-04-04 RX ORDER — OMEPRAZOLE 20 MG/1
40 CAPSULE, DELAYED RELEASE ORAL EVERY MORNING
Qty: 60 CAPSULE | Refills: 3 | Status: SHIPPED | OUTPATIENT
Start: 2019-04-04 | End: 2019-09-14 | Stop reason: SDUPTHER

## 2019-04-04 NOTE — PATIENT INSTRUCTIONS

## 2019-04-04 NOTE — PROGRESS NOTES
Subjective:       Patient ID: Curtis Navarro is a 68 y.o. female Body mass index is 35.42 kg/m².    Chief Complaint: Follow-up (med refill, discuss meds)    Established patient of Dr. Ceballos & myself    Gastroesophageal Reflux   She complains of belching (occasional; not more than usual), coughing (occasional nonproductive, worse with eating; possibly allergies per patient report; improving since back on prilosec), globus sensation (occasional frequent throat clearing- significantly improved), heartburn (rarely) and a hoarse voice (occasional; history of this and sees ENT; unchanged). She reports no abdominal pain, no chest pain, no choking, no dysphagia, no early satiety, no nausea or no sore throat. flatulence- taking fiber which causes gas. This is a chronic problem. Episode onset: several years ago. The problem occurs rarely. The problem has been rapidly improving (reports had gotten off prilosec ~12/2018, symptoms recurred ~2/2019, significantly improved since on prilosec again (restarted 3/2019)). The heartburn does not wake her from sleep. The heartburn changes with position. The symptoms are aggravated by caffeine and lying down (worse in the afternoon and at night). Pertinent negatives include no fatigue, melena or weight loss. Risk factors include obesity and caffeine use. She has tried a PPI, a histamine-2 antagonist, head elevation and a diet change (probiotic; prilosec 40 mg daily- significant relief; zantac 150 mg nightly) for the symptoms. The treatment provided significant relief. Past procedures include an EGD and a UGI.     Review of Systems   Constitutional: Negative for appetite change, chills, fatigue, fever, unexpected weight change and weight loss.   HENT: Positive for hoarse voice (occasional; history of this and sees ENT; unchanged). Negative for sore throat and trouble swallowing.    Respiratory: Positive for cough (occasional nonproductive, worse with eating; possibly allergies per  patient report; improving since back on prilosec). Negative for choking and shortness of breath.    Cardiovascular: Negative for chest pain.   Gastrointestinal: Positive for heartburn (rarely). Negative for abdominal pain, anal bleeding, blood in stool, constipation, diarrhea, dysphagia, melena, nausea, rectal pain and vomiting.   Neurological: Negative for weakness.       No LMP recorded. Patient is postmenopausal.    Past Medical History:   Diagnosis Date    Allergy     Arthritis     Cataract     Colon polyp     Diverticulitis     Diverticulosis     GERD (gastroesophageal reflux disease)     Graves disease     s/p radioactive iodine in 42    H/O esophagogastroduodenoscopy     8/14 and 6/17    H/O percutaneous left heart catheterization     normal 5/12    History of colon polyps     History of esophagitis     egd 8/14    History of skin cancer     L neck    Hyperlipidemia     Hypertension     Hypothyroidism     s/p radioactive iodine    IBS (irritable bowel syndrome)     Mild luminal irregularity of carotid artery on ultrasound     noted on 5/16 usg with next due 5/18    Osteopenia     7/13, 7/15    S/P colonoscopy     7/17;  next due 7/22    Thyroid nodule     last usg 5/17;  next due 5/19     Past Surgical History:   Procedure Laterality Date    CARDIAC SURGERY  2013    angiogram (negative)    COLONOSCOPY  07/21/2010    Dr. Ceballos; in legacy, repeat in 6-7 years    COLONOSCOPY N/A 6/27/2017    [facility] by Hamlet C[facility]allos Jr[facility]    COSMETIC SURGERY      Liposuction to neck    EGD (ESOPHAGOGASTRODUODENOSCOPY) N/A 7/12/2018    Performed by Ham[facility] Ceballos Jr[facility] ENDO    EGD (ESOPHAGOGASTRODUODENOSCOPY) N/A 7/16/2012    Performed [facility] Ham[facility]eballos Jr[facility] ENDO    ESOPHAGOGASTRODUODENOSCOPY (EGD) N/A 6/27/2017    Performed by Ham[facility] Ceballos Jr[facility] ENDO    ESOPHAGOGASTRODUODENOSCOPY (EGD) N/A 8/4/2014    Performed by Hamlet Ceballos Jr., MD at  [facility]    SKIN CANCER EXCISION      with Mohs on left neck    TONSILLECTOMY      UPPER GASTROINTESTINAL ENDOSCOPY  08/04/2014    Dr. Velázquez    UPPER GASTROINTESTINAL ENDOSCOPY  06/27/2017    DR. VELÁZQUEZ    UPPER GASTROINTESTINAL ENDOSCOPY  07/12/2018    Dr. Velázquez: "White nummular lesions in esophageal mucosa (benign)", antritis, gastric polyps removed     Family History   Problem Relation Age of Onset    Cataracts Mother     Colon polyps Mother         history of colitis- part colon removed    Cancer Sister     Ovarian cancer Sister 40    Cataracts Maternal Grandfather     Amblyopia Neg Hx     Blindness Neg Hx     Glaucoma Neg Hx     Hypertension Neg Hx     Macular degeneration Neg Hx     Retinal detachment Neg Hx     Strabismus Neg Hx     Stroke Neg Hx     Thyroid disease Neg Hx     Diabetes Neg Hx     Colon cancer Neg Hx     Esophageal cancer Neg Hx     Stomach cancer Neg Hx      Wt Readings from Last 10 Encounters:   04/04/19 90.7 kg (199 lb 15.3 oz)   01/29/19 91.1 kg (200 lb 13.4 oz)   11/12/18 89.4 kg (197 lb 1.5 oz)   11/06/18 89.4 kg (197 lb 3.2 oz)   10/12/18 89.5 kg (197 lb 5 oz)   08/15/18 88.9 kg (195 lb 15.8 oz)   07/12/18 88.9 kg (196 lb)   07/09/18 88.8 kg (195 lb 12.3 oz)   07/06/18 89.3 kg (196 lb 13.9 oz)   06/13/18 88.3 kg (194 lb 10.7 oz)     Lab Results   Component Value Date    WBC 6.40 04/25/2018    HGB 13.9 04/25/2018    HCT 43.9 04/25/2018    MCV 89 04/25/2018     04/25/2018     CMP  Sodium   Date Value Ref Range Status   04/25/2018 142 136 - 145 mmol/L Final     Potassium   Date Value Ref Range Status   04/25/2018 4.7 3.5 - 5.1 mmol/L Final     Chloride   Date Value Ref Range Status   04/25/2018 105 95 - 110 mmol/L Final     CO2   Date Value Ref Range Status   04/25/2018 31 (H) 23 - 29 mmol/L Final     Glucose   Date Value Ref Range Status   04/25/2018 100 70 - 110 mg/dL Final     BUN, Bld   Date Value Ref Range Status   04/25/2018 15 8 - 23 mg/dL Final " "    Creatinine   Date Value Ref Range Status   04/25/2018 0.9 0.5 - 1.4 mg/dL Final     Calcium   Date Value Ref Range Status   04/25/2018 10.6 (H) 8.7 - 10.5 mg/dL Final     Total Protein   Date Value Ref Range Status   04/25/2018 7.3 6.0 - 8.4 g/dL Final     Albumin   Date Value Ref Range Status   04/25/2018 4.0 3.5 - 5.2 g/dL Final     Total Bilirubin   Date Value Ref Range Status   04/25/2018 0.4 0.1 - 1.0 mg/dL Final     Comment:     For infants and newborns, interpretation of results should be based  on gestational age, weight and in agreement with clinical  observations.  Premature Infant recommended reference ranges:  Up to 24 hours.............<8.0 mg/dL  Up to 48 hours............<12.0 mg/dL  3-5 days..................<15.0 mg/dL  6-29 days.................<15.0 mg/dL       Alkaline Phosphatase   Date Value Ref Range Status   04/25/2018 106 55 - 135 U/L Final     AST   Date Value Ref Range Status   04/25/2018 23 10 - 40 U/L Final     ALT   Date Value Ref Range Status   04/25/2018 29 10 - 44 U/L Final     Anion Gap   Date Value Ref Range Status   04/25/2018 6 (L) 8 - 16 mmol/L Final     eGFR if    Date Value Ref Range Status   04/25/2018 >60.0 >60 mL/min/1.73 m^2 Final     eGFR if non    Date Value Ref Range Status   04/25/2018 >60.0 >60 mL/min/1.73 m^2 Final     Comment:     Calculation used to obtain the estimated glomerular filtration  rate (eGFR) is the CKD-EPI equation.        Lab Results   Component Value Date    [id number] 07/18/2018 7/18/18 magnesium WNL    Reviewed prior medical records including radiology report of 6/22/18 UGI; 6/20/18 modified barium swallow study; & endoscopy history (see surgical history).    6/27/17 Colonoscopy was reviewed and procedure report states:   "Impression:          - One 2 mm by 4 mm polyp in the cecum, removed with                        a cold snare. Resected and retrieved.                       - Diverticulosis in the " "sigmoid colon and in the                        descending colon.                       - Diverticulosis in the transverse colon, at the                        hepatic flexure, in the ascending colon and in the                        cecum.                       - Mild colonic spasm consistent with irritable bowel                        syndrome.                       - Non-bleeding internal hemorrhoids.                       - The examination was otherwise normal.                       - The examined portion of the ileum was normal.  Recommendation:      - Discharge patient to home.                       - Await pathology results.                       - If the pathology report reveals adenomatous                        tissue, then repeat the colonoscopy for surveillance                        in 5 years.                       - If the pathology report indicates hyperplastic                        polyp, then repeat colonoscopy for surveillance in 7                        years.                       - High fiber diet.                       - Take a PROBIOTIC, such as a carton of GREEK YOGURT                        (Chobani or Oikos, or Activia or Dannon); or tablets                        of ALIGN or CULTURELLE or LITA-Q (all                        non-prescription), every day for a month.                       - Call the [facility] clinic in 2 weeks for reports (if                        you haven't heard from us sooner) [phone].                       - Continue present medications. ".  Biopsy results:   "4. COLON, CECAL POLYPS (BIOPSY):  - Tubular adenoma"  Objective:      Physical Exam   Constitutional: She is oriented to person, place, and time. She appears well-developed and well-nourished. No distress.   HENT:   Mouth/Throat: Oropharynx is clear and moist and mucous membranes are normal. No oral lesions. No oropharyngeal exudate.   Eyes: Pupils are equal, round, and reactive to light. Conjunctivae are normal. " No scleral icterus.   Cardiovascular: Normal rate.   Pulmonary/Chest: Effort normal and breath sounds normal. No respiratory distress. She has no wheezes.   Abdominal: Soft. Normal appearance and bowel sounds are normal. She exhibits no distension, no abdominal bruit and no mass. There is no hepatosplenomegaly. There is no tenderness. There is no rigidity, no rebound, no guarding, no tenderness at McBurney's point and negative Smith's sign. No hernia.   Neurological: She is alert and oriented to person, place, and time.   Skin: Skin is warm and dry. No rash noted. She is not diaphoretic. No erythema. No pallor.   Non-jaundiced   Psychiatric: She has a normal mood and affect. Her behavior is normal.   Nursing note and vitals reviewed.      Assessment:       1. Encounter for monitoring long-term proton pump inhibitor therapy    2. Gastroesophageal reflux disease without esophagitis    3. Cough    4. Hoarseness        Plan:       Encounter for monitoring long-term proton pump inhibitor therapy  -     Vitamin B12; Future; Expected date: 04/04/2019  -     Magnesium; Future; Expected date: 04/04/2019  - discussed with patient about long term use of reflux medications (preference to use lowest effective dose or discontinuing if possible), the risk and benefits of using these medications long term, the risk of untreated GERD such as grande's esophagus, and recommend a diet high in calcium and/or taking OTC calcium and vitamin d supplements as directed (such as Citracal +D), pt verbalized understanding.  - recommend annual monitoring with blood work to include CMP, CBC, vitamin B12, and magnesium.    Gastroesophageal reflux disease without esophagitis  -  REFILL   omeprazole (PRILOSEC) 20 MG capsule; Take 2 capsules (40 mg total) by mouth every morning.  Dispense: 60 capsule; Refill: 3,  take in the morning before breakfast, discussed about possible long term use of medication (prefer to use lowest effective dose or  discontinuing if possible) and discussed the risks & benefits with taking a reflux medication long term, and to take OTC calcium and vitamin d supplements as directed (such as Citracal +D), pt verbalized understanding and patient wants to continue current medications at current dosages and patient reports she will follow-up as directed with desire to try to wean off medications as tolerated.  - continue lifestyle modifications to help control/reduce reflux/abdominal pain including: avoid large meals, avoid eating within 2-3 hours of bedtime (avoid late night eating & lying down soon after eating), elevate head of bed if nocturnal symptoms are present, smoking cessation (if current smoker), & weight loss (if overweight).   - avoid known foods which trigger reflux symptoms & to minimize/avoid high-fat foods, chocolate, caffeine, citrus, alcohol, & tomato products.  - avoid/limit use of NSAID's, since they can cause GI upset, bleeding, and/or ulcers. If needed, take with food.  -  REFILL   ranitidine (ZANTAC) 150 MG tablet; Take 1 tablet (150 mg total) by mouth nightly.  Dispense: 30 tablet; Refill: 3    Cough & Hoarseness  Recommend follow-up with Primary Care Provider/ENT for continued evaluation and management.    Follow up in about 1 month (around 5/4/2019), or if symptoms worsen or fail to improve.      If no improvement in symptoms or symptoms worsen, call/follow-up at clinic or go to ER.

## 2019-04-10 ENCOUNTER — OFFICE VISIT (OUTPATIENT)
Dept: FAMILY MEDICINE | Facility: CLINIC | Age: 69
End: 2019-04-10
Payer: MEDICARE

## 2019-04-10 VITALS
BODY MASS INDEX: 34.76 KG/M2 | DIASTOLIC BLOOD PRESSURE: 70 MMHG | TEMPERATURE: 98 F | WEIGHT: 196.19 LBS | SYSTOLIC BLOOD PRESSURE: 140 MMHG | HEART RATE: 87 BPM | HEIGHT: 63 IN | RESPIRATION RATE: 18 BRPM | OXYGEN SATURATION: 96 %

## 2019-04-10 DIAGNOSIS — E04.1 THYROID NODULE: Primary | ICD-10-CM

## 2019-04-10 DIAGNOSIS — J32.9 SINUSITIS, UNSPECIFIED CHRONICITY, UNSPECIFIED LOCATION: ICD-10-CM

## 2019-04-10 DIAGNOSIS — J02.9 PHARYNGITIS, UNSPECIFIED ETIOLOGY: ICD-10-CM

## 2019-04-10 PROCEDURE — 3077F SYST BP >= 140 MM HG: CPT | Mod: CPTII,S$GLB,, | Performed by: FAMILY MEDICINE

## 2019-04-10 PROCEDURE — 1101F PR PT FALLS ASSESS DOC 0-1 FALLS W/OUT INJ PAST YR: ICD-10-PCS | Mod: CPTII,S$GLB,, | Performed by: FAMILY MEDICINE

## 2019-04-10 PROCEDURE — 3078F PR MOST RECENT DIASTOLIC BLOOD PRESSURE < 80 MM HG: ICD-10-PCS | Mod: CPTII,S$GLB,, | Performed by: FAMILY MEDICINE

## 2019-04-10 PROCEDURE — 99214 OFFICE O/P EST MOD 30 MIN: CPT | Mod: S$GLB,,, | Performed by: FAMILY MEDICINE

## 2019-04-10 PROCEDURE — 3077F PR MOST RECENT SYSTOLIC BLOOD PRESSURE >= 140 MM HG: ICD-10-PCS | Mod: CPTII,S$GLB,, | Performed by: FAMILY MEDICINE

## 2019-04-10 PROCEDURE — 3078F DIAST BP <80 MM HG: CPT | Mod: CPTII,S$GLB,, | Performed by: FAMILY MEDICINE

## 2019-04-10 PROCEDURE — 99214 PR OFFICE/OUTPT VISIT, EST, LEVL IV, 30-39 MIN: ICD-10-PCS | Mod: S$GLB,,, | Performed by: FAMILY MEDICINE

## 2019-04-10 PROCEDURE — 1101F PT FALLS ASSESS-DOCD LE1/YR: CPT | Mod: CPTII,S$GLB,, | Performed by: FAMILY MEDICINE

## 2019-04-10 RX ORDER — AMOXICILLIN AND CLAVULANATE POTASSIUM 875; 125 MG/1; MG/1
1 TABLET, FILM COATED ORAL 2 TIMES DAILY
Qty: 14 TABLET | Refills: 0 | Status: SHIPPED | OUTPATIENT
Start: 2019-04-10 | End: 2019-04-17

## 2019-04-10 NOTE — PROGRESS NOTES
Subjective:       Patient ID: Curtis Navarro is a 68 y.o. female.    Chief Complaint: Sore Throat (Symptoms for a week, left gland sore); Cough (Dry scatchy cough); and Knot on Rt thigh    HPI   The patient is a 68-year-old who is here today because she is sick.  She has been sick for the past week and a half.  She is most significantly bothered by a sore throat.  Her sore throat is especially bad in the morning, at night and during the middle the night.  She has been using salt water gargles and Chloraseptic spray but her sore throat persists.  She did not sleep well last night because of for sore throat.  In addition to her sore throat, she has also had sinus congestion, yellow-green rhinorrhea, postnasal drainage, intermittent ear congestion and pain, a dry nonproductive cough and swollen and tender lymph nodes in the left side of her neck.  She has not had any fevers or chills.  She denies any facial or dental pain.  In addition to the salt water gargles and Chloraseptic spray, she has also been using Mucinex.    Review of Systems   Constitutional: Negative for appetite change, chills, diaphoresis, fatigue, fever and unexpected weight change.   HENT: Positive for congestion, ear pain, postnasal drip, rhinorrhea and sore throat. Negative for drooling, ear discharge, sinus pressure, sneezing and trouble swallowing.    Eyes: Negative for pain, discharge and visual disturbance.   Respiratory: Positive for cough. Negative for chest tightness, shortness of breath, wheezing and stridor.    Cardiovascular: Negative for chest pain, palpitations and leg swelling.   Gastrointestinal: Negative for abdominal distention, abdominal pain, blood in stool, constipation, diarrhea, nausea and vomiting.   Musculoskeletal: Positive for neck pain.   Skin: Negative for rash.   Neurological: Negative for headaches.       Objective:      Physical Exam   Constitutional: She is oriented to person, place, and time. She appears  well-developed and well-nourished. No distress.   HENT:   Head: Normocephalic and atraumatic.   Right Ear: Hearing, tympanic membrane, external ear and ear canal normal.   Left Ear: Hearing, tympanic membrane, external ear and ear canal normal.   Nose: Nose normal.   Mouth/Throat: Mucous membranes are normal. No oral lesions. Posterior oropharyngeal erythema present. No oropharyngeal exudate or posterior oropharyngeal edema. No tonsillar exudate.   +discolored PND and posterior lymphoid hyperplasia   Eyes: Pupils are equal, round, and reactive to light. Conjunctivae, EOM and lids are normal. No scleral icterus.   Neck: Normal range of motion. Neck supple. Carotid bruit is not present. No thyroid mass and no thyromegaly present.   Cardiovascular: Normal rate, regular rhythm and normal heart sounds.  No extrasystoles are present. PMI is not displaced. Exam reveals no gallop.   No murmur heard.  Pulmonary/Chest: Effort normal and breath sounds normal. No accessory muscle usage. No respiratory distress.   Clear to auscultation bilaterally.   Abdominal: Soft. Normal appearance and bowel sounds are normal. She exhibits no abdominal bruit. There is no hepatosplenomegaly. There is no tenderness. There is no rebound.   Lymphadenopathy:        Head (right side): No submental and no submandibular adenopathy present.        Head (left side): No submental and no submandibular adenopathy present.     She has cervical adenopathy.        Right cervical: Superficial cervical adenopathy present. No deep cervical and no posterior cervical adenopathy present.       Left cervical: Superficial cervical (more prominent than on the R) adenopathy present. No deep cervical and no posterior cervical adenopathy present.        Right: No supraclavicular adenopathy present.        Left: No supraclavicular adenopathy present.   Neurological: She is alert and oriented to person, place, and time.   Skin: Skin is warm, dry and intact.   Psychiatric:  "She has a normal mood and affect.     Blood pressure (!) 140/70, pulse 87, temperature 98.4 °F (36.9 °C), temperature source Oral, resp. rate 18, height 5' 3" (1.6 m), weight 89 kg (196 lb 3.2 oz), SpO2 96 %.Body mass index is 34.76 kg/m².          A/P:  1) sinusitis, pharyngitis and lymphadenitis.  Acute.  I am going to treat her with a course of Augmentin.  She will continue with the Mucinex.  If her symptoms do not improve or if they worsen, she will let me know    Given her current illness, she is going to reschedule ultrasounds for next month  "

## 2019-04-23 ENCOUNTER — PATIENT OUTREACH (OUTPATIENT)
Dept: ADMINISTRATIVE | Facility: HOSPITAL | Age: 69
End: 2019-04-23

## 2019-04-23 NOTE — PROGRESS NOTES
There are no preventive care reminders to display for this patient.  Pre-visit Chart review complete/scrubbed for this upcoming office visit

## 2019-05-02 ENCOUNTER — HOSPITAL ENCOUNTER (OUTPATIENT)
Dept: RADIOLOGY | Facility: HOSPITAL | Age: 69
Discharge: HOME | End: 2019-05-02
Attending: FAMILY MEDICINE
Payer: MEDICARE

## 2019-05-02 ENCOUNTER — PATIENT MESSAGE (OUTPATIENT)
Dept: FAMILY MEDICINE | Facility: CLINIC | Age: 69
End: 2019-05-02

## 2019-05-02 DIAGNOSIS — I67.2 CEREBRAL ATHEROSCLEROSIS: ICD-10-CM

## 2019-05-02 DIAGNOSIS — E04.1 THYROID NODULE: ICD-10-CM

## 2019-05-02 DIAGNOSIS — I77.9 CAROTID ARTERY DISEASE, UNSPECIFIED LATERALITY, UNSPECIFIED TYPE: ICD-10-CM

## 2019-05-02 PROCEDURE — 93880 EXTRACRANIAL BILAT STUDY: CPT | Mod: TC,HCNC,PO

## 2019-05-02 PROCEDURE — 93880 EXTRACRANIAL BILAT STUDY: CPT | Mod: 26,HCNC,, | Performed by: RADIOLOGY

## 2019-05-02 PROCEDURE — 76536 US EXAM OF HEAD AND NECK: CPT | Mod: TC,HCNC,PO

## 2019-05-02 PROCEDURE — 76536 US SOFT TISSUE HEAD NECK THYROID: ICD-10-PCS | Mod: 26,HCNC,, | Performed by: RADIOLOGY

## 2019-05-02 PROCEDURE — 93880 US CAROTID BILATERAL: ICD-10-PCS | Mod: 26,HCNC,, | Performed by: RADIOLOGY

## 2019-05-02 PROCEDURE — 76536 US EXAM OF HEAD AND NECK: CPT | Mod: 26,HCNC,, | Performed by: RADIOLOGY

## 2019-05-06 ENCOUNTER — OFFICE VISIT (OUTPATIENT)
Dept: FAMILY MEDICINE | Facility: CLINIC | Age: 69
End: 2019-05-06
Payer: MEDICARE

## 2019-05-06 VITALS
WEIGHT: 195.63 LBS | HEIGHT: 63 IN | TEMPERATURE: 99 F | RESPIRATION RATE: 18 BRPM | SYSTOLIC BLOOD PRESSURE: 135 MMHG | BODY MASS INDEX: 34.66 KG/M2 | HEART RATE: 102 BPM | DIASTOLIC BLOOD PRESSURE: 85 MMHG | OXYGEN SATURATION: 96 %

## 2019-05-06 DIAGNOSIS — E03.4 HYPOTHYROIDISM DUE TO ACQUIRED ATROPHY OF THYROID: ICD-10-CM

## 2019-05-06 DIAGNOSIS — R49.0 HOARSENESS: ICD-10-CM

## 2019-05-06 DIAGNOSIS — R05.9 COUGH: ICD-10-CM

## 2019-05-06 DIAGNOSIS — I10 HYPERTENSION, ESSENTIAL: ICD-10-CM

## 2019-05-06 DIAGNOSIS — E04.1 THYROID NODULE: Primary | ICD-10-CM

## 2019-05-06 DIAGNOSIS — E78.5 HYPERLIPIDEMIA, UNSPECIFIED HYPERLIPIDEMIA TYPE: ICD-10-CM

## 2019-05-06 PROCEDURE — 99214 PR OFFICE/OUTPT VISIT, EST, LEVL IV, 30-39 MIN: ICD-10-PCS | Mod: S$GLB,,, | Performed by: FAMILY MEDICINE

## 2019-05-06 PROCEDURE — 3075F SYST BP GE 130 - 139MM HG: CPT | Mod: CPTII,S$GLB,, | Performed by: FAMILY MEDICINE

## 2019-05-06 PROCEDURE — 3075F PR MOST RECENT SYSTOLIC BLOOD PRESS GE 130-139MM HG: ICD-10-PCS | Mod: CPTII,S$GLB,, | Performed by: FAMILY MEDICINE

## 2019-05-06 PROCEDURE — 3079F DIAST BP 80-89 MM HG: CPT | Mod: CPTII,S$GLB,, | Performed by: FAMILY MEDICINE

## 2019-05-06 PROCEDURE — 1101F PR PT FALLS ASSESS DOC 0-1 FALLS W/OUT INJ PAST YR: ICD-10-PCS | Mod: CPTII,S$GLB,, | Performed by: FAMILY MEDICINE

## 2019-05-06 PROCEDURE — 99214 OFFICE O/P EST MOD 30 MIN: CPT | Mod: S$GLB,,, | Performed by: FAMILY MEDICINE

## 2019-05-06 PROCEDURE — 3079F PR MOST RECENT DIASTOLIC BLOOD PRESSURE 80-89 MM HG: ICD-10-PCS | Mod: CPTII,S$GLB,, | Performed by: FAMILY MEDICINE

## 2019-05-06 PROCEDURE — 1101F PT FALLS ASSESS-DOCD LE1/YR: CPT | Mod: CPTII,S$GLB,, | Performed by: FAMILY MEDICINE

## 2019-05-06 RX ORDER — MONTELUKAST SODIUM 10 MG/1
10 TABLET ORAL NIGHTLY
Qty: 30 TABLET | Refills: 4 | Status: SHIPPED | OUTPATIENT
Start: 2019-05-06 | End: 2019-09-30 | Stop reason: SDUPTHER

## 2019-05-06 RX ORDER — ATORVASTATIN CALCIUM 20 MG/1
20 TABLET, FILM COATED ORAL DAILY
Qty: 90 TABLET | Refills: 1 | Status: SHIPPED | OUTPATIENT
Start: 2019-05-06 | End: 2019-11-04 | Stop reason: SDUPTHER

## 2019-05-06 NOTE — PROGRESS NOTES
Subjective:       Patient ID: Curtis Navarro is a 68 y.o. female.    Chief Complaint: Follow-up    HPI   The patient is a 68-year-old who is here today for chronic follow-up.  Overall, she is doing well except for a dry cough and hoarseness which she thinks may be related to acid reflux.  Today we discussed followin)  Hypertension.  She is currently taking Cozaar 50 mg once a day.  Her blood pressure was high today initially when she came for her visit but had improved by the end of her visit.  She has lot of stress today and in her life recently.  She and her  are in the process of selling their house.  She has been very busy getting the house ready for the market and has received a few calls from the agents already this morning.  She was in such a hurry this morning that she forgot to take her blood pressure medicine but when she was in the car and realized she forgot to take it she went back into the house to take it.  She has not felt if her blood pressure has been high or low but she has been feeling very stressed lately   2)  Hyperlipidemia.  We did review her recent cholesterol panel.  Her recent LDL was 111.  She is currently taking Lipitor 10 mg once a day.  We did discuss increasing the strength this medicine which she would be interested in doing   3)   Cough.  She has had a dry cough for the past couple of months.  She denies any acid reflux but does know that this could be causing a cough.  She denies any shortness of breath.  She denies any wheezing.  She is bothered by seasonal allergies  4) hoarseness.  She has been bothered by hoarseness.  She realizes that this could be due to acid reflux.  She has seen ENT for this before.  She is in the process of weaning down on her Prilosec from 40 mg to 20 mg.  She has been taking the 20 mg of Prilosec for the past 2 weeks and has not noticed any change in her hoarseness with this lower dose of medicine.  In addition to the Prilosec, she is  also taking Zantac  5) thyroid nodules.  We did review her recent thyroid ultrasound which showed no change in her thyroid nodules  6)  CAD.  We did discuss her recent carotid ultrasound which showed no significant blockages    Of note, she has had 1 or 2 episodes of vertigo since I have seen her last but these were brief in short lived    Review of Systems   Constitutional: Negative for activity change, appetite change, chills, diaphoresis, fatigue, fever and unexpected weight change.   HENT: Positive for voice change. Negative for congestion, ear pain, hearing loss, postnasal drip, rhinorrhea, sinus pressure, sneezing, sore throat and trouble swallowing.    Eyes: Negative for pain, discharge and visual disturbance.   Respiratory: Positive for choking. Negative for cough, chest tightness, shortness of breath and wheezing.    Cardiovascular: Negative for chest pain, palpitations and leg swelling.   Gastrointestinal: Negative for abdominal distention, abdominal pain, blood in stool, constipation, diarrhea, nausea and vomiting.   Endocrine: Negative for polydipsia and polyuria.   Genitourinary: Negative for difficulty urinating, dysuria, hematuria and menstrual problem.   Musculoskeletal: Positive for arthralgias. Negative for joint swelling and neck pain.   Skin: Negative for rash.   Neurological: Positive for dizziness. Negative for weakness and headaches.   Psychiatric/Behavioral: Negative for confusion and dysphoric mood.       Objective:      Physical Exam   Constitutional: She is oriented to person, place, and time. She appears well-developed and well-nourished. No distress.   HENT:   Head: Normocephalic and atraumatic.   Right Ear: Hearing, tympanic membrane, external ear and ear canal normal.   Left Ear: Hearing, tympanic membrane, external ear and ear canal normal.   Nose: Nose normal.   Mouth/Throat: Oropharynx is clear and moist and mucous membranes are normal. No oral lesions. No oropharyngeal exudate,  "posterior oropharyngeal edema or posterior oropharyngeal erythema.   Eyes: Pupils are equal, round, and reactive to light. Conjunctivae, EOM and lids are normal. No scleral icterus.   Neck: Normal range of motion. Neck supple. Carotid bruit is not present. No thyroid mass and no thyromegaly present.   Cardiovascular: Normal rate, regular rhythm and normal heart sounds.  No extrasystoles are present. PMI is not displaced. Exam reveals no gallop.   No murmur heard.  Pulmonary/Chest: Effort normal and breath sounds normal. No accessory muscle usage. No respiratory distress.   Clear to auscultation bilaterally.   Abdominal: Soft. Normal appearance and bowel sounds are normal. She exhibits no abdominal bruit. There is no hepatosplenomegaly. There is no tenderness. There is no rebound.   Lymphadenopathy:        Head (right side): No submental and no submandibular adenopathy present.        Head (left side): No submental and no submandibular adenopathy present.        Right cervical: No superficial cervical, no deep cervical and no posterior cervical adenopathy present.       Left cervical: No superficial cervical, no deep cervical and no posterior cervical adenopathy present.        Right: No supraclavicular adenopathy present.        Left: No supraclavicular adenopathy present.   Neurological: She is alert and oriented to person, place, and time.   Skin: Skin is warm, dry and intact.   Psychiatric: She has a normal mood and affect.     Blood pressure 135/85, pulse 102, temperature 98.7 °F (37.1 °C), temperature source Oral, resp. rate 18, height 5' 3" (1.6 m), weight 88.7 kg (195 lb 9.6 oz), SpO2 96 %.Body mass index is 34.65 kg/m².          A/P:  1) hypertension.  Well controlled.  For now we will continue with the Cozaar.  I will see her back in 6 months for follow-up or sooner if needed   2)  Hyperlipidemia.  Uncontrolled.  We will increase Lipitor to 20 mg once a day.  We will check fasting lipid profile again in 3 " months  3) cough.  Chronic.  We are going to try Singulair for her allergies and possible RAD.  If in 2 weeks her cough continues, she will let me know and we will refer her to the pulmonologist and get a baseline chest x-ray  4) hoarseness.  She will continue with the current dose of Prilosec and Zantac.  If this continues more than 2 weeks, she will return to ENT  5)  Thyroid nodules.  Stable.  We will repeat a thyroid ultrasound 2 years  6)  Carotid artery disease. Asymptomatic.  We will recheck a carotid ultrasound in 2 years

## 2019-05-08 ENCOUNTER — TELEPHONE (OUTPATIENT)
Dept: GASTROENTEROLOGY | Facility: CLINIC | Age: 69
End: 2019-05-08

## 2019-05-08 NOTE — TELEPHONE ENCOUNTER
----- Message from JAY Monsalve sent at 5/8/2019 12:51 PM CDT -----  Please call to inform & review the results with the patient- Lab results are unremarkable: blood counts, electrolytes, kidney and liver function levels.  Continue with previous recommendations.  Thanks,  Venita THOMPSON-C

## 2019-05-20 RX ORDER — LOSARTAN POTASSIUM 50 MG/1
50 TABLET ORAL DAILY
Qty: 90 TABLET | Refills: 1 | Status: SHIPPED | OUTPATIENT
Start: 2019-05-20 | End: 2019-11-04

## 2019-05-20 RX ORDER — LOSARTAN POTASSIUM 50 MG/1
50 TABLET ORAL DAILY
Qty: 90 TABLET | Refills: 1 | OUTPATIENT
Start: 2019-05-20 | End: 2020-05-19

## 2019-06-10 RX ORDER — LEVOTHYROXINE SODIUM 125 UG/1
125 TABLET ORAL
Qty: 30 TABLET | Refills: 11 | Status: SHIPPED | OUTPATIENT
Start: 2019-06-10 | End: 2020-06-12

## 2019-07-15 ENCOUNTER — OFFICE VISIT (OUTPATIENT)
Dept: CARDIOLOGY | Facility: CLINIC | Age: 69
End: 2019-07-15
Payer: MEDICARE

## 2019-07-15 VITALS
SYSTOLIC BLOOD PRESSURE: 153 MMHG | WEIGHT: 197.06 LBS | HEART RATE: 77 BPM | HEIGHT: 63 IN | DIASTOLIC BLOOD PRESSURE: 80 MMHG | BODY MASS INDEX: 34.91 KG/M2

## 2019-07-15 DIAGNOSIS — R06.02 SOB (SHORTNESS OF BREATH): ICD-10-CM

## 2019-07-15 DIAGNOSIS — R00.2 PALPITATIONS: Primary | ICD-10-CM

## 2019-07-15 PROCEDURE — 3079F DIAST BP 80-89 MM HG: CPT | Mod: HCNC,CPTII,S$GLB, | Performed by: INTERNAL MEDICINE

## 2019-07-15 PROCEDURE — 3077F SYST BP >= 140 MM HG: CPT | Mod: HCNC,CPTII,S$GLB, | Performed by: INTERNAL MEDICINE

## 2019-07-15 PROCEDURE — 1101F PR PT FALLS ASSESS DOC 0-1 FALLS W/OUT INJ PAST YR: ICD-10-PCS | Mod: HCNC,CPTII,S$GLB, | Performed by: INTERNAL MEDICINE

## 2019-07-15 PROCEDURE — 1101F PT FALLS ASSESS-DOCD LE1/YR: CPT | Mod: HCNC,CPTII,S$GLB, | Performed by: INTERNAL MEDICINE

## 2019-07-15 PROCEDURE — 3077F PR MOST RECENT SYSTOLIC BLOOD PRESSURE >= 140 MM HG: ICD-10-PCS | Mod: HCNC,CPTII,S$GLB, | Performed by: INTERNAL MEDICINE

## 2019-07-15 PROCEDURE — 99204 PR OFFICE/OUTPT VISIT, NEW, LEVL IV, 45-59 MIN: ICD-10-PCS | Mod: HCNC,S$GLB,, | Performed by: INTERNAL MEDICINE

## 2019-07-15 PROCEDURE — 99999 PR PBB SHADOW E&M-EST. PATIENT-LVL III: CPT | Mod: PBBFAC,HCNC,, | Performed by: INTERNAL MEDICINE

## 2019-07-15 PROCEDURE — 99204 OFFICE O/P NEW MOD 45 MIN: CPT | Mod: HCNC,S$GLB,, | Performed by: INTERNAL MEDICINE

## 2019-07-15 PROCEDURE — 3079F PR MOST RECENT DIASTOLIC BLOOD PRESSURE 80-89 MM HG: ICD-10-PCS | Mod: HCNC,CPTII,S$GLB, | Performed by: INTERNAL MEDICINE

## 2019-07-15 PROCEDURE — 99999 PR PBB SHADOW E&M-EST. PATIENT-LVL III: ICD-10-PCS | Mod: PBBFAC,HCNC,, | Performed by: INTERNAL MEDICINE

## 2019-07-15 NOTE — PROGRESS NOTES
Subjective:    Patient ID:  Curtis Navarro is a 69 y.o. female who presents for evaluation of palpitation    HPI  She comes with palpitations on/off for the last few months, occasional SOB, no chest pain      Review of Systems   Constitution: Negative for decreased appetite, malaise/fatigue, weight gain and weight loss.   Cardiovascular: Positive for dyspnea on exertion, irregular heartbeat and palpitations. Negative for chest pain, leg swelling and syncope.   Respiratory: Negative for cough and shortness of breath.    Gastrointestinal: Negative.    Neurological: Negative for weakness.   All other systems reviewed and are negative.       Objective:      Physical Exam   Constitutional: She is oriented to person, place, and time. She appears well-developed and well-nourished.   HENT:   Head: Normocephalic.   Eyes: Pupils are equal, round, and reactive to light.   Neck: Normal range of motion. Neck supple. No JVD present. Carotid bruit is not present. No thyromegaly present.   Cardiovascular: Normal rate, regular rhythm, normal heart sounds, intact distal pulses and normal pulses. PMI is not displaced. Exam reveals no gallop.   No murmur heard.  Pulmonary/Chest: Effort normal and breath sounds normal.   Abdominal: Soft. Normal appearance. She exhibits no mass. There is no hepatosplenomegaly. There is no tenderness.   Musculoskeletal: Normal range of motion. She exhibits no edema.   Neurological: She is alert and oriented to person, place, and time. She has normal strength and normal reflexes. No sensory deficit.   Skin: Skin is warm and intact.   Psychiatric: She has a normal mood and affect.   Nursing note and vitals reviewed.        Assessment:       1. Palpitations    2. SOB (shortness of breath)         Plan:     Event monitor, nuke, echo  Call with results

## 2019-07-25 ENCOUNTER — OFFICE VISIT (OUTPATIENT)
Dept: UROLOGY | Facility: CLINIC | Age: 69
End: 2019-07-25
Payer: MEDICARE

## 2019-07-25 VITALS
RESPIRATION RATE: 18 BRPM | HEIGHT: 63 IN | HEART RATE: 82 BPM | DIASTOLIC BLOOD PRESSURE: 75 MMHG | BODY MASS INDEX: 35.04 KG/M2 | WEIGHT: 197.75 LBS | SYSTOLIC BLOOD PRESSURE: 144 MMHG

## 2019-07-25 DIAGNOSIS — N32.81 OAB (OVERACTIVE BLADDER): Primary | ICD-10-CM

## 2019-07-25 LAB
BILIRUB SERPL-MCNC: NORMAL MG/DL
BLOOD URINE, POC: NORMAL
COLOR, POC UA: YELLOW
GLUCOSE UR QL STRIP: NORMAL
KETONES UR QL STRIP: NORMAL
LEUKOCYTE ESTERASE URINE, POC: NORMAL
NITRITE, POC UA: NORMAL
PH, POC UA: 6
PROTEIN, POC: NORMAL
SPECIFIC GRAVITY, POC UA: 1.01
UROBILINOGEN, POC UA: NORMAL

## 2019-07-25 PROCEDURE — 1101F PR PT FALLS ASSESS DOC 0-1 FALLS W/OUT INJ PAST YR: ICD-10-PCS | Mod: HCNC,CPTII,S$GLB, | Performed by: UROLOGY

## 2019-07-25 PROCEDURE — 1101F PT FALLS ASSESS-DOCD LE1/YR: CPT | Mod: HCNC,CPTII,S$GLB, | Performed by: UROLOGY

## 2019-07-25 PROCEDURE — 81002 URINALYSIS NONAUTO W/O SCOPE: CPT | Mod: HCNC,S$GLB,, | Performed by: UROLOGY

## 2019-07-25 PROCEDURE — 99214 OFFICE O/P EST MOD 30 MIN: CPT | Mod: HCNC,25,S$GLB, | Performed by: UROLOGY

## 2019-07-25 PROCEDURE — 99999 PR PBB SHADOW E&M-EST. PATIENT-LVL IV: ICD-10-PCS | Mod: PBBFAC,HCNC,, | Performed by: UROLOGY

## 2019-07-25 PROCEDURE — 3078F PR MOST RECENT DIASTOLIC BLOOD PRESSURE < 80 MM HG: ICD-10-PCS | Mod: HCNC,CPTII,S$GLB, | Performed by: UROLOGY

## 2019-07-25 PROCEDURE — 3078F DIAST BP <80 MM HG: CPT | Mod: HCNC,CPTII,S$GLB, | Performed by: UROLOGY

## 2019-07-25 PROCEDURE — 99214 PR OFFICE/OUTPT VISIT, EST, LEVL IV, 30-39 MIN: ICD-10-PCS | Mod: HCNC,25,S$GLB, | Performed by: UROLOGY

## 2019-07-25 PROCEDURE — 81002 POCT URINE DIPSTICK WITHOUT MICROSCOPE: ICD-10-PCS | Mod: HCNC,S$GLB,, | Performed by: UROLOGY

## 2019-07-25 PROCEDURE — 99999 PR PBB SHADOW E&M-EST. PATIENT-LVL IV: CPT | Mod: PBBFAC,HCNC,, | Performed by: UROLOGY

## 2019-07-25 PROCEDURE — 3077F PR MOST RECENT SYSTOLIC BLOOD PRESSURE >= 140 MM HG: ICD-10-PCS | Mod: HCNC,CPTII,S$GLB, | Performed by: UROLOGY

## 2019-07-25 PROCEDURE — 3077F SYST BP >= 140 MM HG: CPT | Mod: HCNC,CPTII,S$GLB, | Performed by: UROLOGY

## 2019-07-25 NOTE — PROGRESS NOTES
[facility] Note - [place]  Staff: MD Ashley    Referring provider and please cc: Carlyn  PCP: [facility]: active     Chief Complaint: oab    Subjective:        HPI: Curtis Navarro is a 69 y.o. female presents with     oab  -she's had this issue for 20 years  -she initially saw her ob gyn for this who tried her on myrbetriq 25 and 50 and had 50 %improvement in frequency but was still having incontinence. Prior to this she had tried detrol at some point. She then saw jemal on 5/14/18 and pvr was 170, myrbetriq dc'd and started on flomax. She was then seen on 7/6/18 and although sx improved some she they returned and jemal discontinued the flomax and started vesicare 5mg daily which improved her nocturia to1-2x a night. She had her then stop the vesicare for voiding diary which she brought with her today. Prior to starting the medicine per day: 14x a day or more, night time voids: 3x a night, 1-2 leaks of drops to large volume, urgency: 5, 2 pads a day.  Since starting vesicare she has had 50% improvement - voids q1-2 hours, nocturia 2x a night, no leaks, urgency down to 3, 0 pads. +dry mouth (not that bothersome) and constipation but takes metamucil and has a soft bm daily.   -no significant SHAWN   -She also was drinking coffee every morning and 1-2 cokes a day. Now she drinks 1 decaf coffee in the morning and coke zero 1-2x a week. No recurrent utis. G1, P 1, vaginal   -saw her 10/2018, stress test only showed leakage with 150cc but repeated standing and was negative. Stable bladder and discontinued vesicare bc of the dry mouth and constipation and started myrbetriq 50mg. She has had improvement with the meds an no caffeine.3d voiding Diary (uploaded) shows voids 6-8x a day. Nocturia 1x a night. 1-2 leaks with urge. Urgency 3-5 (still pretty strong), 0 pads. Much improved.     Up she was last seen in January 2019 and she had significant improvement in her  overactive bladder with Myrbetriq 50 mg daily.  She was asked to continue this in follow-up and today states that she continues to have minimal leakage less than 1 time a day and does not really ever have to wear pads.  She voids every few hours.  However she still concerned because of the urgency and occasional urge incontinence that does occur.  She has a trip planned to [place] in November and is concerned about having to look for bathrooms.  She is possibly interested in proceeding with another form of treatment.    Ua void: negative  Urine history :   11/12/18 No cx, void: neg, 1 rbc  10/12/18 No cx, void: tr prot/tr blood - send for automated urinalysis, pvr by in and out: 20  8/15/18 No cx, void: neg  7/6/18  No cx, void: neg  6/13/18 Multiple org, void: neg  5/14/18 Ng, Void: neg, pvr by I&o: 170  6/6/17  E.coli, void: neg  3/20/15 ng, void: neg  11/27/06 Multiple org, void: 1+ blood    ECOG Status: 0    Gross HematuriaNo    REVIEW OF SYSTEMS:  General ROS: no fevers, no chills  Psychological ROS: no depression  Endocrine ROS: no heat or cold  Respiratory ROS: no SOB  Cardiovascular ROS: no CP  Gastrointestinal ROS: no abdominal pain, no constipation, no diarrhea, noBRBPR  Musculoskeletal ROS: no muscle pain  Neurological ROS: non headaches  Dermatological ROS: no rashes  HEENT: noglasses, no sinus   ROS: per HPI     PMHx:  Past Medical History:   Diagnosis Date    Allergy     Arthritis     Carotid arterial disease     last us 5/19 next due 5/21    Cataract     Colon polyp     Diverticulitis     Diverticulosis     GERD (gastroesophageal reflux disease)     Graves disease     s/p radioactive iodine in 42    H/O esophagogastroduodenoscopy     8/14 and 6/17    H/O percutaneous left heart catheterization     normal 5/12    History of colon polyps     History of esophagitis     egd 8/14    History of skin cancer     L neck    Hyperlipidemia     Hypertension     Hypothyroidism     s/p  "radioactive iodine    IBS (irritable bowel syndrome)     Mild luminal irregularity of carotid artery on ultrasound     noted on  usg with next due     Osteopenia     , 7/15    S/P colonoscopy     ;  next due     Thyroid nodule     last usg ;  next due      Kidney stones: No    PSHx:  Past Surgical History:   Procedure Laterality Date    CARDIAC SURGERY      angiogram (negative)    COLONOSCOPY  2010    Dr. Velázquez; in legacy, repeat in 6-7 years    COLONOSCOPY N/A 2017    Performed by [facility] Vel[facility]quez Jr[facility]    COSMETIC SURGERY      Liposuction to neck    EGD (ESOPHAGOGASTRODUODENOSCOPY) N/A 2018    Performed by [facility] Jr[facility] ENDO    EGD (ESOPHAGOGASTRODUODENOSCOPY) N/A 2012    Performed by [facility] Vel[facility] Jr[facility] ENDO    ESOPHAGOGASTRODUODENOSCOPY (EGD) N/A 2017    Performed by [facility] Vel[facility] Jr[facility] ENDO    ESOPHAGOGASTRODUODENOSCOPY (EGD) N/A 2014    Performed by [facility] Vel[facility] Jr[facility] ENDO    SKIN CANCER EXCISION      with Mohs on left neck    TONSILLECTOMY      UPPER GASTROINTESTINAL ENDOSCOPY  2014    Dr. Velázquez    UPPER GASTROINTESTINAL ENDOSCOPY  2017    DR. VELÁZQUEZ    UPPER GASTROINTESTINAL ENDOSCOPY  2018    Dr. Velázquez: "White nummular lesions in esophageal mucosa (benign)", antritis, gastric polyps removed     Urologic or Gynecologic Surgery: none    Stents/Valves/Foreign Bodies: No  Cardiac Evaluation:  (stress test )     Screening Studies  Colonoscopy: one year ago, benign polyps     Fam Hx:   malignancies: No , gyn malignancies: Yes - sister  at age 44 with ovarian  kidney stones: No     Soc Hx:  No tobacco.    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 One of them actually caused her to "itch" in her ears and vagina/body and she had a "weird" feeling. May need to try some again if necessary with benadryl.     Urologic Medications:  myrbetriq 50mg   Anticoagulation: Yes - asa 81mg    Objective:     Vitals:    07/25/19 1259   BP: (!) 144/75   Pulse: 82   Resp: 18       General:WDWN in NAD  Eyes: PERRLA, normal conjunctiva  Respiratory: no increased work on breathing. No wheezing.   Cardiovascular: No obvious extremity edema. Warm and well perfused.   GI: no palpation of masses. No tenderness. No hepatosplenomegaly to palpation.  Musculoskeletal: normal range of motion of bilateral upper extremities. Normal muscle strength and tone.  Skin: no obvious rashes or lesions. No tightening of skin noted.  Neurologic: CN grossly normal. Normal sensation.   Psychiatric: awake, alert and oriented x 3. Mood and affect normal. Cooperative.    Pelvic exam 10/12/18:  negative stress test with coughing supine, negative stress test with valsalva supine  In and out cath performed with 20cc residual - urine was not sent for sample  Bladder filled to 150cc cc  Sensation to void felt at 90 cc  Catheter removed  +stress test with coughing supine, negative stress test with valsalva supine  Negative stress test with coughing or valsalva  Standing    Stage 2 cystocele     LABS REVIEW:      Lab Results   Component Value Date    WBC 6.31 05/02/2019    HGB 14.2 05/02/2019    HCT 45.4 05/02/2019    MCV 89 05/02/2019     05/02/2019     BMP  Lab Results   Component Value Date     05/02/2019    K 4.2 05/02/2019     05/02/2019    CO2 28 05/02/2019    BUN 16 05/02/2019    CREATININE 0.9 05/02/2019    CALCIUM 10.2 05/02/2019    ANIONGAP 7 (L) 05/02/2019    ESTGFRAFRICA >60.0 05/02/2019    EGFRNONAA >60.0 05/02/2019         PATHOLOGY REVIEW:  none    RADIOGRAPHIC REVIEW:  none      Assessment:       1. OAB (overactive " bladder)          Plan:     oab with mixed incontinence  She is tried at least 2 overactive bladder medications and continues to have urgency of about 5.  Although her incontinence and urinary frequency have improved significantly he still wants to see if she could have some improvement in her urgency.  We did discussed Botox and InterStim, peripheral nerve evaluation versus 2 staged InterStim.  She also has a cystocele.  It is stage II.    I explained that if she wanted to see if the cystocele was the cause of her overactive bladder they would likely have to repair the cystocele and then do mid urethral sling for the stress incontinence that can result from a repair of her cystocele.    We could instead do a peripheral nerve evaluation awake without any anesthesia and as long as we stop the aspirin and had clearance to stop this.  She would then have a trial with a little wire for about 3 days and we could see if she had an improvement in her overactive bladder. She did then she would need to be cleared for a stage I and stage II InterStim to be done at the same time.  If she did not then I could refer her to Urogynecology for their 2nd opinion to see if cystocele is the cause of her urgency.    I gave her reading material today on InterStim.  She will contact us if she wants to proceed with a peripheral nerve evaluation for August 30th  And may have [facility] contact her to answer any further questions that we can't answer for her.            Eliana Ramirez MD

## 2019-07-25 NOTE — PATIENT INSTRUCTIONS
oab with mixed incontinence  She is tried at least 2 overactive bladder medications and continues to have urgency of about 5.  Although her incontinence and urinary frequency have improved significantly he still wants to see if she could have some improvement in her urgency.  We did discussed Botox and InterStim, peripheral nerve evaluation versus 2 staged InterStim.  She also has a cystocele.  It is stage II.    I explained that if she wanted to see if the cystocele was the cause of her overactive bladder they would likely have to repair the cystocele and then do mid urethral sling for the stress incontinence that can result from a repair of her cystocele.    We could instead do a peripheral nerve evaluation awake without any anesthesia and as long as we stop the aspirin and had clearance to stop this.  She would then have a trial with a little wire for about 3 days and we could see if she had an improvement in her overactive bladder. She did then she would need to be cleared for a stage I and stage II InterStim to be done at the same time.  If she did not then I could refer her to Urogynecology for their 2nd opinion to see if cystocele is the cause of her urgency.    I gave her reading material today on InterStim.  She will contact us if she wants to proceed with a peripheral nerve evaluation for August 30th  And may have [employer] contact her to answer any further questions that we can't answer for her.

## 2019-08-02 ENCOUNTER — HOSPITAL ENCOUNTER (OUTPATIENT)
Dept: RADIOLOGY | Facility: HOSPITAL | Age: 69
Discharge: HOME | End: 2019-08-02
Attending: INTERNAL MEDICINE
Payer: MEDICARE

## 2019-08-02 ENCOUNTER — CLINICAL SUPPORT (OUTPATIENT)
Dept: CARDIOLOGY | Facility: CLINIC | Age: 69
End: 2019-08-02
Attending: INTERNAL MEDICINE
Payer: MEDICARE

## 2019-08-02 VITALS
HEIGHT: 63 IN | DIASTOLIC BLOOD PRESSURE: 71 MMHG | BODY MASS INDEX: 34.91 KG/M2 | HEART RATE: 80 BPM | WEIGHT: 197 LBS | SYSTOLIC BLOOD PRESSURE: 177 MMHG

## 2019-08-02 DIAGNOSIS — R06.02 SOB (SHORTNESS OF BREATH): ICD-10-CM

## 2019-08-02 DIAGNOSIS — R00.2 PALPITATIONS: ICD-10-CM

## 2019-08-02 LAB
CV STRESS BASE HR: 77 BPM
DIASTOLIC BLOOD PRESSURE: 71 MMHG
NUC REST EJECTION FRACTION: 74
NUC STRESS EJECTION FRACTION: 74 %
OHS CV CPX 1 MINUTE RECOVERY HEART RATE: 109 BPM
OHS CV CPX 85 PERCENT MAX PREDICTED HEART RATE MALE: 125
OHS CV CPX MAX PREDICTED HEART RATE: 147
OHS CV CPX PATIENT IS FEMALE: 1
OHS CV CPX PATIENT IS MALE: 0
OHS CV CPX PEAK DIASTOLIC BLOOD PRESSURE: 55 MMHG
OHS CV CPX PEAK HEAR RATE: 113 BPM
OHS CV CPX PEAK RATE PRESSURE PRODUCT: NORMAL
OHS CV CPX PEAK SYSTOLIC BLOOD PRESSURE: 192 MMHG
OHS CV CPX PERCENT MAX PREDICTED HEART RATE ACHIEVED: 77
OHS CV CPX RATE PRESSURE PRODUCT PRESENTING: NORMAL
STRESS ECHO TARGET HR: 130.05 BPM
SYSTOLIC BLOOD PRESSURE: 177 MMHG

## 2019-08-02 PROCEDURE — 78452 STRESS TEST WITH MYOCARDIAL PERFUSION (CUPID ONLY): ICD-10-PCS | Mod: 26,HCNC,, | Performed by: INTERNAL MEDICINE

## 2019-08-02 PROCEDURE — 78452 HT MUSCLE IMAGE SPECT MULT: CPT | Mod: 26,HCNC,, | Performed by: INTERNAL MEDICINE

## 2019-08-02 PROCEDURE — 99999 PR PBB SHADOW E&M-EST. PATIENT-LVL II: CPT | Mod: PBBFAC,HCNC,,

## 2019-08-02 PROCEDURE — 93015 CV STRESS TEST SUPVJ I&R: CPT | Mod: HCNC,,, | Performed by: INTERNAL MEDICINE

## 2019-08-02 PROCEDURE — 93306 TRANSTHORACIC ECHO (TTE) COMPLETE (CUPID ONLY): ICD-10-PCS | Mod: HCNC,S$GLB,, | Performed by: INTERNAL MEDICINE

## 2019-08-02 PROCEDURE — 93015 STRESS TEST WITH MYOCARDIAL PERFUSION (CUPID ONLY): ICD-10-PCS | Mod: HCNC,,, | Performed by: INTERNAL MEDICINE

## 2019-08-02 PROCEDURE — 93306 TTE W/DOPPLER COMPLETE: CPT | Mod: HCNC,S$GLB,, | Performed by: INTERNAL MEDICINE

## 2019-08-02 PROCEDURE — 99999 PR PBB SHADOW E&M-EST. PATIENT-LVL II: ICD-10-PCS | Mod: PBBFAC,HCNC,,

## 2019-08-05 LAB
ASCENDING AORTA: 2.38 CM
AV INDEX (PROSTH): 0.69
AV MEAN GRADIENT: 4 MMHG
AV PEAK GRADIENT: 8 MMHG
AV VALVE AREA: 2.33 CM2
AV VELOCITY RATIO: 0.72
BSA FOR ECHO PROCEDURE: 1.99 M2
CV ECHO LV RWT: 0.42 CM
DOP CALC AO PEAK VEL: 1.38 M/S
DOP CALC AO VTI: 32.11 CM
DOP CALC LVOT AREA: 3.4 CM2
DOP CALC LVOT DIAMETER: 2.07 CM
DOP CALC LVOT PEAK VEL: 1 M/S
DOP CALC LVOT STROKE VOLUME: 74.67 CM3
DOP CALCLVOT PEAK VEL VTI: 22.2 CM
E WAVE DECELERATION TIME: 138.5 MSEC
E/A RATIO: 0.82
E/E' RATIO: 6.7 M/S
ECHO LV POSTERIOR WALL: 0.96 CM (ref 0.6–1.1)
FRACTIONAL SHORTENING: 44 % (ref 28–44)
INTERVENTRICULAR SEPTUM: 0.96 CM (ref 0.6–1.1)
IVRT: 0.1 MSEC
LA MAJOR: 4.69 CM
LA MINOR: 4.5 CM
LA WIDTH: 2.43 CM
LEFT ATRIUM SIZE: 3.27 CM
LEFT ATRIUM VOLUME INDEX: 16.1 ML/M2
LEFT ATRIUM VOLUME: 31.02 CM3
LEFT INTERNAL DIMENSION IN SYSTOLE: 2.54 CM (ref 2.1–4)
LEFT VENTRICLE DIASTOLIC VOLUME INDEX: 48.86 ML/M2
LEFT VENTRICLE DIASTOLIC VOLUME: 93.88 ML
LEFT VENTRICLE MASS INDEX: 76 G/M2
LEFT VENTRICLE SYSTOLIC VOLUME INDEX: 12.1 ML/M2
LEFT VENTRICLE SYSTOLIC VOLUME: 23.31 ML
LEFT VENTRICULAR INTERNAL DIMENSION IN DIASTOLE: 4.53 CM (ref 3.5–6)
LEFT VENTRICULAR MASS: 146.51 G
LV LATERAL E/E' RATIO: 5.58 M/S
LV SEPTAL E/E' RATIO: 8.38 M/S
MV PEAK A VEL: 0.82 M/S
MV PEAK E VEL: 0.67 M/S
PISA TR MAX VEL: 2.53 M/S
PULM VEIN S/D RATIO: 1.76
PV PEAK D VEL: 0.49 M/S
PV PEAK S VEL: 0.86 M/S
RA MAJOR: 4.06 CM
RA PRESSURE: 3 MMHG
RA WIDTH: 2.17 CM
RIGHT VENTRICULAR END-DIASTOLIC DIMENSION: 3.2 CM
SINUS: 2.6 CM
STJ: 2.27 CM
TDI LATERAL: 0.12 M/S
TDI SEPTAL: 0.08 M/S
TDI: 0.1 M/S
TR MAX PG: 26 MMHG
TRICUSPID ANNULAR PLANE SYSTOLIC EXCURSION: 2.02 CM
TV REST PULMONARY ARTERY PRESSURE: 29 MMHG

## 2019-08-08 ENCOUNTER — LAB VISIT (OUTPATIENT)
Dept: LAB | Facility: HOSPITAL | Age: 69
End: 2019-08-08
Attending: FAMILY MEDICINE
Payer: MEDICARE

## 2019-08-08 DIAGNOSIS — E78.5 HYPERLIPIDEMIA, UNSPECIFIED HYPERLIPIDEMIA TYPE: ICD-10-CM

## 2019-08-08 LAB
CHOLEST SERPL-MCNC: 184 MG/DL (ref 120–199)
CHOLEST/HDLC SERPL: 3 {RATIO} (ref 2–5)
HDLC SERPL-MCNC: 61 MG/DL (ref 40–75)
HDLC SERPL: 33.2 % (ref 20–50)
LDLC SERPL CALC-MCNC: 102 MG/DL (ref 63–159)
NONHDLC SERPL-MCNC: 123 MG/DL
TRIGL SERPL-MCNC: 105 MG/DL (ref 30–150)

## 2019-08-08 PROCEDURE — 80061 LIPID PANEL: CPT | Mod: HCNC

## 2019-08-08 PROCEDURE — 36415 COLL VENOUS BLD VENIPUNCTURE: CPT | Mod: HCNC,PO

## 2019-08-09 ENCOUNTER — CLINICAL SUPPORT (OUTPATIENT)
Dept: CARDIOLOGY | Facility: CLINIC | Age: 69
End: 2019-08-09
Attending: INTERNAL MEDICINE
Payer: MEDICARE

## 2019-08-09 PROCEDURE — 93268 CARDIAC EVENT MONITOR (CUPID ONLY): ICD-10-PCS | Mod: HCNC,S$GLB,, | Performed by: INTERNAL MEDICINE

## 2019-08-09 PROCEDURE — 93268 ECG RECORD/REVIEW: CPT | Mod: HCNC,S$GLB,, | Performed by: INTERNAL MEDICINE

## 2019-08-10 ENCOUNTER — PATIENT MESSAGE (OUTPATIENT)
Dept: FAMILY MEDICINE | Facility: CLINIC | Age: 69
End: 2019-08-10

## 2019-08-16 DIAGNOSIS — K21.9 GASTROESOPHAGEAL REFLUX DISEASE WITHOUT ESOPHAGITIS: ICD-10-CM

## 2019-08-27 ENCOUNTER — TELEPHONE (OUTPATIENT)
Dept: FAMILY MEDICINE | Facility: CLINIC | Age: 69
End: 2019-08-27

## 2019-08-27 NOTE — TELEPHONE ENCOUNTER
----- Message from Vicky Corcoran sent at 8/27/2019  2:49 PM CDT -----  Type:  Patient Returning Call    Who Called:  Patient   Who Left Message for Patient:  Patient did not know  Does the patient know what this is regarding?:  Patient did not know  Best Call Back Number:  [phone]  Additional Information:

## 2019-08-27 NOTE — TELEPHONE ENCOUNTER
Attempted to contact patient, the person who answered the number provided has no idea who Curtis Navarro is.

## 2019-09-09 ENCOUNTER — TELEPHONE (OUTPATIENT)
Dept: CARDIOLOGY | Facility: CLINIC | Age: 69
End: 2019-09-09

## 2019-09-09 NOTE — TELEPHONE ENCOUNTER
----- Message from Alanis Carvajal sent at 9/9/2019  2:53 PM CDT -----  Contact: self  Type: Needs Medical Advice    Who Called:  self  Symptoms (please be specific):    How long has patient had these symptoms:    Pharmacy name and phone #:    Best Call Back Number: [phone] (home)   Additional Information: Patient let the doctor know she is will be mailing the heart monitor back today. Please call patient if any questions. Thanks!

## 2019-09-14 DIAGNOSIS — K21.9 GASTROESOPHAGEAL REFLUX DISEASE WITHOUT ESOPHAGITIS: ICD-10-CM

## 2019-09-17 RX ORDER — OMEPRAZOLE 20 MG/1
40 CAPSULE, DELAYED RELEASE ORAL EVERY MORNING
Qty: 60 CAPSULE | Refills: 3 | Status: SHIPPED | OUTPATIENT
Start: 2019-09-17 | End: 2019-12-04 | Stop reason: DRUGHIGH

## 2019-09-24 ENCOUNTER — IMMUNIZATION (OUTPATIENT)
Dept: PHARMACY | Facility: CLINIC | Age: 69
End: 2019-09-24
Payer: MEDICARE

## 2019-09-24 ENCOUNTER — OFFICE VISIT (OUTPATIENT)
Dept: CARDIOLOGY | Facility: CLINIC | Age: 69
End: 2019-09-24
Payer: MEDICARE

## 2019-09-24 VITALS
WEIGHT: 195.56 LBS | HEIGHT: 63 IN | BODY MASS INDEX: 34.65 KG/M2 | DIASTOLIC BLOOD PRESSURE: 79 MMHG | HEART RATE: 73 BPM | SYSTOLIC BLOOD PRESSURE: 154 MMHG

## 2019-09-24 DIAGNOSIS — I10 ESSENTIAL HYPERTENSION: ICD-10-CM

## 2019-09-24 DIAGNOSIS — R00.2 PALPITATIONS: Primary | ICD-10-CM

## 2019-09-24 PROCEDURE — 1101F PR PT FALLS ASSESS DOC 0-1 FALLS W/OUT INJ PAST YR: ICD-10-PCS | Mod: HCNC,CPTII,S$GLB, | Performed by: INTERNAL MEDICINE

## 2019-09-24 PROCEDURE — 99214 OFFICE O/P EST MOD 30 MIN: CPT | Mod: HCNC,S$GLB,, | Performed by: INTERNAL MEDICINE

## 2019-09-24 PROCEDURE — 99999 PR PBB SHADOW E&M-EST. PATIENT-LVL III: ICD-10-PCS | Mod: PBBFAC,HCNC,, | Performed by: INTERNAL MEDICINE

## 2019-09-24 PROCEDURE — 1101F PT FALLS ASSESS-DOCD LE1/YR: CPT | Mod: HCNC,CPTII,S$GLB, | Performed by: INTERNAL MEDICINE

## 2019-09-24 PROCEDURE — 99214 PR OFFICE/OUTPT VISIT, EST, LEVL IV, 30-39 MIN: ICD-10-PCS | Mod: HCNC,S$GLB,, | Performed by: INTERNAL MEDICINE

## 2019-09-24 PROCEDURE — 3078F DIAST BP <80 MM HG: CPT | Mod: HCNC,CPTII,S$GLB, | Performed by: INTERNAL MEDICINE

## 2019-09-24 PROCEDURE — 3078F PR MOST RECENT DIASTOLIC BLOOD PRESSURE < 80 MM HG: ICD-10-PCS | Mod: HCNC,CPTII,S$GLB, | Performed by: INTERNAL MEDICINE

## 2019-09-24 PROCEDURE — 99999 PR PBB SHADOW E&M-EST. PATIENT-LVL III: CPT | Mod: PBBFAC,HCNC,, | Performed by: INTERNAL MEDICINE

## 2019-09-24 PROCEDURE — 3077F SYST BP >= 140 MM HG: CPT | Mod: HCNC,CPTII,S$GLB, | Performed by: INTERNAL MEDICINE

## 2019-09-24 PROCEDURE — 3077F PR MOST RECENT SYSTOLIC BLOOD PRESSURE >= 140 MM HG: ICD-10-PCS | Mod: HCNC,CPTII,S$GLB, | Performed by: INTERNAL MEDICINE

## 2019-09-24 NOTE — PROGRESS NOTES
Subjective:    Patient ID:  Curtis Navarro is a 69 y.o. female who presents for follow-up of hypertension    HPI  She comes with no complaints, no chest pain, no shortness of breath  No new issues. Not taking BP at home    Review of Systems   Constitution: Negative for decreased appetite, malaise/fatigue, weight gain and weight loss.   Cardiovascular: Negative for chest pain, dyspnea on exertion, leg swelling, palpitations and syncope.   Respiratory: Negative for cough and shortness of breath.    Gastrointestinal: Negative.    Neurological: Negative for weakness.   All other systems reviewed and are negative.       Objective:      Physical Exam   Constitutional: She is oriented to person, place, and time. She appears well-developed and well-nourished.   HENT:   Head: Normocephalic.   Eyes: Pupils are equal, round, and reactive to light.   Neck: Normal range of motion. Neck supple. No JVD present. Carotid bruit is not present. No thyromegaly present.   Cardiovascular: Normal rate, regular rhythm, normal heart sounds, intact distal pulses and normal pulses. PMI is not displaced. Exam reveals no gallop.   No murmur heard.  Pulmonary/Chest: Effort normal and breath sounds normal.   Abdominal: Soft. Normal appearance. She exhibits no mass. There is no hepatosplenomegaly. There is no tenderness.   Musculoskeletal: Normal range of motion. She exhibits no edema.   Neurological: She is alert and oriented to person, place, and time. She has normal strength and normal reflexes. No sensory deficit.   Skin: Skin is warm and intact.   Psychiatric: She has a normal mood and affect.   Nursing note and vitals reviewed.    Stress test , echo, event monitor reviewed      Assessment:       1. Palpitations    2. Essential hypertension         Plan:   Check BP on a regular basis  Continue all cardiac medications  Regular exercise program  Weight loss  1 yr f/u

## 2019-09-30 RX ORDER — MONTELUKAST SODIUM 10 MG/1
10 TABLET ORAL NIGHTLY
Qty: 30 TABLET | Refills: 5 | Status: SHIPPED | OUTPATIENT
Start: 2019-09-30 | End: 2019-10-30

## 2019-10-01 ENCOUNTER — HOSPITAL ENCOUNTER (OUTPATIENT)
Dept: RADIOLOGY | Facility: HOSPITAL | Age: 69
Discharge: HOME | End: 2019-10-01
Attending: OBSTETRICS & GYNECOLOGY
Payer: MEDICARE

## 2019-10-01 DIAGNOSIS — M85.80 OSTEOPENIA: ICD-10-CM

## 2019-10-01 PROCEDURE — 77080 DEXA BONE DENSITY SPINE HIP: ICD-10-PCS | Mod: 26,,, | Performed by: RADIOLOGY

## 2019-10-01 PROCEDURE — 77080 DXA BONE DENSITY AXIAL: CPT | Mod: 26,,, | Performed by: RADIOLOGY

## 2019-10-01 PROCEDURE — 77080 DXA BONE DENSITY AXIAL: CPT | Mod: TC,PO

## 2019-10-23 ENCOUNTER — PATIENT OUTREACH (OUTPATIENT)
Dept: ADMINISTRATIVE | Facility: HOSPITAL | Age: 69
End: 2019-10-23

## 2019-10-30 ENCOUNTER — HOSPITAL ENCOUNTER (OUTPATIENT)
Dept: RADIOLOGY | Facility: HOSPITAL | Age: 69
Discharge: HOME | End: 2019-10-30
Attending: OBSTETRICS & GYNECOLOGY
Payer: MEDICARE

## 2019-10-30 VITALS — BODY MASS INDEX: 34.65 KG/M2 | WEIGHT: 195.56 LBS | HEIGHT: 63 IN

## 2019-10-30 DIAGNOSIS — Z12.31 BREAST CANCER SCREENING BY MAMMOGRAM: ICD-10-CM

## 2019-10-30 PROCEDURE — 77067 MAMMO DIGITAL SCREENING BILAT WITH TOMOSYNTHESIS_CAD: ICD-10-PCS | Mod: 26,HCNC,, | Performed by: RADIOLOGY

## 2019-10-30 PROCEDURE — 77067 SCR MAMMO BI INCL CAD: CPT | Mod: 26,HCNC,, | Performed by: RADIOLOGY

## 2019-10-30 PROCEDURE — 77067 SCR MAMMO BI INCL CAD: CPT | Mod: TC,HCNC,PN

## 2019-10-30 PROCEDURE — 77063 BREAST TOMOSYNTHESIS BI: CPT | Mod: 26,HCNC,, | Performed by: RADIOLOGY

## 2019-10-30 PROCEDURE — 77063 MAMMO DIGITAL SCREENING BILAT WITH TOMOSYNTHESIS_CAD: ICD-10-PCS | Mod: 26,HCNC,, | Performed by: RADIOLOGY

## 2019-11-04 ENCOUNTER — OFFICE VISIT (OUTPATIENT)
Dept: FAMILY MEDICINE | Facility: CLINIC | Age: 69
End: 2019-11-04
Payer: MEDICARE

## 2019-11-04 VITALS
HEIGHT: 63 IN | SYSTOLIC BLOOD PRESSURE: 140 MMHG | BODY MASS INDEX: 34.88 KG/M2 | WEIGHT: 196.88 LBS | OXYGEN SATURATION: 98 % | HEART RATE: 84 BPM | TEMPERATURE: 99 F | DIASTOLIC BLOOD PRESSURE: 70 MMHG

## 2019-11-04 DIAGNOSIS — E78.5 HYPERLIPIDEMIA, UNSPECIFIED HYPERLIPIDEMIA TYPE: ICD-10-CM

## 2019-11-04 DIAGNOSIS — I10 HYPERTENSION, ESSENTIAL: Primary | ICD-10-CM

## 2019-11-04 PROCEDURE — 3077F PR MOST RECENT SYSTOLIC BLOOD PRESSURE >= 140 MM HG: ICD-10-PCS | Mod: CPTII,S$GLB,, | Performed by: FAMILY MEDICINE

## 2019-11-04 PROCEDURE — 1101F PR PT FALLS ASSESS DOC 0-1 FALLS W/OUT INJ PAST YR: ICD-10-PCS | Mod: CPTII,S$GLB,, | Performed by: FAMILY MEDICINE

## 2019-11-04 PROCEDURE — 99213 PR OFFICE/OUTPT VISIT, EST, LEVL III, 20-29 MIN: ICD-10-PCS | Mod: S$GLB,,, | Performed by: FAMILY MEDICINE

## 2019-11-04 PROCEDURE — 3077F SYST BP >= 140 MM HG: CPT | Mod: CPTII,S$GLB,, | Performed by: FAMILY MEDICINE

## 2019-11-04 PROCEDURE — 3078F DIAST BP <80 MM HG: CPT | Mod: CPTII,S$GLB,, | Performed by: FAMILY MEDICINE

## 2019-11-04 PROCEDURE — 99213 OFFICE O/P EST LOW 20 MIN: CPT | Mod: S$GLB,,, | Performed by: FAMILY MEDICINE

## 2019-11-04 PROCEDURE — 1101F PT FALLS ASSESS-DOCD LE1/YR: CPT | Mod: CPTII,S$GLB,, | Performed by: FAMILY MEDICINE

## 2019-11-04 PROCEDURE — 3078F PR MOST RECENT DIASTOLIC BLOOD PRESSURE < 80 MM HG: ICD-10-PCS | Mod: CPTII,S$GLB,, | Performed by: FAMILY MEDICINE

## 2019-11-04 RX ORDER — MELOXICAM 7.5 MG/1
1 TABLET ORAL DAILY PRN
Refills: 1 | COMMUNITY
Start: 2019-10-31 | End: 2019-11-20

## 2019-11-04 RX ORDER — LOSARTAN POTASSIUM 100 MG/1
100 TABLET ORAL DAILY
Qty: 90 TABLET | Refills: 1 | Status: SHIPPED | OUTPATIENT
Start: 2019-11-04 | End: 2020-04-25

## 2019-11-04 RX ORDER — ATORVASTATIN CALCIUM 20 MG/1
20 TABLET, FILM COATED ORAL DAILY
Qty: 90 TABLET | Refills: 1 | Status: SHIPPED | OUTPATIENT
Start: 2019-11-04 | End: 2019-12-04

## 2019-11-05 NOTE — PROGRESS NOTES
Subjective:       Patient ID: Curtis Navarro is a 69 y.o. female.    Chief Complaint: Follow-up (6 month follow up)    HPI   The patient is a 69-year-old who is here today her six-month follow-up.  Overall, she is doing well except for a meniscal injury for which she is seeing the orthopedist    Today, her blood pressure is high at 140/70.  With recent visits to other physicians, her blood pressure has also been consistently high.  She is currently taking Cozaar 50 mg once a day    Review of Systems   Constitutional: Negative for activity change, appetite change, chills, diaphoresis, fatigue, fever and unexpected weight change.   HENT: Negative for congestion, ear pain, postnasal drip, rhinorrhea, sinus pressure, sneezing, sore throat and trouble swallowing.    Eyes: Negative for pain, discharge and visual disturbance.   Respiratory: Negative for cough, chest tightness, shortness of breath and wheezing.    Cardiovascular: Negative for chest pain, palpitations and leg swelling.   Gastrointestinal: Negative for abdominal distention, abdominal pain, blood in stool, constipation, diarrhea, nausea and vomiting.   Endocrine: Negative for polydipsia and polyuria.   Genitourinary: Negative for difficulty urinating and hematuria.   Skin: Negative for rash.   Neurological: Negative for headaches.   Psychiatric/Behavioral: Negative for dysphoric mood.       Objective:      Physical Exam   Constitutional: She is oriented to person, place, and time. She appears well-developed and well-nourished. No distress.   HENT:   Head: Normocephalic and atraumatic.   Right Ear: Hearing, tympanic membrane, external ear and ear canal normal.   Left Ear: Hearing, tympanic membrane, external ear and ear canal normal.   Nose: Nose normal.   Mouth/Throat: Oropharynx is clear and moist and mucous membranes are normal. No oral lesions. No oropharyngeal exudate, posterior oropharyngeal edema or posterior oropharyngeal erythema.   Eyes: Pupils  "are equal, round, and reactive to light. Conjunctivae, EOM and lids are normal. No scleral icterus.   Neck: Normal range of motion. Neck supple. Carotid bruit is not present. No thyroid mass and no thyromegaly present.   Cardiovascular: Normal rate, regular rhythm and normal heart sounds.  No extrasystoles are present. PMI is not displaced. Exam reveals no gallop.   No murmur heard.  Pulmonary/Chest: Effort normal and breath sounds normal. No accessory muscle usage. No respiratory distress.   Clear to auscultation bilaterally.   Abdominal: Soft. Normal appearance and bowel sounds are normal. She exhibits no abdominal bruit. There is no hepatosplenomegaly. There is no tenderness. There is no rebound.   Lymphadenopathy:        Head (right side): No submental and no submandibular adenopathy present.        Head (left side): No submental and no submandibular adenopathy present.        Right cervical: No superficial cervical, no deep cervical and no posterior cervical adenopathy present.       Left cervical: No superficial cervical, no deep cervical and no posterior cervical adenopathy present.        Right: No supraclavicular adenopathy present.        Left: No supraclavicular adenopathy present.   Neurological: She is alert and oriented to person, place, and time.   Skin: Skin is warm, dry and intact.   Psychiatric: She has a normal mood and affect.     Blood pressure (!) 140/70, pulse 84, temperature 98.8 °F (37.1 °C), temperature source Oral, height 5' 3" (1.6 m), weight 89.3 kg (196 lb 13.9 oz), SpO2 98 %.Body mass index is 34.87 kg/m².          A/P:  1) hypertension.  Uncontrolled.  We will increase her Cozaar 50 mg to 1 mg once day.  We will see her back with the nurses for blood pressure recheck in 2 weeks so that we can make further adjustments if needed.  If she has any trouble with his higher dose of Cozaar, she will let me know .  We did discuss the digital hypertension medicine program which she will " consider  2)  Meniscal injury.  Follow up with Ortho as needed  3)  Hypothyroidism.  We will check a TSH with her next set of labs

## 2019-11-20 ENCOUNTER — TELEPHONE (OUTPATIENT)
Dept: FAMILY MEDICINE | Facility: CLINIC | Age: 69
End: 2019-11-20

## 2019-11-20 ENCOUNTER — CLINICAL SUPPORT (OUTPATIENT)
Dept: FAMILY MEDICINE | Facility: CLINIC | Age: 69
End: 2019-11-20
Payer: MEDICARE

## 2019-11-20 VITALS — HEART RATE: 90 BPM | SYSTOLIC BLOOD PRESSURE: 132 MMHG | DIASTOLIC BLOOD PRESSURE: 72 MMHG | OXYGEN SATURATION: 97 %

## 2019-11-20 NOTE — TELEPHONE ENCOUNTER
Patient presented to clinic for BP check.   Bp 140/72   After 15 minutes /72  HR 90    Patient stated she is no longer taking atorvastatin because she has been having knee pain for about 2 months now and felt as though it was from the increase in her dose of atorvastatin and discontinued about 1 week ago and stated her knee pain has gone away since discontinuing Rx. Please advise.

## 2019-11-20 NOTE — PROGRESS NOTES
Curtis Navarro 69 y.o. female is here today for Blood Pressure check.   History of HTN yes.    Review of patient's allergies indicates:   Allergen Reactions    Lisinopril Other (See Comments)     Cough    Azithromycin      Other reaction(s): Nausea  Other reaction(s): Diarrhea    Metronidazole hcl      Other reaction(s): Rash  Other reaction(s): Itching    Moxifloxacin      Other reaction(s): Rash    Sulfa (sulfonamide antibiotics)      Other reaction(s): Itching     Creatinine   Date Value Ref Range Status   05/02/2019 0.9 0.5 - 1.4 mg/dL Final     Sodium   Date Value Ref Range Status   05/02/2019 139 136 - 145 mmol/L Final     Potassium   Date Value Ref Range Status   05/02/2019 4.2 3.5 - 5.1 mmol/L Final   ]  Patient verifies taking blood pressure medications on a regular basis at the same time of the day.     Current Outpatient Medications:     aspirin (ECOTRIN) 81 MG EC tablet, Take 81 mg by mouth once daily., Disp: , Rfl:     B INFANTIS/B ANI/B JOSE M/B BIFID (PROBIOTIC 4X ORAL), Take 1 tablet by mouth once daily., Disp: , Rfl:     biotin 5 mg Cap, Take 5 mg by mouth once daily., Disp: , Rfl:     calcium-vitamin D3 500 mg(1,250mg) -200 unit per tablet, Take 1 tablet by mouth 2 (two) times daily with meals., Disp: , Rfl:     DIVIGEL 0.5 mg/0.5 gram (0.1 %) GlPk, Apply topically every other day. , Disp: , Rfl: 11    GLUCOSA HAHN 2KCL/CHONDROITIN HAHN (GLUCOSAMINE-CHONDROITIN DS ORAL), Take by mouth 2 (two) times daily., Disp: , Rfl:     KRILL/OM-3/DHA/EPA/PHOSPHO/AST (MEGARED OMEGA-3 KRILL OIL ORAL), Take 1 capsule by mouth once daily., Disp: , Rfl:     losartan (COZAAR) 100 MG tablet, Take 1 tablet (100 mg total) by mouth once daily., Disp: 90 tablet, Rfl: 1    mirabegron (MYRBETRIQ) 50 mg Tb24, Take 1 tablet (50 mg total) by mouth once daily., Disp: 90 tablet, Rfl: 3    MULTIVITAMIN ORAL, Take by mouth.  , Disp: , Rfl:     omeprazole (PRILOSEC) 20 MG capsule, TAKE 2 CAPSULES (40 MG TOTAL) BY  MOUTH EVERY MORNING. (Patient taking differently: Take 20 mg by mouth every morning. ), Disp: 60 capsule, Rfl: 3    progesterone (PROMETRIUM) 100 MG capsule, 100 mg once daily. , Disp: , Rfl:     ranitidine (ZANTAC) 150 MG tablet, TAKE 1 TABLET (150 MG TOTAL) BY MOUTH NIGHTLY., Disp: 30 tablet, Rfl: 3    SYNTHROID 125 mcg tablet, Take 1 tablet (125 mcg total) by mouth before breakfast., Disp: 30 tablet, Rfl: 11    atorvastatin (LIPITOR) 20 MG tablet, TAKE 1 TABLET (20 MG TOTAL) BY MOUTH ONCE DAILY. (Patient not taking: Reported on 11/20/2019), Disp: 90 tablet, Rfl: 1  Does patient have record of home blood pressure readings yes. Readings have been averaging unable to give average.   Last dose of blood pressure medication was taken at 0830.  Patient is asymptomatic.   Complains of N/A.    BP: (!) 140/72 , Pulse: 103 .    Blood pressure reading after 15 minutes was 132/72, Pulse 90.  Dr. Del Castillo notified.

## 2019-11-21 NOTE — TELEPHONE ENCOUNTER
Patient states she has only ever taken either 10mg or 20mg of atorvastatin. Most recent prescription patient was taking was 20mg daily, which she discontinued due to side effects. Patient states since DC'ing medication, the symptoms are not as severe and patient is not interested in increasing dosage.

## 2019-12-04 ENCOUNTER — OFFICE VISIT (OUTPATIENT)
Dept: FAMILY MEDICINE | Facility: CLINIC | Age: 69
End: 2019-12-04
Payer: MEDICARE

## 2019-12-04 VITALS
SYSTOLIC BLOOD PRESSURE: 130 MMHG | BODY MASS INDEX: 35.12 KG/M2 | HEIGHT: 63 IN | DIASTOLIC BLOOD PRESSURE: 68 MMHG | HEART RATE: 79 BPM | RESPIRATION RATE: 16 BRPM | OXYGEN SATURATION: 97 % | TEMPERATURE: 99 F | WEIGHT: 198.19 LBS

## 2019-12-04 DIAGNOSIS — R05.9 COUGH: ICD-10-CM

## 2019-12-04 DIAGNOSIS — J20.9 BRONCHITIS WITH BRONCHOSPASM: Primary | ICD-10-CM

## 2019-12-04 PROCEDURE — 1101F PT FALLS ASSESS-DOCD LE1/YR: CPT | Mod: CPTII,S$GLB,, | Performed by: NURSE PRACTITIONER

## 2019-12-04 PROCEDURE — 99213 PR OFFICE/OUTPT VISIT, EST, LEVL III, 20-29 MIN: ICD-10-PCS | Mod: S$GLB,,, | Performed by: NURSE PRACTITIONER

## 2019-12-04 PROCEDURE — 3075F SYST BP GE 130 - 139MM HG: CPT | Mod: CPTII,S$GLB,, | Performed by: NURSE PRACTITIONER

## 2019-12-04 PROCEDURE — 3078F DIAST BP <80 MM HG: CPT | Mod: CPTII,S$GLB,, | Performed by: NURSE PRACTITIONER

## 2019-12-04 PROCEDURE — 1101F PR PT FALLS ASSESS DOC 0-1 FALLS W/OUT INJ PAST YR: ICD-10-PCS | Mod: CPTII,S$GLB,, | Performed by: NURSE PRACTITIONER

## 2019-12-04 PROCEDURE — 1126F PR PAIN SEVERITY QUANTIFIED, NO PAIN PRESENT: ICD-10-PCS | Mod: S$GLB,,, | Performed by: NURSE PRACTITIONER

## 2019-12-04 PROCEDURE — 99213 OFFICE O/P EST LOW 20 MIN: CPT | Mod: S$GLB,,, | Performed by: NURSE PRACTITIONER

## 2019-12-04 PROCEDURE — 1126F AMNT PAIN NOTED NONE PRSNT: CPT | Mod: S$GLB,,, | Performed by: NURSE PRACTITIONER

## 2019-12-04 PROCEDURE — 1159F MED LIST DOCD IN RCRD: CPT | Mod: S$GLB,,, | Performed by: NURSE PRACTITIONER

## 2019-12-04 PROCEDURE — 3078F PR MOST RECENT DIASTOLIC BLOOD PRESSURE < 80 MM HG: ICD-10-PCS | Mod: CPTII,S$GLB,, | Performed by: NURSE PRACTITIONER

## 2019-12-04 PROCEDURE — 3075F PR MOST RECENT SYSTOLIC BLOOD PRESS GE 130-139MM HG: ICD-10-PCS | Mod: CPTII,S$GLB,, | Performed by: NURSE PRACTITIONER

## 2019-12-04 PROCEDURE — 1159F PR MEDICATION LIST DOCUMENTED IN MEDICAL RECORD: ICD-10-PCS | Mod: S$GLB,,, | Performed by: NURSE PRACTITIONER

## 2019-12-04 RX ORDER — FLUTICASONE PROPIONATE AND SALMETEROL 250; 50 UG/1; UG/1
1 POWDER RESPIRATORY (INHALATION) 2 TIMES DAILY
Qty: 14 EACH | Refills: 0 | Status: SHIPPED | OUTPATIENT
Start: 2019-12-04 | End: 2020-07-06

## 2019-12-04 RX ORDER — MONTELUKAST SODIUM 10 MG/1
10 TABLET ORAL DAILY
Refills: 5 | COMMUNITY
Start: 2019-11-04 | End: 2020-04-01

## 2019-12-04 RX ORDER — OMEPRAZOLE 20 MG/1
20 CAPSULE, DELAYED RELEASE ORAL DAILY
COMMUNITY
End: 2020-08-13

## 2019-12-04 RX ORDER — PREDNISONE 10 MG/1
10 TABLET ORAL DAILY
Qty: 5 TABLET | Refills: 0 | Status: SHIPPED | OUTPATIENT
Start: 2019-12-04 | End: 2020-07-06

## 2019-12-04 NOTE — PROGRESS NOTES
Answers for HPI/ROS submitted by the patient on 12/3/2019   Cough  Chronicity: new  Onset: 1 to 4 weeks ago  Progression since onset: waxing and waning  Frequency: hourly  Cough characteristics: non-productive  chest pain: No  chills: No  ear congestion: No  ear pain: No  fever: No  headaches: Yes  heartburn: No  hemoptysis: No  myalgias: No  nasal congestion: No  postnasal drip: Yes  rash: No  rhinorrhea: No  shortness of breath: No  sore throat: No  sweats: No  weight loss: No  wheezing: No  asthma: No  bronchiectasis: No  bronchitis: No  COPD: No  emphysema: No  environmental allergies: No  pneumonia: No  Improvement on treatment: moderate    Subjective:       Patient ID: Curtis Navarro is a 69 y.o. female.    Chief Complaint: Cough (chest congestion x 3 weeks ) and Sinus Problem    HPI here with concerns regarding cough for the past 3 weeks. Dry cough most of the time. Has noticed some wheezing. No fever. Constant type cough. See ROS.    The following portion of the patients history was reviewed and updated as appropriate: allergies, current medications, past medical and surgical history. Past social history and problem list reviewed. Family PMH and Past social history reviewed. Tobacco, Illicit drug use reviewed.      Review of patient's allergies indicates:   Allergen Reactions    Lisinopril Other (See Comments)     Cough    Azithromycin      Other reaction(s): Nausea  Other reaction(s): Diarrhea    Metronidazole hcl      Other reaction(s): Rash  Other reaction(s): Itching    Moxifloxacin      Other reaction(s): Rash    Sulfa (sulfonamide antibiotics)      Other reaction(s): Itching         Current Outpatient Medications:     aspirin (ECOTRIN) 81 MG EC tablet, Take 81 mg by mouth once daily., Disp: , Rfl:     B INFANTIS/B ANI/B JOSE M/B BIFID (PROBIOTIC 4X ORAL), Take 1 tablet by mouth once daily., Disp: , Rfl:     biotin 5 mg Cap, Take 5 mg by mouth once daily., Disp: , Rfl:     calcium-vitamin D3  500 mg(1,250mg) -200 unit per tablet, Take 1 tablet by mouth 2 (two) times daily with meals., Disp: , Rfl:     DIVIGEL 0.5 mg/0.5 gram (0.1 %) GlPk, Apply topically every other day. , Disp: , Rfl: 11    GLUCOSA HAHN 2KCL/CHONDROITIN HAHN (GLUCOSAMINE-CHONDROITIN DS ORAL), Take by mouth 2 (two) times daily., Disp: , Rfl:     KRILL/OM-3/DHA/EPA/PHOSPHO/AST (MEGARED OMEGA-3 KRILL OIL ORAL), Take 1 capsule by mouth once daily., Disp: , Rfl:     losartan (COZAAR) 100 MG tablet, Take 1 tablet (100 mg total) by mouth once daily., Disp: 90 tablet, Rfl: 1    mirabegron (MYRBETRIQ) 50 mg Tb24, Take 1 tablet (50 mg total) by mouth once daily., Disp: 90 tablet, Rfl: 3    montelukast (SINGULAIR) 10 mg tablet, Take 10 mg by mouth once daily. , Disp: , Rfl: 5    MULTIVITAMIN ORAL, Take by mouth.  , Disp: , Rfl:     omeprazole (PRILOSEC) 20 MG capsule, Take 20 mg by mouth once daily., Disp: , Rfl:     progesterone (PROMETRIUM) 100 MG capsule, 100 mg once daily. , Disp: , Rfl:     SYNTHROID 125 mcg tablet, Take 1 tablet (125 mcg total) by mouth before breakfast., Disp: 30 tablet, Rfl: 11    Past Medical History:   Diagnosis Date    Allergy     Arthritis     Carotid arterial disease     last us 5/19 next due 5/21    Cataract     Colon polyp     Diverticulitis     Diverticulosis     GERD (gastroesophageal reflux disease)     Graves disease     s/p radioactive iodine in 42    H/O esophagogastroduodenoscopy     8/14 and 6/17    H/O percutaneous left heart catheterization     normal 5/12    History of colon polyps     History of esophagitis     egd 8/14    History of skin cancer     L neck    Hyperlipidemia     Hypertension     Hypothyroidism     s/p radioactive iodine    IBS (irritable bowel syndrome)     Mild luminal irregularity of carotid artery on ultrasound     noted on 5/16 usg with next due 5/18    Osteopenia     7/13, 7/15    S/P colonoscopy     7/17;  next due 7/22    Thyroid nodule     last usg  "5/19;  next due 5/21       Past Surgical History:   Procedure Laterality Date    CARDIAC SURGERY  2013    angiogram (negative)    COLONOSCOPY  07/21/2010    Dr. Velázquez; in legacy, repeat in 6-7 years    COLONOSCOPY N/A 6/27/2017    Procedure: COLONOSCOPY;  Surgeon: Hamlet Velázquez Jr., MD;  Location: [facility];  Service: Endoscopy;  Laterality: N/A; repeat in 5 years for surveillance    COSMETIC SURGERY      Liposuction to neck    ESOPHAGOGASTRODUODENOSCOPY N/A 7/12/2018    Procedure: EGD (ESOPHAGOGASTRODUODENOSCOPY);  Surgeon: Hamlet Velázquez Jr., MD;  Location: [facility];  Service: Endoscopy;  Laterality: N/A;    SKIN CANCER EXCISION      with Mohs on left neck    TONSILLECTOMY      UPPER GASTROINTESTINAL ENDOSCOPY  08/04/2014    Dr. Velázquez    UPPER GASTROINTESTINAL ENDOSCOPY  06/27/2017    DR. VELÁZQUEZ    UPPER GASTROINTESTINAL ENDOSCOPY  07/12/2018    Dr. Velázquez: "White nummular lesions in esophageal mucosa (benign)", antritis, gastric polyps removed       Social History     Socioeconomic History    Marital status:      Spouse name: Not on file    Number of children: Not on file    Years of education: Not on file    Highest education level: Not on file   Occupational History    Not on file   Social Needs    Financial resource strain: Not on file    Food insecurity:     Worry: Not on file     Inability: Not on file    Transportation needs:     Medical: Not on file     Non-medical: Not on file   Tobacco Use    Smoking status: Never Smoker    Smokeless tobacco: Never Used   Substance and Sexual Activity    Alcohol use: Yes     Comment: very rare    Drug use: No    Sexual activity: Yes     Partners: Male     Birth control/protection: Post-menopausal   Lifestyle    Physical activity:     Days per week: Not on file     Minutes per session: Not on file    Stress: Not on file   Relationships    Social connections:     Talks on phone: Not on file     Gets together: Not on file     Attends " "Episcopal service: Not on file     Active member of club or organization: Not on file     Attends meetings of clubs or organizations: Not on file     Relationship status: Not on file   Other Topics Concern    Not on file   Social History Narrative         Review of Systems   Constitutional: Negative for chills, fatigue and fever.   HENT: Positive for postnasal drip. Negative for ear pain, rhinorrhea, sinus pressure, sinus pain and sore throat.    Respiratory: Positive for cough. Negative for chest tightness and shortness of breath. Wheezing: at times.    Cardiovascular: Negative for chest pain and palpitations.   Gastrointestinal: Negative for abdominal pain, diarrhea, nausea and vomiting.        She has acid reflux   Musculoskeletal: Negative for arthralgias, back pain, gait problem and myalgias.   Skin: Negative for rash.   Allergic/Immunologic: Negative for environmental allergies.   Neurological: Positive for headaches (not constant). Negative for light-headedness.       Objective:      /68   Pulse 79   Temp 98.6 °F (37 °C) (Oral)   Resp 16   Ht 5' 3" (1.6 m)   Wt 89.9 kg (198 lb 3.1 oz)   SpO2 97%   BMI 35.11 kg/m²       Physical Exam   Constitutional: She is oriented to person, place, and time. She appears well-developed and well-nourished. No distress.   HENT:   Head: Normocephalic.   Right Ear: Tympanic membrane, external ear and ear canal normal.   Left Ear: Tympanic membrane, external ear and ear canal normal.   Nose: Rhinorrhea present. Right sinus exhibits no maxillary sinus tenderness and no frontal sinus tenderness. Left sinus exhibits no maxillary sinus tenderness and no frontal sinus tenderness.   Mouth/Throat: Uvula is midline, oropharynx is clear and moist and mucous membranes are normal.   Eyes: Pupils are equal, round, and reactive to light. Conjunctivae and EOM are normal.   Neck: Trachea normal and normal range of motion. Neck supple. No tracheal tenderness present. No " tracheal deviation and no edema present. No thyromegaly present.   Cardiovascular: Normal rate, regular rhythm and normal heart sounds. Exam reveals no gallop.   No murmur heard.  Pulmonary/Chest: No stridor. No respiratory distress. She has wheezes (end exp wheezing mild) in the right lower field and the left lower field. She has no rhonchi. She has no rales.   Musculoskeletal: Normal range of motion.   Gait and coordination normal.  strong, equal. Upper and lower extremity strength normal.    Lymphadenopathy:        Head (right side): No submandibular adenopathy present.        Head (left side): No submandibular adenopathy present.   Neurological: She is alert and oriented to person, place, and time. She has normal strength.   Skin: Skin is warm and dry. Capillary refill takes less than 2 seconds. No lesion and no rash noted.   Psychiatric: She has a normal mood and affect. Her speech is normal and behavior is normal.       Assessment:       1. Bronchitis with bronchospasm    2. Cough        Plan:       Bronchitis with bronchospasm: will give low dose steroids for inflammation. Advair- rinse and spit after each use.     Cough: she has tessalon perles at home she can use. Continue claritin daily. Hydrate well.     Other orders  -     predniSONE (DELTASONE) 10 MG tablet; Take 1 tablet (10 mg total) by mouth once daily.  Dispense: 5 tablet; Refill: 0  -     fluticasone-salmeterol diskus inhaler 250-50 mcg; Inhale 1 puff into the lungs 2 (two) times daily. Controller  Dispense: 14 each; Refill: 0       Continue current medication  Take medications only as prescribed  Healthy diet, exercise  Adequate rest  Adequate hydration  Avoid allergens  Avoid excessive caffeine     Follow up in a few days if not improving, sooner if issues arise.

## 2019-12-05 ENCOUNTER — OFFICE VISIT (OUTPATIENT)
Dept: OPTOMETRY | Facility: CLINIC | Age: 69
End: 2019-12-05
Payer: MEDICARE

## 2019-12-05 DIAGNOSIS — H04.123 BILATERAL DRY EYES: ICD-10-CM

## 2019-12-05 DIAGNOSIS — Z46.0 FITTING AND ADJUSTMENT OF SPECTACLES AND CONTACT LENSES: Primary | ICD-10-CM

## 2019-12-05 DIAGNOSIS — H52.7 REFRACTIVE ERROR: ICD-10-CM

## 2019-12-05 DIAGNOSIS — H25.13 NUCLEAR SCLEROSIS, BILATERAL: Primary | ICD-10-CM

## 2019-12-05 PROCEDURE — 99499 UNLISTED E&M SERVICE: CPT | Mod: S$GLB,,, | Performed by: OPTOMETRIST

## 2019-12-05 PROCEDURE — 92015 PR REFRACTION: ICD-10-PCS | Mod: HCNC,S$GLB,, | Performed by: OPTOMETRIST

## 2019-12-05 PROCEDURE — 99999 PR PBB SHADOW E&M-EST. PATIENT-LVL II: ICD-10-PCS | Mod: PBBFAC,HCNC,, | Performed by: OPTOMETRIST

## 2019-12-05 PROCEDURE — 99499 RISK ADDL DX/OHS AUDIT: ICD-10-PCS | Mod: S$GLB,,, | Performed by: OPTOMETRIST

## 2019-12-05 PROCEDURE — 92014 PR EYE EXAM, EST PATIENT,COMPREHESV: ICD-10-PCS | Mod: HCNC,S$GLB,, | Performed by: OPTOMETRIST

## 2019-12-05 PROCEDURE — 99999 PR PBB SHADOW E&M-EST. PATIENT-LVL II: CPT | Mod: PBBFAC,HCNC,, | Performed by: OPTOMETRIST

## 2019-12-05 PROCEDURE — 92015 DETERMINE REFRACTIVE STATE: CPT | Mod: HCNC,S$GLB,, | Performed by: OPTOMETRIST

## 2019-12-05 PROCEDURE — 92310 PR CONTACT LENS FITTING (NO CHANGE): ICD-10-PCS | Mod: CSM,,, | Performed by: OPTOMETRIST

## 2019-12-05 PROCEDURE — 92014 COMPRE OPH EXAM EST PT 1/>: CPT | Mod: HCNC,S$GLB,, | Performed by: OPTOMETRIST

## 2019-12-05 PROCEDURE — 92310 CONTACT LENS FITTING OU: CPT | Mod: CSM,,, | Performed by: OPTOMETRIST

## 2019-12-05 NOTE — PROGRESS NOTES
HPI     DLE- 11/21/18     Pt is here for routine eye exam with CL fit. Pt states, no changes since   last seen. Denies eye pain. Pt would like to look into other CL options.   Pt states she struggles with her near va. Pt would like to explore the   monthly CL options as well as a multifocal or one eye distance/one eye   near. Denies sleeping in CL    Last edited by Praful Calloway on 12/5/2019 10:23 AM. (History)            Assessment /Plan     For exam results, see Encounter Report.    Nuclear sclerosis, bilateral    Bilateral dry eyes    Refractive error      1. Educated pt on presence of cataracts and effects on vision. No surgery at this time. Recheck in one year.  2. Recommend artificial tears. 1 drop 2x per day. Chronicity of disease and treatment discussed.  3. New Spec Rx given. Different lens options discussed with patient. RTC 1 year full exam.

## 2020-01-02 ENCOUNTER — PATIENT OUTREACH (OUTPATIENT)
Dept: ADMINISTRATIVE | Facility: HOSPITAL | Age: 70
End: 2020-01-02

## 2020-01-02 NOTE — PROGRESS NOTES
Health Maintenance Due   Topic Date Due    Shingles Vaccine (2 of 3) 07/31/2014     Future Appointments   Date Time Provider Department Center   1/15/2020 10:10 AM Komal Del Castillo MD [facility] MED [place]   6/29/2020  8:05 AM LAB, [facility] LAB [place]   7/6/2020  9:50 AM Komal Del Castillo MD [facility] MED [place]   10/2/2020  8:45 AM Pieter Corral MD [facility] CARDIO [place]

## 2020-01-22 ENCOUNTER — TELEPHONE (OUTPATIENT)
Dept: OPTOMETRY | Facility: CLINIC | Age: 70
End: 2020-01-22

## 2020-04-01 RX ORDER — MONTELUKAST SODIUM 10 MG/1
TABLET ORAL
Qty: 30 TABLET | Refills: 5 | Status: SHIPPED | OUTPATIENT
Start: 2020-04-01 | End: 2020-09-25

## 2020-04-15 ENCOUNTER — TELEPHONE (OUTPATIENT)
Dept: OPTOMETRY | Facility: CLINIC | Age: 70
End: 2020-04-15

## 2020-04-15 NOTE — TELEPHONE ENCOUNTER
----- Message from Isabel Stone sent at 4/15/2020  9:29 AM CDT -----  Contact: Patient  Type: Needs Medical Advice  Who Called:  Pt  Best Call Back Number: [phone]  Additional Information: patient is requesting a call back from office in regards to her eyes are very stitch. Please call back and advise.

## 2020-04-25 RX ORDER — LOSARTAN POTASSIUM 100 MG/1
100 TABLET ORAL DAILY
Qty: 90 TABLET | Refills: 0 | Status: SHIPPED | OUTPATIENT
Start: 2020-04-25 | End: 2020-08-14

## 2020-05-05 ENCOUNTER — PATIENT MESSAGE (OUTPATIENT)
Dept: ADMINISTRATIVE | Facility: HOSPITAL | Age: 70
End: 2020-05-05

## 2020-06-12 RX ORDER — LEVOTHYROXINE SODIUM 125 UG/1
125 TABLET ORAL
Qty: 30 TABLET | Refills: 0 | Status: SHIPPED | OUTPATIENT
Start: 2020-06-12 | End: 2020-07-06

## 2020-06-29 ENCOUNTER — LAB VISIT (OUTPATIENT)
Dept: LAB | Facility: HOSPITAL | Age: 70
End: 2020-06-29
Attending: FAMILY MEDICINE
Payer: MEDICARE

## 2020-06-29 DIAGNOSIS — I10 HYPERTENSION, ESSENTIAL: ICD-10-CM

## 2020-06-29 LAB
ALBUMIN SERPL BCP-MCNC: 4.2 G/DL (ref 3.5–5.2)
ALP SERPL-CCNC: 106 U/L (ref 55–135)
ALT SERPL W/O P-5'-P-CCNC: 22 U/L (ref 10–44)
ANION GAP SERPL CALC-SCNC: 8 MMOL/L (ref 8–16)
AST SERPL-CCNC: 21 U/L (ref 10–40)
BASOPHILS # BLD AUTO: 0.06 K/UL (ref 0–0.2)
BASOPHILS NFR BLD: 0.8 % (ref 0–1.9)
BILIRUB SERPL-MCNC: 0.4 MG/DL (ref 0.1–1)
BUN SERPL-MCNC: 16 MG/DL (ref 8–23)
CALCIUM SERPL-MCNC: 10.3 MG/DL (ref 8.7–10.5)
CHLORIDE SERPL-SCNC: 104 MMOL/L (ref 95–110)
CHOLEST SERPL-MCNC: 192 MG/DL (ref 120–199)
CHOLEST/HDLC SERPL: 3.5 {RATIO} (ref 2–5)
CO2 SERPL-SCNC: 29 MMOL/L (ref 23–29)
CREAT SERPL-MCNC: 0.9 MG/DL (ref 0.5–1.4)
DIFFERENTIAL METHOD: ABNORMAL
EOSINOPHIL # BLD AUTO: 0.2 K/UL (ref 0–0.5)
EOSINOPHIL NFR BLD: 2.9 % (ref 0–8)
ERYTHROCYTE [DISTWIDTH] IN BLOOD BY AUTOMATED COUNT: 13.2 % (ref 11.5–14.5)
EST. GFR  (AFRICAN AMERICAN): >60 ML/MIN/1.73 M^2
EST. GFR  (NON AFRICAN AMERICAN): >60 ML/MIN/1.73 M^2
GLUCOSE SERPL-MCNC: 102 MG/DL (ref 70–110)
HCT VFR BLD AUTO: 45.3 % (ref 37–48.5)
HDLC SERPL-MCNC: 55 MG/DL (ref 40–75)
HDLC SERPL: 28.6 % (ref 20–50)
HGB BLD-MCNC: 14.3 G/DL (ref 12–16)
IMM GRANULOCYTES # BLD AUTO: 0.02 K/UL (ref 0–0.04)
IMM GRANULOCYTES NFR BLD AUTO: 0.3 % (ref 0–0.5)
LDLC SERPL CALC-MCNC: 114.4 MG/DL (ref 63–159)
LYMPHOCYTES # BLD AUTO: 3.3 K/UL (ref 1–4.8)
LYMPHOCYTES NFR BLD: 44.8 % (ref 18–48)
MCH RBC QN AUTO: 28.1 PG (ref 27–31)
MCHC RBC AUTO-ENTMCNC: 31.6 G/DL (ref 32–36)
MCV RBC AUTO: 89 FL (ref 82–98)
MONOCYTES # BLD AUTO: 0.8 K/UL (ref 0.3–1)
MONOCYTES NFR BLD: 10.7 % (ref 4–15)
NEUTROPHILS # BLD AUTO: 3 K/UL (ref 1.8–7.7)
NEUTROPHILS NFR BLD: 40.5 % (ref 38–73)
NONHDLC SERPL-MCNC: 137 MG/DL
NRBC BLD-RTO: 0 /100 WBC
PLATELET # BLD AUTO: 285 K/UL (ref 150–350)
PMV BLD AUTO: 10.6 FL (ref 9.2–12.9)
POTASSIUM SERPL-SCNC: 4.7 MMOL/L (ref 3.5–5.1)
PROT SERPL-MCNC: 7.7 G/DL (ref 6–8.4)
RBC # BLD AUTO: 5.09 M/UL (ref 4–5.4)
SODIUM SERPL-SCNC: 141 MMOL/L (ref 136–145)
T4 FREE SERPL-MCNC: 1.24 NG/DL (ref 0.71–1.51)
TRIGL SERPL-MCNC: 113 MG/DL (ref 30–150)
TSH SERPL DL<=0.005 MIU/L-ACNC: 0.17 UIU/ML (ref 0.4–4)
WBC # BLD AUTO: 7.3 K/UL (ref 3.9–12.7)

## 2020-06-29 PROCEDURE — 36415 COLL VENOUS BLD VENIPUNCTURE: CPT | Mod: HCNC,PO

## 2020-06-29 PROCEDURE — 80053 COMPREHEN METABOLIC PANEL: CPT | Mod: HCNC

## 2020-06-29 PROCEDURE — 85025 COMPLETE CBC W/AUTO DIFF WBC: CPT | Mod: HCNC

## 2020-06-29 PROCEDURE — 84443 ASSAY THYROID STIM HORMONE: CPT | Mod: HCNC

## 2020-06-29 PROCEDURE — 84439 ASSAY OF FREE THYROXINE: CPT | Mod: HCNC

## 2020-06-29 PROCEDURE — 80061 LIPID PANEL: CPT | Mod: HCNC

## 2020-07-06 ENCOUNTER — HOSPITAL ENCOUNTER (OUTPATIENT)
Dept: RADIOLOGY | Facility: HOSPITAL | Age: 70
Discharge: HOME | End: 2020-07-06
Attending: FAMILY MEDICINE
Payer: MEDICARE

## 2020-07-06 ENCOUNTER — OFFICE VISIT (OUTPATIENT)
Dept: FAMILY MEDICINE | Facility: CLINIC | Age: 70
End: 2020-07-06
Payer: MEDICARE

## 2020-07-06 VITALS
HEART RATE: 82 BPM | OXYGEN SATURATION: 97 % | BODY MASS INDEX: 32.54 KG/M2 | WEIGHT: 183.63 LBS | DIASTOLIC BLOOD PRESSURE: 72 MMHG | HEIGHT: 63 IN | SYSTOLIC BLOOD PRESSURE: 128 MMHG | TEMPERATURE: 98 F

## 2020-07-06 DIAGNOSIS — I10 HYPERTENSION, ESSENTIAL: ICD-10-CM

## 2020-07-06 DIAGNOSIS — R06.02 SOB (SHORTNESS OF BREATH): ICD-10-CM

## 2020-07-06 DIAGNOSIS — M54.6 BILATERAL THORACIC BACK PAIN, UNSPECIFIED CHRONICITY: ICD-10-CM

## 2020-07-06 DIAGNOSIS — E03.4 HYPOTHYROIDISM DUE TO ACQUIRED ATROPHY OF THYROID: Primary | ICD-10-CM

## 2020-07-06 DIAGNOSIS — E78.5 HYPERLIPIDEMIA, UNSPECIFIED HYPERLIPIDEMIA TYPE: ICD-10-CM

## 2020-07-06 PROCEDURE — 3078F PR MOST RECENT DIASTOLIC BLOOD PRESSURE < 80 MM HG: ICD-10-PCS | Mod: CPTII,S$GLB,, | Performed by: FAMILY MEDICINE

## 2020-07-06 PROCEDURE — 3074F SYST BP LT 130 MM HG: CPT | Mod: CPTII,S$GLB,, | Performed by: FAMILY MEDICINE

## 2020-07-06 PROCEDURE — 3078F DIAST BP <80 MM HG: CPT | Mod: CPTII,S$GLB,, | Performed by: FAMILY MEDICINE

## 2020-07-06 PROCEDURE — 1126F AMNT PAIN NOTED NONE PRSNT: CPT | Mod: S$GLB,,, | Performed by: FAMILY MEDICINE

## 2020-07-06 PROCEDURE — 1126F PR PAIN SEVERITY QUANTIFIED, NO PAIN PRESENT: ICD-10-PCS | Mod: S$GLB,,, | Performed by: FAMILY MEDICINE

## 2020-07-06 PROCEDURE — 3008F PR BODY MASS INDEX (BMI) DOCUMENTED: ICD-10-PCS | Mod: CPTII,S$GLB,, | Performed by: FAMILY MEDICINE

## 2020-07-06 PROCEDURE — 1159F MED LIST DOCD IN RCRD: CPT | Mod: S$GLB,,, | Performed by: FAMILY MEDICINE

## 2020-07-06 PROCEDURE — 72070 XR THORACIC SPINE AP LATERAL: ICD-10-PCS | Mod: 26,HCNC,, | Performed by: RADIOLOGY

## 2020-07-06 PROCEDURE — 99499 RISK ADDL DX/OHS AUDIT: ICD-10-PCS | Mod: S$GLB,,, | Performed by: FAMILY MEDICINE

## 2020-07-06 PROCEDURE — 71046 X-RAY EXAM CHEST 2 VIEWS: CPT | Mod: 26,HCNC,, | Performed by: RADIOLOGY

## 2020-07-06 PROCEDURE — 99214 OFFICE O/P EST MOD 30 MIN: CPT | Mod: S$GLB,,, | Performed by: FAMILY MEDICINE

## 2020-07-06 PROCEDURE — 1101F PT FALLS ASSESS-DOCD LE1/YR: CPT | Mod: CPTII,S$GLB,, | Performed by: FAMILY MEDICINE

## 2020-07-06 PROCEDURE — 99499 UNLISTED E&M SERVICE: CPT | Mod: S$GLB,,, | Performed by: FAMILY MEDICINE

## 2020-07-06 PROCEDURE — 72070 X-RAY EXAM THORAC SPINE 2VWS: CPT | Mod: 26,HCNC,, | Performed by: RADIOLOGY

## 2020-07-06 PROCEDURE — 3008F BODY MASS INDEX DOCD: CPT | Mod: CPTII,S$GLB,, | Performed by: FAMILY MEDICINE

## 2020-07-06 PROCEDURE — 99214 PR OFFICE/OUTPT VISIT, EST, LEVL IV, 30-39 MIN: ICD-10-PCS | Mod: S$GLB,,, | Performed by: FAMILY MEDICINE

## 2020-07-06 PROCEDURE — 71046 XR CHEST PA AND LATERAL: ICD-10-PCS | Mod: 26,HCNC,, | Performed by: RADIOLOGY

## 2020-07-06 PROCEDURE — 1159F PR MEDICATION LIST DOCUMENTED IN MEDICAL RECORD: ICD-10-PCS | Mod: S$GLB,,, | Performed by: FAMILY MEDICINE

## 2020-07-06 PROCEDURE — 71046 X-RAY EXAM CHEST 2 VIEWS: CPT | Mod: TC,HCNC,FY,PO

## 2020-07-06 PROCEDURE — 72070 X-RAY EXAM THORAC SPINE 2VWS: CPT | Mod: TC,HCNC,FY,PO

## 2020-07-06 PROCEDURE — 1101F PR PT FALLS ASSESS DOC 0-1 FALLS W/OUT INJ PAST YR: ICD-10-PCS | Mod: CPTII,S$GLB,, | Performed by: FAMILY MEDICINE

## 2020-07-06 PROCEDURE — 3074F PR MOST RECENT SYSTOLIC BLOOD PRESSURE < 130 MM HG: ICD-10-PCS | Mod: CPTII,S$GLB,, | Performed by: FAMILY MEDICINE

## 2020-07-06 RX ORDER — PRAMIPEXOLE DIHYDROCHLORIDE 0.25 MG/1
0.25 TABLET ORAL NIGHTLY
Qty: 30 TABLET | Refills: 2 | Status: SHIPPED | OUTPATIENT
Start: 2020-07-06 | End: 2020-11-06

## 2020-07-06 RX ORDER — LEVOTHYROXINE SODIUM 112 UG/1
112 TABLET ORAL
Qty: 30 TABLET | Refills: 1 | Status: SHIPPED | OUTPATIENT
Start: 2020-07-06 | End: 2020-08-20

## 2020-07-06 RX ORDER — ATORVASTATIN CALCIUM 20 MG/1
1 TABLET, FILM COATED ORAL DAILY
COMMUNITY
Start: 2020-06-29 | End: 2020-12-13

## 2020-07-12 NOTE — PROGRESS NOTES
Subjective:       Patient ID: Curtis Navarro is a 70 y.o. female.    Chief Complaint: Back Pain and Follow-up    HPI   The patient is a 70-year-old who is here today for follow-up.  Since I have seen her last, she has really changed her diet and been more physically active biking regularly.  Since December, she has lost 15 lbs.  Her  has had some issues with his diabetes which has helped motivate her to eat healthy are exercise and lose weight    Today we discussed the followin) Back pain.  She is having upper to mid back pain.  She has had this for months.  It is worse with activity.  She feels as if she needs to take her bra off and take deep breaths in.    She sometimes feels short of breath.  This usually lasts for several minutes.  She does find that reclining a bit will help.  She denies any chest discomfort.  Of note, last year she did have cardiac evaluation which included an echo, alternate stress test all of which were normal  2) hyperlipidemia.  She is doing with her Lipitor.  Her recent LDL was 114 and her total cholesterol was 192  3) hypothyroidism.  Her recent TSH was slightly low at 0.174 and her T4 was normal at 1.24.  She is interested in trying a lower dose of medicine to see if that helps her feel better.      4)   Hypertension.  She is currently taking Cozaar.  Her blood pressure is 128/72    Review of Systems   Constitutional: Positive for activity change. Negative for appetite change, chills, diaphoresis, fatigue, fever and unexpected weight change.   HENT: Negative for congestion, ear pain, hearing loss, postnasal drip, rhinorrhea, sinus pressure, sneezing, sore throat and trouble swallowing.    Eyes: Negative for pain, discharge and visual disturbance.   Respiratory: Positive for shortness of breath. Negative for cough, chest tightness and wheezing.    Cardiovascular: Negative for chest pain, palpitations and leg swelling.   Gastrointestinal: Negative for abdominal  distention, abdominal pain, blood in stool, constipation, diarrhea, nausea and vomiting.   Endocrine: Negative for polydipsia and polyuria.   Genitourinary: Negative for difficulty urinating, dysuria, hematuria and menstrual problem.   Musculoskeletal: Positive for arthralgias and back pain. Negative for neck pain.   Skin: Negative for rash.   Neurological: Negative for weakness and headaches.   Psychiatric/Behavioral: Negative for confusion and dysphoric mood.       Objective:      Physical Exam  Constitutional:       General: She is not in acute distress.     Appearance: Normal appearance. She is well-developed.   HENT:      Head: Normocephalic and atraumatic.      Right Ear: Hearing, tympanic membrane, ear canal and external ear normal.      Left Ear: Hearing, tympanic membrane, ear canal and external ear normal.      Nose: Nose normal.      Mouth/Throat:      Mouth: No oral lesions.      Pharynx: No oropharyngeal exudate or posterior oropharyngeal erythema.   Eyes:      General: Lids are normal. No scleral icterus.     Conjunctiva/sclera: Conjunctivae normal.      Pupils: Pupils are equal, round, and reactive to light.   Neck:      Musculoskeletal: Normal range of motion and neck supple.      Thyroid: No thyroid mass or thyromegaly.      Vascular: No carotid bruit.   Cardiovascular:      Rate and Rhythm: Normal rate and regular rhythm.  No extrasystoles are present.     Chest Wall: PMI is not displaced.      Heart sounds: Normal heart sounds. No murmur. No gallop.    Pulmonary:      Effort: Pulmonary effort is normal. No accessory muscle usage or respiratory distress.      Breath sounds: Normal breath sounds.   Abdominal:      General: Bowel sounds are normal. There is no abdominal bruit.      Palpations: Abdomen is soft.      Tenderness: There is no abdominal tenderness. There is no rebound.   Lymphadenopathy:      Head:      Right side of head: No submental or submandibular adenopathy.      Left side of head:  "No submental or submandibular adenopathy.      Cervical:      Right cervical: No superficial, deep or posterior cervical adenopathy.     Left cervical: No superficial, deep or posterior cervical adenopathy.      Upper Body:      Right upper body: No supraclavicular adenopathy.      Left upper body: No supraclavicular adenopathy.   Skin:     General: Skin is warm and dry.   Neurological:      Mental Status: She is alert and oriented to person, place, and time.       Blood pressure 128/72, pulse 82, temperature 98.1 °F (36.7 °C), temperature source Oral, height 5' 3" (1.6 m), weight 83.3 kg (183 lb 10.3 oz), SpO2 97 %.Body mass index is 32.53 kg/m².            A/P:  1) obesity with BMI of 32.  I did congratulate her on the progress she has made with diet exercise and weight loss.  She will continue her efforts  2) back pain with shortness of breath.  New to me.  We are going to start with an x-ray of her thoracic spine and a chest x-ray.  Once those results are back, we will determine how to proceed moving forward  3)  Hyperlipidemia.  Well controlled.  Continue with Lipitor  4)  Hypothyroidism.  Over controlled.  We will decrease her Synthroid and recheck a level in 6 weeks   5)  Hypertension.  Well controlled.  With continued weight loss, we may be able to decrease her Cozaar dose  "

## 2020-08-13 ENCOUNTER — TELEPHONE (OUTPATIENT)
Dept: GASTROENTEROLOGY | Facility: CLINIC | Age: 70
End: 2020-08-13

## 2020-08-13 RX ORDER — OMEPRAZOLE 20 MG/1
CAPSULE, DELAYED RELEASE ORAL
Qty: 60 CAPSULE | Refills: 1 | Status: SHIPPED | OUTPATIENT
Start: 2020-08-13 | End: 2020-12-11

## 2020-08-13 NOTE — TELEPHONE ENCOUNTER
Please inform the patient that I refilled the prescription for omeprazole and patient is due for a follow-up visit (last seen a year ago) for continued evaluation and management.  Thanks  KATE

## 2020-08-17 ENCOUNTER — LAB VISIT (OUTPATIENT)
Dept: LAB | Facility: HOSPITAL | Age: 70
End: 2020-08-17
Attending: FAMILY MEDICINE
Payer: MEDICARE

## 2020-08-17 DIAGNOSIS — E03.4 HYPOTHYROIDISM DUE TO ACQUIRED ATROPHY OF THYROID: ICD-10-CM

## 2020-08-17 LAB
T4 FREE SERPL-MCNC: 1.33 NG/DL (ref 0.71–1.51)
TSH SERPL DL<=0.005 MIU/L-ACNC: 0.32 UIU/ML (ref 0.4–4)

## 2020-08-17 PROCEDURE — 36415 COLL VENOUS BLD VENIPUNCTURE: CPT | Mod: HCNC,PO

## 2020-08-17 PROCEDURE — 84439 ASSAY OF FREE THYROXINE: CPT | Mod: HCNC

## 2020-08-17 PROCEDURE — 84443 ASSAY THYROID STIM HORMONE: CPT | Mod: HCNC

## 2020-08-20 ENCOUNTER — PATIENT MESSAGE (OUTPATIENT)
Dept: FAMILY MEDICINE | Facility: CLINIC | Age: 70
End: 2020-08-20

## 2020-08-26 ENCOUNTER — TELEPHONE (OUTPATIENT)
Dept: FAMILY MEDICINE | Facility: CLINIC | Age: 70
End: 2020-08-26

## 2020-08-26 ENCOUNTER — PATIENT MESSAGE (OUTPATIENT)
Dept: FAMILY MEDICINE | Facility: CLINIC | Age: 70
End: 2020-08-26

## 2020-08-26 NOTE — TELEPHONE ENCOUNTER
----- Message from Bibi Chan sent at 8/26/2020 12:06 PM CDT -----  Contact: Laurie [phone]  Prescription Request:     Name of medication: pramipexole (MIRAPEX) 0.25 MG tablet    Reason for request: Refill    Pharmacy: [facility] [id number] [address]    Stated that Rx from 07/06/2020 was not received.    Please advise.    Thank You

## 2020-08-28 RX ORDER — MIRABEGRON 50 MG/1
50 TABLET, FILM COATED, EXTENDED RELEASE ORAL DAILY
Qty: 90 TABLET | Refills: 3 | Status: SHIPPED | OUTPATIENT
Start: 2020-08-28 | End: 2021-09-03

## 2020-09-29 ENCOUNTER — PATIENT MESSAGE (OUTPATIENT)
Dept: OTHER | Facility: OTHER | Age: 70
End: 2020-09-29

## 2020-10-01 ENCOUNTER — PATIENT OUTREACH (OUTPATIENT)
Dept: ADMINISTRATIVE | Facility: OTHER | Age: 70
End: 2020-10-01

## 2020-10-01 NOTE — PROGRESS NOTES
LINKS immunization registry updated  Care Everywhere updated  Health Maintenance updated  Chart reviewed for overdue Proactive [facility] Encounters ([facility]) health maintenance testing (CRS, Breast Ca, Diabetic Eye Exam)   Orders entered:N/A

## 2020-10-02 ENCOUNTER — OFFICE VISIT (OUTPATIENT)
Dept: CARDIOLOGY | Facility: CLINIC | Age: 70
End: 2020-10-02
Payer: MEDICARE

## 2020-10-02 VITALS
DIASTOLIC BLOOD PRESSURE: 69 MMHG | HEIGHT: 63 IN | BODY MASS INDEX: 33.17 KG/M2 | HEART RATE: 78 BPM | SYSTOLIC BLOOD PRESSURE: 135 MMHG | WEIGHT: 187.19 LBS

## 2020-10-02 DIAGNOSIS — E78.5 DYSLIPIDEMIA: Chronic | ICD-10-CM

## 2020-10-02 DIAGNOSIS — R00.2 PALPITATIONS: Primary | ICD-10-CM

## 2020-10-02 DIAGNOSIS — I10 ESSENTIAL HYPERTENSION: ICD-10-CM

## 2020-10-02 PROCEDURE — 1101F PR PT FALLS ASSESS DOC 0-1 FALLS W/OUT INJ PAST YR: ICD-10-PCS | Mod: HCNC,CPTII,S$GLB, | Performed by: INTERNAL MEDICINE

## 2020-10-02 PROCEDURE — 99999 PR PBB SHADOW E&M-EST. PATIENT-LVL IV: CPT | Mod: PBBFAC,HCNC,, | Performed by: INTERNAL MEDICINE

## 2020-10-02 PROCEDURE — 99213 PR OFFICE/OUTPT VISIT, EST, LEVL III, 20-29 MIN: ICD-10-PCS | Mod: HCNC,S$GLB,, | Performed by: INTERNAL MEDICINE

## 2020-10-02 PROCEDURE — 1159F MED LIST DOCD IN RCRD: CPT | Mod: HCNC,S$GLB,, | Performed by: INTERNAL MEDICINE

## 2020-10-02 PROCEDURE — 3078F PR MOST RECENT DIASTOLIC BLOOD PRESSURE < 80 MM HG: ICD-10-PCS | Mod: HCNC,CPTII,S$GLB, | Performed by: INTERNAL MEDICINE

## 2020-10-02 PROCEDURE — 1126F PR PAIN SEVERITY QUANTIFIED, NO PAIN PRESENT: ICD-10-PCS | Mod: HCNC,S$GLB,, | Performed by: INTERNAL MEDICINE

## 2020-10-02 PROCEDURE — 3075F PR MOST RECENT SYSTOLIC BLOOD PRESS GE 130-139MM HG: ICD-10-PCS | Mod: HCNC,CPTII,S$GLB, | Performed by: INTERNAL MEDICINE

## 2020-10-02 PROCEDURE — 3008F BODY MASS INDEX DOCD: CPT | Mod: HCNC,CPTII,S$GLB, | Performed by: INTERNAL MEDICINE

## 2020-10-02 PROCEDURE — 3075F SYST BP GE 130 - 139MM HG: CPT | Mod: HCNC,CPTII,S$GLB, | Performed by: INTERNAL MEDICINE

## 2020-10-02 PROCEDURE — 1159F PR MEDICATION LIST DOCUMENTED IN MEDICAL RECORD: ICD-10-PCS | Mod: HCNC,S$GLB,, | Performed by: INTERNAL MEDICINE

## 2020-10-02 PROCEDURE — 3008F PR BODY MASS INDEX (BMI) DOCUMENTED: ICD-10-PCS | Mod: HCNC,CPTII,S$GLB, | Performed by: INTERNAL MEDICINE

## 2020-10-02 PROCEDURE — 99999 PR PBB SHADOW E&M-EST. PATIENT-LVL IV: ICD-10-PCS | Mod: PBBFAC,HCNC,, | Performed by: INTERNAL MEDICINE

## 2020-10-02 PROCEDURE — 3078F DIAST BP <80 MM HG: CPT | Mod: HCNC,CPTII,S$GLB, | Performed by: INTERNAL MEDICINE

## 2020-10-02 PROCEDURE — 99213 OFFICE O/P EST LOW 20 MIN: CPT | Mod: HCNC,S$GLB,, | Performed by: INTERNAL MEDICINE

## 2020-10-02 PROCEDURE — 1101F PT FALLS ASSESS-DOCD LE1/YR: CPT | Mod: HCNC,CPTII,S$GLB, | Performed by: INTERNAL MEDICINE

## 2020-10-02 PROCEDURE — 1126F AMNT PAIN NOTED NONE PRSNT: CPT | Mod: HCNC,S$GLB,, | Performed by: INTERNAL MEDICINE

## 2020-10-02 NOTE — PROGRESS NOTES
Subjective:    Patient ID:  Curtis Navarro is a 70 y.o. female who presents for follow-up of Palpitations f/u      HPI  She comes with no complaints, no chest pain, no shortness of breath  No palpitations    Review of Systems   Constitution: Negative for decreased appetite, malaise/fatigue, weight gain and weight loss.   Cardiovascular: Negative for chest pain, dyspnea on exertion, leg swelling, palpitations and syncope.   Respiratory: Negative for cough and shortness of breath.    Gastrointestinal: Negative.    Neurological: Negative for weakness.   All other systems reviewed and are negative.       Objective:      Physical Exam   Constitutional: She is oriented to person, place, and time. She appears well-developed and well-nourished.   HENT:   Head: Normocephalic.   Eyes: Pupils are equal, round, and reactive to light.   Neck: Normal range of motion. Neck supple. No JVD present. Carotid bruit is not present. No thyromegaly present.   Cardiovascular: Normal rate, regular rhythm, normal heart sounds, intact distal pulses and normal pulses. PMI is not displaced. Exam reveals no gallop.   No murmur heard.  Pulmonary/Chest: Effort normal and breath sounds normal.   Abdominal: Soft. Normal appearance. She exhibits no mass. There is no hepatosplenomegaly. There is no abdominal tenderness.   Musculoskeletal: Normal range of motion.         General: No edema.   Neurological: She is alert and oriented to person, place, and time. She has normal strength and normal reflexes. No sensory deficit.   Skin: Skin is warm and intact.   Psychiatric: She has a normal mood and affect.   Nursing note and vitals reviewed.        Assessment:       1. Palpitations    2. Dyslipidemia    3. Essential hypertension         Plan:     Continue all cardiac medications  Regular exercise program  Weight loss  1 yr f/u with me or rachael canela

## 2020-10-06 ENCOUNTER — LAB VISIT (OUTPATIENT)
Dept: LAB | Facility: HOSPITAL | Age: 70
End: 2020-10-06
Attending: FAMILY MEDICINE
Payer: MEDICARE

## 2020-10-06 DIAGNOSIS — E03.4 HYPOTHYROIDISM DUE TO ACQUIRED ATROPHY OF THYROID: ICD-10-CM

## 2020-10-06 LAB — TSH SERPL DL<=0.005 MIU/L-ACNC: 1.05 UIU/ML (ref 0.4–4)

## 2020-10-06 PROCEDURE — 84443 ASSAY THYROID STIM HORMONE: CPT | Mod: HCNC

## 2020-10-06 PROCEDURE — 36415 COLL VENOUS BLD VENIPUNCTURE: CPT | Mod: HCNC,PO

## 2020-10-07 ENCOUNTER — PATIENT MESSAGE (OUTPATIENT)
Dept: FAMILY MEDICINE | Facility: CLINIC | Age: 70
End: 2020-10-07

## 2020-10-14 ENCOUNTER — IMMUNIZATION (OUTPATIENT)
Dept: FAMILY MEDICINE | Facility: CLINIC | Age: 70
End: 2020-10-14
Payer: MEDICARE

## 2020-10-14 PROCEDURE — 90694 FLU VACCINE - QUADRIVALENT - ADJUVANTED: ICD-10-PCS | Mod: HCNC,S$GLB,, | Performed by: INTERNAL MEDICINE

## 2020-10-14 PROCEDURE — 90694 VACC AIIV4 NO PRSRV 0.5ML IM: CPT | Mod: HCNC,S$GLB,, | Performed by: INTERNAL MEDICINE

## 2020-10-14 PROCEDURE — G0008 ADMIN INFLUENZA VIRUS VAC: HCPCS | Mod: HCNC,S$GLB,, | Performed by: INTERNAL MEDICINE

## 2020-10-14 PROCEDURE — 99999 PR PBB SHADOW E&M-EST. PATIENT-LVL I: CPT | Mod: PBBFAC,HCNC,,

## 2020-10-14 PROCEDURE — G0008 FLU VACCINE - QUADRIVALENT - ADJUVANTED: ICD-10-PCS | Mod: HCNC,S$GLB,, | Performed by: INTERNAL MEDICINE

## 2020-10-14 PROCEDURE — 99999 PR PBB SHADOW E&M-EST. PATIENT-LVL I: ICD-10-PCS | Mod: PBBFAC,HCNC,,

## 2020-11-06 ENCOUNTER — OFFICE VISIT (OUTPATIENT)
Dept: FAMILY MEDICINE | Facility: CLINIC | Age: 70
End: 2020-11-06
Payer: MEDICARE

## 2020-11-06 VITALS
BODY MASS INDEX: 32.15 KG/M2 | RESPIRATION RATE: 16 BRPM | DIASTOLIC BLOOD PRESSURE: 76 MMHG | OXYGEN SATURATION: 99 % | HEIGHT: 63 IN | WEIGHT: 181.44 LBS | TEMPERATURE: 98 F | SYSTOLIC BLOOD PRESSURE: 132 MMHG | HEART RATE: 85 BPM

## 2020-11-06 DIAGNOSIS — E78.5 HYPERLIPIDEMIA, UNSPECIFIED HYPERLIPIDEMIA TYPE: ICD-10-CM

## 2020-11-06 DIAGNOSIS — R79.9 ABNORMAL FINDING OF BLOOD CHEMISTRY, UNSPECIFIED: ICD-10-CM

## 2020-11-06 DIAGNOSIS — E03.4 HYPOTHYROIDISM DUE TO ACQUIRED ATROPHY OF THYROID: ICD-10-CM

## 2020-11-06 DIAGNOSIS — I10 HYPERTENSION, ESSENTIAL: Primary | ICD-10-CM

## 2020-11-06 PROCEDURE — 1101F PR PT FALLS ASSESS DOC 0-1 FALLS W/OUT INJ PAST YR: ICD-10-PCS | Mod: CPTII,S$GLB,, | Performed by: FAMILY MEDICINE

## 2020-11-06 PROCEDURE — 3008F PR BODY MASS INDEX (BMI) DOCUMENTED: ICD-10-PCS | Mod: CPTII,S$GLB,, | Performed by: FAMILY MEDICINE

## 2020-11-06 PROCEDURE — 1159F PR MEDICATION LIST DOCUMENTED IN MEDICAL RECORD: ICD-10-PCS | Mod: S$GLB,,, | Performed by: FAMILY MEDICINE

## 2020-11-06 PROCEDURE — 3008F BODY MASS INDEX DOCD: CPT | Mod: CPTII,S$GLB,, | Performed by: FAMILY MEDICINE

## 2020-11-06 PROCEDURE — 3075F SYST BP GE 130 - 139MM HG: CPT | Mod: CPTII,S$GLB,, | Performed by: FAMILY MEDICINE

## 2020-11-06 PROCEDURE — 3075F PR MOST RECENT SYSTOLIC BLOOD PRESS GE 130-139MM HG: ICD-10-PCS | Mod: CPTII,S$GLB,, | Performed by: FAMILY MEDICINE

## 2020-11-06 PROCEDURE — 99214 PR OFFICE/OUTPT VISIT, EST, LEVL IV, 30-39 MIN: ICD-10-PCS | Mod: S$GLB,,, | Performed by: FAMILY MEDICINE

## 2020-11-06 PROCEDURE — 3078F DIAST BP <80 MM HG: CPT | Mod: CPTII,S$GLB,, | Performed by: FAMILY MEDICINE

## 2020-11-06 PROCEDURE — 1126F PR PAIN SEVERITY QUANTIFIED, NO PAIN PRESENT: ICD-10-PCS | Mod: S$GLB,,, | Performed by: FAMILY MEDICINE

## 2020-11-06 PROCEDURE — 1159F MED LIST DOCD IN RCRD: CPT | Mod: S$GLB,,, | Performed by: FAMILY MEDICINE

## 2020-11-06 PROCEDURE — 1101F PT FALLS ASSESS-DOCD LE1/YR: CPT | Mod: CPTII,S$GLB,, | Performed by: FAMILY MEDICINE

## 2020-11-06 PROCEDURE — 3078F PR MOST RECENT DIASTOLIC BLOOD PRESSURE < 80 MM HG: ICD-10-PCS | Mod: CPTII,S$GLB,, | Performed by: FAMILY MEDICINE

## 2020-11-06 PROCEDURE — 1126F AMNT PAIN NOTED NONE PRSNT: CPT | Mod: S$GLB,,, | Performed by: FAMILY MEDICINE

## 2020-11-06 PROCEDURE — 99214 OFFICE O/P EST MOD 30 MIN: CPT | Mod: S$GLB,,, | Performed by: FAMILY MEDICINE

## 2020-11-06 RX ORDER — TETANUS TOXOID, REDUCED DIPHTHERIA TOXOID AND ACELLULAR PERTUSSIS VACCINE, ADSORBED 5; 2.5; 8; 8; 2.5 [IU]/.5ML; [IU]/.5ML; UG/.5ML; UG/.5ML; UG/.5ML
0.5 SUSPENSION INTRAMUSCULAR ONCE
Qty: 0.5 ML | Refills: 0 | Status: SHIPPED | OUTPATIENT
Start: 2020-11-06 | End: 2020-11-06

## 2020-11-06 RX ORDER — ZOSTER VACCINE RECOMBINANT, ADJUVANTED 50 MCG/0.5
0.5 KIT INTRAMUSCULAR ONCE
Qty: 1 EACH | Refills: 1 | Status: SHIPPED | OUTPATIENT
Start: 2020-11-06 | End: 2020-11-06

## 2020-11-08 NOTE — PROGRESS NOTES
Subjective:       Patient ID: Curtis Navarro is a 70 y.o. female.    Chief Complaint: Follow-up    HPI   The patient is a 70-year-old who is here today for short-term follow-up.  Overall, she is doing well although she is keeping very busy with her mother and her 's health issues.  She is staying physically active and is trying to ride her bike 5 miles a day.    Today we discussed the followin)   Hypothyroidism.  She is doing well with her Synthroid.  She feels that the lower dose has helped her feel better.  She could tell she was not herself with the higher dose of the Synthroid.    2)  Hypertension.  Today her blood pressure is 132/76.  She is doing well with the Cozaar     She does note that her shortness of breath is better    She is following with her gynecologist and is particularly concerned about ovarian cancer as her sister had this    Review of Systems   Constitutional: Negative for appetite change, chills, diaphoresis, fatigue, fever and unexpected weight change.   HENT: Negative for congestion, ear pain, postnasal drip, rhinorrhea, sinus pressure, sneezing, sore throat and trouble swallowing.    Eyes: Negative for pain, discharge and visual disturbance.   Respiratory: Negative for cough, chest tightness, shortness of breath and wheezing.    Cardiovascular: Negative for chest pain, palpitations and leg swelling.   Gastrointestinal: Negative for abdominal distention, abdominal pain, blood in stool, constipation, diarrhea, nausea and vomiting.   Skin: Negative for rash.       Objective:      Physical Exam  Constitutional:       General: She is not in acute distress.     Appearance: Normal appearance. She is well-developed.   HENT:      Head: Normocephalic and atraumatic.      Right Ear: Hearing, tympanic membrane, ear canal and external ear normal.      Left Ear: Hearing, tympanic membrane, ear canal and external ear normal.      Nose: Nose normal.      Mouth/Throat:      Mouth: No oral  "lesions.      Pharynx: No oropharyngeal exudate or posterior oropharyngeal erythema.   Eyes:      General: Lids are normal. No scleral icterus.     Extraocular Movements: Extraocular movements intact.      Conjunctiva/sclera: Conjunctivae normal.      Pupils: Pupils are equal, round, and reactive to light.   Neck:      Musculoskeletal: Normal range of motion and neck supple.      Thyroid: No thyroid mass or thyromegaly.      Vascular: No carotid bruit.   Cardiovascular:      Rate and Rhythm: Normal rate and regular rhythm.  No extrasystoles are present.     Chest Wall: PMI is not displaced.      Heart sounds: Normal heart sounds. No murmur. No gallop.    Pulmonary:      Effort: Pulmonary effort is normal. No accessory muscle usage or respiratory distress.      Breath sounds: Normal breath sounds.   Abdominal:      General: Bowel sounds are normal. There is no abdominal bruit.      Palpations: Abdomen is soft.      Tenderness: There is no abdominal tenderness. There is no rebound.   Lymphadenopathy:      Head:      Right side of head: No submental or submandibular adenopathy.      Left side of head: No submental or submandibular adenopathy.      Cervical:      Right cervical: No superficial, deep or posterior cervical adenopathy.     Left cervical: No superficial, deep or posterior cervical adenopathy.      Upper Body:      Right upper body: No supraclavicular adenopathy.      Left upper body: No supraclavicular adenopathy.   Skin:     General: Skin is warm and dry.   Neurological:      Mental Status: She is alert and oriented to person, place, and time.       Blood pressure 132/76, pulse 85, temperature 98.1 °F (36.7 °C), temperature source Temporal, resp. rate 16, height 5' 3" (1.6 m), weight 82.3 kg (181 lb 7 oz), SpO2 99 %.Body mass index is 32.14 kg/m².            A/P:  1) hypothyroidism.  Well controlled.  Continue current Synthroid dose.  We will recheck labs again next summer  2)    Hypertension.  Well " controlled.  Continue with Cozaar  3)  Shortness of breath.  Improved.  Her cardiac and pulmonary workup was unremarkable.  If this becomes problematic again, she will let me know  4)  Family history of ovarian cancer.  Follow up with gyn    5) hyperlipidemia.  Continue with Crestor.  We will recheck labs again next summer    6)  Health maintenance issues.  She is due for her Shingrix shot in her Tdap and we will send that to the pharmacy    As long as she does well, I will see her back in 9 months or sooner if needed

## 2020-11-10 ENCOUNTER — HOSPITAL ENCOUNTER (OUTPATIENT)
Dept: RADIOLOGY | Facility: HOSPITAL | Age: 70
Discharge: HOME | End: 2020-11-10
Attending: NURSE PRACTITIONER
Payer: MEDICARE

## 2020-11-10 DIAGNOSIS — Z12.31 ENCOUNTER FOR SCREENING MAMMOGRAM FOR MALIGNANT NEOPLASM OF BREAST: ICD-10-CM

## 2020-11-10 PROCEDURE — 77067 SCR MAMMO BI INCL CAD: CPT | Mod: TC,HCNC,PO

## 2020-11-10 PROCEDURE — 77063 BREAST TOMOSYNTHESIS BI: CPT | Mod: 26,HCNC,, | Performed by: RADIOLOGY

## 2020-11-10 PROCEDURE — 77067 MAMMO DIGITAL SCREENING BILAT WITH TOMO: ICD-10-PCS | Mod: 26,HCNC,, | Performed by: RADIOLOGY

## 2020-11-10 PROCEDURE — 77067 SCR MAMMO BI INCL CAD: CPT | Mod: 26,HCNC,, | Performed by: RADIOLOGY

## 2020-11-10 PROCEDURE — 77063 MAMMO DIGITAL SCREENING BILAT WITH TOMO: ICD-10-PCS | Mod: 26,HCNC,, | Performed by: RADIOLOGY

## 2020-11-12 ENCOUNTER — TELEPHONE (OUTPATIENT)
Dept: OPTOMETRY | Facility: CLINIC | Age: 70
End: 2020-11-12

## 2020-11-12 NOTE — TELEPHONE ENCOUNTER
----- Message from Katlin Morse MA sent at 11/12/2020 11:13 AM CST -----  Regarding: Call Back  Needing to verity contact dori script   Ref Order # [id number]  Call Back # [phone]

## 2020-11-13 ENCOUNTER — TELEPHONE (OUTPATIENT)
Dept: OPTOMETRY | Facility: CLINIC | Age: 70
End: 2020-11-13

## 2020-11-13 NOTE — TELEPHONE ENCOUNTER
----- Message from Isabel Stone sent at 11/13/2020  9:16 AM CST -----  Regarding: Pt Message  Type: Needs Medical Advice  Who Called:  Pt  Best Call Back Number: [phone]  Additional Information: pt is requesting for office to send in a stripe for her contact lens to fax: [phone] [employer]. Patient is out of lens and company need the Rx. Please and thank you

## 2020-12-07 ENCOUNTER — PATIENT OUTREACH (OUTPATIENT)
Dept: ADMINISTRATIVE | Facility: OTHER | Age: 70
End: 2020-12-07

## 2020-12-07 NOTE — PROGRESS NOTES
LINKS immunization registry not responding  Care Everywhere updated  Health Maintenance updated  Chart reviewed for overdue [facility] Encounters ([facility]) health maintenance testing (CRS, Breast Ca, Diabetic Eye Exam)   Orders entered:N/A

## 2020-12-09 ENCOUNTER — OFFICE VISIT (OUTPATIENT)
Dept: OPTOMETRY | Facility: CLINIC | Age: 70
End: 2020-12-09
Payer: MEDICARE

## 2020-12-09 DIAGNOSIS — Z46.0 FITTING AND ADJUSTMENT OF SPECTACLES AND CONTACT LENSES: Primary | ICD-10-CM

## 2020-12-09 DIAGNOSIS — H52.7 REFRACTIVE ERROR: ICD-10-CM

## 2020-12-09 DIAGNOSIS — H25.13 NUCLEAR SCLEROSIS, BILATERAL: Primary | ICD-10-CM

## 2020-12-09 PROCEDURE — 1126F AMNT PAIN NOTED NONE PRSNT: CPT | Mod: HCNC,S$GLB,, | Performed by: OPTOMETRIST

## 2020-12-09 PROCEDURE — 3288F FALL RISK ASSESSMENT DOCD: CPT | Mod: HCNC,CPTII,S$GLB, | Performed by: OPTOMETRIST

## 2020-12-09 PROCEDURE — 99999 PR PBB SHADOW E&M-EST. PATIENT-LVL III: CPT | Mod: PBBFAC,HCNC,, | Performed by: OPTOMETRIST

## 2020-12-09 PROCEDURE — 1101F PT FALLS ASSESS-DOCD LE1/YR: CPT | Mod: HCNC,CPTII,S$GLB, | Performed by: OPTOMETRIST

## 2020-12-09 PROCEDURE — 3288F PR FALLS RISK ASSESSMENT DOCUMENTED: ICD-10-PCS | Mod: HCNC,CPTII,S$GLB, | Performed by: OPTOMETRIST

## 2020-12-09 PROCEDURE — 92014 PR EYE EXAM, EST PATIENT,COMPREHESV: ICD-10-PCS | Mod: HCNC,S$GLB,, | Performed by: OPTOMETRIST

## 2020-12-09 PROCEDURE — 92310 CONTACT LENS FITTING OU: CPT | Mod: CSM,HCNC,S$GLB, | Performed by: OPTOMETRIST

## 2020-12-09 PROCEDURE — 92310 PR CONTACT LENS FITTING (NO CHANGE): ICD-10-PCS | Mod: CSM,HCNC,S$GLB, | Performed by: OPTOMETRIST

## 2020-12-09 PROCEDURE — 92014 COMPRE OPH EXAM EST PT 1/>: CPT | Mod: HCNC,S$GLB,, | Performed by: OPTOMETRIST

## 2020-12-09 PROCEDURE — 1126F PR PAIN SEVERITY QUANTIFIED, NO PAIN PRESENT: ICD-10-PCS | Mod: HCNC,S$GLB,, | Performed by: OPTOMETRIST

## 2020-12-09 PROCEDURE — 99999 PR PBB SHADOW E&M-EST. PATIENT-LVL III: ICD-10-PCS | Mod: PBBFAC,HCNC,, | Performed by: OPTOMETRIST

## 2020-12-09 PROCEDURE — 1101F PR PT FALLS ASSESS DOC 0-1 FALLS W/OUT INJ PAST YR: ICD-10-PCS | Mod: HCNC,CPTII,S$GLB, | Performed by: OPTOMETRIST

## 2020-12-09 NOTE — PROGRESS NOTES
HPI     Hypertensive Eye Exam      Additional comments: H TN eye exam good comtrol with Med//              Contact Lens Follow Up      Additional comments: [facility]              Comments     H TN eye exam good comtrol with Med//  No blurred va//  No Flashes  No floaters  No pain            Last edited by Nicki Lobato on 12/9/2020 11:18 AM. (History)            Assessment /Plan     For exam results, see Encounter Report.    Nuclear sclerosis, bilateral    Refractive error      1. Educated pt on presence of cataracts and effects on vision. No surgery at this time. Recheck in one year.  2. Contact lens Rx released to pt. Daily wear only advised, with education to risks of extended wear.  Discussed lens care, compliance and solutions. RTC yearly contact lens follow up.

## 2020-12-10 ENCOUNTER — HOSPITAL ENCOUNTER (OUTPATIENT)
Dept: RADIOLOGY | Facility: HOSPITAL | Age: 70
Discharge: HOME | End: 2020-12-10
Attending: OBSTETRICS & GYNECOLOGY
Payer: MEDICARE

## 2020-12-10 ENCOUNTER — TELEPHONE (OUTPATIENT)
Dept: GASTROENTEROLOGY | Facility: CLINIC | Age: 70
End: 2020-12-10

## 2020-12-10 DIAGNOSIS — Z80.41 FAMILY HISTORY OF OVARIAN CANCER: ICD-10-CM

## 2020-12-10 PROCEDURE — 76856 US EXAM PELVIC COMPLETE: CPT | Mod: 26,HCNC,, | Performed by: RADIOLOGY

## 2020-12-10 PROCEDURE — 76830 TRANSVAGINAL US NON-OB: CPT | Mod: TC,HCNC,PO

## 2020-12-10 PROCEDURE — 76856 US PELVIS COMP WITH TRANSVAG NON-OB (XPD): ICD-10-PCS | Mod: 26,HCNC,, | Performed by: RADIOLOGY

## 2020-12-10 PROCEDURE — 76830 TRANSVAGINAL US NON-OB: CPT | Mod: 26,HCNC,, | Performed by: RADIOLOGY

## 2020-12-10 PROCEDURE — 76830 US PELVIS COMP WITH TRANSVAG NON-OB (XPD): ICD-10-PCS | Mod: 26,HCNC,, | Performed by: RADIOLOGY

## 2020-12-11 ENCOUNTER — PATIENT MESSAGE (OUTPATIENT)
Dept: OTHER | Facility: OTHER | Age: 70
End: 2020-12-11

## 2020-12-11 RX ORDER — OMEPRAZOLE 20 MG/1
CAPSULE, DELAYED RELEASE ORAL
Qty: 60 CAPSULE | Refills: 0 | Status: SHIPPED | OUTPATIENT
Start: 2020-12-11 | End: 2021-01-15 | Stop reason: ALTCHOICE

## 2020-12-11 NOTE — TELEPHONE ENCOUNTER
Please inform the patient that I refilled the prescription for omeprazole and patient is OVERdue for a follow-up visit (last seen a year ago) for continued evaluation and management. Similar message sent in 8/2020 with refill. Patient needs to follow-up for further refills.  Thanks  KATE

## 2020-12-11 NOTE — TELEPHONE ENCOUNTER
Notified pt her prescription for omeprazole has been refilled and needs to return for follow up with Chari Hussein NP, for any further refills.  Pt states she has an appointment with Chari later this month.  Pt had no concerns to address at this time.

## 2021-01-15 ENCOUNTER — OFFICE VISIT (OUTPATIENT)
Dept: GASTROENTEROLOGY | Facility: CLINIC | Age: 71
End: 2021-01-15
Payer: MEDICARE

## 2021-01-15 VITALS — WEIGHT: 190.69 LBS | HEIGHT: 63 IN | BODY MASS INDEX: 33.79 KG/M2

## 2021-01-15 DIAGNOSIS — R05.9 COUGH: ICD-10-CM

## 2021-01-15 DIAGNOSIS — R09.A2 GLOBUS SENSATION: ICD-10-CM

## 2021-01-15 DIAGNOSIS — Z51.81 ENCOUNTER FOR MONITORING LONG-TERM PROTON PUMP INHIBITOR THERAPY: Primary | ICD-10-CM

## 2021-01-15 DIAGNOSIS — Z79.899 ENCOUNTER FOR MONITORING LONG-TERM PROTON PUMP INHIBITOR THERAPY: Primary | ICD-10-CM

## 2021-01-15 DIAGNOSIS — K21.9 GASTROESOPHAGEAL REFLUX DISEASE WITHOUT ESOPHAGITIS: ICD-10-CM

## 2021-01-15 DIAGNOSIS — R49.0 HOARSENESS: ICD-10-CM

## 2021-01-15 PROCEDURE — 1126F AMNT PAIN NOTED NONE PRSNT: CPT | Mod: S$GLB,,, | Performed by: NURSE PRACTITIONER

## 2021-01-15 PROCEDURE — 1101F PT FALLS ASSESS-DOCD LE1/YR: CPT | Mod: CPTII,S$GLB,, | Performed by: NURSE PRACTITIONER

## 2021-01-15 PROCEDURE — 99999 PR PBB SHADOW E&M-EST. PATIENT-LVL IV: CPT | Mod: PBBFAC,,, | Performed by: NURSE PRACTITIONER

## 2021-01-15 PROCEDURE — 1101F PR PT FALLS ASSESS DOC 0-1 FALLS W/OUT INJ PAST YR: ICD-10-PCS | Mod: CPTII,S$GLB,, | Performed by: NURSE PRACTITIONER

## 2021-01-15 PROCEDURE — 3008F BODY MASS INDEX DOCD: CPT | Mod: CPTII,S$GLB,, | Performed by: NURSE PRACTITIONER

## 2021-01-15 PROCEDURE — 3288F FALL RISK ASSESSMENT DOCD: CPT | Mod: CPTII,S$GLB,, | Performed by: NURSE PRACTITIONER

## 2021-01-15 PROCEDURE — 99999 PR PBB SHADOW E&M-EST. PATIENT-LVL IV: ICD-10-PCS | Mod: PBBFAC,,, | Performed by: NURSE PRACTITIONER

## 2021-01-15 PROCEDURE — 1159F MED LIST DOCD IN RCRD: CPT | Mod: S$GLB,,, | Performed by: NURSE PRACTITIONER

## 2021-01-15 PROCEDURE — 99214 PR OFFICE/OUTPT VISIT, EST, LEVL IV, 30-39 MIN: ICD-10-PCS | Mod: S$GLB,,, | Performed by: NURSE PRACTITIONER

## 2021-01-15 PROCEDURE — 3288F PR FALLS RISK ASSESSMENT DOCUMENTED: ICD-10-PCS | Mod: CPTII,S$GLB,, | Performed by: NURSE PRACTITIONER

## 2021-01-15 PROCEDURE — 99214 OFFICE O/P EST MOD 30 MIN: CPT | Mod: S$GLB,,, | Performed by: NURSE PRACTITIONER

## 2021-01-15 PROCEDURE — 1126F PR PAIN SEVERITY QUANTIFIED, NO PAIN PRESENT: ICD-10-PCS | Mod: S$GLB,,, | Performed by: NURSE PRACTITIONER

## 2021-01-15 PROCEDURE — 3008F PR BODY MASS INDEX (BMI) DOCUMENTED: ICD-10-PCS | Mod: CPTII,S$GLB,, | Performed by: NURSE PRACTITIONER

## 2021-01-15 PROCEDURE — 1159F PR MEDICATION LIST DOCUMENTED IN MEDICAL RECORD: ICD-10-PCS | Mod: S$GLB,,, | Performed by: NURSE PRACTITIONER

## 2021-01-15 RX ORDER — FAMOTIDINE 40 MG/1
40 TABLET, FILM COATED ORAL NIGHTLY
Qty: 30 TABLET | Refills: 3 | Status: SHIPPED | OUTPATIENT
Start: 2021-01-15 | End: 2021-05-11

## 2021-01-22 ENCOUNTER — PATIENT MESSAGE (OUTPATIENT)
Dept: ADMINISTRATIVE | Facility: OTHER | Age: 71
End: 2021-01-22

## 2021-03-30 ENCOUNTER — OFFICE VISIT (OUTPATIENT)
Dept: FAMILY MEDICINE | Facility: CLINIC | Age: 71
End: 2021-03-30
Payer: MEDICARE

## 2021-03-30 VITALS
DIASTOLIC BLOOD PRESSURE: 76 MMHG | OXYGEN SATURATION: 98 % | BODY MASS INDEX: 34.04 KG/M2 | RESPIRATION RATE: 16 BRPM | HEART RATE: 81 BPM | TEMPERATURE: 99 F | WEIGHT: 192.13 LBS | HEIGHT: 63 IN | SYSTOLIC BLOOD PRESSURE: 154 MMHG

## 2021-03-30 DIAGNOSIS — M54.2 NECK PAIN: Primary | ICD-10-CM

## 2021-03-30 DIAGNOSIS — M54.41 LOW BACK PAIN WITH RIGHT-SIDED SCIATICA, UNSPECIFIED BACK PAIN LATERALITY, UNSPECIFIED CHRONICITY: ICD-10-CM

## 2021-03-30 PROCEDURE — 1101F PT FALLS ASSESS-DOCD LE1/YR: CPT | Mod: CPTII,S$GLB,, | Performed by: FAMILY MEDICINE

## 2021-03-30 PROCEDURE — 1159F PR MEDICATION LIST DOCUMENTED IN MEDICAL RECORD: ICD-10-PCS | Mod: S$GLB,,, | Performed by: FAMILY MEDICINE

## 2021-03-30 PROCEDURE — 99214 PR OFFICE/OUTPT VISIT, EST, LEVL IV, 30-39 MIN: ICD-10-PCS | Mod: S$GLB,,, | Performed by: FAMILY MEDICINE

## 2021-03-30 PROCEDURE — 3008F PR BODY MASS INDEX (BMI) DOCUMENTED: ICD-10-PCS | Mod: CPTII,S$GLB,, | Performed by: FAMILY MEDICINE

## 2021-03-30 PROCEDURE — 3288F PR FALLS RISK ASSESSMENT DOCUMENTED: ICD-10-PCS | Mod: CPTII,S$GLB,, | Performed by: FAMILY MEDICINE

## 2021-03-30 PROCEDURE — 3288F FALL RISK ASSESSMENT DOCD: CPT | Mod: CPTII,S$GLB,, | Performed by: FAMILY MEDICINE

## 2021-03-30 PROCEDURE — 1159F MED LIST DOCD IN RCRD: CPT | Mod: S$GLB,,, | Performed by: FAMILY MEDICINE

## 2021-03-30 PROCEDURE — 1126F AMNT PAIN NOTED NONE PRSNT: CPT | Mod: S$GLB,,, | Performed by: FAMILY MEDICINE

## 2021-03-30 PROCEDURE — 99214 OFFICE O/P EST MOD 30 MIN: CPT | Mod: S$GLB,,, | Performed by: FAMILY MEDICINE

## 2021-03-30 PROCEDURE — 1101F PR PT FALLS ASSESS DOC 0-1 FALLS W/OUT INJ PAST YR: ICD-10-PCS | Mod: CPTII,S$GLB,, | Performed by: FAMILY MEDICINE

## 2021-03-30 PROCEDURE — 1126F PR PAIN SEVERITY QUANTIFIED, NO PAIN PRESENT: ICD-10-PCS | Mod: S$GLB,,, | Performed by: FAMILY MEDICINE

## 2021-03-30 PROCEDURE — 3008F BODY MASS INDEX DOCD: CPT | Mod: CPTII,S$GLB,, | Performed by: FAMILY MEDICINE

## 2021-03-30 RX ORDER — METOPROLOL SUCCINATE 50 MG/1
50 TABLET, EXTENDED RELEASE ORAL DAILY
Qty: 30 TABLET | Refills: 1 | Status: SHIPPED | OUTPATIENT
Start: 2021-03-30 | End: 2021-05-28

## 2021-04-07 ENCOUNTER — CLINICAL SUPPORT (OUTPATIENT)
Dept: REHABILITATION | Facility: HOSPITAL | Age: 71
End: 2021-04-07
Attending: FAMILY MEDICINE
Payer: MEDICARE

## 2021-04-07 DIAGNOSIS — R29.898 WEAKNESS OF BOTH LOWER EXTREMITIES: ICD-10-CM

## 2021-04-07 DIAGNOSIS — M54.41 LOW BACK PAIN WITH RIGHT-SIDED SCIATICA, UNSPECIFIED BACK PAIN LATERALITY, UNSPECIFIED CHRONICITY: ICD-10-CM

## 2021-04-07 DIAGNOSIS — M54.2 NECK PAIN: ICD-10-CM

## 2021-04-07 DIAGNOSIS — R29.3 ABNORMAL POSTURE: ICD-10-CM

## 2021-04-07 DIAGNOSIS — R26.9 GAIT ABNORMALITY: ICD-10-CM

## 2021-04-07 DIAGNOSIS — R29.898 MUSCULAR DECONDITIONING: ICD-10-CM

## 2021-04-07 PROCEDURE — 97162 PT EVAL MOD COMPLEX 30 MIN: CPT | Mod: HCNC,PO | Performed by: PHYSICAL THERAPIST

## 2021-04-07 PROCEDURE — 97110 THERAPEUTIC EXERCISES: CPT | Mod: HCNC,PO | Performed by: PHYSICAL THERAPIST

## 2021-04-14 ENCOUNTER — CLINICAL SUPPORT (OUTPATIENT)
Dept: REHABILITATION | Facility: HOSPITAL | Age: 71
End: 2021-04-14
Attending: FAMILY MEDICINE
Payer: MEDICARE

## 2021-04-14 DIAGNOSIS — R29.898 WEAKNESS OF BOTH LOWER EXTREMITIES: ICD-10-CM

## 2021-04-14 DIAGNOSIS — R29.3 ABNORMAL POSTURE: ICD-10-CM

## 2021-04-14 DIAGNOSIS — R29.898 MUSCULAR DECONDITIONING: ICD-10-CM

## 2021-04-14 DIAGNOSIS — R26.9 GAIT ABNORMALITY: ICD-10-CM

## 2021-04-14 PROCEDURE — 97110 THERAPEUTIC EXERCISES: CPT | Mod: HCNC,PO,CQ

## 2021-04-19 ENCOUNTER — CLINICAL SUPPORT (OUTPATIENT)
Dept: REHABILITATION | Facility: HOSPITAL | Age: 71
End: 2021-04-19
Attending: FAMILY MEDICINE
Payer: MEDICARE

## 2021-04-19 DIAGNOSIS — R26.9 GAIT ABNORMALITY: ICD-10-CM

## 2021-04-19 DIAGNOSIS — R29.898 MUSCULAR DECONDITIONING: ICD-10-CM

## 2021-04-19 DIAGNOSIS — R29.898 WEAKNESS OF BOTH LOWER EXTREMITIES: ICD-10-CM

## 2021-04-19 DIAGNOSIS — R29.3 ABNORMAL POSTURE: Primary | ICD-10-CM

## 2021-04-19 PROCEDURE — 97110 THERAPEUTIC EXERCISES: CPT | Mod: HCNC,PO | Performed by: PHYSICAL THERAPIST

## 2021-04-23 ENCOUNTER — CLINICAL SUPPORT (OUTPATIENT)
Dept: REHABILITATION | Facility: HOSPITAL | Age: 71
End: 2021-04-23
Attending: FAMILY MEDICINE
Payer: MEDICARE

## 2021-04-23 DIAGNOSIS — R29.3 ABNORMAL POSTURE: ICD-10-CM

## 2021-04-23 DIAGNOSIS — R26.9 GAIT ABNORMALITY: ICD-10-CM

## 2021-04-23 DIAGNOSIS — R29.898 MUSCULAR DECONDITIONING: ICD-10-CM

## 2021-04-23 DIAGNOSIS — R29.898 WEAKNESS OF BOTH LOWER EXTREMITIES: ICD-10-CM

## 2021-04-23 PROCEDURE — 97110 THERAPEUTIC EXERCISES: CPT | Mod: HCNC,PO,CQ

## 2021-04-26 ENCOUNTER — CLINICAL SUPPORT (OUTPATIENT)
Dept: REHABILITATION | Facility: HOSPITAL | Age: 71
End: 2021-04-26
Attending: FAMILY MEDICINE
Payer: MEDICARE

## 2021-04-26 DIAGNOSIS — R29.3 ABNORMAL POSTURE: Primary | ICD-10-CM

## 2021-04-26 DIAGNOSIS — R29.898 WEAKNESS OF BOTH LOWER EXTREMITIES: ICD-10-CM

## 2021-04-26 DIAGNOSIS — R26.9 GAIT ABNORMALITY: ICD-10-CM

## 2021-04-26 DIAGNOSIS — R29.898 MUSCULAR DECONDITIONING: ICD-10-CM

## 2021-04-26 PROCEDURE — 97110 THERAPEUTIC EXERCISES: CPT | Mod: HCNC,PO | Performed by: PHYSICAL THERAPIST

## 2021-04-26 PROCEDURE — 97140 MANUAL THERAPY 1/> REGIONS: CPT | Mod: HCNC,PO | Performed by: PHYSICAL THERAPIST

## 2021-04-29 ENCOUNTER — CLINICAL SUPPORT (OUTPATIENT)
Dept: REHABILITATION | Facility: HOSPITAL | Age: 71
End: 2021-04-29
Attending: FAMILY MEDICINE
Payer: MEDICARE

## 2021-04-29 DIAGNOSIS — R29.898 WEAKNESS OF BOTH LOWER EXTREMITIES: ICD-10-CM

## 2021-04-29 DIAGNOSIS — R29.3 ABNORMAL POSTURE: ICD-10-CM

## 2021-04-29 DIAGNOSIS — R26.9 GAIT ABNORMALITY: ICD-10-CM

## 2021-04-29 DIAGNOSIS — R29.898 MUSCULAR DECONDITIONING: ICD-10-CM

## 2021-04-29 PROCEDURE — 97110 THERAPEUTIC EXERCISES: CPT | Mod: HCNC,PO,CQ

## 2021-05-03 ENCOUNTER — CLINICAL SUPPORT (OUTPATIENT)
Dept: REHABILITATION | Facility: HOSPITAL | Age: 71
End: 2021-05-03
Attending: FAMILY MEDICINE
Payer: MEDICARE

## 2021-05-03 DIAGNOSIS — R29.3 ABNORMAL POSTURE: ICD-10-CM

## 2021-05-03 DIAGNOSIS — R29.898 WEAKNESS OF BOTH LOWER EXTREMITIES: ICD-10-CM

## 2021-05-03 DIAGNOSIS — R26.9 GAIT ABNORMALITY: ICD-10-CM

## 2021-05-03 DIAGNOSIS — R29.898 MUSCULAR DECONDITIONING: ICD-10-CM

## 2021-05-03 PROCEDURE — 97110 THERAPEUTIC EXERCISES: CPT | Mod: HCNC,PO,CQ

## 2021-05-05 ENCOUNTER — LAB VISIT (OUTPATIENT)
Dept: LAB | Facility: HOSPITAL | Age: 71
End: 2021-05-05
Attending: FAMILY MEDICINE
Payer: MEDICARE

## 2021-05-05 DIAGNOSIS — R79.9 ABNORMAL FINDING OF BLOOD CHEMISTRY, UNSPECIFIED: ICD-10-CM

## 2021-05-05 DIAGNOSIS — Z79.899 ENCOUNTER FOR MONITORING LONG-TERM PROTON PUMP INHIBITOR THERAPY: ICD-10-CM

## 2021-05-05 DIAGNOSIS — E78.5 HYPERLIPIDEMIA, UNSPECIFIED HYPERLIPIDEMIA TYPE: ICD-10-CM

## 2021-05-05 DIAGNOSIS — I10 HYPERTENSION, ESSENTIAL: ICD-10-CM

## 2021-05-05 DIAGNOSIS — E03.4 HYPOTHYROIDISM DUE TO ACQUIRED ATROPHY OF THYROID: ICD-10-CM

## 2021-05-05 DIAGNOSIS — Z51.81 ENCOUNTER FOR MONITORING LONG-TERM PROTON PUMP INHIBITOR THERAPY: ICD-10-CM

## 2021-05-05 LAB
BASOPHILS # BLD AUTO: 0.05 K/UL (ref 0–0.2)
BASOPHILS NFR BLD: 0.7 % (ref 0–1.9)
DIFFERENTIAL METHOD: ABNORMAL
EOSINOPHIL # BLD AUTO: 0.2 K/UL (ref 0–0.5)
EOSINOPHIL NFR BLD: 2.4 % (ref 0–8)
ERYTHROCYTE [DISTWIDTH] IN BLOOD BY AUTOMATED COUNT: 13.2 % (ref 11.5–14.5)
HCT VFR BLD AUTO: 41.1 % (ref 37–48.5)
HGB BLD-MCNC: 13.1 G/DL (ref 12–16)
IMM GRANULOCYTES # BLD AUTO: 0.01 K/UL (ref 0–0.04)
IMM GRANULOCYTES NFR BLD AUTO: 0.1 % (ref 0–0.5)
LYMPHOCYTES # BLD AUTO: 3.4 K/UL (ref 1–4.8)
LYMPHOCYTES NFR BLD: 44.1 % (ref 18–48)
MCH RBC QN AUTO: 28.5 PG (ref 27–31)
MCHC RBC AUTO-ENTMCNC: 31.9 G/DL (ref 32–36)
MCV RBC AUTO: 89 FL (ref 82–98)
MONOCYTES # BLD AUTO: 0.7 K/UL (ref 0.3–1)
MONOCYTES NFR BLD: 9.7 % (ref 4–15)
NEUTROPHILS # BLD AUTO: 3.3 K/UL (ref 1.8–7.7)
NEUTROPHILS NFR BLD: 43 % (ref 38–73)
NRBC BLD-RTO: 0 /100 WBC
PLATELET # BLD AUTO: 254 K/UL (ref 150–450)
PMV BLD AUTO: 10.8 FL (ref 9.2–12.9)
RBC # BLD AUTO: 4.6 M/UL (ref 4–5.4)
WBC # BLD AUTO: 7.59 K/UL (ref 3.9–12.7)

## 2021-05-05 PROCEDURE — 80061 LIPID PANEL: CPT | Mod: HCNC | Performed by: FAMILY MEDICINE

## 2021-05-05 PROCEDURE — 83036 HEMOGLOBIN GLYCOSYLATED A1C: CPT | Mod: HCNC | Performed by: FAMILY MEDICINE

## 2021-05-05 PROCEDURE — 85025 COMPLETE CBC W/AUTO DIFF WBC: CPT | Mod: HCNC | Performed by: FAMILY MEDICINE

## 2021-05-05 PROCEDURE — 36415 COLL VENOUS BLD VENIPUNCTURE: CPT | Mod: HCNC,PO | Performed by: NURSE PRACTITIONER

## 2021-05-05 PROCEDURE — 82607 VITAMIN B-12: CPT | Mod: HCNC | Performed by: NURSE PRACTITIONER

## 2021-05-05 PROCEDURE — 84443 ASSAY THYROID STIM HORMONE: CPT | Mod: HCNC | Performed by: FAMILY MEDICINE

## 2021-05-05 PROCEDURE — 83735 ASSAY OF MAGNESIUM: CPT | Mod: HCNC | Performed by: NURSE PRACTITIONER

## 2021-05-05 PROCEDURE — 80053 COMPREHEN METABOLIC PANEL: CPT | Mod: HCNC | Performed by: FAMILY MEDICINE

## 2021-05-06 ENCOUNTER — CLINICAL SUPPORT (OUTPATIENT)
Dept: REHABILITATION | Facility: HOSPITAL | Age: 71
End: 2021-05-06
Attending: FAMILY MEDICINE
Payer: MEDICARE

## 2021-05-06 ENCOUNTER — TELEPHONE (OUTPATIENT)
Dept: GASTROENTEROLOGY | Facility: CLINIC | Age: 71
End: 2021-05-06

## 2021-05-06 ENCOUNTER — PATIENT MESSAGE (OUTPATIENT)
Dept: FAMILY MEDICINE | Facility: CLINIC | Age: 71
End: 2021-05-06

## 2021-05-06 DIAGNOSIS — R29.898 WEAKNESS OF BOTH LOWER EXTREMITIES: ICD-10-CM

## 2021-05-06 DIAGNOSIS — R29.898 MUSCULAR DECONDITIONING: ICD-10-CM

## 2021-05-06 DIAGNOSIS — R29.3 ABNORMAL POSTURE: ICD-10-CM

## 2021-05-06 DIAGNOSIS — R26.9 GAIT ABNORMALITY: ICD-10-CM

## 2021-05-06 LAB
ALBUMIN SERPL BCP-MCNC: 3.9 G/DL (ref 3.5–5.2)
ALP SERPL-CCNC: 87 U/L (ref 55–135)
ALT SERPL W/O P-5'-P-CCNC: 24 U/L (ref 10–44)
ANION GAP SERPL CALC-SCNC: 9 MMOL/L (ref 8–16)
AST SERPL-CCNC: 21 U/L (ref 10–40)
BILIRUB SERPL-MCNC: 0.4 MG/DL (ref 0.1–1)
BUN SERPL-MCNC: 17 MG/DL (ref 8–23)
CALCIUM SERPL-MCNC: 10 MG/DL (ref 8.7–10.5)
CHLORIDE SERPL-SCNC: 103 MMOL/L (ref 95–110)
CHOLEST SERPL-MCNC: 192 MG/DL (ref 120–199)
CHOLEST/HDLC SERPL: 2.9 {RATIO} (ref 2–5)
CO2 SERPL-SCNC: 26 MMOL/L (ref 23–29)
CREAT SERPL-MCNC: 1 MG/DL (ref 0.5–1.4)
EST. GFR  (AFRICAN AMERICAN): >60 ML/MIN/1.73 M^2
EST. GFR  (NON AFRICAN AMERICAN): 58 ML/MIN/1.73 M^2
ESTIMATED AVG GLUCOSE: 111 MG/DL (ref 68–131)
GLUCOSE SERPL-MCNC: 94 MG/DL (ref 70–110)
HBA1C MFR BLD: 5.5 % (ref 4–5.6)
HDLC SERPL-MCNC: 66 MG/DL (ref 40–75)
HDLC SERPL: 34.4 % (ref 20–50)
LDLC SERPL CALC-MCNC: 106.6 MG/DL (ref 63–159)
MAGNESIUM SERPL-MCNC: 1.8 MG/DL (ref 1.6–2.6)
NONHDLC SERPL-MCNC: 126 MG/DL
POTASSIUM SERPL-SCNC: 4.5 MMOL/L (ref 3.5–5.1)
PROT SERPL-MCNC: 7.1 G/DL (ref 6–8.4)
SODIUM SERPL-SCNC: 138 MMOL/L (ref 136–145)
TRIGL SERPL-MCNC: 97 MG/DL (ref 30–150)
TSH SERPL DL<=0.005 MIU/L-ACNC: 3.52 UIU/ML (ref 0.4–4)
VIT B12 SERPL-MCNC: 793 PG/ML (ref 210–950)

## 2021-05-06 PROCEDURE — 97110 THERAPEUTIC EXERCISES: CPT | Mod: HCNC,PO,CQ

## 2021-05-11 ENCOUNTER — OFFICE VISIT (OUTPATIENT)
Dept: FAMILY MEDICINE | Facility: CLINIC | Age: 71
End: 2021-05-11
Payer: MEDICARE

## 2021-05-11 VITALS
HEART RATE: 82 BPM | WEIGHT: 193.88 LBS | OXYGEN SATURATION: 99 % | BODY MASS INDEX: 34.35 KG/M2 | SYSTOLIC BLOOD PRESSURE: 120 MMHG | RESPIRATION RATE: 18 BRPM | HEIGHT: 63 IN | DIASTOLIC BLOOD PRESSURE: 72 MMHG | TEMPERATURE: 97 F

## 2021-05-11 DIAGNOSIS — E04.2 MULTIPLE THYROID NODULES: ICD-10-CM

## 2021-05-11 DIAGNOSIS — I77.9 CAROTID ARTERY DISEASE, UNSPECIFIED LATERALITY, UNSPECIFIED TYPE: ICD-10-CM

## 2021-05-11 DIAGNOSIS — E03.4 HYPOTHYROIDISM DUE TO ACQUIRED ATROPHY OF THYROID: Primary | ICD-10-CM

## 2021-05-11 DIAGNOSIS — Z09 ENCOUNTER FOR FOLLOW-UP EXAMINATION AFTER COMPLETED TREATMENT FOR CONDITIONS OTHER THAN MALIGNANT NEOPLASM: ICD-10-CM

## 2021-05-11 DIAGNOSIS — I10 HYPERTENSION, UNSPECIFIED TYPE: ICD-10-CM

## 2021-05-11 PROCEDURE — 1101F PT FALLS ASSESS-DOCD LE1/YR: CPT | Mod: CPTII,S$GLB,, | Performed by: FAMILY MEDICINE

## 2021-05-11 PROCEDURE — 1126F AMNT PAIN NOTED NONE PRSNT: CPT | Mod: S$GLB,,, | Performed by: FAMILY MEDICINE

## 2021-05-11 PROCEDURE — 3008F BODY MASS INDEX DOCD: CPT | Mod: CPTII,S$GLB,, | Performed by: FAMILY MEDICINE

## 2021-05-11 PROCEDURE — 99214 PR OFFICE/OUTPT VISIT, EST, LEVL IV, 30-39 MIN: ICD-10-PCS | Mod: S$GLB,,, | Performed by: FAMILY MEDICINE

## 2021-05-11 PROCEDURE — 3288F FALL RISK ASSESSMENT DOCD: CPT | Mod: CPTII,S$GLB,, | Performed by: FAMILY MEDICINE

## 2021-05-11 PROCEDURE — 99214 OFFICE O/P EST MOD 30 MIN: CPT | Mod: S$GLB,,, | Performed by: FAMILY MEDICINE

## 2021-05-11 PROCEDURE — 1101F PR PT FALLS ASSESS DOC 0-1 FALLS W/OUT INJ PAST YR: ICD-10-PCS | Mod: CPTII,S$GLB,, | Performed by: FAMILY MEDICINE

## 2021-05-11 PROCEDURE — 3008F PR BODY MASS INDEX (BMI) DOCUMENTED: ICD-10-PCS | Mod: CPTII,S$GLB,, | Performed by: FAMILY MEDICINE

## 2021-05-11 PROCEDURE — 1126F PR PAIN SEVERITY QUANTIFIED, NO PAIN PRESENT: ICD-10-PCS | Mod: S$GLB,,, | Performed by: FAMILY MEDICINE

## 2021-05-11 PROCEDURE — 1159F PR MEDICATION LIST DOCUMENTED IN MEDICAL RECORD: ICD-10-PCS | Mod: S$GLB,,, | Performed by: FAMILY MEDICINE

## 2021-05-11 PROCEDURE — 99499 RISK ADDL DX/OHS AUDIT: ICD-10-PCS | Mod: S$GLB,,, | Performed by: FAMILY MEDICINE

## 2021-05-11 PROCEDURE — 3288F PR FALLS RISK ASSESSMENT DOCUMENTED: ICD-10-PCS | Mod: CPTII,S$GLB,, | Performed by: FAMILY MEDICINE

## 2021-05-11 PROCEDURE — 1159F MED LIST DOCD IN RCRD: CPT | Mod: S$GLB,,, | Performed by: FAMILY MEDICINE

## 2021-05-11 PROCEDURE — 99499 UNLISTED E&M SERVICE: CPT | Mod: S$GLB,,, | Performed by: FAMILY MEDICINE

## 2021-05-11 RX ORDER — OMEPRAZOLE 40 MG/1
40 CAPSULE, DELAYED RELEASE ORAL EVERY MORNING
Qty: 30 CAPSULE | Refills: 3 | Status: SHIPPED | OUTPATIENT
Start: 2021-05-11 | End: 2021-08-20

## 2021-05-11 RX ORDER — LEVOTHYROXINE SODIUM 112 UG/1
112 TABLET ORAL
Qty: 30 TABLET | Refills: 1 | Status: SHIPPED | OUTPATIENT
Start: 2021-05-11 | End: 2021-07-09

## 2021-05-18 ENCOUNTER — CLINICAL SUPPORT (OUTPATIENT)
Dept: REHABILITATION | Facility: HOSPITAL | Age: 71
End: 2021-05-18
Attending: FAMILY MEDICINE
Payer: MEDICARE

## 2021-05-18 DIAGNOSIS — R29.3 ABNORMAL POSTURE: ICD-10-CM

## 2021-05-18 DIAGNOSIS — R29.898 MUSCULAR DECONDITIONING: ICD-10-CM

## 2021-05-18 DIAGNOSIS — R26.9 GAIT ABNORMALITY: ICD-10-CM

## 2021-05-18 DIAGNOSIS — R29.898 WEAKNESS OF BOTH LOWER EXTREMITIES: ICD-10-CM

## 2021-05-18 PROCEDURE — 97110 THERAPEUTIC EXERCISES: CPT | Mod: HCNC,PO,CQ

## 2021-05-20 ENCOUNTER — CLINICAL SUPPORT (OUTPATIENT)
Dept: REHABILITATION | Facility: HOSPITAL | Age: 71
End: 2021-05-20
Attending: FAMILY MEDICINE
Payer: MEDICARE

## 2021-05-20 ENCOUNTER — DOCUMENTATION ONLY (OUTPATIENT)
Dept: REHABILITATION | Facility: HOSPITAL | Age: 71
End: 2021-05-20

## 2021-05-20 DIAGNOSIS — R29.898 WEAKNESS OF BOTH LOWER EXTREMITIES: ICD-10-CM

## 2021-05-20 DIAGNOSIS — R29.898 MUSCULAR DECONDITIONING: ICD-10-CM

## 2021-05-20 DIAGNOSIS — R26.9 GAIT ABNORMALITY: ICD-10-CM

## 2021-05-20 DIAGNOSIS — R29.3 ABNORMAL POSTURE: Primary | ICD-10-CM

## 2021-05-20 PROCEDURE — 97110 THERAPEUTIC EXERCISES: CPT | Mod: HCNC,PO | Performed by: PHYSICAL THERAPIST

## 2021-05-28 ENCOUNTER — CLINICAL SUPPORT (OUTPATIENT)
Dept: REHABILITATION | Facility: HOSPITAL | Age: 71
End: 2021-05-28
Attending: FAMILY MEDICINE
Payer: MEDICARE

## 2021-05-28 ENCOUNTER — HOSPITAL ENCOUNTER (OUTPATIENT)
Dept: RADIOLOGY | Facility: HOSPITAL | Age: 71
Discharge: HOME | End: 2021-05-28
Attending: FAMILY MEDICINE
Payer: MEDICARE

## 2021-05-28 DIAGNOSIS — E04.2 MULTIPLE THYROID NODULES: ICD-10-CM

## 2021-05-28 DIAGNOSIS — I77.9 CAROTID ARTERY DISEASE, UNSPECIFIED LATERALITY, UNSPECIFIED TYPE: ICD-10-CM

## 2021-05-28 DIAGNOSIS — Z09 ENCOUNTER FOR FOLLOW-UP EXAMINATION AFTER COMPLETED TREATMENT FOR CONDITIONS OTHER THAN MALIGNANT NEOPLASM: ICD-10-CM

## 2021-05-28 DIAGNOSIS — R29.3 ABNORMAL POSTURE: ICD-10-CM

## 2021-05-28 DIAGNOSIS — R29.898 MUSCULAR DECONDITIONING: ICD-10-CM

## 2021-05-28 DIAGNOSIS — R29.898 WEAKNESS OF BOTH LOWER EXTREMITIES: ICD-10-CM

## 2021-05-28 DIAGNOSIS — R26.9 GAIT ABNORMALITY: ICD-10-CM

## 2021-05-28 PROCEDURE — 93880 EXTRACRANIAL BILAT STUDY: CPT | Mod: TC,HCNC,PO

## 2021-05-28 PROCEDURE — 93880 EXTRACRANIAL BILAT STUDY: CPT | Mod: 26,59,HCNC, | Performed by: RADIOLOGY

## 2021-05-28 PROCEDURE — 76536 US EXAM OF HEAD AND NECK: CPT | Mod: TC,HCNC,PO

## 2021-05-28 PROCEDURE — 97110 THERAPEUTIC EXERCISES: CPT | Mod: HCNC,PO,CQ

## 2021-05-28 PROCEDURE — 93880 US CAROTID BILATERAL: ICD-10-PCS | Mod: 26,59,HCNC, | Performed by: RADIOLOGY

## 2021-05-28 PROCEDURE — 76536 US EXAM OF HEAD AND NECK: CPT | Mod: 26,HCNC,, | Performed by: RADIOLOGY

## 2021-05-28 PROCEDURE — 76536 US SOFT TISSUE HEAD NECK THYROID: ICD-10-PCS | Mod: 26,HCNC,, | Performed by: RADIOLOGY

## 2021-05-28 RX ORDER — METOPROLOL SUCCINATE 50 MG/1
50 TABLET, EXTENDED RELEASE ORAL DAILY
Qty: 30 TABLET | Refills: 1 | OUTPATIENT
Start: 2021-05-28 | End: 2022-05-28

## 2021-05-28 RX ORDER — MONTELUKAST SODIUM 10 MG/1
10 TABLET ORAL NIGHTLY
Qty: 30 TABLET | Refills: 5 | OUTPATIENT
Start: 2021-05-28

## 2021-05-31 ENCOUNTER — PATIENT MESSAGE (OUTPATIENT)
Dept: FAMILY MEDICINE | Facility: CLINIC | Age: 71
End: 2021-05-31

## 2021-06-03 ENCOUNTER — CLINICAL SUPPORT (OUTPATIENT)
Dept: REHABILITATION | Facility: HOSPITAL | Age: 71
End: 2021-06-03
Attending: FAMILY MEDICINE
Payer: MEDICARE

## 2021-06-03 DIAGNOSIS — R29.898 MUSCULAR DECONDITIONING: ICD-10-CM

## 2021-06-03 DIAGNOSIS — R26.9 GAIT ABNORMALITY: ICD-10-CM

## 2021-06-03 DIAGNOSIS — R29.898 WEAKNESS OF BOTH LOWER EXTREMITIES: ICD-10-CM

## 2021-06-03 DIAGNOSIS — R29.3 ABNORMAL POSTURE: Primary | ICD-10-CM

## 2021-06-03 PROCEDURE — 97110 THERAPEUTIC EXERCISES: CPT | Mod: HCNC,PO | Performed by: PHYSICAL THERAPIST

## 2021-06-15 ENCOUNTER — LAB VISIT (OUTPATIENT)
Dept: LAB | Facility: HOSPITAL | Age: 71
End: 2021-06-15
Attending: FAMILY MEDICINE
Payer: MEDICARE

## 2021-06-15 DIAGNOSIS — E03.4 HYPOTHYROIDISM DUE TO ACQUIRED ATROPHY OF THYROID: ICD-10-CM

## 2021-06-15 PROCEDURE — 84443 ASSAY THYROID STIM HORMONE: CPT | Mod: HCNC | Performed by: FAMILY MEDICINE

## 2021-06-15 PROCEDURE — 36415 COLL VENOUS BLD VENIPUNCTURE: CPT | Mod: HCNC,PO | Performed by: FAMILY MEDICINE

## 2021-06-16 LAB — TSH SERPL DL<=0.005 MIU/L-ACNC: 2.11 UIU/ML (ref 0.4–4)

## 2021-06-17 ENCOUNTER — PATIENT MESSAGE (OUTPATIENT)
Dept: FAMILY MEDICINE | Facility: CLINIC | Age: 71
End: 2021-06-17

## 2021-08-19 ENCOUNTER — PATIENT MESSAGE (OUTPATIENT)
Dept: FAMILY MEDICINE | Facility: CLINIC | Age: 71
End: 2021-08-19

## 2021-08-20 RX ORDER — OMEPRAZOLE 20 MG/1
20 CAPSULE, DELAYED RELEASE ORAL DAILY
Qty: 30 CAPSULE | Refills: 1 | Status: SHIPPED | OUTPATIENT
Start: 2021-08-20 | End: 2021-10-26 | Stop reason: SDUPTHER

## 2021-09-29 ENCOUNTER — IMMUNIZATION (OUTPATIENT)
Dept: FAMILY MEDICINE | Facility: CLINIC | Age: 71
End: 2021-09-29
Payer: MEDICARE

## 2021-09-29 DIAGNOSIS — Z23 NEED FOR VACCINATION: Primary | ICD-10-CM

## 2021-09-29 PROCEDURE — 91300 COVID-19, MRNA, LNP-S, PF, 30 MCG/0.3 ML DOSE VACCINE: CPT | Mod: HCNC,PBBFAC | Performed by: FAMILY MEDICINE

## 2021-09-29 PROCEDURE — 0003A COVID-19, MRNA, LNP-S, PF, 30 MCG/0.3 ML DOSE VACCINE: CPT | Mod: HCNC,PBBFAC | Performed by: FAMILY MEDICINE

## 2021-10-04 ENCOUNTER — OFFICE VISIT (OUTPATIENT)
Dept: FAMILY MEDICINE | Facility: CLINIC | Age: 71
End: 2021-10-04
Payer: MEDICARE

## 2021-10-04 VITALS
DIASTOLIC BLOOD PRESSURE: 68 MMHG | RESPIRATION RATE: 20 BRPM | HEART RATE: 91 BPM | BODY MASS INDEX: 33.71 KG/M2 | WEIGHT: 190.25 LBS | TEMPERATURE: 98 F | HEIGHT: 63 IN | OXYGEN SATURATION: 99 % | SYSTOLIC BLOOD PRESSURE: 132 MMHG

## 2021-10-04 DIAGNOSIS — Z12.31 ENCOUNTER FOR SCREENING MAMMOGRAM FOR MALIGNANT NEOPLASM OF BREAST: ICD-10-CM

## 2021-10-04 DIAGNOSIS — I10 HYPERTENSION, ESSENTIAL: ICD-10-CM

## 2021-10-04 DIAGNOSIS — Z12.39 ENCOUNTER FOR SCREENING FOR MALIGNANT NEOPLASM OF BREAST, UNSPECIFIED SCREENING MODALITY: ICD-10-CM

## 2021-10-04 DIAGNOSIS — E03.4 HYPOTHYROIDISM DUE TO ACQUIRED ATROPHY OF THYROID: Primary | ICD-10-CM

## 2021-10-04 PROCEDURE — 99499 UNLISTED E&M SERVICE: CPT | Mod: S$GLB,,, | Performed by: FAMILY MEDICINE

## 2021-10-04 PROCEDURE — 1159F MED LIST DOCD IN RCRD: CPT | Mod: CPTII,S$GLB,, | Performed by: FAMILY MEDICINE

## 2021-10-04 PROCEDURE — 1160F PR REVIEW ALL MEDS BY PRESCRIBER/CLIN PHARMACIST DOCUMENTED: ICD-10-PCS | Mod: CPTII,S$GLB,, | Performed by: FAMILY MEDICINE

## 2021-10-04 PROCEDURE — 1126F PR PAIN SEVERITY QUANTIFIED, NO PAIN PRESENT: ICD-10-PCS | Mod: CPTII,S$GLB,, | Performed by: FAMILY MEDICINE

## 2021-10-04 PROCEDURE — 1126F AMNT PAIN NOTED NONE PRSNT: CPT | Mod: CPTII,S$GLB,, | Performed by: FAMILY MEDICINE

## 2021-10-04 PROCEDURE — 1101F PR PT FALLS ASSESS DOC 0-1 FALLS W/OUT INJ PAST YR: ICD-10-PCS | Mod: CPTII,S$GLB,, | Performed by: FAMILY MEDICINE

## 2021-10-04 PROCEDURE — 3075F SYST BP GE 130 - 139MM HG: CPT | Mod: CPTII,S$GLB,, | Performed by: FAMILY MEDICINE

## 2021-10-04 PROCEDURE — 3078F PR MOST RECENT DIASTOLIC BLOOD PRESSURE < 80 MM HG: ICD-10-PCS | Mod: CPTII,S$GLB,, | Performed by: FAMILY MEDICINE

## 2021-10-04 PROCEDURE — 1159F PR MEDICATION LIST DOCUMENTED IN MEDICAL RECORD: ICD-10-PCS | Mod: CPTII,S$GLB,, | Performed by: FAMILY MEDICINE

## 2021-10-04 PROCEDURE — 3078F DIAST BP <80 MM HG: CPT | Mod: CPTII,S$GLB,, | Performed by: FAMILY MEDICINE

## 2021-10-04 PROCEDURE — 1160F RVW MEDS BY RX/DR IN RCRD: CPT | Mod: CPTII,S$GLB,, | Performed by: FAMILY MEDICINE

## 2021-10-04 PROCEDURE — 99214 PR OFFICE/OUTPT VISIT, EST, LEVL IV, 30-39 MIN: ICD-10-PCS | Mod: S$GLB,,, | Performed by: FAMILY MEDICINE

## 2021-10-04 PROCEDURE — 4010F ACE/ARB THERAPY RXD/TAKEN: CPT | Mod: CPTII,S$GLB,, | Performed by: FAMILY MEDICINE

## 2021-10-04 PROCEDURE — 99214 OFFICE O/P EST MOD 30 MIN: CPT | Mod: S$GLB,,, | Performed by: FAMILY MEDICINE

## 2021-10-04 PROCEDURE — 3075F PR MOST RECENT SYSTOLIC BLOOD PRESS GE 130-139MM HG: ICD-10-PCS | Mod: CPTII,S$GLB,, | Performed by: FAMILY MEDICINE

## 2021-10-04 PROCEDURE — 4010F PR ACE/ARB THEARPY RXD/TAKEN: ICD-10-PCS | Mod: CPTII,S$GLB,, | Performed by: FAMILY MEDICINE

## 2021-10-04 PROCEDURE — 3044F HG A1C LEVEL LT 7.0%: CPT | Mod: CPTII,S$GLB,, | Performed by: FAMILY MEDICINE

## 2021-10-04 PROCEDURE — 3008F BODY MASS INDEX DOCD: CPT | Mod: CPTII,S$GLB,, | Performed by: FAMILY MEDICINE

## 2021-10-04 PROCEDURE — 3288F PR FALLS RISK ASSESSMENT DOCUMENTED: ICD-10-PCS | Mod: CPTII,S$GLB,, | Performed by: FAMILY MEDICINE

## 2021-10-04 PROCEDURE — 3008F PR BODY MASS INDEX (BMI) DOCUMENTED: ICD-10-PCS | Mod: CPTII,S$GLB,, | Performed by: FAMILY MEDICINE

## 2021-10-04 PROCEDURE — 3044F PR MOST RECENT HEMOGLOBIN A1C LEVEL <7.0%: ICD-10-PCS | Mod: CPTII,S$GLB,, | Performed by: FAMILY MEDICINE

## 2021-10-04 PROCEDURE — 3288F FALL RISK ASSESSMENT DOCD: CPT | Mod: CPTII,S$GLB,, | Performed by: FAMILY MEDICINE

## 2021-10-04 PROCEDURE — 1101F PT FALLS ASSESS-DOCD LE1/YR: CPT | Mod: CPTII,S$GLB,, | Performed by: FAMILY MEDICINE

## 2021-10-04 PROCEDURE — 99499 RISK ADDL DX/OHS AUDIT: ICD-10-PCS | Mod: S$GLB,,, | Performed by: FAMILY MEDICINE

## 2021-10-04 RX ORDER — LEVOTHYROXINE SODIUM 125 UG/1
125 TABLET ORAL
Qty: 30 TABLET | Refills: 1 | Status: SHIPPED | OUTPATIENT
Start: 2021-10-04 | End: 2021-12-06

## 2021-10-27 RX ORDER — OMEPRAZOLE 20 MG/1
20 CAPSULE, DELAYED RELEASE ORAL DAILY
Qty: 30 CAPSULE | Refills: 5 | Status: SHIPPED | OUTPATIENT
Start: 2021-10-27 | End: 2022-04-26 | Stop reason: SDUPTHER

## 2021-11-04 ENCOUNTER — IMMUNIZATION (OUTPATIENT)
Dept: PHARMACY | Facility: CLINIC | Age: 71
End: 2021-11-04
Payer: MEDICARE

## 2021-11-15 ENCOUNTER — LAB VISIT (OUTPATIENT)
Dept: LAB | Facility: HOSPITAL | Age: 71
End: 2021-11-15
Attending: FAMILY MEDICINE
Payer: MEDICARE

## 2021-11-15 DIAGNOSIS — E03.4 HYPOTHYROIDISM DUE TO ACQUIRED ATROPHY OF THYROID: ICD-10-CM

## 2021-11-15 LAB
T4 FREE SERPL-MCNC: 1.38 NG/DL (ref 0.71–1.51)
TSH SERPL DL<=0.005 MIU/L-ACNC: 0.32 UIU/ML (ref 0.4–4)

## 2021-11-15 PROCEDURE — 84439 ASSAY OF FREE THYROXINE: CPT | Mod: HCNC | Performed by: FAMILY MEDICINE

## 2021-11-15 PROCEDURE — 36415 COLL VENOUS BLD VENIPUNCTURE: CPT | Mod: HCNC,PO | Performed by: FAMILY MEDICINE

## 2021-11-15 PROCEDURE — 84443 ASSAY THYROID STIM HORMONE: CPT | Mod: HCNC | Performed by: FAMILY MEDICINE

## 2021-11-23 ENCOUNTER — HOSPITAL ENCOUNTER (OUTPATIENT)
Dept: RADIOLOGY | Facility: HOSPITAL | Age: 71
Discharge: HOME | End: 2021-11-23
Attending: FAMILY MEDICINE
Payer: MEDICARE

## 2021-11-23 DIAGNOSIS — Z12.31 ENCOUNTER FOR SCREENING MAMMOGRAM FOR MALIGNANT NEOPLASM OF BREAST: ICD-10-CM

## 2021-11-23 DIAGNOSIS — Z12.39 ENCOUNTER FOR SCREENING FOR MALIGNANT NEOPLASM OF BREAST, UNSPECIFIED SCREENING MODALITY: ICD-10-CM

## 2021-11-23 PROCEDURE — 77067 MAMMO DIGITAL SCREENING BILAT WITH TOMO: ICD-10-PCS | Mod: 26,HCNC,, | Performed by: RADIOLOGY

## 2021-11-23 PROCEDURE — 77063 BREAST TOMOSYNTHESIS BI: CPT | Mod: 26,HCNC,, | Performed by: RADIOLOGY

## 2021-11-23 PROCEDURE — 77067 SCR MAMMO BI INCL CAD: CPT | Mod: 26,HCNC,, | Performed by: RADIOLOGY

## 2021-11-23 PROCEDURE — 77067 SCR MAMMO BI INCL CAD: CPT | Mod: TC,HCNC,PO

## 2021-11-23 PROCEDURE — 77063 MAMMO DIGITAL SCREENING BILAT WITH TOMO: ICD-10-PCS | Mod: 26,HCNC,, | Performed by: RADIOLOGY

## 2021-11-26 ENCOUNTER — PATIENT MESSAGE (OUTPATIENT)
Dept: FAMILY MEDICINE | Facility: CLINIC | Age: 71
End: 2021-11-26
Payer: MEDICARE

## 2021-11-26 DIAGNOSIS — R93.89 INCREASED ENDOMETRIAL STRIPE THICKNESS: Primary | ICD-10-CM

## 2021-11-27 ENCOUNTER — PATIENT MESSAGE (OUTPATIENT)
Dept: FAMILY MEDICINE | Facility: CLINIC | Age: 71
End: 2021-11-27
Payer: MEDICARE

## 2021-11-27 DIAGNOSIS — E03.4 HYPOTHYROIDISM DUE TO ACQUIRED ATROPHY OF THYROID: Primary | ICD-10-CM

## 2021-11-30 RX ORDER — METOPROLOL SUCCINATE 50 MG/1
50 TABLET, EXTENDED RELEASE ORAL DAILY
Qty: 90 TABLET | Refills: 1 | Status: SHIPPED | OUTPATIENT
Start: 2021-11-30 | End: 2022-05-02 | Stop reason: SDUPTHER

## 2021-11-30 RX ORDER — LOSARTAN POTASSIUM 100 MG/1
100 TABLET ORAL DAILY
Qty: 90 TABLET | Refills: 1 | Status: SHIPPED | OUTPATIENT
Start: 2021-11-30 | End: 2022-09-26

## 2021-12-08 ENCOUNTER — PATIENT MESSAGE (OUTPATIENT)
Dept: FAMILY MEDICINE | Facility: CLINIC | Age: 71
End: 2021-12-08
Payer: MEDICARE

## 2021-12-09 ENCOUNTER — TELEPHONE (OUTPATIENT)
Dept: FAMILY MEDICINE | Facility: CLINIC | Age: 71
End: 2021-12-09
Payer: MEDICARE

## 2021-12-10 RX ORDER — LEVOTHYROXINE SODIUM 125 UG/1
125 TABLET ORAL
Qty: 30 TABLET | Refills: 1 | Status: CANCELLED | OUTPATIENT
Start: 2021-12-10 | End: 2022-12-10

## 2021-12-11 RX ORDER — LEVOTHYROXINE SODIUM 125 UG/1
125 TABLET ORAL
Qty: 30 TABLET | Refills: 1 | OUTPATIENT
Start: 2021-12-11 | End: 2022-12-11

## 2021-12-11 RX ORDER — LEVOTHYROXINE SODIUM 125 UG/1
125 TABLET ORAL
Qty: 90 TABLET | Refills: 0 | Status: SHIPPED | OUTPATIENT
Start: 2021-12-11 | End: 2022-01-12

## 2021-12-21 ENCOUNTER — HOSPITAL ENCOUNTER (OUTPATIENT)
Dept: RADIOLOGY | Facility: HOSPITAL | Age: 71
Discharge: HOME | End: 2021-12-21
Attending: FAMILY MEDICINE
Payer: MEDICARE

## 2021-12-21 DIAGNOSIS — R93.89 INCREASED ENDOMETRIAL STRIPE THICKNESS: ICD-10-CM

## 2021-12-21 PROCEDURE — 76856 US EXAM PELVIC COMPLETE: CPT | Mod: 26,HCNC,, | Performed by: RADIOLOGY

## 2021-12-21 PROCEDURE — 76830 US PELVIS COMP WITH TRANSVAG NON-OB (XPD): ICD-10-PCS | Mod: 26,HCNC,, | Performed by: RADIOLOGY

## 2021-12-21 PROCEDURE — 76856 US PELVIS COMP WITH TRANSVAG NON-OB (XPD): ICD-10-PCS | Mod: 26,HCNC,, | Performed by: RADIOLOGY

## 2021-12-21 PROCEDURE — 76856 US EXAM PELVIC COMPLETE: CPT | Mod: TC,HCNC,PO

## 2021-12-21 PROCEDURE — 76830 TRANSVAGINAL US NON-OB: CPT | Mod: 26,HCNC,, | Performed by: RADIOLOGY

## 2021-12-22 ENCOUNTER — PATIENT MESSAGE (OUTPATIENT)
Dept: FAMILY MEDICINE | Facility: CLINIC | Age: 71
End: 2021-12-22
Payer: MEDICARE

## 2022-01-05 ENCOUNTER — PATIENT OUTREACH (OUTPATIENT)
Dept: ADMINISTRATIVE | Facility: OTHER | Age: 72
End: 2022-01-05
Payer: MEDICARE

## 2022-01-06 ENCOUNTER — OFFICE VISIT (OUTPATIENT)
Dept: FAMILY MEDICINE | Facility: CLINIC | Age: 72
End: 2022-01-06
Payer: MEDICARE

## 2022-01-06 VITALS
DIASTOLIC BLOOD PRESSURE: 72 MMHG | HEART RATE: 84 BPM | RESPIRATION RATE: 20 BRPM | WEIGHT: 193.25 LBS | SYSTOLIC BLOOD PRESSURE: 132 MMHG | OXYGEN SATURATION: 99 % | TEMPERATURE: 98 F | HEIGHT: 63 IN | BODY MASS INDEX: 34.24 KG/M2

## 2022-01-06 DIAGNOSIS — R07.89 COSTOCHONDRAL CHEST PAIN: Primary | ICD-10-CM

## 2022-01-06 DIAGNOSIS — M25.512 ACUTE PAIN OF LEFT SHOULDER: ICD-10-CM

## 2022-01-06 DIAGNOSIS — R07.9 CHEST PAIN, UNSPECIFIED TYPE: ICD-10-CM

## 2022-01-06 PROCEDURE — 1101F PT FALLS ASSESS-DOCD LE1/YR: CPT | Mod: CPTII,S$GLB,, | Performed by: NURSE PRACTITIONER

## 2022-01-06 PROCEDURE — 1101F PR PT FALLS ASSESS DOC 0-1 FALLS W/OUT INJ PAST YR: ICD-10-PCS | Mod: CPTII,S$GLB,, | Performed by: NURSE PRACTITIONER

## 2022-01-06 PROCEDURE — 1125F AMNT PAIN NOTED PAIN PRSNT: CPT | Mod: CPTII,S$GLB,, | Performed by: NURSE PRACTITIONER

## 2022-01-06 PROCEDURE — 1125F PR PAIN SEVERITY QUANTIFIED, PAIN PRESENT: ICD-10-PCS | Mod: CPTII,S$GLB,, | Performed by: NURSE PRACTITIONER

## 2022-01-06 PROCEDURE — 3288F PR FALLS RISK ASSESSMENT DOCUMENTED: ICD-10-PCS | Mod: CPTII,S$GLB,, | Performed by: NURSE PRACTITIONER

## 2022-01-06 PROCEDURE — 1160F RVW MEDS BY RX/DR IN RCRD: CPT | Mod: CPTII,S$GLB,, | Performed by: NURSE PRACTITIONER

## 2022-01-06 PROCEDURE — 3288F FALL RISK ASSESSMENT DOCD: CPT | Mod: CPTII,S$GLB,, | Performed by: NURSE PRACTITIONER

## 2022-01-06 PROCEDURE — 1160F PR REVIEW ALL MEDS BY PRESCRIBER/CLIN PHARMACIST DOCUMENTED: ICD-10-PCS | Mod: CPTII,S$GLB,, | Performed by: NURSE PRACTITIONER

## 2022-01-06 PROCEDURE — 1159F MED LIST DOCD IN RCRD: CPT | Mod: CPTII,S$GLB,, | Performed by: NURSE PRACTITIONER

## 2022-01-06 PROCEDURE — 99214 OFFICE O/P EST MOD 30 MIN: CPT | Mod: S$GLB,,, | Performed by: NURSE PRACTITIONER

## 2022-01-06 PROCEDURE — 3078F DIAST BP <80 MM HG: CPT | Mod: CPTII,S$GLB,, | Performed by: NURSE PRACTITIONER

## 2022-01-06 PROCEDURE — 3075F SYST BP GE 130 - 139MM HG: CPT | Mod: CPTII,S$GLB,, | Performed by: NURSE PRACTITIONER

## 2022-01-06 PROCEDURE — 3075F PR MOST RECENT SYSTOLIC BLOOD PRESS GE 130-139MM HG: ICD-10-PCS | Mod: CPTII,S$GLB,, | Performed by: NURSE PRACTITIONER

## 2022-01-06 PROCEDURE — 3008F PR BODY MASS INDEX (BMI) DOCUMENTED: ICD-10-PCS | Mod: CPTII,S$GLB,, | Performed by: NURSE PRACTITIONER

## 2022-01-06 PROCEDURE — 3008F BODY MASS INDEX DOCD: CPT | Mod: CPTII,S$GLB,, | Performed by: NURSE PRACTITIONER

## 2022-01-06 PROCEDURE — 3078F PR MOST RECENT DIASTOLIC BLOOD PRESSURE < 80 MM HG: ICD-10-PCS | Mod: CPTII,S$GLB,, | Performed by: NURSE PRACTITIONER

## 2022-01-06 PROCEDURE — 99214 PR OFFICE/OUTPT VISIT, EST, LEVL IV, 30-39 MIN: ICD-10-PCS | Mod: S$GLB,,, | Performed by: NURSE PRACTITIONER

## 2022-01-06 PROCEDURE — 1159F PR MEDICATION LIST DOCUMENTED IN MEDICAL RECORD: ICD-10-PCS | Mod: CPTII,S$GLB,, | Performed by: NURSE PRACTITIONER

## 2022-01-06 RX ORDER — PREDNISONE 20 MG/1
TABLET ORAL
Qty: 10 TABLET | Refills: 0 | Status: SHIPPED | OUTPATIENT
Start: 2022-01-06 | End: 2022-05-25 | Stop reason: ALTCHOICE

## 2022-01-06 RX ORDER — TRAMADOL HYDROCHLORIDE 50 MG/1
50 TABLET ORAL EVERY 6 HOURS PRN
Qty: 12 EACH | Refills: 0 | Status: SHIPPED | OUTPATIENT
Start: 2022-01-06 | End: 2022-05-25 | Stop reason: ALTCHOICE

## 2022-01-06 NOTE — PROGRESS NOTES
"Subjective:       Patient ID: Curtis Navarro is a 71 y.o. female.    Chief Complaint: Shoulder Pain    HPI Has had discomfort to left clavicle area off and on for some time now, worse today. Constant pressure/discomfort. Mostly along the upper left chest wall radiating up between the neck and shoulder and into the trapezius. States she was picking up her grandson yesterday a lot. More discomfort with leaning over. Feels better if she props her arm up. No medications for this.  No injury.  No weakness, numbness or tingling to the left extremity. She states "this is not my Heart"  States on the ride over here it gave her relief to prop her elbow on the door support rest, took the pressure off the area. States when she hangs her arm it seems to hurt more. No cough. No fever. See ROS.    The following portion of the patients history was reviewed and updated as appropriate: allergies, current medications, past medical and surgical history. Past social history and problem list reviewed. Family PMH and Past social history reviewed. Tobacco, Illicit drug use reviewed.      Review of patient's allergies indicates:   Allergen Reactions    Lisinopril Other (See Comments)     Cough    Azithromycin      Other reaction(s): Nausea  Other reaction(s): Diarrhea    Metronidazole hcl      Other reaction(s): Rash  Other reaction(s): Itching    Moxifloxacin      Other reaction(s): Rash    Sulfa (sulfonamide antibiotics)      Other reaction(s): Itching         Current Outpatient Medications:     aspirin (ECOTRIN) 81 MG EC tablet, Take 81 mg by mouth once daily., Disp: , Rfl:     atorvastatin (LIPITOR) 20 MG tablet, TAKE 1 TABLET (20 MG TOTAL) BY MOUTH ONCE DAILY., Disp: 90 tablet, Rfl: 1    B INFANTIS/B ANI/B JOSE M/B BIFID (PROBIOTIC 4X ORAL), Take 1 tablet by mouth once daily., Disp: , Rfl:     biotin 5 mg Cap, Take 5 mg by mouth once daily., Disp: , Rfl:     calcium-vitamin D3 500 mg(1,250mg) -200 unit per tablet, Take 1 " tablet by mouth 2 (two) times daily with meals., Disp: , Rfl:     DIVIGEL 0.5 mg/0.5 gram (0.1 %) GlPk, Apply topically every other day. , Disp: , Rfl: 11    GLUCOSA HAHN 2KCL/CHONDROITIN HAHN (GLUCOSAMINE-CHONDROITIN DS ORAL), Take by mouth 2 (two) times daily., Disp: , Rfl:     KRILL/OM-3/DHA/EPA/PHOSPHO/AST (MEGARED OMEGA-3 KRILL OIL ORAL), Take 1 capsule by mouth once daily., Disp: , Rfl:     levothyroxine (SYNTHROID) 125 MCG tablet, TAKE 1 TABLET (125 MCG TOTAL) BY MOUTH BEFORE BREAKFAST., Disp: 90 tablet, Rfl: 0    losartan (COZAAR) 100 MG tablet, Take 1 tablet (100 mg total) by mouth once daily., Disp: 90 tablet, Rfl: 1    metoprolol succinate (TOPROL-XL) 50 MG 24 hr tablet, Take 1 tablet (50 mg total) by mouth once daily., Disp: 90 tablet, Rfl: 1    montelukast (SINGULAIR) 10 mg tablet, TAKE 1 TABLET BY MOUTH DAILY, Disp: 30 tablet, Rfl: 5    MULTIVITAMIN ORAL, Take by mouth., Disp: , Rfl:     MYRBETRIQ 50 mg Tb24, TAKE 1 TABLET (50 MG TOTAL) BY MOUTH ONCE DAILY., Disp: 90 tablet, Rfl: 2    omeprazole (PRILOSEC) 20 MG capsule, Take 1 capsule (20 mg total) by mouth once daily., Disp: 30 capsule, Rfl: 5    progesterone (PROMETRIUM) 100 MG capsule, 100 mg once daily. , Disp: , Rfl:     Past Medical History:   Diagnosis Date    Allergy     Arthritis     Carotid arterial disease     5/21 next due 5/23    Cataract     Diverticulosis     GERD (gastroesophageal reflux disease)     Graves disease     s/p radioactive iodine in 42    H/O esophagogastroduodenoscopy     8/14 and 6/17    H/O percutaneous left heart catheterization     normal 5/12    History of colon polyps     History of diverticulitis     History of esophagitis     egd 8/14    History of skin cancer     L neck    Hyperlipidemia     Hypertension     Hypothyroidism     s/p radioactive iodine    IBS (irritable bowel syndrome)     Osteopenia     7/13, 7/15    S/P colonoscopy     7/17;  next due 7/22    Thyroid nodule     last usg  "5/21;  next due 5/23       Past Surgical History:   Procedure Laterality Date    CARDIAC SURGERY  2013    angiogram (negative)    COLONOSCOPY  07/21/2010    Dr. Velázquez; in legacy, repeat in 6-7 years    COLONOSCOPY N/A 6/27/2017    Procedure: COLONOSCOPY;  Surgeon: Hamlet Velázquez Jr., MD;  Location: [facility];  Service: Endoscopy;  Laterality: N/A; repeat in 5 years for surveillance    COSMETIC SURGERY      Liposuction to neck    ESOPHAGOGASTRODUODENOSCOPY N/A 7/12/2018    Procedure: EGD (ESOPHAGOGASTRODUODENOSCOPY);  Surgeon: Hamlet Velázquez Jr., MD;  Location: [facility];  Service: Endoscopy;  Laterality: N/A;    SKIN CANCER EXCISION      with Mohs on left neck    TONSILLECTOMY      UPPER GASTROINTESTINAL ENDOSCOPY  08/04/2014    Dr. Velázuqez    UPPER GASTROINTESTINAL ENDOSCOPY  06/27/2017    DR. VELÁZQUEZ    UPPER GASTROINTESTINAL ENDOSCOPY  07/12/2018    Dr. Velázquez: "White nummular lesions in esophageal mucosa (benign)", antritis, gastric polyps removed       Social History     Socioeconomic History    Marital status:    Tobacco Use    Smoking status: Never Smoker    Smokeless tobacco: Never Used   Substance and Sexual Activity    Alcohol use: Yes     Comment: very rare    Drug use: No    Sexual activity: Yes     Partners: Male     Birth control/protection: Post-menopausal   Social History Narrative         Social Determinants of Health     Financial Resource Strain: Low Risk     Difficulty of Paying Living Expenses: Not hard at all   Food Insecurity: No Food Insecurity    Worried About Running Out of Food in the Last Year: Never true    Ran Out of Food in the Last Year: Never true   Transportation Needs: No Transportation Needs    Lack of Transportation (Medical): No    Lack of Transportation (Non-Medical): No   Physical Activity: Insufficiently Active    Days of Exercise per Week: 3 days    Minutes of Exercise per Session: 30 min   Stress: Stress Concern Present    Feeling of " "Stress : To some extent   Social Connections: Unknown    Frequency of Communication with Friends and Family: More than three times a week    Frequency of Social Gatherings with Friends and Family: Patient refused    Active Member of Clubs or Organizations: No    Attends Club or Organization Meetings: Never    Marital Status:      Review of Systems   Constitutional: Negative for fatigue and fever.   HENT: Negative for congestion, postnasal drip, rhinorrhea and voice change.    Eyes: Negative for visual disturbance.   Respiratory: Negative for cough, chest tightness, shortness of breath and wheezing.    Cardiovascular: Negative for chest pain, palpitations and leg swelling.   Gastrointestinal: Negative for abdominal pain, diarrhea, nausea and vomiting.   Musculoskeletal: Negative for arthralgias, back pain and gait problem.        See HPI   Skin: Negative for rash and wound.   Neurological: Negative for dizziness, weakness and headaches.   Hematological: Negative for adenopathy. Does not bruise/bleed easily.   Psychiatric/Behavioral: Negative for dysphoric mood and sleep disturbance. The patient is not nervous/anxious.        Objective:      /72   Pulse 84   Temp 98.2 °F (36.8 °C)   Resp 20   Ht 5' 3" (1.6 m)   Wt 87.6 kg (193 lb 3.7 oz)   SpO2 99%   BMI 34.23 kg/m²       Physical Exam  Constitutional:       General: She is not in acute distress.     Appearance: Normal appearance. She is well-developed and normal weight.   HENT:      Head: Normocephalic.      Nose: No congestion or rhinorrhea.      Mouth/Throat:      Mouth: Mucous membranes are moist.      Pharynx: Oropharynx is clear.   Eyes:      Conjunctiva/sclera: Conjunctivae normal.      Pupils: Pupils are equal, round, and reactive to light.   Neck:      Thyroid: No thyromegaly.      Trachea: Trachea normal. No tracheal tenderness or tracheal deviation.   Cardiovascular:      Rate and Rhythm: Normal rate and regular rhythm.      Pulses: " Normal pulses.           Carotid pulses are 2+ on the right side and 2+ on the left side.       Radial pulses are 2+ on the right side and 2+ on the left side.      Heart sounds: Normal heart sounds. No murmur heard.  No gallop.    Pulmonary:      Effort: Pulmonary effort is normal. No respiratory distress.      Breath sounds: Normal breath sounds. No stridor. No wheezing, rhonchi or rales.   Chest:          Comments: Swelling and tenderness to the clavicle area around into the trapezius. No bony tenderness to the shoulder.   Abdominal:      General: Bowel sounds are normal.      Palpations: Abdomen is soft. There is no splenomegaly or mass.      Tenderness: There is no abdominal tenderness. There is no rebound.   Musculoskeletal:         General: Normal range of motion.      Cervical back: Full passive range of motion without pain, normal range of motion and neck supple. No edema.      Right lower leg: No edema.      Left lower leg: No edema.      Comments: Gait and coordination normal.  strong, equal. Upper and lower extremity strength normal.    Skin:     General: Skin is warm and dry.      Capillary Refill: Capillary refill takes less than 2 seconds.      Findings: No lesion or rash.   Neurological:      General: No focal deficit present.      Mental Status: She is alert and oriented to person, place, and time.   Psychiatric:         Attention and Perception: Attention and perception normal.         Mood and Affect: Mood and affect normal.         Speech: Speech normal.         Behavior: Behavior normal.         Assessment:       1. Costochondral chest pain    2. Acute pain of left shoulder    3. Chest pain, unspecified type        Plan:       Costochondral chest pain: will give a few tramadol for discomfort. She is notably uncomfortable during our visit. Will give prednisone taper to start tomorrow morning. Avoid heavy lifting.   -     traMADoL (ULTRAM) 50 mg tablet; Take 1 tablet (50 mg total) by mouth  every 6 (six) hours as needed for Pain.  Dispense: 12 each; Refill: 0    Acute pain of left shoulder  -     X-Ray Shoulder 2 or More Views Left; Future; Expected date: 01/06/2022    Chest pain, unspecified type  -     X-Ray Chest PA And Lateral; Future; Expected date: 01/06/2022    Other orders  -     predniSONE (DELTASONE) 20 MG tablet; Take two tablets in am for 3 days, then one for 3 days then 1/2 for 2 days  Dispense: 10 tablet; Refill: 0       Continue current medication  Take medications only as prescribed  Healthy diet, exercise  Adequate rest  Adequate hydration  Avoid allergens  Avoid excessive caffeine     if symptoms worsen go to ER.

## 2022-01-07 ENCOUNTER — HOSPITAL ENCOUNTER (OUTPATIENT)
Dept: RADIOLOGY | Facility: HOSPITAL | Age: 72
Discharge: HOME | End: 2022-01-07
Attending: NURSE PRACTITIONER
Payer: MEDICARE

## 2022-01-07 ENCOUNTER — PATIENT MESSAGE (OUTPATIENT)
Dept: FAMILY MEDICINE | Facility: CLINIC | Age: 72
End: 2022-01-07
Payer: MEDICARE

## 2022-01-07 DIAGNOSIS — R07.9 CHEST PAIN, UNSPECIFIED TYPE: ICD-10-CM

## 2022-01-07 DIAGNOSIS — M25.512 ACUTE PAIN OF LEFT SHOULDER: ICD-10-CM

## 2022-01-07 PROCEDURE — 71046 X-RAY EXAM CHEST 2 VIEWS: CPT | Mod: TC,HCNC,FY,PO

## 2022-01-07 PROCEDURE — 71046 X-RAY EXAM CHEST 2 VIEWS: CPT | Mod: 26,HCNC,, | Performed by: RADIOLOGY

## 2022-01-07 PROCEDURE — 71046 XR CHEST PA AND LATERAL: ICD-10-PCS | Mod: 26,HCNC,, | Performed by: RADIOLOGY

## 2022-01-07 PROCEDURE — 73030 X-RAY EXAM OF SHOULDER: CPT | Mod: TC,HCNC,FY,PO,LT

## 2022-01-07 PROCEDURE — 73030 X-RAY EXAM OF SHOULDER: CPT | Mod: 26,HCNC,LT, | Performed by: RADIOLOGY

## 2022-01-07 PROCEDURE — 73030 XR SHOULDER COMPLETE 2 OR MORE VIEWS LEFT: ICD-10-PCS | Mod: 26,HCNC,LT, | Performed by: RADIOLOGY

## 2022-01-10 ENCOUNTER — OFFICE VISIT (OUTPATIENT)
Dept: OPTOMETRY | Facility: CLINIC | Age: 72
End: 2022-01-10
Payer: MEDICARE

## 2022-01-10 ENCOUNTER — OFFICE VISIT (OUTPATIENT)
Dept: OBSTETRICS AND GYNECOLOGY | Facility: CLINIC | Age: 72
End: 2022-01-10
Payer: MEDICARE

## 2022-01-10 VITALS
DIASTOLIC BLOOD PRESSURE: 80 MMHG | SYSTOLIC BLOOD PRESSURE: 130 MMHG | BODY MASS INDEX: 34.34 KG/M2 | HEIGHT: 63 IN | WEIGHT: 193.81 LBS

## 2022-01-10 DIAGNOSIS — H25.13 NUCLEAR SCLEROSIS, BILATERAL: Primary | ICD-10-CM

## 2022-01-10 DIAGNOSIS — Z97.3 WEARS CONTACT LENSES: Primary | ICD-10-CM

## 2022-01-10 DIAGNOSIS — Z79.890 HORMONE REPLACEMENT THERAPY (HRT): ICD-10-CM

## 2022-01-10 DIAGNOSIS — R93.5 ABNORMAL ULTRASOUND OF UTERUS: Primary | ICD-10-CM

## 2022-01-10 DIAGNOSIS — H52.7 REFRACTIVE ERROR: ICD-10-CM

## 2022-01-10 DIAGNOSIS — Z97.3 WEARS CONTACT LENSES: ICD-10-CM

## 2022-01-10 PROCEDURE — 3008F PR BODY MASS INDEX (BMI) DOCUMENTED: ICD-10-PCS | Mod: HCNC,CPTII,S$GLB, | Performed by: OBSTETRICS & GYNECOLOGY

## 2022-01-10 PROCEDURE — 1159F PR MEDICATION LIST DOCUMENTED IN MEDICAL RECORD: ICD-10-PCS | Mod: HCNC,CPTII,S$GLB, | Performed by: OPTOMETRIST

## 2022-01-10 PROCEDURE — 99999 PR PBB SHADOW E&M-EST. PATIENT-LVL III: ICD-10-PCS | Mod: PBBFAC,HCNC,, | Performed by: OPTOMETRIST

## 2022-01-10 PROCEDURE — 99499 NO LOS: ICD-10-PCS | Mod: HCNC,S$GLB,, | Performed by: OPTOMETRIST

## 2022-01-10 PROCEDURE — 1126F AMNT PAIN NOTED NONE PRSNT: CPT | Mod: HCNC,CPTII,S$GLB, | Performed by: OBSTETRICS & GYNECOLOGY

## 2022-01-10 PROCEDURE — 3288F PR FALLS RISK ASSESSMENT DOCUMENTED: ICD-10-PCS | Mod: HCNC,CPTII,S$GLB, | Performed by: OPTOMETRIST

## 2022-01-10 PROCEDURE — 99999 PR PBB SHADOW E&M-EST. PATIENT-LVL IV: CPT | Mod: PBBFAC,HCNC,, | Performed by: OBSTETRICS & GYNECOLOGY

## 2022-01-10 PROCEDURE — 3075F SYST BP GE 130 - 139MM HG: CPT | Mod: HCNC,CPTII,S$GLB, | Performed by: OBSTETRICS & GYNECOLOGY

## 2022-01-10 PROCEDURE — 1126F PR PAIN SEVERITY QUANTIFIED, NO PAIN PRESENT: ICD-10-PCS | Mod: HCNC,CPTII,S$GLB, | Performed by: OBSTETRICS & GYNECOLOGY

## 2022-01-10 PROCEDURE — 1101F PT FALLS ASSESS-DOCD LE1/YR: CPT | Mod: HCNC,CPTII,S$GLB, | Performed by: OBSTETRICS & GYNECOLOGY

## 2022-01-10 PROCEDURE — 99203 PR OFFICE/OUTPT VISIT, NEW, LEVL III, 30-44 MIN: ICD-10-PCS | Mod: HCNC,S$GLB,, | Performed by: OBSTETRICS & GYNECOLOGY

## 2022-01-10 PROCEDURE — 99999 PR PBB SHADOW E&M-EST. PATIENT-LVL IV: ICD-10-PCS | Mod: PBBFAC,HCNC,, | Performed by: OBSTETRICS & GYNECOLOGY

## 2022-01-10 PROCEDURE — 1159F MED LIST DOCD IN RCRD: CPT | Mod: HCNC,CPTII,S$GLB, | Performed by: OBSTETRICS & GYNECOLOGY

## 2022-01-10 PROCEDURE — 1101F PR PT FALLS ASSESS DOC 0-1 FALLS W/OUT INJ PAST YR: ICD-10-PCS | Mod: HCNC,CPTII,S$GLB, | Performed by: OPTOMETRIST

## 2022-01-10 PROCEDURE — 99999 PR PBB SHADOW E&M-EST. PATIENT-LVL III: CPT | Mod: PBBFAC,HCNC,, | Performed by: OPTOMETRIST

## 2022-01-10 PROCEDURE — 1101F PR PT FALLS ASSESS DOC 0-1 FALLS W/OUT INJ PAST YR: ICD-10-PCS | Mod: HCNC,CPTII,S$GLB, | Performed by: OBSTETRICS & GYNECOLOGY

## 2022-01-10 PROCEDURE — 1126F PR PAIN SEVERITY QUANTIFIED, NO PAIN PRESENT: ICD-10-PCS | Mod: HCNC,CPTII,S$GLB, | Performed by: OPTOMETRIST

## 2022-01-10 PROCEDURE — 3288F PR FALLS RISK ASSESSMENT DOCUMENTED: ICD-10-PCS | Mod: HCNC,CPTII,S$GLB, | Performed by: OBSTETRICS & GYNECOLOGY

## 2022-01-10 PROCEDURE — 1101F PT FALLS ASSESS-DOCD LE1/YR: CPT | Mod: HCNC,CPTII,S$GLB, | Performed by: OPTOMETRIST

## 2022-01-10 PROCEDURE — 92014 PR EYE EXAM, EST PATIENT,COMPREHESV: ICD-10-PCS | Mod: HCNC,S$GLB,, | Performed by: OPTOMETRIST

## 2022-01-10 PROCEDURE — 1159F MED LIST DOCD IN RCRD: CPT | Mod: HCNC,CPTII,S$GLB, | Performed by: OPTOMETRIST

## 2022-01-10 PROCEDURE — 3288F FALL RISK ASSESSMENT DOCD: CPT | Mod: HCNC,CPTII,S$GLB, | Performed by: OPTOMETRIST

## 2022-01-10 PROCEDURE — 1159F PR MEDICATION LIST DOCUMENTED IN MEDICAL RECORD: ICD-10-PCS | Mod: HCNC,CPTII,S$GLB, | Performed by: OBSTETRICS & GYNECOLOGY

## 2022-01-10 PROCEDURE — 1160F PR REVIEW ALL MEDS BY PRESCRIBER/CLIN PHARMACIST DOCUMENTED: ICD-10-PCS | Mod: HCNC,CPTII,S$GLB, | Performed by: OPTOMETRIST

## 2022-01-10 PROCEDURE — 3008F BODY MASS INDEX DOCD: CPT | Mod: HCNC,CPTII,S$GLB, | Performed by: OBSTETRICS & GYNECOLOGY

## 2022-01-10 PROCEDURE — 3079F PR MOST RECENT DIASTOLIC BLOOD PRESSURE 80-89 MM HG: ICD-10-PCS | Mod: HCNC,CPTII,S$GLB, | Performed by: OBSTETRICS & GYNECOLOGY

## 2022-01-10 PROCEDURE — 1160F RVW MEDS BY RX/DR IN RCRD: CPT | Mod: HCNC,CPTII,S$GLB, | Performed by: OPTOMETRIST

## 2022-01-10 PROCEDURE — 99499 UNLISTED E&M SERVICE: CPT | Mod: HCNC,S$GLB,, | Performed by: OPTOMETRIST

## 2022-01-10 PROCEDURE — 1126F AMNT PAIN NOTED NONE PRSNT: CPT | Mod: HCNC,CPTII,S$GLB, | Performed by: OPTOMETRIST

## 2022-01-10 PROCEDURE — 3075F PR MOST RECENT SYSTOLIC BLOOD PRESS GE 130-139MM HG: ICD-10-PCS | Mod: HCNC,CPTII,S$GLB, | Performed by: OBSTETRICS & GYNECOLOGY

## 2022-01-10 PROCEDURE — 3288F FALL RISK ASSESSMENT DOCD: CPT | Mod: HCNC,CPTII,S$GLB, | Performed by: OBSTETRICS & GYNECOLOGY

## 2022-01-10 PROCEDURE — 99203 OFFICE O/P NEW LOW 30 MIN: CPT | Mod: HCNC,S$GLB,, | Performed by: OBSTETRICS & GYNECOLOGY

## 2022-01-10 PROCEDURE — 3079F DIAST BP 80-89 MM HG: CPT | Mod: HCNC,CPTII,S$GLB, | Performed by: OBSTETRICS & GYNECOLOGY

## 2022-01-10 PROCEDURE — 92014 COMPRE OPH EXAM EST PT 1/>: CPT | Mod: HCNC,S$GLB,, | Performed by: OPTOMETRIST

## 2022-01-10 NOTE — TELEPHONE ENCOUNTER
No new care gaps identified.  Powered by [employer]. Reference number: [id number].   1/10/2022 2:06:03 PM CST

## 2022-01-10 NOTE — PROGRESS NOTES
Assessment /Plan     For exam results, see Encounter Report.    Wears contact lenses      1. Contact lens Rx released to pt. Daily wear only advised, with education to risks of extended wear.  Discussed lens care, compliance and solutions. RTC yearly contact lens follow up.

## 2022-01-10 NOTE — PROGRESS NOTES
"Chief Complaint   Patient presents with    Establish Care    Follow-up     Abnormal ultrasound, check via chart       History of Present Illness   71 y.o.  female   patient presents today for consultation - thickened endometrium. Counseled.       Past medical and surgical history reviewed.   I have reviewed the patient's medical history in detail and updated the computerized patient record.    Review of patient's allergies indicates:   Allergen Reactions    Lisinopril Other (See Comments)     Cough    Azithromycin      Other reaction(s): Nausea  Other reaction(s): Diarrhea    Metronidazole hcl      Other reaction(s): Rash  Other reaction(s): Itching    Moxifloxacin      Other reaction(s): Rash    Sulfa (sulfonamide antibiotics)      Other reaction(s): Itching         Review of Systems - Negative except HPI  GEN ROS: negative for - chills or fever  Breast ROS: negative for breast lumps  Genito-Urinary ROS: no dysuria, trouble voiding, or hematuria      Physical Examination:  /80   Ht 5' 3" (1.6 m)   Wt 87.9 kg (193 lb 12.6 oz)   BMI 34.33 kg/m²    deferred    Ultrasound:  Reports reviewed from  and  - minimal change, essentially normal with endoemtrial thickness upper limits.       Assessment:  High normal endometrial thickness on u/s without Vaginal Bleeding  - reassured  reproted normal PAP this year with Mary- need records  Mammogram normal 10/2021 - reassured  On HRT- counseled, wean donw - agreed.   No diagnosis found.    Plan:  Wean off of hrt  Follow up 10  / 2022 for annual breast exam and mammogram  Patient informed will be contacted with results within 2 weeks. Encouraged to please call back or email if she has not heard from us by then.          "

## 2022-01-10 NOTE — PROGRESS NOTES
"HPI     Annual Exam w/CL fit    Pt. States [facility] "is not quite as good at distance" since last visit and   would like a new CL rx.  Wears daily's total 1 +1.75 CL OU that she disposes of daily and does not   sleep in them.  + dryness OU "sometimes" x;s 5+ yrs    Denies flashes, but some floaters x's 7+ yrs with no change.     OTC tears PRN OU (pt. Does not know name of brand)      Last edited by Shabnam Bermeo on 1/10/2022 11:17 AM. (History)            Assessment /Plan     For exam results, see Encounter Report.    Nuclear sclerosis, bilateral    Wears contact lenses    Refractive error      1. Educated pt on presence of cataracts and effects on vision. No surgery at this time. Recheck in one year.  2,3. Contact lens Rx released to pt. Daily wear only advised, with education to risks of extended wear.  Discussed lens care, compliance and solutions. RTC yearly contact lens follow up.                  "

## 2022-01-12 ENCOUNTER — PATIENT MESSAGE (OUTPATIENT)
Dept: FAMILY MEDICINE | Facility: CLINIC | Age: 72
End: 2022-01-12
Payer: MEDICARE

## 2022-01-12 RX ORDER — LEVOTHYROXINE SODIUM 125 UG/1
125 TABLET ORAL
Qty: 30 TABLET | Refills: 0 | Status: SHIPPED | OUTPATIENT
Start: 2022-01-12 | End: 2022-01-15

## 2022-01-13 ENCOUNTER — LAB VISIT (OUTPATIENT)
Dept: LAB | Facility: HOSPITAL | Age: 72
End: 2022-01-13
Attending: FAMILY MEDICINE
Payer: MEDICARE

## 2022-01-13 DIAGNOSIS — E03.4 HYPOTHYROIDISM DUE TO ACQUIRED ATROPHY OF THYROID: ICD-10-CM

## 2022-01-13 LAB
T4 FREE SERPL-MCNC: 1.35 NG/DL (ref 0.71–1.51)
TSH SERPL DL<=0.005 MIU/L-ACNC: 0.31 UIU/ML (ref 0.4–4)

## 2022-01-13 PROCEDURE — 84443 ASSAY THYROID STIM HORMONE: CPT | Mod: HCNC | Performed by: FAMILY MEDICINE

## 2022-01-13 PROCEDURE — 84439 ASSAY OF FREE THYROXINE: CPT | Mod: HCNC | Performed by: FAMILY MEDICINE

## 2022-01-13 PROCEDURE — 36415 COLL VENOUS BLD VENIPUNCTURE: CPT | Mod: HCNC,PO | Performed by: FAMILY MEDICINE

## 2022-01-15 ENCOUNTER — PATIENT MESSAGE (OUTPATIENT)
Dept: FAMILY MEDICINE | Facility: CLINIC | Age: 72
End: 2022-01-15
Payer: MEDICARE

## 2022-01-15 DIAGNOSIS — I10 HYPERTENSION, ESSENTIAL: ICD-10-CM

## 2022-01-15 DIAGNOSIS — E03.4 HYPOTHYROIDISM DUE TO ACQUIRED ATROPHY OF THYROID: Primary | ICD-10-CM

## 2022-01-15 DIAGNOSIS — E78.5 HYPERLIPIDEMIA, UNSPECIFIED HYPERLIPIDEMIA TYPE: ICD-10-CM

## 2022-01-15 RX ORDER — LEVOTHYROXINE SODIUM 112 UG/1
112 TABLET ORAL
Qty: 30 TABLET | Refills: 1 | Status: SHIPPED | OUTPATIENT
Start: 2022-01-15 | End: 2022-07-21 | Stop reason: SDUPTHER

## 2022-01-18 NOTE — TELEPHONE ENCOUNTER
Called and scheduled patient for labs.  Patient is aware of the time, date and to fast for the labs.

## 2022-02-24 ENCOUNTER — PATIENT MESSAGE (OUTPATIENT)
Dept: FAMILY MEDICINE | Facility: CLINIC | Age: 72
End: 2022-02-24
Payer: MEDICARE

## 2022-02-24 ENCOUNTER — TELEPHONE (OUTPATIENT)
Dept: FAMILY MEDICINE | Facility: CLINIC | Age: 72
End: 2022-02-24
Payer: MEDICARE

## 2022-03-02 ENCOUNTER — LAB VISIT (OUTPATIENT)
Dept: LAB | Facility: HOSPITAL | Age: 72
End: 2022-03-02
Attending: FAMILY MEDICINE
Payer: MEDICARE

## 2022-03-02 DIAGNOSIS — I10 HYPERTENSION, ESSENTIAL: ICD-10-CM

## 2022-03-02 DIAGNOSIS — E03.4 HYPOTHYROIDISM DUE TO ACQUIRED ATROPHY OF THYROID: ICD-10-CM

## 2022-03-02 DIAGNOSIS — E78.5 HYPERLIPIDEMIA, UNSPECIFIED HYPERLIPIDEMIA TYPE: ICD-10-CM

## 2022-03-02 LAB
ALBUMIN SERPL BCP-MCNC: 4 G/DL (ref 3.5–5.2)
ALP SERPL-CCNC: 98 U/L (ref 55–135)
ALT SERPL W/O P-5'-P-CCNC: 26 U/L (ref 10–44)
ANION GAP SERPL CALC-SCNC: 11 MMOL/L (ref 8–16)
AST SERPL-CCNC: 19 U/L (ref 10–40)
BASOPHILS # BLD AUTO: 0.06 K/UL (ref 0–0.2)
BASOPHILS NFR BLD: 0.8 % (ref 0–1.9)
BILIRUB SERPL-MCNC: 0.4 MG/DL (ref 0.1–1)
BUN SERPL-MCNC: 23 MG/DL (ref 8–23)
CALCIUM SERPL-MCNC: 10.4 MG/DL (ref 8.7–10.5)
CHLORIDE SERPL-SCNC: 100 MMOL/L (ref 95–110)
CHOLEST SERPL-MCNC: 196 MG/DL (ref 120–199)
CHOLEST/HDLC SERPL: 3.1 {RATIO} (ref 2–5)
CO2 SERPL-SCNC: 28 MMOL/L (ref 23–29)
CREAT SERPL-MCNC: 0.9 MG/DL (ref 0.5–1.4)
DIFFERENTIAL METHOD: ABNORMAL
EOSINOPHIL # BLD AUTO: 0.2 K/UL (ref 0–0.5)
EOSINOPHIL NFR BLD: 2.4 % (ref 0–8)
ERYTHROCYTE [DISTWIDTH] IN BLOOD BY AUTOMATED COUNT: 12.8 % (ref 11.5–14.5)
EST. GFR  (AFRICAN AMERICAN): >60 ML/MIN/1.73 M^2
EST. GFR  (NON AFRICAN AMERICAN): >60 ML/MIN/1.73 M^2
ESTIMATED AVG GLUCOSE: 111 MG/DL (ref 68–131)
GLUCOSE SERPL-MCNC: 96 MG/DL (ref 70–110)
HBA1C MFR BLD: 5.5 % (ref 4–5.6)
HCT VFR BLD AUTO: 42.9 % (ref 37–48.5)
HDLC SERPL-MCNC: 64 MG/DL (ref 40–75)
HDLC SERPL: 32.7 % (ref 20–50)
HGB BLD-MCNC: 13.6 G/DL (ref 12–16)
IMM GRANULOCYTES # BLD AUTO: 0.01 K/UL (ref 0–0.04)
IMM GRANULOCYTES NFR BLD AUTO: 0.1 % (ref 0–0.5)
LDLC SERPL CALC-MCNC: 108.6 MG/DL (ref 63–159)
LYMPHOCYTES # BLD AUTO: 2.8 K/UL (ref 1–4.8)
LYMPHOCYTES NFR BLD: 35.1 % (ref 18–48)
MCH RBC QN AUTO: 28.5 PG (ref 27–31)
MCHC RBC AUTO-ENTMCNC: 31.7 G/DL (ref 32–36)
MCV RBC AUTO: 90 FL (ref 82–98)
MONOCYTES # BLD AUTO: 0.9 K/UL (ref 0.3–1)
MONOCYTES NFR BLD: 11.1 % (ref 4–15)
NEUTROPHILS # BLD AUTO: 4 K/UL (ref 1.8–7.7)
NEUTROPHILS NFR BLD: 50.5 % (ref 38–73)
NONHDLC SERPL-MCNC: 132 MG/DL
NRBC BLD-RTO: 0 /100 WBC
PLATELET # BLD AUTO: 248 K/UL (ref 150–450)
PMV BLD AUTO: 10.7 FL (ref 9.2–12.9)
POTASSIUM SERPL-SCNC: 4.4 MMOL/L (ref 3.5–5.1)
PROT SERPL-MCNC: 7.3 G/DL (ref 6–8.4)
RBC # BLD AUTO: 4.77 M/UL (ref 4–5.4)
SODIUM SERPL-SCNC: 139 MMOL/L (ref 136–145)
TRIGL SERPL-MCNC: 117 MG/DL (ref 30–150)
TSH SERPL DL<=0.005 MIU/L-ACNC: 1.71 UIU/ML (ref 0.4–4)
WBC # BLD AUTO: 7.92 K/UL (ref 3.9–12.7)

## 2022-03-02 PROCEDURE — 80053 COMPREHEN METABOLIC PANEL: CPT | Mod: HCNC | Performed by: FAMILY MEDICINE

## 2022-03-02 PROCEDURE — 84443 ASSAY THYROID STIM HORMONE: CPT | Mod: HCNC | Performed by: FAMILY MEDICINE

## 2022-03-02 PROCEDURE — 80061 LIPID PANEL: CPT | Mod: HCNC | Performed by: FAMILY MEDICINE

## 2022-03-02 PROCEDURE — 83036 HEMOGLOBIN GLYCOSYLATED A1C: CPT | Mod: HCNC | Performed by: FAMILY MEDICINE

## 2022-03-02 PROCEDURE — 36415 COLL VENOUS BLD VENIPUNCTURE: CPT | Mod: HCNC,PO | Performed by: FAMILY MEDICINE

## 2022-03-02 PROCEDURE — 85025 COMPLETE CBC W/AUTO DIFF WBC: CPT | Mod: HCNC | Performed by: FAMILY MEDICINE

## 2022-03-06 ENCOUNTER — PATIENT MESSAGE (OUTPATIENT)
Dept: FAMILY MEDICINE | Facility: CLINIC | Age: 72
End: 2022-03-06
Payer: MEDICARE

## 2022-04-26 RX ORDER — OMEPRAZOLE 20 MG/1
20 CAPSULE, DELAYED RELEASE ORAL DAILY
Qty: 30 CAPSULE | Refills: 5 | Status: SHIPPED | OUTPATIENT
Start: 2022-04-26 | End: 2022-07-14 | Stop reason: DRUGHIGH

## 2022-04-26 NOTE — TELEPHONE ENCOUNTER
No new care gaps identified.  Powered by [employer]. Reference number: [id number].   4/26/2022 1:43:46 PM CDT

## 2022-05-02 NOTE — TELEPHONE ENCOUNTER
No new care gaps identified.  Powered by [employer]. Reference number: [id number].   5/02/2022 9:49:13 AM CDT

## 2022-05-05 RX ORDER — METOPROLOL SUCCINATE 50 MG/1
50 TABLET, EXTENDED RELEASE ORAL DAILY
Qty: 90 TABLET | Refills: 1 | Status: SHIPPED | OUTPATIENT
Start: 2022-05-05 | End: 2023-06-04

## 2022-05-24 ENCOUNTER — TELEPHONE (OUTPATIENT)
Dept: FAMILY MEDICINE | Facility: CLINIC | Age: 72
End: 2022-05-24
Payer: MEDICARE

## 2022-05-24 NOTE — TELEPHONE ENCOUNTER
----- Message from Lissett Han sent at 5/24/2022  1:07 PM CDT -----  Regarding: pt called  Name of Who is Calling: WILLIAM HAINES [[id number]]      What is the request in detail: pt had a virus on Sunday and she had developed a rash on upper body. She can send pictures  to the office and would like to speak with a nurse .      Can the clinic reply by [facility]: No      What Number to Call Back if not in [facility]: [phone]

## 2022-05-25 ENCOUNTER — OFFICE VISIT (OUTPATIENT)
Dept: FAMILY MEDICINE | Facility: CLINIC | Age: 72
End: 2022-05-25
Payer: MEDICARE

## 2022-05-25 VITALS
BODY MASS INDEX: 34.65 KG/M2 | SYSTOLIC BLOOD PRESSURE: 126 MMHG | RESPIRATION RATE: 20 BRPM | DIASTOLIC BLOOD PRESSURE: 74 MMHG | TEMPERATURE: 99 F | OXYGEN SATURATION: 98 % | WEIGHT: 195.56 LBS | HEART RATE: 80 BPM | HEIGHT: 63 IN

## 2022-05-25 DIAGNOSIS — R11.2 NAUSEA AND VOMITING, INTRACTABILITY OF VOMITING NOT SPECIFIED, UNSPECIFIED VOMITING TYPE: Primary | ICD-10-CM

## 2022-05-25 DIAGNOSIS — R21 RASH: ICD-10-CM

## 2022-05-25 LAB
CTP QC/QA: YES
SARS-COV-2 RDRP RESP QL NAA+PROBE: NEGATIVE

## 2022-05-25 PROCEDURE — 3078F DIAST BP <80 MM HG: CPT | Mod: CPTII,S$GLB,, | Performed by: NURSE PRACTITIONER

## 2022-05-25 PROCEDURE — 1126F AMNT PAIN NOTED NONE PRSNT: CPT | Mod: CPTII,S$GLB,, | Performed by: NURSE PRACTITIONER

## 2022-05-25 PROCEDURE — 1101F PR PT FALLS ASSESS DOC 0-1 FALLS W/OUT INJ PAST YR: ICD-10-PCS | Mod: CPTII,S$GLB,, | Performed by: NURSE PRACTITIONER

## 2022-05-25 PROCEDURE — 1101F PT FALLS ASSESS-DOCD LE1/YR: CPT | Mod: CPTII,S$GLB,, | Performed by: NURSE PRACTITIONER

## 2022-05-25 PROCEDURE — 3008F PR BODY MASS INDEX (BMI) DOCUMENTED: ICD-10-PCS | Mod: CPTII,S$GLB,, | Performed by: NURSE PRACTITIONER

## 2022-05-25 PROCEDURE — 3044F PR MOST RECENT HEMOGLOBIN A1C LEVEL <7.0%: ICD-10-PCS | Mod: CPTII,S$GLB,, | Performed by: NURSE PRACTITIONER

## 2022-05-25 PROCEDURE — 3078F PR MOST RECENT DIASTOLIC BLOOD PRESSURE < 80 MM HG: ICD-10-PCS | Mod: CPTII,S$GLB,, | Performed by: NURSE PRACTITIONER

## 2022-05-25 PROCEDURE — 1159F MED LIST DOCD IN RCRD: CPT | Mod: CPTII,S$GLB,, | Performed by: NURSE PRACTITIONER

## 2022-05-25 PROCEDURE — 3008F BODY MASS INDEX DOCD: CPT | Mod: CPTII,S$GLB,, | Performed by: NURSE PRACTITIONER

## 2022-05-25 PROCEDURE — 4010F PR ACE/ARB THEARPY RXD/TAKEN: ICD-10-PCS | Mod: CPTII,S$GLB,, | Performed by: NURSE PRACTITIONER

## 2022-05-25 PROCEDURE — 1126F PR PAIN SEVERITY QUANTIFIED, NO PAIN PRESENT: ICD-10-PCS | Mod: CPTII,S$GLB,, | Performed by: NURSE PRACTITIONER

## 2022-05-25 PROCEDURE — 1160F PR REVIEW ALL MEDS BY PRESCRIBER/CLIN PHARMACIST DOCUMENTED: ICD-10-PCS | Mod: CPTII,S$GLB,, | Performed by: NURSE PRACTITIONER

## 2022-05-25 PROCEDURE — 1159F PR MEDICATION LIST DOCUMENTED IN MEDICAL RECORD: ICD-10-PCS | Mod: CPTII,S$GLB,, | Performed by: NURSE PRACTITIONER

## 2022-05-25 PROCEDURE — 3044F HG A1C LEVEL LT 7.0%: CPT | Mod: CPTII,S$GLB,, | Performed by: NURSE PRACTITIONER

## 2022-05-25 PROCEDURE — 4010F ACE/ARB THERAPY RXD/TAKEN: CPT | Mod: CPTII,S$GLB,, | Performed by: NURSE PRACTITIONER

## 2022-05-25 PROCEDURE — 1160F RVW MEDS BY RX/DR IN RCRD: CPT | Mod: CPTII,S$GLB,, | Performed by: NURSE PRACTITIONER

## 2022-05-25 PROCEDURE — U0002: ICD-10-PCS | Mod: QW,S$GLB,, | Performed by: NURSE PRACTITIONER

## 2022-05-25 PROCEDURE — 99214 PR OFFICE/OUTPT VISIT, EST, LEVL IV, 30-39 MIN: ICD-10-PCS | Mod: S$GLB,,, | Performed by: NURSE PRACTITIONER

## 2022-05-25 PROCEDURE — 3074F PR MOST RECENT SYSTOLIC BLOOD PRESSURE < 130 MM HG: ICD-10-PCS | Mod: CPTII,S$GLB,, | Performed by: NURSE PRACTITIONER

## 2022-05-25 PROCEDURE — U0002 COVID-19 LAB TEST NON-CDC: HCPCS | Mod: QW,S$GLB,, | Performed by: NURSE PRACTITIONER

## 2022-05-25 PROCEDURE — 99214 OFFICE O/P EST MOD 30 MIN: CPT | Mod: S$GLB,,, | Performed by: NURSE PRACTITIONER

## 2022-05-25 PROCEDURE — 3288F PR FALLS RISK ASSESSMENT DOCUMENTED: ICD-10-PCS | Mod: CPTII,S$GLB,, | Performed by: NURSE PRACTITIONER

## 2022-05-25 PROCEDURE — 3074F SYST BP LT 130 MM HG: CPT | Mod: CPTII,S$GLB,, | Performed by: NURSE PRACTITIONER

## 2022-05-25 PROCEDURE — 3288F FALL RISK ASSESSMENT DOCD: CPT | Mod: CPTII,S$GLB,, | Performed by: NURSE PRACTITIONER

## 2022-05-25 NOTE — PROGRESS NOTES
Subjective:       Patient ID: Curtis Navarro is a 71 y.o. female.    Chief Complaint: Rash    HPI onset Sunday with stomach upset, vomiting. Mild headache. Decreased appetite since then, fatigue. Yesterday started feeling better. States noticed a rash during this time. Diarrhea once. Last episode of vomiting was early Monday morning. No fever. No sore throat. States she and her son ate the same thing for lunch on Sunday and he has had a similar reaction just not as bad as hers. She is caring for her 91 year old mother and wants to make sure this is not Covid. She states she has had this rash multiple times for years. States usually resolves in a few days with allegra. She has not had any allegra at the house and did not feel like going to the store to get some. States she will pick some up today. Rash does not itch. Rash from scalp down to mid waist. States it looks better today as well. No other concerns. See ROS.    The following portion of the patients history was reviewed and updated as appropriate: allergies, current medications, past medical and surgical history. Past social history and problem list reviewed. Family PMH and Past social history reviewed. Tobacco, Illicit drug use reviewed.      Review of patient's allergies indicates:   Allergen Reactions    Lisinopril Other (See Comments)     Cough    Azithromycin      Other reaction(s): Nausea  Other reaction(s): Diarrhea    Metronidazole hcl      Other reaction(s): Rash  Other reaction(s): Itching    Moxifloxacin      Other reaction(s): Rash    Sulfa (sulfonamide antibiotics)      Other reaction(s): Itching         Current Outpatient Medications:     aspirin (ECOTRIN) 81 MG EC tablet, Take 81 mg by mouth once daily., Disp: , Rfl:     atorvastatin (LIPITOR) 20 MG tablet, TAKE 1 TABLET (20 MG TOTAL) BY MOUTH ONCE DAILY., Disp: 90 tablet, Rfl: 1    B INFANTIS/B ANI/B JOSE M/B BIFID (PROBIOTIC 4X ORAL), Take 1 tablet by mouth once daily., Disp: , Rfl:      biotin 5 mg Cap, Take 5 mg by mouth once daily., Disp: , Rfl:     calcium-vitamin D3 500 mg(1,250mg) -200 unit per tablet, Take 1 tablet by mouth 2 (two) times daily with meals., Disp: , Rfl:     GLUCOSA HAHN 2KCL/CHONDROITIN HAHN (GLUCOSAMINE-CHONDROITIN DS ORAL), Take by mouth 2 (two) times daily., Disp: , Rfl:     KRILL/OM-3/DHA/EPA/PHOSPHO/AST (MEGARED OMEGA-3 KRILL OIL ORAL), Take 1 capsule by mouth once daily., Disp: , Rfl:     levothyroxine (SYNTHROID) 112 MCG tablet, Take 1 tablet (112 mcg total) by mouth before breakfast., Disp: 30 tablet, Rfl: 1    losartan (COZAAR) 100 MG tablet, Take 1 tablet (100 mg total) by mouth once daily., Disp: 90 tablet, Rfl: 1    metoprolol succinate (TOPROL-XL) 50 MG 24 hr tablet, Take 1 tablet (50 mg total) by mouth once daily., Disp: 90 tablet, Rfl: 1    montelukast (SINGULAIR) 10 mg tablet, TAKE 1 TABLET BY MOUTH DAILY, Disp: 30 tablet, Rfl: 5    MULTIVITAMIN ORAL, Take by mouth., Disp: , Rfl:     MYRBETRIQ 50 mg Tb24, TAKE 1 TABLET (50 MG TOTAL) BY MOUTH ONCE DAILY., Disp: 90 tablet, Rfl: 2    omeprazole (PRILOSEC) 20 MG capsule, Take 1 capsule (20 mg total) by mouth once daily., Disp: 30 capsule, Rfl: 5    Past Medical History:   Diagnosis Date    Allergy     Arthritis     Carotid arterial disease     5/21 next due 5/23    Cataract     Diverticulosis     GERD (gastroesophageal reflux disease)     Graves disease     s/p radioactive iodine in 42    H/O esophagogastroduodenoscopy     8/14 and 6/17    H/O percutaneous left heart catheterization     normal 5/12    History of colon polyps     History of diverticulitis     History of esophagitis     egd 8/14    History of skin cancer     L neck    Hyperlipidemia     Hypertension     Hypothyroidism     s/p radioactive iodine    IBS (irritable bowel syndrome)     Osteopenia     7/13, 7/15    S/P colonoscopy     7/17;  next due 7/22    Thyroid nodule     last usg 5/21;  next due 5/23       Past  "Surgical History:   Procedure Laterality Date    CARDIAC SURGERY  2013    angiogram (negative)    COLONOSCOPY  07/21/2010    Dr. Velázquez; in legacy, repeat in 6-7 years    COLONOSCOPY N/A 6/27/2017    Procedure: COLONOSCOPY;  Surgeon: Hamlet Velázquez Jr., MD;  Location: [facility];  Service: Endoscopy;  Laterality: N/A; repeat in 5 years for surveillance    COSMETIC SURGERY      Liposuction to neck    ESOPHAGOGASTRODUODENOSCOPY N/A 7/12/2018    Procedure: EGD (ESOPHAGOGASTRODUODENOSCOPY);  Surgeon: Hamlet Velázquez Jr., MD;  Location: [facility];  Service: Endoscopy;  Laterality: N/A;    SKIN CANCER EXCISION      with Mohs on left neck    TONSILLECTOMY      UPPER GASTROINTESTINAL ENDOSCOPY  08/04/2014    Dr. Velázquez    UPPER GASTROINTESTINAL ENDOSCOPY  06/27/2017    DR. VELÁZQUEZ    UPPER GASTROINTESTINAL ENDOSCOPY  07/12/2018    Dr. Velázquez: "White nummular lesions in esophageal mucosa (benign)", antritis, gastric polyps removed       Social History     Socioeconomic History    Marital status:    Tobacco Use    Smoking status: Never Smoker    Smokeless tobacco: Never Used   Substance and Sexual Activity    Alcohol use: Yes     Comment: very rare    Drug use: No    Sexual activity: Yes     Partners: Male     Birth control/protection: Post-menopausal   Social History Narrative         Social Determinants of Health     Financial Resource Strain: Low Risk     Difficulty of Paying Living Expenses: Not very hard   Food Insecurity: No Food Insecurity    Worried About Running Out of Food in the Last Year: Never true    Ran Out of Food in the Last Year: Never true   Transportation Needs: No Transportation Needs    Lack of Transportation (Medical): No    Lack of Transportation (Non-Medical): No   Physical Activity: Unknown    Days of Exercise per Week: 2 days   Stress: No Stress Concern Present    Feeling of Stress : Only a little   Social Connections: Unknown    Frequency of Communication with " "Friends and Family: More than three times a week    Frequency of Social Gatherings with Friends and Family: Once a week    Active Member of Clubs or Organizations: No    Attends Club or Organization Meetings: Never    Marital Status:    Housing Stability: Low Risk     Unable to Pay for Housing in the Last Year: No    Number of Places Lived in the Last Year: 1    Unstable Housing in the Last Year: No     Review of Systems   Constitutional: Positive for appetite change and fatigue. Negative for fever.   HENT: Negative for congestion, postnasal drip, rhinorrhea and voice change.    Eyes: Negative for visual disturbance.   Respiratory: Negative for cough, chest tightness, shortness of breath and wheezing.    Cardiovascular: Negative for chest pain, palpitations and leg swelling.   Gastrointestinal: Positive for diarrhea, nausea and vomiting. Negative for abdominal pain, blood in stool and constipation.        All symptoms have improved. No vomiting since early Monday morning. No diarrhea since Sunday. No nausea, stomach just feels weak   Genitourinary: Negative for difficulty urinating and dysuria.   Musculoskeletal: Negative for arthralgias, back pain and gait problem.   Skin: Positive for rash (fine red rash from scalp down to mid abdomen. does not itch). Negative for wound.   Neurological: Negative for dizziness, weakness and headaches.   Hematological: Negative for adenopathy. Does not bruise/bleed easily.   Psychiatric/Behavioral: Negative for decreased concentration, dysphoric mood and sleep disturbance. The patient is not nervous/anxious.        Objective:      /74   Pulse 80   Temp 98.6 °F (37 °C)   Resp 20   Ht 5' 3" (1.6 m)   Wt 88.7 kg (195 lb 8.8 oz)   SpO2 98%   BMI 34.64 kg/m²      Physical Exam  Constitutional:       General: She is not in acute distress.     Appearance: Normal appearance. She is well-developed. She is obese.   HENT:      Head: Normocephalic.      Mouth/Throat: "      Mouth: Mucous membranes are moist.      Pharynx: Oropharynx is clear.   Eyes:      Conjunctiva/sclera: Conjunctivae normal.      Pupils: Pupils are equal, round, and reactive to light.   Neck:      Thyroid: No thyromegaly.      Trachea: Trachea normal. No tracheal tenderness or tracheal deviation.   Cardiovascular:      Rate and Rhythm: Normal rate and regular rhythm.      Pulses: Normal pulses.           Carotid pulses are 2+ on the right side and 2+ on the left side.       Radial pulses are 2+ on the right side and 2+ on the left side.      Heart sounds: Normal heart sounds. No murmur heard.    No gallop.   Pulmonary:      Effort: Pulmonary effort is normal. No respiratory distress.      Breath sounds: Normal breath sounds. No stridor. No wheezing, rhonchi or rales.   Abdominal:      General: Bowel sounds are normal.      Palpations: Abdomen is soft. There is no splenomegaly or mass.      Tenderness: There is no abdominal tenderness. There is no rebound.   Musculoskeletal:         General: Normal range of motion.      Cervical back: Full passive range of motion without pain, normal range of motion and neck supple. No edema.      Right lower leg: No edema.      Left lower leg: No edema.      Comments: Gait and coordination normal.  strong, equal. Upper and lower extremity strength normal.    Skin:     General: Skin is warm and dry.      Capillary Refill: Capillary refill takes less than 2 seconds.      Findings: Rash present. No lesion.      Comments: Very fine red diffuse rash scalp, neck, chest, breasts, abdomen, back. No pustules. No raised areas.    Neurological:      General: No focal deficit present.      Mental Status: She is alert and oriented to person, place, and time.   Psychiatric:         Attention and Perception: Attention and perception normal.         Mood and Affect: Mood and affect normal.         Speech: Speech normal.         Behavior: Behavior normal.         Assessment:       1.  Nausea and vomiting, intractability of vomiting not specified, unspecified vomiting type    2. Rash        Plan:       Nausea and vomiting, intractability of vomiting not specified, unspecified vomiting type: most likely related to something she ate. Symptoms improving. Advised bland diet, advance as tolerated. Hydrate well. Covid was negative.  -     POCT COVID-19 Rapid Screening    Rash: get Allegra and take as prescribed. If rash does not continue to improve over the next few days let me know. Is rash worsens, let me know     Continue current medication  Take medications only as prescribed  Healthy diet  Adequate rest  Adequate hydration  Avoid allergens  Avoid excessive caffeine     follow up if not improving

## 2022-06-09 ENCOUNTER — OFFICE VISIT (OUTPATIENT)
Dept: GASTROENTEROLOGY | Facility: CLINIC | Age: 72
End: 2022-06-09
Payer: MEDICARE

## 2022-06-09 VITALS — WEIGHT: 199.06 LBS | BODY MASS INDEX: 35.27 KG/M2 | HEIGHT: 63 IN

## 2022-06-09 DIAGNOSIS — R49.0 HOARSENESS: ICD-10-CM

## 2022-06-09 DIAGNOSIS — K21.9 GASTROESOPHAGEAL REFLUX DISEASE WITHOUT ESOPHAGITIS: ICD-10-CM

## 2022-06-09 DIAGNOSIS — Z79.01 ANTICOAGULANT LONG-TERM USE: ICD-10-CM

## 2022-06-09 DIAGNOSIS — Z86.010 HISTORY OF COLON POLYPS: ICD-10-CM

## 2022-06-09 DIAGNOSIS — Z79.899 ENCOUNTER FOR MONITORING LONG-TERM PROTON PUMP INHIBITOR THERAPY: Primary | ICD-10-CM

## 2022-06-09 DIAGNOSIS — R05.9 COUGH: ICD-10-CM

## 2022-06-09 DIAGNOSIS — Z51.81 ENCOUNTER FOR MONITORING LONG-TERM PROTON PUMP INHIBITOR THERAPY: Primary | ICD-10-CM

## 2022-06-09 DIAGNOSIS — R09.A2 GLOBUS SENSATION: ICD-10-CM

## 2022-06-09 PROCEDURE — 1101F PT FALLS ASSESS-DOCD LE1/YR: CPT | Mod: CPTII,S$GLB,, | Performed by: NURSE PRACTITIONER

## 2022-06-09 PROCEDURE — 99214 OFFICE O/P EST MOD 30 MIN: CPT | Mod: S$GLB,,, | Performed by: NURSE PRACTITIONER

## 2022-06-09 PROCEDURE — 1126F PR PAIN SEVERITY QUANTIFIED, NO PAIN PRESENT: ICD-10-PCS | Mod: CPTII,S$GLB,, | Performed by: NURSE PRACTITIONER

## 2022-06-09 PROCEDURE — 99999 PR PBB SHADOW E&M-EST. PATIENT-LVL IV: ICD-10-PCS | Mod: PBBFAC,,, | Performed by: NURSE PRACTITIONER

## 2022-06-09 PROCEDURE — 3008F BODY MASS INDEX DOCD: CPT | Mod: CPTII,S$GLB,, | Performed by: NURSE PRACTITIONER

## 2022-06-09 PROCEDURE — 3288F PR FALLS RISK ASSESSMENT DOCUMENTED: ICD-10-PCS | Mod: CPTII,S$GLB,, | Performed by: NURSE PRACTITIONER

## 2022-06-09 PROCEDURE — 3008F PR BODY MASS INDEX (BMI) DOCUMENTED: ICD-10-PCS | Mod: CPTII,S$GLB,, | Performed by: NURSE PRACTITIONER

## 2022-06-09 PROCEDURE — 4010F PR ACE/ARB THEARPY RXD/TAKEN: ICD-10-PCS | Mod: CPTII,S$GLB,, | Performed by: NURSE PRACTITIONER

## 2022-06-09 PROCEDURE — 1126F AMNT PAIN NOTED NONE PRSNT: CPT | Mod: CPTII,S$GLB,, | Performed by: NURSE PRACTITIONER

## 2022-06-09 PROCEDURE — 3044F HG A1C LEVEL LT 7.0%: CPT | Mod: CPTII,S$GLB,, | Performed by: NURSE PRACTITIONER

## 2022-06-09 PROCEDURE — 4010F ACE/ARB THERAPY RXD/TAKEN: CPT | Mod: CPTII,S$GLB,, | Performed by: NURSE PRACTITIONER

## 2022-06-09 PROCEDURE — 3288F FALL RISK ASSESSMENT DOCD: CPT | Mod: CPTII,S$GLB,, | Performed by: NURSE PRACTITIONER

## 2022-06-09 PROCEDURE — 1101F PR PT FALLS ASSESS DOC 0-1 FALLS W/OUT INJ PAST YR: ICD-10-PCS | Mod: CPTII,S$GLB,, | Performed by: NURSE PRACTITIONER

## 2022-06-09 PROCEDURE — 99999 PR PBB SHADOW E&M-EST. PATIENT-LVL IV: CPT | Mod: PBBFAC,,, | Performed by: NURSE PRACTITIONER

## 2022-06-09 PROCEDURE — 3044F PR MOST RECENT HEMOGLOBIN A1C LEVEL <7.0%: ICD-10-PCS | Mod: CPTII,S$GLB,, | Performed by: NURSE PRACTITIONER

## 2022-06-09 PROCEDURE — 99214 PR OFFICE/OUTPT VISIT, EST, LEVL IV, 30-39 MIN: ICD-10-PCS | Mod: S$GLB,,, | Performed by: NURSE PRACTITIONER

## 2022-06-09 NOTE — PATIENT INSTRUCTIONS
Understanding Colon and Rectal Polyps    The colon (also called the large intestine) is a muscular tube that forms the last part of the digestive tract. It absorbs water and stores food waste. The colon is about 4 to 6 feet long. The rectum is the last 6 inches of the colon. The colon and rectum have a smooth lining composed of millions of cells. Changes in these cells can lead to growths in the colon that can become cancerous and should be removed. Multiple tests are available to screen for colon cancer, but the colonoscopy is the most recommended test. During colonoscopy, these polyps can be removed. How often you need this test depends on many things including your condition, your family history, symptoms, and what the findings were at the previous colonoscopy.   When the colon lining changes  Changes that happen in the cells that line the colon or rectum can lead to growths called polyps. Over a period of years, polyps can turn cancerous. Removing polyps early may prevent cancer from ever forming.  Polyps  Polyps are fleshy clumps of tissue that form on the lining of the colon or rectum. Small polyps are usually benign (not cancerous). However, over time, cells in a polyp can change and become cancerous. Certain types of polyps known as adenomatous polyps are premalignant. The risk for invasive cancer increases with the size of the polyp and certain cell and gene features. This means that they can become cancerous if they're not removed. Hyperplastic polyps are benign. They can grow quite large and not turn cancerous.   Cancer  Almost all colorectal cancers start when polyp cells begin growing abnormally. As a cancerous tumor grows, it may involve more and more of the colon or rectum. In time, cancer can also grow beyond the colon or rectum and spread to nearby organs or to glands called lymph nodes. The cells can also travel to other parts of the body. This is known as metastasis. The earlier a cancerous tumor  is removed, the better the chance of preventing its spread.    Date Last Reviewed: 8/1/2016  © 0407-4125 [employer], [email or web address]. [address] [place] [address], [place] [address]. All rights reserved. This information is not intended as a substitute for professional medical care. Always follow your healthcare professional's instructions.     GERD (Adult)    The esophagus is a tube that carries food from the mouth to the stomach. A valve at the lower end of the esophagus prevents stomach acid from flowing upward. When this valve doesn't work properly, stomach contents may repeatedly flow back up (reflux) into the esophagus. This is called gastroesophageal reflux disease (GERD). GERD can irritate the esophagus. It can cause problems with swallowing or breathing. In severe cases, GERD can cause recurrent pneumonia or other serious problems.  Symptoms of reflux include burning, pressure or sharp pain in the upper abdomen or mid to lower chest. The pain can spread to the neck, back, or shoulder. There may be belching, an acid taste in the back of the throat, chronic cough, or sore throat or hoarseness. GERD symptoms often occur during the day after a big meal. They can also occur at night when lying down.   Home care  Lifestyle changes can help reduce symptoms. If needed, medicines may be prescribed. Symptoms often improve with treatment, but if treatment is stopped, the symptoms often return after a few months. So most persons with GERD will need to continue treatment.  Lifestyle changes  Limit or avoid fatty, fried, and spicy foods, as well as coffee, chocolate, mint, and foods with high acid content such as tomatoes and citrus fruit and juices (orange, grapefruit, lemon).  Dont eat large meals, especially at night. Frequent, smaller meals are best. Do not lie down right after eating. And dont eat anything 3 hours before going to bed.  Avoid drinking alcohol and smoking. As much as possible, stay away from second hand  "smoke.  If you are overweight, losing weight will reduce symptoms.   Avoid wearing tight clothing around your stomach area.  If your symptoms occur during sleep, use a foam wedge to elevate your upper body (not just your head.) Or, place 4" blocks under the head of your bed.  Medicines  If needed, medicines can help relieve the symptoms of GERD and prevent damage to the esophagus. Discuss a medicine plan with your healthcare provider. This may include one or more of the following medicines:  Antacids to help neutralize the normal acids in your stomach.  Acid blockers (H2 blockers) to decrease acid production.  Acid inhibitors (PPIs) to decrease acid production in a different way than the blockers. They may work better, but can take a little longer to take effect.  Take an antacid 30-60 minutes after eating and at bedtime, but not at the same time as an acid blocker.  Try not to take medicines such as ibuprofen and aspirin. If you are taking aspirin for your heart or other medical reasons, talk to your healthcare provider about stopping it.  Follow-up care  Follow up with your healthcare provider or as advised by our staff.  When to seek medical advice  Call your healthcare provider if any of the following occur:  Stomach pain gets worse or moves to the lower right abdomen (appendix area)  Chest pain appears or gets worse, or spreads to the back, neck, shoulder, or arm  Frequent vomiting (cant keep down liquids)  Blood in the stool or vomit (red or black in color)  Feeling weak or dizzy  Fever of 100.4ºF (38ºC) or higher, or as directed by your healthcare provider  Date Last Reviewed: 6/23/2015  © 4224-0152 [employer]. [address] [place] [address], [place] [address]. All rights reserved. This information is not intended as a substitute for professional medical care. Always follow your healthcare professional's instructions.           "

## 2022-06-10 ENCOUNTER — OFFICE VISIT (OUTPATIENT)
Dept: PODIATRY | Facility: CLINIC | Age: 72
End: 2022-06-10
Payer: MEDICARE

## 2022-06-10 VITALS — BODY MASS INDEX: 35.26 KG/M2 | HEIGHT: 63 IN

## 2022-06-10 DIAGNOSIS — M21.41 PES PLANUS OF BOTH FEET: ICD-10-CM

## 2022-06-10 DIAGNOSIS — L60.3 NAIL DYSTROPHY: Primary | ICD-10-CM

## 2022-06-10 DIAGNOSIS — M21.42 PES PLANUS OF BOTH FEET: ICD-10-CM

## 2022-06-10 DIAGNOSIS — M79.671 FOOT PAIN, BILATERAL: ICD-10-CM

## 2022-06-10 DIAGNOSIS — M79.672 FOOT PAIN, BILATERAL: ICD-10-CM

## 2022-06-10 PROCEDURE — 1101F PR PT FALLS ASSESS DOC 0-1 FALLS W/OUT INJ PAST YR: ICD-10-PCS | Mod: CPTII,S$GLB,, | Performed by: PODIATRIST

## 2022-06-10 PROCEDURE — 4010F ACE/ARB THERAPY RXD/TAKEN: CPT | Mod: CPTII,S$GLB,, | Performed by: PODIATRIST

## 2022-06-10 PROCEDURE — 4010F PR ACE/ARB THEARPY RXD/TAKEN: ICD-10-PCS | Mod: CPTII,S$GLB,, | Performed by: PODIATRIST

## 2022-06-10 PROCEDURE — 3008F BODY MASS INDEX DOCD: CPT | Mod: CPTII,S$GLB,, | Performed by: PODIATRIST

## 2022-06-10 PROCEDURE — 3288F FALL RISK ASSESSMENT DOCD: CPT | Mod: CPTII,S$GLB,, | Performed by: PODIATRIST

## 2022-06-10 PROCEDURE — 3044F HG A1C LEVEL LT 7.0%: CPT | Mod: CPTII,S$GLB,, | Performed by: PODIATRIST

## 2022-06-10 PROCEDURE — 1159F MED LIST DOCD IN RCRD: CPT | Mod: CPTII,S$GLB,, | Performed by: PODIATRIST

## 2022-06-10 PROCEDURE — 99203 OFFICE O/P NEW LOW 30 MIN: CPT | Mod: S$GLB,,, | Performed by: PODIATRIST

## 2022-06-10 PROCEDURE — 88304 TISSUE EXAM BY PATHOLOGIST: CPT | Mod: 26,,, | Performed by: PATHOLOGY

## 2022-06-10 PROCEDURE — 3008F PR BODY MASS INDEX (BMI) DOCUMENTED: ICD-10-PCS | Mod: CPTII,S$GLB,, | Performed by: PODIATRIST

## 2022-06-10 PROCEDURE — 88304 PR  SURG PATH,LEVEL III: ICD-10-PCS | Mod: 26,,, | Performed by: PATHOLOGY

## 2022-06-10 PROCEDURE — 99999 PR PBB SHADOW E&M-EST. PATIENT-LVL III: ICD-10-PCS | Mod: PBBFAC,,, | Performed by: PODIATRIST

## 2022-06-10 PROCEDURE — 88312 SPECIAL STAINS GROUP 1: CPT | Performed by: PATHOLOGY

## 2022-06-10 PROCEDURE — 99999 PR PBB SHADOW E&M-EST. PATIENT-LVL III: CPT | Mod: PBBFAC,,, | Performed by: PODIATRIST

## 2022-06-10 PROCEDURE — 1101F PT FALLS ASSESS-DOCD LE1/YR: CPT | Mod: CPTII,S$GLB,, | Performed by: PODIATRIST

## 2022-06-10 PROCEDURE — 1159F PR MEDICATION LIST DOCUMENTED IN MEDICAL RECORD: ICD-10-PCS | Mod: CPTII,S$GLB,, | Performed by: PODIATRIST

## 2022-06-10 PROCEDURE — 3288F PR FALLS RISK ASSESSMENT DOCUMENTED: ICD-10-PCS | Mod: CPTII,S$GLB,, | Performed by: PODIATRIST

## 2022-06-10 PROCEDURE — 88312 SPECIAL STAINS GROUP 1: CPT | Mod: 26,,, | Performed by: PATHOLOGY

## 2022-06-10 PROCEDURE — 99203 PR OFFICE/OUTPT VISIT, NEW, LEVL III, 30-44 MIN: ICD-10-PCS | Mod: S$GLB,,, | Performed by: PODIATRIST

## 2022-06-10 PROCEDURE — 88304 TISSUE EXAM BY PATHOLOGIST: CPT | Performed by: PATHOLOGY

## 2022-06-10 PROCEDURE — 3044F PR MOST RECENT HEMOGLOBIN A1C LEVEL <7.0%: ICD-10-PCS | Mod: CPTII,S$GLB,, | Performed by: PODIATRIST

## 2022-06-10 PROCEDURE — 88312 PR  SPECIAL STAINS,GROUP I: ICD-10-PCS | Mod: 26,,, | Performed by: PATHOLOGY

## 2022-06-10 NOTE — PROGRESS NOTES
Subjective:      Patient ID: Curtis Navarro is a 71 y.o. female.    Chief Complaint: Nail Problem and Foot Pain    Curtis is a 71 y.o. female with a past medical history of Allergy, Arthritis, Carotid arterial disease, Cataract, Colon polyps, Diverticulitis, Diverticulosis, GERD (gastroesophageal reflux disease), Graves disease, H/O percutaneous left heart catheterization, History of skin cancer, Hyperlipidemia, Hypertension, Hypothyroidism, IBS (irritable bowel syndrome), Osteopenia, S/P colonoscopy, and Thyroid nodule. The patient's chief complaint consists of possible fungal infection of bilateral great toe.  States the nails have been in this shape for years.  Most recently she attempted application of Jublia, however, this failed to resolve said issue.  She has also had laser therapy with no overt change experienced.  Denies the nails being a source of pain.  Attempts to keep the nails trimmed appropriately to minimize further damage to the nail plates.  Inquires as to how the nails should be treated.  Lastly, notes having diffuse pain in bilateral foot.  States symptoms can be both sharp and aching.  Rates pain as a 0/10 while at rest.  Symptoms correlate with the amount of time spent ambulatory.  Relates a significant wear pattern to the medial portion of the soles.  Inquires as to potential treatment options to resolve this issue.  Denies any additional pedal complaints.        Hemoglobin A1C   Date Value Ref Range Status   03/02/2022 5.5 4.0 - 5.6 % Final     Comment:     ADA Screening Guidelines:  5.7-6.4%  Consistent with prediabetes  >or=6.5%  Consistent with diabetes    High levels of fetal hemoglobin interfere with the HbA1C  assay. Heterozygous hemoglobin variants (HbS, HgC, etc)do  not significantly interfere with this assay.   However, presence of multiple variants may affect accuracy.     05/05/2021 5.5 4.0 - 5.6 % Final     Comment:     ADA Screening Guidelines:  5.7-6.4%  Consistent with  "prediabetes  >or=6.5%  Consistent with diabetes    High levels of fetal hemoglobin interfere with the HbA1C  assay. Heterozygous hemoglobin variants (HbS, HgC, etc)do  not significantly interfere with this assay.   However, presence of multiple variants may affect accuracy.     05/02/2019 5.5 4.0 - 5.6 % Final     Comment:     ADA Screening Guidelines:  5.7-6.4%  Consistent with prediabetes  >or=6.5%  Consistent with diabetes  High levels of fetal hemoglobin interfere with the HbA1C  assay. Heterozygous hemoglobin variants (HbS, HgC, etc)do  not significantly interfere with this assay.   However, presence of multiple variants may affect accuracy.       Past Medical History:   Diagnosis Date    Allergy     Arthritis     Carotid arterial disease     5/21 next due 5/23    Cataract     Colon polyps     Diverticulitis     Diverticulosis     GERD (gastroesophageal reflux disease)     Graves disease     s/p radioactive iodine in 42    H/O percutaneous left heart catheterization     normal 5/12    History of skin cancer     L neck    Hyperlipidemia     Hypertension     Hypothyroidism     s/p radioactive iodine    IBS (irritable bowel syndrome)     Osteopenia     7/13, 7/15    S/P colonoscopy     7/17;  next due 7/22    Thyroid nodule     last usg 5/21;  next due 5/23       Past Surgical History:   Procedure Laterality Date    CARDIAC SURGERY  2013    angiogram (negative)    COLONOSCOPY  07/21/2010    Dr. Ceballos; [place], repeat in 6-7 years    COLONOSCOPY N/A 06/27/2017    Procedure: COLONOSCOPY;  Surgeon: Hamlet Ceballos Jr., MD;  Location: [facility];  Service: Endoscopy;  Laterality: N/A; repeat in 5 years for surveillance    COSMETIC SURGERY      Liposuction to neck    ESOPHAGOGASTRODUODENOSCOPY N/A 07/12/2018    Dr. Ceballos: "White nummular lesions in esophageal mucosa (benign)", antritis, gastric polyps removed    SKIN CANCER EXCISION      with Mohs on left neck    TONSILLECTOMY      UPPER " GASTROINTESTINAL ENDOSCOPY  08/04/2014    Dr. Velázquez    UPPER GASTROINTESTINAL ENDOSCOPY  06/27/2017    DR. VELÁZQUEZ       Family History   Problem Relation Age of Onset    Cataracts Mother     Colon polyps Mother         history of colitis- part colon removed    Cancer Sister     Ovarian cancer Sister 40    Cataracts Maternal Grandfather     Amblyopia Neg Hx     Blindness Neg Hx     Glaucoma Neg Hx     Hypertension Neg Hx     Macular degeneration Neg Hx     Retinal detachment Neg Hx     Strabismus Neg Hx     Stroke Neg Hx     Thyroid disease Neg Hx     Diabetes Neg Hx     Colon cancer Neg Hx     Esophageal cancer Neg Hx     Stomach cancer Neg Hx        Social History     Socioeconomic History    Marital status:    Tobacco Use    Smoking status: Never Smoker    Smokeless tobacco: Never Used   Substance and Sexual Activity    Alcohol use: Never    Drug use: No    Sexual activity: Yes     Partners: Male     Birth control/protection: Post-menopausal   Social History Narrative         Social Determinants of Health     Financial Resource Strain: Low Risk     Difficulty of Paying Living Expenses: Not very hard   Food Insecurity: No Food Insecurity    Worried About Running Out of Food in the Last Year: Never true    Ran Out of Food in the Last Year: Never true   Transportation Needs: No Transportation Needs    Lack of Transportation (Medical): No    Lack of Transportation (Non-Medical): No   Physical Activity: Unknown    Days of Exercise per Week: 2 days   Stress: No Stress Concern Present    Feeling of Stress : Only a little   Social Connections: Unknown    Frequency of Communication with Friends and Family: More than three times a week    Frequency of Social Gatherings with Friends and Family: Once a week    Active Member of Clubs or Organizations: No    Attends Club or Organization Meetings: Never    Marital Status:    Housing Stability: Low Risk     Unable to Pay  for Housing in the Last Year: No    Number of Places Lived in the Last Year: 1    Unstable Housing in the Last Year: No       Current Outpatient Medications   Medication Sig Dispense Refill    aspirin (ECOTRIN) 81 MG EC tablet Take 81 mg by mouth once daily.      atorvastatin (LIPITOR) 20 MG tablet TAKE 1 TABLET (20 MG TOTAL) BY MOUTH ONCE DAILY. 90 tablet 1    B INFANTIS/B ANI/B JOSE M/B BIFID (PROBIOTIC 4X ORAL) Take 1 tablet by mouth once daily.      biotin 5 mg Cap Take 5 mg by mouth once daily.      calcium-vitamin D3 500 mg(1,250mg) -200 unit per tablet Take 1 tablet by mouth 2 (two) times daily with meals.      GLUCOSA HAHN 2KCL/CHONDROITIN HAHN (GLUCOSAMINE-CHONDROITIN DS ORAL) Take by mouth 2 (two) times daily.      KRILL/OM-3/DHA/EPA/PHOSPHO/AST (MEGARED OMEGA-3 KRILL OIL ORAL) Take 1 capsule by mouth once daily.      levothyroxine (SYNTHROID) 112 MCG tablet Take 1 tablet (112 mcg total) by mouth before breakfast. 30 tablet 1    losartan (COZAAR) 100 MG tablet Take 1 tablet (100 mg total) by mouth once daily. 90 tablet 1    metoprolol succinate (TOPROL-XL) 50 MG 24 hr tablet Take 1 tablet (50 mg total) by mouth once daily. 90 tablet 1    montelukast (SINGULAIR) 10 mg tablet TAKE 1 TABLET BY MOUTH DAILY 30 tablet 5    MULTIVITAMIN ORAL Take by mouth.      omeprazole (PRILOSEC) 20 MG capsule Take 1 capsule (20 mg total) by mouth once daily. 30 capsule 5    MYRBETRIQ 50 mg Tb24 TAKE 1 TABLET (50 MG TOTAL) BY MOUTH ONCE DAILY. 90 tablet 2     No current facility-administered medications for this visit.       Review of patient's allergies indicates:   Allergen Reactions    Lisinopril Other (See Comments)     Cough    Azithromycin      Other reaction(s): Nausea  Other reaction(s): Diarrhea    Metronidazole hcl      Other reaction(s): Rash  Other reaction(s): Itching    Moxifloxacin      Other reaction(s): Rash    Sulfa (sulfonamide antibiotics)      Other reaction(s): Itching     Review of Systems    Constitutional: Negative for chills and fever.   Cardiovascular: Negative for claudication and leg swelling.   Skin: Positive for color change and nail changes.   Musculoskeletal: Positive for myalgias. Negative for joint swelling, muscle cramps and muscle weakness.   Gastrointestinal: Negative for nausea and vomiting.   Neurological: Negative for numbness and paresthesias.   Psychiatric/Behavioral: Negative for altered mental status.           Objective:      Physical Exam  Constitutional:       Appearance: Normal appearance. She is not ill-appearing.   Cardiovascular:      Pulses:           Dorsalis pedis pulses are 2+ on the right side and 2+ on the left side.        Posterior tibial pulses are 2+ on the right side and 2+ on the left side.      Comments: CFT is < 3 seconds bilateral.  Pedal hair growth is present bilateral.  No lower extremity edema noted bilateral.  Toes are warm to touch bilateral.    Musculoskeletal:         General: Deformity present. No tenderness or signs of injury.      Right lower leg: No edema.      Left lower leg: No edema.      Comments: Muscle strength 5/5 in all muscle groups bilateral.  No tenderness nor crepitation with ROM of foot/ankle joints bilateral.  No tenderness with palpation of bilateral foot and ankle.  Bilateral pes planus foot type with moderate collapse of the medial longitudinal arch.  Eversion of bilateral heel while in RCSP.  (+) too many toes sign bilateral.     Skin:     General: Skin is warm and dry.      Capillary Refill: Capillary refill takes 2 to 3 seconds.      Findings: No bruising, ecchymosis, erythema, signs of injury, laceration, lesion, petechiae, rash or wound.      Comments: Pedal skin has normal turgor, temperature, and texture bilateral.  Dystrophy noted to the distal half of bilateral hallux nail plate.  No evidence of subungual debris noted. Remaining toenails x 8 appear normotrophic. Examination of the skin reveals no evidence of significant  maceration, rashes, open lesions, suspicious appearing nevi or other concerning lesions.    Neurological:      General: No focal deficit present.      Mental Status: She is alert.      Sensory: No sensory deficit.      Motor: No weakness or atrophy.      Comments: Light touch is intact bilateral.                 Assessment:       Encounter Diagnoses   Name Primary?    Nail dystrophy Yes    Pes planus of both feet     Foot pain, bilateral          Plan:       Curtis was seen today for nail problem and foot pain.    Diagnoses and all orders for this visit:    Nail dystrophy  -     Specimen to Pathology Other    Pes planus of both feet    Foot pain, bilateral      I counseled the patient on her conditions, their implications and medical management.    Discussed with patient a variety of treatment options to address onychomycosis including Jeff Vaporub, topical/oral antifungals, and laser therapy.    With the patient's verbal consent, a sterile nail nipper was used to obtain a portion of the Rt. Hallux toenail without incident.  Patient tolerated this quite well.  Specimen was sent to Pathology to rule out/in onychomycosis.    Regarding bilateral pes planus foot type, we discussed first treating with wearing motion controlled shoes such as Hoka.  Patient is amenable to this conservative option.  If this should fail to eliminate over pronation, we will consider custom orthotics with a medial heel wedge.  Patient to give the shoes a try for roughly two months.    RTC pending the Pathology results.    Sebastian Flores DPM

## 2022-06-15 ENCOUNTER — IMMUNIZATION (OUTPATIENT)
Dept: FAMILY MEDICINE | Facility: CLINIC | Age: 72
End: 2022-06-15
Payer: MEDICARE

## 2022-06-15 DIAGNOSIS — Z23 NEED FOR VACCINATION: Primary | ICD-10-CM

## 2022-06-15 PROCEDURE — 91305 COVID-19, MRNA, LNP-S, PF, 30 MCG/0.3 ML DOSE VACCINE (PFIZER): CPT | Mod: PBBFAC | Performed by: FAMILY MEDICINE

## 2022-06-20 LAB
FINAL PATHOLOGIC DIAGNOSIS: NORMAL
Lab: NORMAL

## 2022-07-07 ENCOUNTER — OFFICE VISIT (OUTPATIENT)
Dept: UROLOGY | Facility: CLINIC | Age: 72
End: 2022-07-07
Payer: MEDICARE

## 2022-07-07 VITALS
SYSTOLIC BLOOD PRESSURE: 128 MMHG | HEART RATE: 63 BPM | DIASTOLIC BLOOD PRESSURE: 64 MMHG | WEIGHT: 199.31 LBS | HEIGHT: 63 IN | BODY MASS INDEX: 35.32 KG/M2

## 2022-07-07 DIAGNOSIS — N39.41 URGE INCONTINENCE: ICD-10-CM

## 2022-07-07 DIAGNOSIS — N32.81 OVERACTIVE BLADDER: Primary | ICD-10-CM

## 2022-07-07 LAB
BILIRUB SERPL-MCNC: NEGATIVE MG/DL
BLOOD URINE, POC: NEGATIVE
CLARITY, POC UA: CLEAR
COLOR, POC UA: YELLOW
GLUCOSE UR QL STRIP: NEGATIVE
KETONES UR QL STRIP: NEGATIVE
LEUKOCYTE ESTERASE URINE, POC: NEGATIVE
NITRITE, POC UA: NEGATIVE
PH, POC UA: 7.5
POC RESIDUAL URINE VOLUME: 14 ML (ref 0–100)
PROTEIN, POC: NEGATIVE
SPECIFIC GRAVITY, POC UA: 1.01
UROBILINOGEN, POC UA: 0.2

## 2022-07-07 PROCEDURE — 1101F PT FALLS ASSESS-DOCD LE1/YR: CPT | Mod: CPTII,S$GLB,, | Performed by: UROLOGY

## 2022-07-07 PROCEDURE — 99214 OFFICE O/P EST MOD 30 MIN: CPT | Mod: S$GLB,,, | Performed by: UROLOGY

## 2022-07-07 PROCEDURE — 1126F PR PAIN SEVERITY QUANTIFIED, NO PAIN PRESENT: ICD-10-PCS | Mod: CPTII,S$GLB,, | Performed by: UROLOGY

## 2022-07-07 PROCEDURE — 3008F BODY MASS INDEX DOCD: CPT | Mod: CPTII,S$GLB,, | Performed by: UROLOGY

## 2022-07-07 PROCEDURE — 81002 URINALYSIS NONAUTO W/O SCOPE: CPT | Mod: S$GLB,,, | Performed by: UROLOGY

## 2022-07-07 PROCEDURE — 1101F PR PT FALLS ASSESS DOC 0-1 FALLS W/OUT INJ PAST YR: ICD-10-PCS | Mod: CPTII,S$GLB,, | Performed by: UROLOGY

## 2022-07-07 PROCEDURE — 3288F PR FALLS RISK ASSESSMENT DOCUMENTED: ICD-10-PCS | Mod: CPTII,S$GLB,, | Performed by: UROLOGY

## 2022-07-07 PROCEDURE — 1159F PR MEDICATION LIST DOCUMENTED IN MEDICAL RECORD: ICD-10-PCS | Mod: CPTII,S$GLB,, | Performed by: UROLOGY

## 2022-07-07 PROCEDURE — 3044F PR MOST RECENT HEMOGLOBIN A1C LEVEL <7.0%: ICD-10-PCS | Mod: CPTII,S$GLB,, | Performed by: UROLOGY

## 2022-07-07 PROCEDURE — 51798 US URINE CAPACITY MEASURE: CPT | Mod: S$GLB,,, | Performed by: UROLOGY

## 2022-07-07 PROCEDURE — 3288F FALL RISK ASSESSMENT DOCD: CPT | Mod: CPTII,S$GLB,, | Performed by: UROLOGY

## 2022-07-07 PROCEDURE — 1126F AMNT PAIN NOTED NONE PRSNT: CPT | Mod: CPTII,S$GLB,, | Performed by: UROLOGY

## 2022-07-07 PROCEDURE — 99999 PR PBB SHADOW E&M-EST. PATIENT-LVL IV: ICD-10-PCS | Mod: PBBFAC,,, | Performed by: UROLOGY

## 2022-07-07 PROCEDURE — 51798 POCT BLADDER SCAN: ICD-10-PCS | Mod: S$GLB,,, | Performed by: UROLOGY

## 2022-07-07 PROCEDURE — 99214 PR OFFICE/OUTPT VISIT, EST, LEVL IV, 30-39 MIN: ICD-10-PCS | Mod: S$GLB,,, | Performed by: UROLOGY

## 2022-07-07 PROCEDURE — 1160F RVW MEDS BY RX/DR IN RCRD: CPT | Mod: CPTII,S$GLB,, | Performed by: UROLOGY

## 2022-07-07 PROCEDURE — 1160F PR REVIEW ALL MEDS BY PRESCRIBER/CLIN PHARMACIST DOCUMENTED: ICD-10-PCS | Mod: CPTII,S$GLB,, | Performed by: UROLOGY

## 2022-07-07 PROCEDURE — 3078F DIAST BP <80 MM HG: CPT | Mod: CPTII,S$GLB,, | Performed by: UROLOGY

## 2022-07-07 PROCEDURE — 4010F ACE/ARB THERAPY RXD/TAKEN: CPT | Mod: CPTII,S$GLB,, | Performed by: UROLOGY

## 2022-07-07 PROCEDURE — 1159F MED LIST DOCD IN RCRD: CPT | Mod: CPTII,S$GLB,, | Performed by: UROLOGY

## 2022-07-07 PROCEDURE — 3074F PR MOST RECENT SYSTOLIC BLOOD PRESSURE < 130 MM HG: ICD-10-PCS | Mod: CPTII,S$GLB,, | Performed by: UROLOGY

## 2022-07-07 PROCEDURE — 81002 POCT URINE DIPSTICK WITHOUT MICROSCOPE: ICD-10-PCS | Mod: S$GLB,,, | Performed by: UROLOGY

## 2022-07-07 PROCEDURE — 3044F HG A1C LEVEL LT 7.0%: CPT | Mod: CPTII,S$GLB,, | Performed by: UROLOGY

## 2022-07-07 PROCEDURE — 99999 PR PBB SHADOW E&M-EST. PATIENT-LVL IV: CPT | Mod: PBBFAC,,, | Performed by: UROLOGY

## 2022-07-07 PROCEDURE — 3008F PR BODY MASS INDEX (BMI) DOCUMENTED: ICD-10-PCS | Mod: CPTII,S$GLB,, | Performed by: UROLOGY

## 2022-07-07 PROCEDURE — 4010F PR ACE/ARB THEARPY RXD/TAKEN: ICD-10-PCS | Mod: CPTII,S$GLB,, | Performed by: UROLOGY

## 2022-07-07 PROCEDURE — 3074F SYST BP LT 130 MM HG: CPT | Mod: CPTII,S$GLB,, | Performed by: UROLOGY

## 2022-07-07 PROCEDURE — 3078F PR MOST RECENT DIASTOLIC BLOOD PRESSURE < 80 MM HG: ICD-10-PCS | Mod: CPTII,S$GLB,, | Performed by: UROLOGY

## 2022-07-07 RX ORDER — OXYBUTYNIN CHLORIDE 10 MG/1
10 TABLET, EXTENDED RELEASE ORAL DAILY
Qty: 90 TABLET | Refills: 3 | Status: SHIPPED | OUTPATIENT
Start: 2022-07-07 | End: 2022-07-11

## 2022-07-07 NOTE — PATIENT INSTRUCTIONS
Vitals:    07/07/22 1051   BP: 128/64   Pulse: 63           Assessment:       Curtis Navarro is a 72 y.o. female    1. Overactive bladder    2. Urge incontinence          She has tried detrol, vesicare (caused dry mouth) and is currently on myrbetriq with increasing frequency, urgency and more importantly urge incontinenence. Previously discussed botox, interstim, posterior tibial nerve however pandemic hit. Discussed in detail again today. Caretaker and ok with trying ptns but would need to find a urologist in [place] who does this. In the meantime wants to try another oab med.   She's worried about anesthesia    Plan:       Start vibegron once daily. If not covered try ditropan/oxybutynin 10mg once daily  Both should work quickly  Ditropan higher risk of dry mouth/constipation.   Keep 3 day diary and tally for day. To use as baseline.      or  are urologists in [place] who offer PTNS.     If she decides on interstim could do stage 1, if improved then stage 2. If not improved then remove the lead and do botox at the same time.

## 2022-07-07 NOTE — PROGRESS NOTES
[facility] Note - [place]  Staff: MD Ashley    Referring provider and please cc: Carlyn  PCP: [facility]: [facility]     Chief Complaint: oab    Subjective:        HPI: Curtis Navarro is a 72 y.o. female presents with     oab  -she's had this issue for 20 years  -she initially saw her ob gyn for this who tried her on myrbetriq 25 and 50 and had 50 %improvement in frequency but was still having incontinence. Prior to this she had tried detrol at some point. She then saw jemal on 5/14/18 and pvr was 170, myrbetriq dc'd and started on flomax. She was then seen on 7/6/18 and although sx improved some she they returned and jemal discontinued the flomax and started vesicare 5mg daily which improved her nocturia to1-2x a night. She had her then stop the vesicare for voiding diary which she brought with her today. Prior to starting the medicine per day: 14x a day or more, night time voids: 3x a night, 1-2 leaks of drops to large volume, urgency: 5, 2 pads a day.  Since starting vesicare she has had 50% improvement - voids q1-2 hours, nocturia 2x a night, no leaks, urgency down to 3, 0 pads. +dry mouth (not that bothersome) and constipation but takes metamucil and has a soft bm daily.   -no significant SHAWN   -She also was drinking coffee every morning and 1-2 cokes a day. Now she drinks 1 decaf coffee in the morning and coke zero 1-2x a week. No recurrent utis. G1, P 1, vaginal   -saw her 10/2018, stress test only showed leakage with 150cc but repeated standing and was negative.     Pelvic exam 10/12/18: negative stress test with coughing supine, negative stress test with valsalva supine, In and out cath performed with 20cc residual - urine was not sent for sample, Bladder filled to 150cc cc, Sensation to void felt at 90 cc Catheter removed, +stress test with coughing supine, negative stress test with valsalva supine, Negative stress test with coughing or valsalva   Standing. Stage 2 cystocele     Stable bladder and discontinued vesicare bc of the dry mouth and constipation and started myrbetriq 50mg. She has had improvement with the meds an no caffeine.3d voiding Diary (uploaded) shows voids 6-8x a day. Nocturia 1x a night. 1-2 leaks with urge. Urgency 3-5 (still pretty strong), 0 pads. Much improved.     Interval history 7/25/19: Up she was last seen in January 2019 and she had significant improvement in her overactive bladder with Myrbetriq 50 mg daily.  She was asked to continue this in follow-up and today states that she continues to have minimal leakage less than 1 time a day and does not really ever have to wear pads.  She voids every few hours.  However she still concerned because of the urgency and occasional urge incontinence that does occur.  She has a trip planned to [place] in November and is concerned about having to look for bathrooms.  She is possibly interested in proceeding with another form of treatment.    Interval history 7/7/22:   At last visit 3 years ago discussed botox vs interstim. Was having urgency still although frequency and UUI had improved. She says she is still on myrbetriq 50mg which helps with nocturia (1-2x) and during day every 1-3 hours. Sometimes having some UUI with drop and rare SHAWN. It's becoming bothersome again now.   Wears 3-4 thick pads/day damp but not wet. 1 cup of coffee in am. No sodas.     Urine history : anticoagulation: none. Cardiologist: yearly for checkup (stress test- ).  7/7/22  Neg, pvr by scan: 14  11/12/18 No cx, void: neg, 1 rbc  10/12/18 No cx, void: tr prot/tr blood - send for automated urinalysis, pvr by in and out: 20  8/15/18 No cx, void: neg  7/6/18  No cx, void: neg  6/13/18 Multiple org, void: neg  5/14/18 Ng, Void: neg, pvr by I&o: 170  6/6/17  E.coli, void: neg  3/20/15 ng, void: neg  11/27/06 Multiple org, void: 1+ blood        REVIEW OF SYSTEMS:  General ROS: no fevers, no  "chills  Psychological ROS: no depression  Endocrine ROS: no heat or cold  Respiratory ROS: no SOB  Cardiovascular ROS: no CP  Gastrointestinal ROS: no abdominal pain, no constipation, no diarrhea, noBRBPR  Musculoskeletal ROS: no muscle pain  Neurological ROS: non headaches  Dermatological ROS: no rashes  HEENT: noglasses, no sinus   ROS: per HPI     PMHx:  Past Medical History:   Diagnosis Date    Allergy     Arthritis     Carotid arterial disease     5/21 next due 5/23    Cataract     Colon polyps     Diverticulitis     Diverticulosis     GERD (gastroesophageal reflux disease)     Graves disease     s/p radioactive iodine in 42    H/O percutaneous left heart catheterization     normal 5/12    History of skin cancer     L neck    Hyperlipidemia     Hypertension     Hypothyroidism     s/p radioactive iodine    IBS (irritable bowel syndrome)     Osteopenia     7/13, 7/15    S/P colonoscopy     7/17;  next due 7/22    Thyroid nodule     last usg 5/21;  next due 5/23     Kidney stones: No    PSHx:  Past Surgical History:   Procedure Laterality Date    CARDIAC SURGERY  2013    angiogram (negative)    COLONOSCOPY  07/21/2010    Dr. Velázquez; in [place], repeat in 6-7 years    COLONOSCOPY N/A 06/27/2017    Procedure: COLONOSCOPY;  Surgeon: Hamlet Velázquez Jr., MD;  Location: [facility];  Service: Endoscopy;  Laterality: N/A; repeat in 5 years for surveillance    COSMETIC SURGERY      Liposuction to neck    ESOPHAGOGASTRODUODENOSCOPY N/A 07/12/2018    Dr. Velázquez: "White nummular lesions in esophageal mucosa (benign)", antritis, gastric polyps removed    SKIN CANCER EXCISION      with Mohs on left neck    TONSILLECTOMY      UPPER GASTROINTESTINAL ENDOSCOPY  08/04/2014    Dr. Velázquez    UPPER GASTROINTESTINAL ENDOSCOPY  06/27/2017    DR. VELÁZQUEZ     Urologic or Gynecologic Surgery: none    Stents/Valves/Foreign Bodies: No  Cardiac Evaluation:  (stress test 8/2)     Screening " "Studies  Colonoscopy: one year ago, benign polyps     Fam Hx:   malignancies: No , gyn malignancies: Yes - sister  at age 44 with ovarian  kidney stones: No     Soc Hx:  No tobacco.    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 One of them actually caused her to "itch" in her ears and vagina/body and she had a "weird" feeling. May need to try some again if necessary with benadryl.     Urologic Medications:  myrbetriq 50mg   Anticoagulation: Yes - asa 81mg    Objective:     Vitals:    22 1051   BP: 128/64   Pulse: 63           Assessment:       Curtis Navarro is a 72 y.o. female    1. Overactive bladder    2. Urge incontinence          She has tried detrol, vesicare (caused dry mouth) and is currently on myrbetriq with increasing frequency, urgency and more importantly urge incontinenence. Previously discussed botox, interstim, posterior tibial nerve however pandemic hit. Discussed in detail again today. Caretaker and ok with trying ptns but would need to find a urologist in [place] who does this. In the meantime wants to try another oab med.   She's worried about anesthesia    Plan:       · Start vibegron once daily. If not covered try ditropan/oxybutynin 10mg once daily  · Both should work quickly  · Ditropan higher risk of dry mouth/constipation.   · Keep 3 day diary and tally for day. To use as baseline.   · Follow up in 2 months for oab and pvr and bring diary      or  are urologists in [place] who offer PTNS.     If she decides on interstim could do stage 1, if improved then stage 2. If not improved then remove the lead and do botox at the same time.     Eliana Ramirez MD  "

## 2022-07-11 ENCOUNTER — OFFICE VISIT (OUTPATIENT)
Dept: FAMILY MEDICINE | Facility: CLINIC | Age: 72
End: 2022-07-11
Payer: MEDICARE

## 2022-07-11 ENCOUNTER — TELEPHONE (OUTPATIENT)
Dept: GASTROENTEROLOGY | Facility: CLINIC | Age: 72
End: 2022-07-11
Payer: MEDICARE

## 2022-07-11 VITALS
HEART RATE: 71 BPM | BODY MASS INDEX: 35.09 KG/M2 | DIASTOLIC BLOOD PRESSURE: 60 MMHG | WEIGHT: 198.06 LBS | RESPIRATION RATE: 20 BRPM | HEIGHT: 63 IN | TEMPERATURE: 98 F | OXYGEN SATURATION: 96 % | SYSTOLIC BLOOD PRESSURE: 130 MMHG

## 2022-07-11 DIAGNOSIS — I10 HYPERTENSION, ESSENTIAL: ICD-10-CM

## 2022-07-11 DIAGNOSIS — E78.5 HYPERLIPIDEMIA, UNSPECIFIED HYPERLIPIDEMIA TYPE: ICD-10-CM

## 2022-07-11 DIAGNOSIS — Z00.00 ANNUAL PHYSICAL EXAM: Primary | ICD-10-CM

## 2022-07-11 DIAGNOSIS — E66.01 SEVERE OBESITY (BMI 35.0-39.9) WITH COMORBIDITY: ICD-10-CM

## 2022-07-11 PROCEDURE — 1160F RVW MEDS BY RX/DR IN RCRD: CPT | Mod: CPTII,S$GLB,, | Performed by: FAMILY MEDICINE

## 2022-07-11 PROCEDURE — 3044F PR MOST RECENT HEMOGLOBIN A1C LEVEL <7.0%: ICD-10-PCS | Mod: CPTII,S$GLB,, | Performed by: FAMILY MEDICINE

## 2022-07-11 PROCEDURE — 3078F PR MOST RECENT DIASTOLIC BLOOD PRESSURE < 80 MM HG: ICD-10-PCS | Mod: CPTII,S$GLB,, | Performed by: FAMILY MEDICINE

## 2022-07-11 PROCEDURE — 3008F BODY MASS INDEX DOCD: CPT | Mod: CPTII,S$GLB,, | Performed by: FAMILY MEDICINE

## 2022-07-11 PROCEDURE — 3288F PR FALLS RISK ASSESSMENT DOCUMENTED: ICD-10-PCS | Mod: CPTII,S$GLB,, | Performed by: FAMILY MEDICINE

## 2022-07-11 PROCEDURE — 1101F PR PT FALLS ASSESS DOC 0-1 FALLS W/OUT INJ PAST YR: ICD-10-PCS | Mod: CPTII,S$GLB,, | Performed by: FAMILY MEDICINE

## 2022-07-11 PROCEDURE — 3044F HG A1C LEVEL LT 7.0%: CPT | Mod: CPTII,S$GLB,, | Performed by: FAMILY MEDICINE

## 2022-07-11 PROCEDURE — 1159F PR MEDICATION LIST DOCUMENTED IN MEDICAL RECORD: ICD-10-PCS | Mod: CPTII,S$GLB,, | Performed by: FAMILY MEDICINE

## 2022-07-11 PROCEDURE — 1126F PR PAIN SEVERITY QUANTIFIED, NO PAIN PRESENT: ICD-10-PCS | Mod: CPTII,S$GLB,, | Performed by: FAMILY MEDICINE

## 2022-07-11 PROCEDURE — 4010F PR ACE/ARB THEARPY RXD/TAKEN: ICD-10-PCS | Mod: CPTII,S$GLB,, | Performed by: FAMILY MEDICINE

## 2022-07-11 PROCEDURE — 99397 PER PM REEVAL EST PAT 65+ YR: CPT | Mod: S$GLB,,, | Performed by: FAMILY MEDICINE

## 2022-07-11 PROCEDURE — 1101F PT FALLS ASSESS-DOCD LE1/YR: CPT | Mod: CPTII,S$GLB,, | Performed by: FAMILY MEDICINE

## 2022-07-11 PROCEDURE — 3288F FALL RISK ASSESSMENT DOCD: CPT | Mod: CPTII,S$GLB,, | Performed by: FAMILY MEDICINE

## 2022-07-11 PROCEDURE — 4010F ACE/ARB THERAPY RXD/TAKEN: CPT | Mod: CPTII,S$GLB,, | Performed by: FAMILY MEDICINE

## 2022-07-11 PROCEDURE — 3075F PR MOST RECENT SYSTOLIC BLOOD PRESS GE 130-139MM HG: ICD-10-PCS | Mod: CPTII,S$GLB,, | Performed by: FAMILY MEDICINE

## 2022-07-11 PROCEDURE — 1159F MED LIST DOCD IN RCRD: CPT | Mod: CPTII,S$GLB,, | Performed by: FAMILY MEDICINE

## 2022-07-11 PROCEDURE — 99397 PR PREVENTIVE VISIT,EST,65 & OVER: ICD-10-PCS | Mod: S$GLB,,, | Performed by: FAMILY MEDICINE

## 2022-07-11 PROCEDURE — 3008F PR BODY MASS INDEX (BMI) DOCUMENTED: ICD-10-PCS | Mod: CPTII,S$GLB,, | Performed by: FAMILY MEDICINE

## 2022-07-11 PROCEDURE — 3078F DIAST BP <80 MM HG: CPT | Mod: CPTII,S$GLB,, | Performed by: FAMILY MEDICINE

## 2022-07-11 PROCEDURE — 1160F PR REVIEW ALL MEDS BY PRESCRIBER/CLIN PHARMACIST DOCUMENTED: ICD-10-PCS | Mod: CPTII,S$GLB,, | Performed by: FAMILY MEDICINE

## 2022-07-11 PROCEDURE — 1126F AMNT PAIN NOTED NONE PRSNT: CPT | Mod: CPTII,S$GLB,, | Performed by: FAMILY MEDICINE

## 2022-07-11 PROCEDURE — 3075F SYST BP GE 130 - 139MM HG: CPT | Mod: CPTII,S$GLB,, | Performed by: FAMILY MEDICINE

## 2022-07-11 NOTE — TELEPHONE ENCOUNTER
----- Message from Komal Del Castillo MD sent at 7/11/2022 11:59 AM CDT -----  Pls clarify if needing the repeat cscope per prior recommendations from Dr Ceballos and if so please bi.  Thanks!

## 2022-07-11 NOTE — PROGRESS NOTES
Subjective:       Patient ID: Curtis Navarro is a 72 y.o. female.    Chief Complaint: Follow-up    HPI   The patient is a 72-year-old who is here today for her 9 month follow-up in her annual visit.  Overall, she is doing well and about the same as she has been in the past.  She has no acute questions or concerns regarding her health although she is unhappy about her weight and her inability to lose weight.  She does try to eat healthy diet.  She is not exercising currently other she thinks she will start biking.  She had labs in March which reviewed today.  Her mammogram and colon cancer screening are up-to-date.  She is going to get this Shingrix shot now that she has gotten her COVID boosters.    Today we discussed the followin)  Hypertension.  Today blood pressure is 130/60.  She is taking Cozaar and toprol which she is tolerating well   2)  Hypothyroidism.  She is doing well with her Synthroid.  Her recent TSH in March was 1.07   3)  Hyperlipidemia.  She is doing well with the lipitor.  In March, her total cholesterol was 196 and LDL was 108    She has been meeting with Dr. Ramirez to discuss her urinary incontinence.  She is considering PTNS.      Review of Systems   Constitutional: Positive for unexpected weight change. Negative for appetite change, chills, diaphoresis, fatigue and fever.   HENT: Negative for congestion, dental problem, ear pain, hearing loss, postnasal drip, rhinorrhea, sneezing, sore throat and trouble swallowing.    Eyes: Negative for photophobia, pain, discharge and visual disturbance.   Respiratory: Negative for cough, chest tightness, shortness of breath and wheezing.    Cardiovascular: Negative for chest pain, palpitations and leg swelling.   Gastrointestinal: Negative for abdominal distention, abdominal pain, blood in stool, constipation, diarrhea, nausea and vomiting.   Endocrine: Negative for cold intolerance, heat intolerance, polydipsia and polyuria.    Genitourinary: Negative for dysuria, flank pain, frequency, genital sores, hematuria, menstrual problem and vaginal discharge.   Musculoskeletal: Negative for arthralgias, joint swelling and myalgias.   Skin: Negative for rash.   Neurological: Negative for dizziness, syncope, light-headedness and headaches.   Hematological: Negative for adenopathy. Does not bruise/bleed easily.   Psychiatric/Behavioral: Negative for dysphoric mood, self-injury, sleep disturbance and suicidal ideas. The patient is not nervous/anxious.        Objective:      Physical Exam  Constitutional:       General: She is not in acute distress.     Appearance: Normal appearance. She is well-developed.   HENT:      Head: Normocephalic and atraumatic.      Right Ear: Hearing, tympanic membrane, ear canal and external ear normal.      Left Ear: Hearing, tympanic membrane, ear canal and external ear normal.      Nose: Nose normal.      Mouth/Throat:      Mouth: No oral lesions.      Pharynx: No oropharyngeal exudate or posterior oropharyngeal erythema.   Eyes:      General: Lids are normal. No scleral icterus.     Extraocular Movements: Extraocular movements intact.      Conjunctiva/sclera: Conjunctivae normal.      Pupils: Pupils are equal, round, and reactive to light.   Neck:      Thyroid: No thyroid mass or thyromegaly.      Vascular: No carotid bruit.   Cardiovascular:      Rate and Rhythm: Normal rate and regular rhythm.  No extrasystoles are present.     Chest Wall: PMI is not displaced.      Heart sounds: Normal heart sounds. No murmur heard.    No gallop.   Pulmonary:      Effort: Pulmonary effort is normal. No accessory muscle usage or respiratory distress.      Breath sounds: Normal breath sounds.   Chest:   Breasts:      Right: No supraclavicular adenopathy.      Left: No supraclavicular adenopathy.       Abdominal:      General: Bowel sounds are normal. There is no abdominal bruit.      Palpations: Abdomen is soft.      Tenderness:  "There is no abdominal tenderness. There is no rebound.   Musculoskeletal:      Cervical back: Normal range of motion and neck supple.   Lymphadenopathy:      Head:      Right side of head: No submental or submandibular adenopathy.      Left side of head: No submental or submandibular adenopathy.      Cervical:      Right cervical: No superficial, deep or posterior cervical adenopathy.     Left cervical: No superficial, deep or posterior cervical adenopathy.      Upper Body:      Right upper body: No supraclavicular adenopathy.      Left upper body: No supraclavicular adenopathy.   Skin:     General: Skin is warm and dry.   Neurological:      Mental Status: She is alert and oriented to person, place, and time.      Cranial Nerves: No cranial nerve deficit.      Sensory: No sensory deficit.   Psychiatric:         Speech: Speech normal.         Behavior: Behavior normal.         Thought Content: Thought content normal.       Blood pressure 130/60, pulse 71, temperature 98.4 °F (36.9 °C), resp. rate 20, height 5' 3" (1.6 m), weight 89.8 kg (198 lb 1.3 oz), SpO2 96 %.Body mass index is 35.09 kg/m².          A/P:  1) annual exam.  Health maintenance issues and anticipatory guidance issues were discussed.  She will start exercising regularly.  She will continue to monitor her diet.  We will check labs again in March.  She will keep up-to-date with her yearly mammogram.  She will get the Shingrix shot  2) hypertension.  Well controlled.  Continue with Cozaar and toprol  3)  Hypothyroidism.  Well controlled.  Continue with Synthroid .  We will check labs again in March  4)  Hyperlipidemia.  Well controlled.  Continue with lipitor.  We will check labs again in March   5) obesity with BMI of 35. We did discuss the pros and cons of Ozempic.  We are going to start the Ozempic and see how she does with this.  She will touch base with me 4 weeks after starting the Ozempic and we will determine our next steps.    6) incontinence.  " Follow-up with urology    As she is able to start the Ozempic, I will see her back in 6-8 weeks.  If she is not able to start the Ozempic, I will see her back in 9 months.

## 2022-07-12 ENCOUNTER — TELEPHONE (OUTPATIENT)
Dept: GASTROENTEROLOGY | Facility: CLINIC | Age: 72
End: 2022-07-12
Payer: MEDICARE

## 2022-07-14 ENCOUNTER — OFFICE VISIT (OUTPATIENT)
Dept: OTOLARYNGOLOGY | Facility: CLINIC | Age: 72
End: 2022-07-14
Payer: MEDICARE

## 2022-07-14 VITALS — HEIGHT: 63 IN | BODY MASS INDEX: 35.47 KG/M2 | WEIGHT: 200.19 LBS

## 2022-07-14 DIAGNOSIS — J30.9 ALLERGIC RHINITIS, UNSPECIFIED SEASONALITY, UNSPECIFIED TRIGGER: ICD-10-CM

## 2022-07-14 DIAGNOSIS — H92.02 REFERRED OTALGIA OF LEFT EAR: ICD-10-CM

## 2022-07-14 DIAGNOSIS — H69.93 ETD (EUSTACHIAN TUBE DYSFUNCTION), BILATERAL: ICD-10-CM

## 2022-07-14 DIAGNOSIS — K21.9 LPRD (LARYNGOPHARYNGEAL REFLUX DISEASE): ICD-10-CM

## 2022-07-14 DIAGNOSIS — R09.A2 GLOBUS SENSATION: ICD-10-CM

## 2022-07-14 DIAGNOSIS — R09.81 NASAL CONGESTION: Primary | ICD-10-CM

## 2022-07-14 DIAGNOSIS — M95.0 NASAL VALVE COLLAPSE: ICD-10-CM

## 2022-07-14 PROCEDURE — 1126F AMNT PAIN NOTED NONE PRSNT: CPT | Mod: CPTII,S$GLB,, | Performed by: NURSE PRACTITIONER

## 2022-07-14 PROCEDURE — 3288F FALL RISK ASSESSMENT DOCD: CPT | Mod: CPTII,S$GLB,, | Performed by: NURSE PRACTITIONER

## 2022-07-14 PROCEDURE — 4010F ACE/ARB THERAPY RXD/TAKEN: CPT | Mod: CPTII,S$GLB,, | Performed by: NURSE PRACTITIONER

## 2022-07-14 PROCEDURE — 3044F HG A1C LEVEL LT 7.0%: CPT | Mod: CPTII,S$GLB,, | Performed by: NURSE PRACTITIONER

## 2022-07-14 PROCEDURE — 1126F PR PAIN SEVERITY QUANTIFIED, NO PAIN PRESENT: ICD-10-PCS | Mod: CPTII,S$GLB,, | Performed by: NURSE PRACTITIONER

## 2022-07-14 PROCEDURE — 99203 PR OFFICE/OUTPT VISIT, NEW, LEVL III, 30-44 MIN: ICD-10-PCS | Mod: S$GLB,,, | Performed by: NURSE PRACTITIONER

## 2022-07-14 PROCEDURE — 3008F PR BODY MASS INDEX (BMI) DOCUMENTED: ICD-10-PCS | Mod: CPTII,S$GLB,, | Performed by: NURSE PRACTITIONER

## 2022-07-14 PROCEDURE — 1159F PR MEDICATION LIST DOCUMENTED IN MEDICAL RECORD: ICD-10-PCS | Mod: CPTII,S$GLB,, | Performed by: NURSE PRACTITIONER

## 2022-07-14 PROCEDURE — 1101F PT FALLS ASSESS-DOCD LE1/YR: CPT | Mod: CPTII,S$GLB,, | Performed by: NURSE PRACTITIONER

## 2022-07-14 PROCEDURE — 99999 PR PBB SHADOW E&M-EST. PATIENT-LVL III: CPT | Mod: PBBFAC,,, | Performed by: NURSE PRACTITIONER

## 2022-07-14 PROCEDURE — 99999 PR PBB SHADOW E&M-EST. PATIENT-LVL III: ICD-10-PCS | Mod: PBBFAC,,, | Performed by: NURSE PRACTITIONER

## 2022-07-14 PROCEDURE — 3044F PR MOST RECENT HEMOGLOBIN A1C LEVEL <7.0%: ICD-10-PCS | Mod: CPTII,S$GLB,, | Performed by: NURSE PRACTITIONER

## 2022-07-14 PROCEDURE — 3288F PR FALLS RISK ASSESSMENT DOCUMENTED: ICD-10-PCS | Mod: CPTII,S$GLB,, | Performed by: NURSE PRACTITIONER

## 2022-07-14 PROCEDURE — 4010F PR ACE/ARB THEARPY RXD/TAKEN: ICD-10-PCS | Mod: CPTII,S$GLB,, | Performed by: NURSE PRACTITIONER

## 2022-07-14 PROCEDURE — 1160F RVW MEDS BY RX/DR IN RCRD: CPT | Mod: CPTII,S$GLB,, | Performed by: NURSE PRACTITIONER

## 2022-07-14 PROCEDURE — 99203 OFFICE O/P NEW LOW 30 MIN: CPT | Mod: S$GLB,,, | Performed by: NURSE PRACTITIONER

## 2022-07-14 PROCEDURE — 1160F PR REVIEW ALL MEDS BY PRESCRIBER/CLIN PHARMACIST DOCUMENTED: ICD-10-PCS | Mod: CPTII,S$GLB,, | Performed by: NURSE PRACTITIONER

## 2022-07-14 PROCEDURE — 3008F BODY MASS INDEX DOCD: CPT | Mod: CPTII,S$GLB,, | Performed by: NURSE PRACTITIONER

## 2022-07-14 PROCEDURE — 1159F MED LIST DOCD IN RCRD: CPT | Mod: CPTII,S$GLB,, | Performed by: NURSE PRACTITIONER

## 2022-07-14 PROCEDURE — 1101F PR PT FALLS ASSESS DOC 0-1 FALLS W/OUT INJ PAST YR: ICD-10-PCS | Mod: CPTII,S$GLB,, | Performed by: NURSE PRACTITIONER

## 2022-07-14 RX ORDER — OMEPRAZOLE 40 MG/1
40 CAPSULE, DELAYED RELEASE ORAL
Qty: 30 CAPSULE | Refills: 11 | Status: SHIPPED | OUTPATIENT
Start: 2022-07-14 | End: 2022-11-14 | Stop reason: SDUPTHER

## 2022-07-14 RX ORDER — FAMOTIDINE 40 MG/1
40 TABLET, FILM COATED ORAL NIGHTLY
Qty: 30 TABLET | Refills: 11 | Status: SHIPPED | OUTPATIENT
Start: 2022-07-14 | End: 2022-11-03

## 2022-07-14 RX ORDER — FLUTICASONE PROPIONATE 50 MCG
1 SPRAY, SUSPENSION (ML) NASAL 2 TIMES DAILY
Qty: 16 G | Refills: 12 | Status: SHIPPED | OUTPATIENT
Start: 2022-07-14 | End: 2022-11-03

## 2022-07-14 NOTE — PATIENT INSTRUCTIONS
"EUSTACHIAN TUBE INSTRUCTIONS:  Nasal valsalva:  Pinch nose and with closed mouth try to "pop" air into ears through the back of the nose. Attempt this several times a day. The more popping you have, the more likely the fluid will resolve.           Mucous (Post nasal drip) Management     A fullness sensation in the back of the throat is called "globus."   Many people attribute this fullness to a sensation of increased mucous, because they can feel a sticky material in the throat that they will occasionally cough up.     Nasal  / Throat Mucous  Our body NORMALLY makes 1 liter or more of saliva (spit) and nasal mucous every day. These body fluids are important for breaking down the food we eat, protecting our teeth from cavities, and clearing out the pollen and irritants that we breathe in our nose. As our body makes these fluids, we swallow them throughout the day, where they are recycled back through the body. This process is happening all our life without us thinking about it. When an adjustment to this process causes the mucous to be increased or thicker, is when we notice it.      Some problems cause an INCREASE in these body fluids, which we perceive as irritating, excess phlegm in the throat.   However, it is not always an increase. Many times there is a change in CONSISTENCY of the mucous to make it thicker. This will cause the mucous to be held up in the throat instead of being swallowed easily.     Several factors can cause these problems:  Dryness of throat and nose which increases the mucous sticking - Causes include inadequate hydration, excess caffeine / soda / sugary drinks, medication side effects.  Acid reflux  Increased mucous production from allergies or chronic sinus drainage.      We will evaluate the cause(s) of increased or thickened mucous and consider different treatment strategies:     MANY COMMON medications cause dryness of the throat, including medications for allergy, depression/anxiety, " heart, and urination issues.   There is some evidence that added sugars or processed sugars in the diet (not the kind that occur naturally in honey or ripe fruit) can increase mucus, as well as too much dairy. To avoid these refined carbohydrates, on food labels, watch out for wheat flour (also called white, refined or enriched flour) on the ingredients list.      Recommendations for Increased Mucous Sensation     Water, water, water - this cannot be understated. Most people are not drinking enough water regularly to keep up with body's demand. Recommend AT LEAST 64 oz of water per day, and more for men (up to 100 oz.) unless a medical issue prevents this.  Reduce intake of caffeine / tea / sugary drinks or soda, which all will cause increased mucous.  If your nose is the source of mucous (if you must repeated clear your nose of mucus in order to effectively pass air through your nose daily), then nasal medications discussed by your doctor as well as nasal saline can be effective to wash away the mucous.  However if your nose is essentially open and clear (no mucus), then prevention of acid reflux is advised by avoiding late-night eating (nothing 3 hours before laying down at night), greasy and spicy foods, alcohol, and acidic foods.   Humidifier in the bedroom if you find dryness increases at night.  Smoking cessation if you use tobacco  Examination your medication list with your doctor to determine medications that may be contributing     There are NUMEROUS over the counter mucous thinning agents. Here are some suggestions that can be purchased online:  Brennan's Breezer's (Sugar Free), Grether's black currant pastilles, and Entertainer's Secret Throat Relief can all help dry mouth and thickened mucous.   Biotene spray and mouthwash can help lubricate a dry mouth  Mucinex can be helpful in some cases, but stop taking if you don't notice improvement after a few weeks.             Chronic nasal congestion is  "usually either: (1) anatomical/structural or (2) inflammatory in nature.     (1) If anatomical/structural, an ENT surgeon can discuss whether nasal surgery is indicated. Sometimes it is a matter of correcting a deviated septum or making the nasal turbinates smaller. Sometimes it is a matter of removing nasal polyps or clearing out the sinuses.     (2) If it is inflammatory, a steroid nasal spray and daily nasal saline rinsing is recommended. Consideration of allergy testing with the possibility of allergy shots may be warranted. A daily antihistamine is also recommended. Over-the-counter nasal decongestants are NOT recommended for more than 2-3 days. Also daily nasal saline rinsing is advised to decrease the allergic burden in the nose.     There is a wide variety of non-surgical nasal options, including but not limited to: nasal steroids twice daily, Ponaris nasal emollient twice daily, humidifier/diffuser, "Mute" nasal cuffs, "MedCline" wedge pillow, BreatheRight strips, etc. and if after two months of all of the above, patient is still unsatisfied with nasal breathing, then consider seeing ENT surgeon to discuss possible surgical treatment options.     Ponaris Nasal Emollient is used for the relief of: nasal congestion due to colds, nasal irritation, allergy exacerbations, nasal crusting. Specifically prepared iodized organic oils of pine, eucalyptus, peppermint, cajeput, and cottonseed. To order Ponaris: ask your pharmacist to order it for you or we carry it in our pharmacy downstairs on the first floor.      DIFFERENT TYPES OF "ENT-APPROVED" NASAL SPRAYS:  Flonase / fluticasone / Nasacort / Rhinocort (steroid spray) is best for stuffy, pressure, fullness.  Astelin / azelastine (antihistamine spray) is best for itchy, drippy, sneezy.    Use as directed, spraying 1-2 times in each nostril each day. It may take 2-3 days to 2-3 weeks to begin seeing improvement. This medication needs to be taken consistently to see " results. Overall, this is a well-tolerated medication with low side effects. The benefit of nasal steroids as opposed to oral steroids is that the nasal steroid spray works primarily in the nose. Common side effects can include: headache, nasal dryness, minor nose bleed.  Rare side effects may include:  septal perforation, elevation in eye pressure, dry eyes, change in smell, allergic reaction.  Notify your provider if you have any concerns or experience these symptoms.     Nasal spray instructions:  Blow nose first gently to clean. Keep chin level with the floor (do not tilt head forward or back). Using the opposite hand (example: right hand for left nostril, left hand for right nostril) insert nasal spray taking caution to direct it AWAY from the middle wall inside the nose (septum) to avoid irritating nasal septum which could cause nosebleed.  Do not tilt spray up but rather flat and out along the roof of your mouth to spray. Angle the tip of the spray out slightly toward the direction of the ears; then spray. Do not take quick vigorous sniff but rather slow gentle inhalation while waiting for medication to absorb into nasal passages. Then administer second spray in same way.     Nasal saline rinse kit (use Neti pot or DonorPro sinus rinse kit) -- Rinse your sinuses once to twice daily to reduce the allergen burden in your nose. Use sterile water (boiled tap water which has cooled) or distilled bottled water. Add 1/4 teaspoon sea salt and a pinch of baking soda or a mixture packet from the maker of your sinus rinse kit.  Rinse through both sides of nose to cleanse sinus and nasal passages, bending forward with head tilted down. Keep your mouth open, without holding your breath. Squeeze bottle gently until solution starts draining from the opposite nasal passage. After bottle is empty, blow nose very gently, without pinching nose completely, to avoid pressure on eardrums.  There are useful YouTube videos that show  "demonstration of how to do these properly.          LARYNGOPHARYNGEAL REFLUX  (SILENT OR ATYPICAL REFLUX)     If you have any of the following symptoms you may have laryngopharyngeal reflux (LPR):  hoarseness, thick or too much mucus, chronic throat pain/irritation, chronic throat clearing, chronic cough, especially cough that wake you up from sleep, chronic "postnasal drip" without the need to blow your nose.      LPR is also called extra esophageal reflux. This means that if you have reflux into the tissues above the esophagus (into the voicebox,) you may experience throat issues as above.      Many people with LPR do not have symptoms of heartburn. Compared to the esophagus, the voice box and the back of the throat are significantly more sensitive to the effects of acid and digestive enzymes on surrounding tissue. Acid passing quickly through the esophagus does not have a chance to irritate the area for too long.  However acid that pools in the throat or voice box can cause prolonged irritation resulting in the symptoms of LPR.     The symptoms of LPR can consist of a dry cough and the sensation of something being stuck in the throat.  Some people will complain of heartburn while others may have intermittent hoarseness or loss of voice.  Another major symptom of LPR is "postnasal drip."  Patients are often told symptoms are due to abnormal nasal drainage or sinus infection; however this is rarely the cause of chronic throat irritation. For post nasal drip to cause the complaints described, signs and symptoms of an active nasal infection should be present.      Treatments for LPR include: postural changes (sleeping or laying with an incline), weight reduction, diet modification, medication to reduce stomach acid and promote normal motility, and rarely: surgery to prevent reflux. Most patients will begin to notice some relief in her symptoms about 2-4 weeks after starting the medication; however it is generally " recommended the medication should be continued for at least 2 months. If the symptoms completely resolve, the medication can then be tapered.  Some people will remain symptom free while others may have relapses which required treatment again.     Things you may be able to do to prevent reflux.  1. Do not smoke.  Smoking will worsen reflux.    2. Avoid eating at least 2-3 hours prior to bedtime.  Try to avoid very large meals at night.    3. Weight loss.  For patient's with recent weight gain, shedding a few pounds is all that is required to improve reflux.    4. Avoid reflux triggers. These can worsen acid in the stomach or even cause the esophagus muscles to relax: caffeine, soft drinks, acidic foods (tomato, fruit), mints, alcoholic beverages, particularly at night, cheese, fried foods, spicy foods, eggs, and chocolate.    5. Sleep with the head of bed elevated at least 6 inches.  6. You may also be prescribed a medication, or a medication you take may also be increased. Typically PPIs and H2-blockers or a combination of both are commonly used.  After 4-8 weeks, with significant symptomatic improvement, you may begin weaning your reflux medications down, from prescription strength to over-the-counter strength. Then every other day. Continue to wean as symptoms allow.    See a Gastro doctor (GI) for refractory symptoms and continued management.

## 2022-07-14 NOTE — PROGRESS NOTES
Subjective:       Patient ID: Curtis Navarro is a 72 y.o. female.    Chief Complaint: Sinus Problem (R nostril is clogged), Sore Throat (Pt has a lot of mucus / post nasal drip / pt states she has reflux), and Otalgia    HPI   Patient is new to ENT, referred by STACEY Hussein for several ENT related issues. Patient states her #1 concern is lots of mucus in the back of her throat all night, which she suspects is reflux related. It affects her voice. Secondly, patient reports occasional soreness behind left ear and transient shooting pains AS. Thirdly, patient has to repeatedly open her ears to hear better. And lastly, patient reports nasal congestion bilaterally, R>L, has to pull laterally on her cheeks to breathe better.     Review of Systems   Constitutional: Negative.  Negative for fever.   HENT: Positive for nasal congestion, postnasal drip, sinus pressure/congestion and voice change. Negative for dental problem, facial swelling, rhinorrhea, sneezing, sore throat and trouble swallowing.         Globus sensation   Eyes: Positive for photophobia. Negative for discharge, redness and itching.   Respiratory: Positive for cough and wheezing. Negative for choking.    Cardiovascular: Negative.    Endocrine: Negative.    Musculoskeletal: Positive for back pain and neck pain.   Integumentary:  Negative.   Allergic/Immunologic: Negative.    Neurological: Negative.    Hematological: Negative.    Psychiatric/Behavioral: Negative.          Objective:      Physical Exam  Vitals and nursing note reviewed.   Constitutional:       General: She is not in acute distress.     Appearance: She is well-developed. She is not ill-appearing or diaphoretic.   HENT:      Head: Normocephalic and atraumatic.      Right Ear: Hearing, tympanic membrane, ear canal and external ear normal. No middle ear effusion. Tympanic membrane is not erythematous.      Left Ear: Hearing, tympanic membrane, ear canal and external ear normal.  No middle ear  effusion. Tympanic membrane is not erythematous.      Nose: Nose normal. No mucosal edema or rhinorrhea.      Right Sinus: No maxillary sinus tenderness or frontal sinus tenderness.      Left Sinus: No maxillary sinus tenderness or frontal sinus tenderness.      Mouth/Throat:      Mouth: Mucous membranes are not pale, not dry and not cyanotic. No oral lesions.      Pharynx: Uvula midline. No oropharyngeal exudate or posterior oropharyngeal erythema.      Tonsils: 0 on the right. 0 on the left.   Eyes:      General: Lids are normal. No scleral icterus.        Right eye: No discharge.         Left eye: No discharge.      Conjunctiva/sclera: Conjunctivae normal.      Pupils: Pupils are equal, round, and reactive to light.   Neck:      Thyroid: No thyroid mass or thyromegaly.      Trachea: Trachea normal. No tracheal deviation.   Cardiovascular:      Rate and Rhythm: Normal rate.   Pulmonary:      Effort: Pulmonary effort is normal. No respiratory distress.      Breath sounds: No stridor. No wheezing.   Musculoskeletal:         General: Normal range of motion.      Cervical back: Normal range of motion and neck supple.   Lymphadenopathy:      Head:      Right side of head: No submental, submandibular, tonsillar, preauricular or posterior auricular adenopathy.      Left side of head: No submental, submandibular, tonsillar, preauricular or posterior auricular adenopathy.      Cervical: No cervical adenopathy.      Right cervical: No superficial or posterior cervical adenopathy.     Left cervical: No superficial or posterior cervical adenopathy.   Skin:     General: Skin is warm and dry.      Coloration: Skin is not pale.      Findings: No lesion or rash.   Neurological:      Mental Status: She is alert and oriented to person, place, and time.      Coordination: Coordination normal.      Gait: Gait normal.   Psychiatric:         Speech: Speech normal.         Behavior: Behavior normal. Behavior is cooperative.          "Thought Content: Thought content normal.         Judgment: Judgment normal.         Assessment:       Problem List Items Addressed This Visit    None     Visit Diagnoses     Nasal congestion    -  Primary    Relevant Medications    fluticasone propionate (FLONASE) 50 mcg/actuation nasal spray    Probable LPRD (laryngopharyngeal reflux disease)        Relevant Medications    omeprazole (PRILOSEC) 40 MG capsule    famotidine (PEPCID) 40 MG tablet    Nasal valve collapse        Globus sensation            ETD/Allergic rhinitis  Plan:     Flonase and Ponaris for nasal congestion and ETD relief.  Briefly discussed nasal valve collapse.    PND/Mucus management ("globus") handout given and discussed at length.  LPRD handout given and discussed. Recommend increasing PPI temporarily to see if that would help. Also advised avoidance of eating 3 hours before going to bed, shedding a few pounds, raising HOB, avoidance of trigger foods, etc.   Patient encouraged to return to clinic if symptoms worsen/persist and as needed for further ENT symptoms or concerns.           Answers for HPI/ROS submitted by the patient on 7/10/2022  Snoring?: Yes  Acid Reflux?: Yes  Urinating too frequently?: Yes      "

## 2022-07-21 ENCOUNTER — PATIENT MESSAGE (OUTPATIENT)
Dept: FAMILY MEDICINE | Facility: CLINIC | Age: 72
End: 2022-07-21
Payer: MEDICARE

## 2022-07-21 RX ORDER — LEVOTHYROXINE SODIUM 112 UG/1
112 TABLET ORAL
Qty: 90 TABLET | Refills: 1 | Status: SHIPPED | OUTPATIENT
Start: 2022-07-21 | End: 2023-01-18

## 2022-07-21 RX ORDER — LEVOTHYROXINE SODIUM 112 UG/1
112 TABLET ORAL
Qty: 30 TABLET | Refills: 5 | OUTPATIENT
Start: 2022-07-21 | End: 2023-07-21

## 2022-07-21 NOTE — TELEPHONE ENCOUNTER
Care Due:                  Date            Visit Type   Department     Provider  --------------------------------------------------------------------------------                                EP -                              PRIMARY      [facility]    Komal Del Castillo  Last Visit: 07-      CARE ([facility])   MEDICINE       Anger  Next Visit: None Scheduled  None         None Found                                                            Last  Test          Frequency    Reason                     Performed    Due Date  --------------------------------------------------------------------------------    HBA1C.......  6 months...  semaglutide..............  03- 08-    [facility] Embedded Care Gaps. Reference number: [id number]. 7/21/2022   1:36:16 PM CDT

## 2022-07-21 NOTE — TELEPHONE ENCOUNTER
Functional abdominal pain, not otherwise specified.  Previous transient benefit from MiraLAX but only partial    Will try probiotic, rx for Align-1 capsule daily  Recommend 5-week trial, if not better by 1/1/19, referral to [facility].  Discussed this with them    Has not had TTG done for celiac disease.  Nothing to suggest this except the abdominal pain.  Will test  Previously has had normal sed rate/white blood cell count   No new care gaps identified.  [facility] Embedded Care Gaps. Reference number: [id number]. 7/21/2022   2:30:39 PM CDT

## 2022-07-22 ENCOUNTER — PATIENT MESSAGE (OUTPATIENT)
Dept: FAMILY MEDICINE | Facility: CLINIC | Age: 72
End: 2022-07-22
Payer: MEDICARE

## 2022-07-22 NOTE — TELEPHONE ENCOUNTER
Refill Decision Note   Curtis Navarro  is requesting a refill authorization.  Brief Assessment and Rationale for Refill:  Quick Discontinue     Medication Therapy Plan:       Medication Reconciliation Completed: No   Comments:     No Care Gaps recommended.     Note composed:8:43 PM 07/21/2022

## 2022-08-02 ENCOUNTER — PATIENT MESSAGE (OUTPATIENT)
Dept: OPTOMETRY | Facility: CLINIC | Age: 72
End: 2022-08-02
Payer: MEDICARE

## 2022-09-15 ENCOUNTER — OFFICE VISIT (OUTPATIENT)
Dept: UROLOGY | Facility: CLINIC | Age: 72
End: 2022-09-15
Payer: MEDICARE

## 2022-09-15 VITALS
DIASTOLIC BLOOD PRESSURE: 67 MMHG | HEIGHT: 63 IN | BODY MASS INDEX: 32.99 KG/M2 | WEIGHT: 186.19 LBS | HEART RATE: 70 BPM | SYSTOLIC BLOOD PRESSURE: 139 MMHG

## 2022-09-15 DIAGNOSIS — N32.81 OVERACTIVE BLADDER: Primary | ICD-10-CM

## 2022-09-15 LAB
BILIRUB SERPL-MCNC: NEGATIVE MG/DL
BLOOD URINE, POC: NEGATIVE
CLARITY, POC UA: CLEAR
COLOR, POC UA: YELLOW
GLUCOSE UR QL STRIP: NEGATIVE
KETONES UR QL STRIP: NEGATIVE
LEUKOCYTE ESTERASE URINE, POC: NEGATIVE
NITRITE, POC UA: NEGATIVE
PH, POC UA: 6
POC RESIDUAL URINE VOLUME: 18 ML (ref 0–100)
PROTEIN, POC: NEGATIVE
SPECIFIC GRAVITY, POC UA: NORMAL
UROBILINOGEN, POC UA: 0.2

## 2022-09-15 PROCEDURE — 99999 PR PBB SHADOW E&M-EST. PATIENT-LVL IV: CPT | Mod: PBBFAC,,, | Performed by: UROLOGY

## 2022-09-15 PROCEDURE — 1125F AMNT PAIN NOTED PAIN PRSNT: CPT | Mod: CPTII,S$GLB,, | Performed by: UROLOGY

## 2022-09-15 PROCEDURE — 3075F PR MOST RECENT SYSTOLIC BLOOD PRESS GE 130-139MM HG: ICD-10-PCS | Mod: CPTII,S$GLB,, | Performed by: UROLOGY

## 2022-09-15 PROCEDURE — 4010F ACE/ARB THERAPY RXD/TAKEN: CPT | Mod: CPTII,S$GLB,, | Performed by: UROLOGY

## 2022-09-15 PROCEDURE — 1160F PR REVIEW ALL MEDS BY PRESCRIBER/CLIN PHARMACIST DOCUMENTED: ICD-10-PCS | Mod: CPTII,S$GLB,, | Performed by: UROLOGY

## 2022-09-15 PROCEDURE — 51798 US URINE CAPACITY MEASURE: CPT | Mod: S$GLB,,, | Performed by: UROLOGY

## 2022-09-15 PROCEDURE — 81002 URINALYSIS NONAUTO W/O SCOPE: CPT | Mod: S$GLB,,, | Performed by: UROLOGY

## 2022-09-15 PROCEDURE — 3075F SYST BP GE 130 - 139MM HG: CPT | Mod: CPTII,S$GLB,, | Performed by: UROLOGY

## 2022-09-15 PROCEDURE — 4010F PR ACE/ARB THEARPY RXD/TAKEN: ICD-10-PCS | Mod: CPTII,S$GLB,, | Performed by: UROLOGY

## 2022-09-15 PROCEDURE — 1159F PR MEDICATION LIST DOCUMENTED IN MEDICAL RECORD: ICD-10-PCS | Mod: CPTII,S$GLB,, | Performed by: UROLOGY

## 2022-09-15 PROCEDURE — 99213 OFFICE O/P EST LOW 20 MIN: CPT | Mod: S$GLB,,, | Performed by: UROLOGY

## 2022-09-15 PROCEDURE — 3078F PR MOST RECENT DIASTOLIC BLOOD PRESSURE < 80 MM HG: ICD-10-PCS | Mod: CPTII,S$GLB,, | Performed by: UROLOGY

## 2022-09-15 PROCEDURE — 3008F PR BODY MASS INDEX (BMI) DOCUMENTED: ICD-10-PCS | Mod: CPTII,S$GLB,, | Performed by: UROLOGY

## 2022-09-15 PROCEDURE — 99213 PR OFFICE/OUTPT VISIT, EST, LEVL III, 20-29 MIN: ICD-10-PCS | Mod: S$GLB,,, | Performed by: UROLOGY

## 2022-09-15 PROCEDURE — 3044F PR MOST RECENT HEMOGLOBIN A1C LEVEL <7.0%: ICD-10-PCS | Mod: CPTII,S$GLB,, | Performed by: UROLOGY

## 2022-09-15 PROCEDURE — 1125F PR PAIN SEVERITY QUANTIFIED, PAIN PRESENT: ICD-10-PCS | Mod: CPTII,S$GLB,, | Performed by: UROLOGY

## 2022-09-15 PROCEDURE — 3288F PR FALLS RISK ASSESSMENT DOCUMENTED: ICD-10-PCS | Mod: CPTII,S$GLB,, | Performed by: UROLOGY

## 2022-09-15 PROCEDURE — 81002 POCT URINE DIPSTICK WITHOUT MICROSCOPE: ICD-10-PCS | Mod: S$GLB,,, | Performed by: UROLOGY

## 2022-09-15 PROCEDURE — 3288F FALL RISK ASSESSMENT DOCD: CPT | Mod: CPTII,S$GLB,, | Performed by: UROLOGY

## 2022-09-15 PROCEDURE — 1101F PR PT FALLS ASSESS DOC 0-1 FALLS W/OUT INJ PAST YR: ICD-10-PCS | Mod: CPTII,S$GLB,, | Performed by: UROLOGY

## 2022-09-15 PROCEDURE — 1159F MED LIST DOCD IN RCRD: CPT | Mod: CPTII,S$GLB,, | Performed by: UROLOGY

## 2022-09-15 PROCEDURE — 1160F RVW MEDS BY RX/DR IN RCRD: CPT | Mod: CPTII,S$GLB,, | Performed by: UROLOGY

## 2022-09-15 PROCEDURE — 3078F DIAST BP <80 MM HG: CPT | Mod: CPTII,S$GLB,, | Performed by: UROLOGY

## 2022-09-15 PROCEDURE — 3044F HG A1C LEVEL LT 7.0%: CPT | Mod: CPTII,S$GLB,, | Performed by: UROLOGY

## 2022-09-15 PROCEDURE — 99999 PR PBB SHADOW E&M-EST. PATIENT-LVL IV: ICD-10-PCS | Mod: PBBFAC,,, | Performed by: UROLOGY

## 2022-09-15 PROCEDURE — 1101F PT FALLS ASSESS-DOCD LE1/YR: CPT | Mod: CPTII,S$GLB,, | Performed by: UROLOGY

## 2022-09-15 PROCEDURE — 51798 POCT BLADDER SCAN: ICD-10-PCS | Mod: S$GLB,,, | Performed by: UROLOGY

## 2022-09-15 PROCEDURE — 3008F BODY MASS INDEX DOCD: CPT | Mod: CPTII,S$GLB,, | Performed by: UROLOGY

## 2022-09-15 RX ORDER — OXYBUTYNIN CHLORIDE 10 MG/1
10 TABLET, EXTENDED RELEASE ORAL DAILY
Qty: 90 TABLET | Refills: 3 | Status: SHIPPED | OUTPATIENT
Start: 2022-09-15 | End: 2022-11-03

## 2022-09-15 NOTE — PROGRESS NOTES
[facility] Note - [place]  Staff: MD Ashley    Referring provider and please cc: Carlyn  PCP: [facility]hel[facility]: active     Chief Complaint: oab    Subjective:        HPI: Curtis Navarro is a 72 y.o. female presents with     oab  -she's had this issue for 20 years  -she initially saw her ob gyn for this who tried her on myrbetriq 25 and 50 and had 50 %improvement in frequency but was still having incontinence. Prior to this she had tried detrol at some point. She then saw jemal on 5/14/18 and pvr was 170, myrbetriq dc'd and started on flomax. She was then seen on 7/6/18 and although sx improved some she they returned and jemal discontinued the flomax and started vesicare 5mg daily which improved her nocturia to1-2x a night. She had her then stop the vesicare for voiding diary which she brought with her today. Prior to starting the medicine per day: 14x a day or more, night time voids: 3x a night, 1-2 leaks of drops to large volume, urgency: 5, 2 pads a day.  Since starting vesicare she has had 50% improvement - voids q1-2 hours, nocturia 2x a night, no leaks, urgency down to 3, 0 pads. +dry mouth (not that bothersome) and constipation but takes metamucil and has a soft bm daily.   -no significant SHAWN   -She also was drinking coffee every morning and 1-2 cokes a day. Now she drinks 1 decaf coffee in the morning and coke zero 1-2x a week. No recurrent utis. G1, P 1, vaginal   -saw her 10/2018, stress test only showed leakage with 150cc but repeated standing and was negative.     Pelvic exam 10/12/18: negative stress test with coughing supine, negative stress test with valsalva supine, In and out cath performed with 20cc residual - urine was not sent for sample, Bladder filled to 150cc cc, Sensation to void felt at 90 cc Catheter removed, +stress test with coughing supine, negative stress test with valsalva supine, Negative stress test with coughing or valsalva   Standing. Stage 2 cystocele     Stable bladder and discontinued vesicare bc of the dry mouth and constipation and started myrbetriq 50mg. She has had improvement with the meds an no caffeine.3d voiding Diary (uploaded) shows voids 6-8x a day. Nocturia 1x a night. 1-2 leaks with urge. Urgency 3-5 (still pretty strong), 0 pads. Much improved.     Interval history 7/25/19: Up she was last seen in January 2019 and she had significant improvement in her overactive bladder with Myrbetriq 50 mg daily.  She was asked to continue this in follow-up and today states that she continues to have minimal leakage less than 1 time a day and does not really ever have to wear pads.  She voids every few hours.  However she still concerned because of the urgency and occasional urge incontinence that does occur.  She has a trip planned to [place] in November and is concerned about having to look for bathrooms.  She is possibly interested in proceeding with another form of treatment.    Interval history 7/7/22:  At last visit 3 years ago discussed botox vs interstim. Was having urgency still although frequency and UUI had improved. She says she is still on myrbetriq 50mg which helps with nocturia (1-2x) and during day every 1-3 hours. Sometimes having some UUI with drop and rare SAHWN. It's becoming bothersome again now.     Wears 3-4 thick pads/day damp but not wet. 1 cup of coffee in am. No sodas.     Interval history since last visit with me:  She has tried detrol, vesicare (caused dry mouth) and is currently on myrbetriq and switched her to vibegron on 7/17/22 bc she was having increasing frequency, urgency and more importantly urge incontinenence. Vibegron was covered. She says it helped with frequency. Diary 1 month after taking shows she voids 9-12x/day (so maybe going more often than this).   Went from 3 to 4 pads a day to 2 pads a day. She doesn't feel when she leaks.  Still having urgency every time she urinates but feels  "like vibegron helping.   She's interested in ptns but doesn't know anyone in [place] that does this.   Ua today neg. Pvr by sc: 8    Urine history : anticoagulation: none. Cardiologist: yearly for checkup (stress test- ).  9/15/22 Void: neg pvr by scan: 18  7/7/22  Neg, pvr by scan: 14  11/12/18 No cx, void: neg, 1 rbc  10/12/18 No cx, void: tr prot/tr blood - send for automated urinalysis, pvr by in and out: 20  8/15/18 No cx, void: neg  7/6/18  No cx, void: neg  6/13/18 Multiple org, void: neg  5/14/18 Ng, Void: neg, pvr by I&o: 170  6/6/17  E.coli, void: neg  3/20/15 ng, void: neg  11/27/06 Multiple org, void: 1+ blood    REVIEW OF SYSTEMS:  General ROS: no fevers, no chills  Psychological ROS: no depression  Endocrine ROS: no heat or cold  Respiratory ROS: no SOB  Cardiovascular ROS: no CP  Gastrointestinal ROS: no abdominal pain, no constipation, no diarrhea, noBRBPR  Musculoskeletal ROS: no muscle pain  Neurological ROS: non headaches  Dermatological ROS: no rashes  HEENT: noglasses, no sinus   ROS: per HPI     Allergies:  Lisinopril, Azithromycin, Metronidazole hcl, Moxifloxacin, and Sulfa (sulfonamide antibiotics)   Some of these she just had a small rash to but she's not sure. One of them actually caused her to "itch" in her ears and vagina/body and she had a "weird" feeling. May need to try some again if necessary with benadryl.     Urologic Medications:  myrbetriq 50mg   Anticoagulation: Yes - asa 81mg    Objective:     Vitals:    09/15/22 1000   BP: 139/67   Pulse: 70           Assessment:       Curtis Navarro is a 72 y.o. female    1. Overactive bladder         Plan:     Previously discussed PTNS but primary caretaker for  and family.  Could try sacral nerve which would require 2 procedures but really wants to try something less invasive 1st.  But doing okay on medicines so will add a new medication.  Continue vibegron once daily which has helped frequency and urge incontinence " but not really helping the urgency  Add oxybutynin 10mg once daily - see if this helps urgency.  If it causes dry mouth or constipation can try something for the dry mouth or constipation if the medicine is helping or discontinue if it is too bothersome.  Should see some results within 2 weeks and if no improvement in frequency or urgency then can discontinue.  She can keep a diary in 3-4 days for at least 2 days see can compare to previous.  Current symptoms, urinating 9-11 times a day, 2 pads a day.  Return in 3 months to see how she is doing and do a CMG/stress test if still having frequency incontinence, bring the diary done after the oxybutynin.  If she decides on interstim could do stage 1, if improved then stage 2. If not improved then remove the lead and do botox at the same time.     Eliana Ramirez MD

## 2022-09-15 NOTE — PATIENT INSTRUCTIONS
1. Overactive bladder         Plan:     Previously discussed PTNS but primary caretaker for  and family.  Could try sacral nerve which would require 2 procedures but really wants to try something less invasive 1st.  But doing okay on medicines so will add a new medication.  Continue vibegron once daily which has helped frequency and urge incontinence but not really helping the urgency  Add oxybutynin 10mg once daily - see if this helps urgency.  If it causes dry mouth or constipation can try something for the dry mouth or constipation if the medicine is helping or discontinue if it is too bothersome.  Should see some results within 2 weeks and if no improvement in frequency or urgency then can discontinue.  She can keep a diary in 3-4 days for at least 2 days see can compare to previous.  Current symptoms, urinating 9-11 times a day, 2 pads a day.  Return in 3 months to see how she is doing and do a CMG/stress test if still having frequency incontinence, bring the diary done after the oxybutynin.  If she decides on interstim could do stage 1, if improved then stage 2. If not improved then remove the lead and do botox at the same time.

## 2022-09-20 ENCOUNTER — PES CALL (OUTPATIENT)
Dept: ADMINISTRATIVE | Facility: CLINIC | Age: 72
End: 2022-09-20
Payer: MEDICARE

## 2022-09-26 RX ORDER — LOSARTAN POTASSIUM 100 MG/1
100 TABLET ORAL DAILY
Qty: 90 TABLET | Refills: 1 | Status: CANCELLED | OUTPATIENT
Start: 2022-09-26

## 2022-09-26 NOTE — TELEPHONE ENCOUNTER
No new care gaps identified.  [facility] Embedded Care Gaps. Reference number: [id number]. 9/26/2022   12:20:58 PM CDT

## 2022-09-29 ENCOUNTER — PATIENT MESSAGE (OUTPATIENT)
Dept: GASTROENTEROLOGY | Facility: CLINIC | Age: 72
End: 2022-09-29
Payer: MEDICARE

## 2022-09-30 NOTE — TELEPHONE ENCOUNTER
Returned call to the patient, patient not feeling well, flare up of allergies per patient, procedure rescheduled, patient verbalized understanding of this.

## 2022-10-10 ENCOUNTER — PATIENT MESSAGE (OUTPATIENT)
Dept: FAMILY MEDICINE | Facility: CLINIC | Age: 72
End: 2022-10-10
Payer: MEDICARE

## 2022-10-10 DIAGNOSIS — R93.89 INCREASED ENDOMETRIAL STRIPE THICKNESS: ICD-10-CM

## 2022-10-10 DIAGNOSIS — Z12.31 ENCOUNTER FOR SCREENING MAMMOGRAM FOR BREAST CANCER: Primary | ICD-10-CM

## 2022-10-18 ENCOUNTER — IMMUNIZATION (OUTPATIENT)
Dept: PHARMACY | Facility: CLINIC | Age: 72
End: 2022-10-18
Payer: MEDICARE

## 2022-10-23 ENCOUNTER — PATIENT MESSAGE (OUTPATIENT)
Dept: FAMILY MEDICINE | Facility: CLINIC | Age: 72
End: 2022-10-23
Payer: MEDICARE

## 2022-10-27 ENCOUNTER — HOSPITAL ENCOUNTER (OUTPATIENT)
Dept: RADIOLOGY | Facility: HOSPITAL | Age: 72
Discharge: HOME | End: 2022-10-27
Attending: FAMILY MEDICINE
Payer: MEDICARE

## 2022-10-27 DIAGNOSIS — R93.89 INCREASED ENDOMETRIAL STRIPE THICKNESS: ICD-10-CM

## 2022-10-27 PROCEDURE — 76830 TRANSVAGINAL US NON-OB: CPT | Mod: TC,PO

## 2022-10-27 PROCEDURE — 76830 US PELVIS COMP WITH TRANSVAG NON-OB (XPD): ICD-10-PCS | Mod: 26,,, | Performed by: RADIOLOGY

## 2022-10-27 PROCEDURE — 76856 US EXAM PELVIC COMPLETE: CPT | Mod: 26,,, | Performed by: RADIOLOGY

## 2022-10-27 PROCEDURE — 76830 TRANSVAGINAL US NON-OB: CPT | Mod: 26,,, | Performed by: RADIOLOGY

## 2022-10-27 PROCEDURE — 76856 US PELVIS COMP WITH TRANSVAG NON-OB (XPD): ICD-10-PCS | Mod: 26,,, | Performed by: RADIOLOGY

## 2022-10-29 ENCOUNTER — PATIENT MESSAGE (OUTPATIENT)
Dept: FAMILY MEDICINE | Facility: CLINIC | Age: 72
End: 2022-10-29
Payer: MEDICARE

## 2022-11-03 ENCOUNTER — HOSPITAL ENCOUNTER (OUTPATIENT)
Dept: RADIOLOGY | Facility: HOSPITAL | Age: 72
Discharge: HOME | End: 2022-11-03
Attending: FAMILY MEDICINE
Payer: MEDICARE

## 2022-11-03 ENCOUNTER — OFFICE VISIT (OUTPATIENT)
Dept: FAMILY MEDICINE | Facility: CLINIC | Age: 72
End: 2022-11-03
Payer: MEDICARE

## 2022-11-03 VITALS
RESPIRATION RATE: 20 BRPM | BODY MASS INDEX: 33.84 KG/M2 | WEIGHT: 191 LBS | HEART RATE: 70 BPM | SYSTOLIC BLOOD PRESSURE: 132 MMHG | DIASTOLIC BLOOD PRESSURE: 72 MMHG | HEIGHT: 63 IN | OXYGEN SATURATION: 100 % | TEMPERATURE: 98 F

## 2022-11-03 DIAGNOSIS — R10.2 PAIN IN FEMALE PELVIS: ICD-10-CM

## 2022-11-03 DIAGNOSIS — M54.9 BACK PAIN, UNSPECIFIED BACK LOCATION, UNSPECIFIED BACK PAIN LATERALITY, UNSPECIFIED CHRONICITY: ICD-10-CM

## 2022-11-03 DIAGNOSIS — Z78.0 ASYMPTOMATIC MENOPAUSAL STATE: ICD-10-CM

## 2022-11-03 DIAGNOSIS — R22.30 SUBCUTANEOUS NODULE OF HAND: ICD-10-CM

## 2022-11-03 DIAGNOSIS — M85.80 OSTEOPENIA, UNSPECIFIED LOCATION: ICD-10-CM

## 2022-11-03 DIAGNOSIS — M54.9 BACK PAIN, UNSPECIFIED BACK LOCATION, UNSPECIFIED BACK PAIN LATERALITY, UNSPECIFIED CHRONICITY: Primary | ICD-10-CM

## 2022-11-03 PROCEDURE — 1160F RVW MEDS BY RX/DR IN RCRD: CPT | Mod: CPTII,S$GLB,, | Performed by: FAMILY MEDICINE

## 2022-11-03 PROCEDURE — 3078F PR MOST RECENT DIASTOLIC BLOOD PRESSURE < 80 MM HG: ICD-10-PCS | Mod: CPTII,S$GLB,, | Performed by: FAMILY MEDICINE

## 2022-11-03 PROCEDURE — 72190 X-RAY EXAM OF PELVIS: CPT | Mod: TC,FY,PO

## 2022-11-03 PROCEDURE — 72190 X-RAY EXAM OF PELVIS: CPT | Mod: 26,,, | Performed by: RADIOLOGY

## 2022-11-03 PROCEDURE — 72100 XR LUMBAR SPINE AP AND LATERAL: ICD-10-PCS | Mod: 26,,, | Performed by: RADIOLOGY

## 2022-11-03 PROCEDURE — 1101F PR PT FALLS ASSESS DOC 0-1 FALLS W/OUT INJ PAST YR: ICD-10-PCS | Mod: CPTII,S$GLB,, | Performed by: FAMILY MEDICINE

## 2022-11-03 PROCEDURE — 3078F DIAST BP <80 MM HG: CPT | Mod: CPTII,S$GLB,, | Performed by: FAMILY MEDICINE

## 2022-11-03 PROCEDURE — 1101F PT FALLS ASSESS-DOCD LE1/YR: CPT | Mod: CPTII,S$GLB,, | Performed by: FAMILY MEDICINE

## 2022-11-03 PROCEDURE — 1159F MED LIST DOCD IN RCRD: CPT | Mod: CPTII,S$GLB,, | Performed by: FAMILY MEDICINE

## 2022-11-03 PROCEDURE — 1125F AMNT PAIN NOTED PAIN PRSNT: CPT | Mod: CPTII,S$GLB,, | Performed by: FAMILY MEDICINE

## 2022-11-03 PROCEDURE — 4010F ACE/ARB THERAPY RXD/TAKEN: CPT | Mod: CPTII,S$GLB,, | Performed by: FAMILY MEDICINE

## 2022-11-03 PROCEDURE — 3008F BODY MASS INDEX DOCD: CPT | Mod: CPTII,S$GLB,, | Performed by: FAMILY MEDICINE

## 2022-11-03 PROCEDURE — 1125F PR PAIN SEVERITY QUANTIFIED, PAIN PRESENT: ICD-10-PCS | Mod: CPTII,S$GLB,, | Performed by: FAMILY MEDICINE

## 2022-11-03 PROCEDURE — 3288F FALL RISK ASSESSMENT DOCD: CPT | Mod: CPTII,S$GLB,, | Performed by: FAMILY MEDICINE

## 2022-11-03 PROCEDURE — 72100 X-RAY EXAM L-S SPINE 2/3 VWS: CPT | Mod: 26,,, | Performed by: RADIOLOGY

## 2022-11-03 PROCEDURE — 1159F PR MEDICATION LIST DOCUMENTED IN MEDICAL RECORD: ICD-10-PCS | Mod: CPTII,S$GLB,, | Performed by: FAMILY MEDICINE

## 2022-11-03 PROCEDURE — 99214 OFFICE O/P EST MOD 30 MIN: CPT | Mod: S$GLB,,, | Performed by: FAMILY MEDICINE

## 2022-11-03 PROCEDURE — 72100 X-RAY EXAM L-S SPINE 2/3 VWS: CPT | Mod: TC,FY,PO

## 2022-11-03 PROCEDURE — 99214 PR OFFICE/OUTPT VISIT, EST, LEVL IV, 30-39 MIN: ICD-10-PCS | Mod: S$GLB,,, | Performed by: FAMILY MEDICINE

## 2022-11-03 PROCEDURE — 3288F PR FALLS RISK ASSESSMENT DOCUMENTED: ICD-10-PCS | Mod: CPTII,S$GLB,, | Performed by: FAMILY MEDICINE

## 2022-11-03 PROCEDURE — 3075F PR MOST RECENT SYSTOLIC BLOOD PRESS GE 130-139MM HG: ICD-10-PCS | Mod: CPTII,S$GLB,, | Performed by: FAMILY MEDICINE

## 2022-11-03 PROCEDURE — 3075F SYST BP GE 130 - 139MM HG: CPT | Mod: CPTII,S$GLB,, | Performed by: FAMILY MEDICINE

## 2022-11-03 PROCEDURE — 3008F PR BODY MASS INDEX (BMI) DOCUMENTED: ICD-10-PCS | Mod: CPTII,S$GLB,, | Performed by: FAMILY MEDICINE

## 2022-11-03 PROCEDURE — 4010F PR ACE/ARB THEARPY RXD/TAKEN: ICD-10-PCS | Mod: CPTII,S$GLB,, | Performed by: FAMILY MEDICINE

## 2022-11-03 PROCEDURE — 72190 XR PELVIS COMPLETE MIN 3 VIEWS: ICD-10-PCS | Mod: 26,,, | Performed by: RADIOLOGY

## 2022-11-03 PROCEDURE — 3044F PR MOST RECENT HEMOGLOBIN A1C LEVEL <7.0%: ICD-10-PCS | Mod: CPTII,S$GLB,, | Performed by: FAMILY MEDICINE

## 2022-11-03 PROCEDURE — 1160F PR REVIEW ALL MEDS BY PRESCRIBER/CLIN PHARMACIST DOCUMENTED: ICD-10-PCS | Mod: CPTII,S$GLB,, | Performed by: FAMILY MEDICINE

## 2022-11-03 PROCEDURE — 3044F HG A1C LEVEL LT 7.0%: CPT | Mod: CPTII,S$GLB,, | Performed by: FAMILY MEDICINE

## 2022-11-03 RX ORDER — MELOXICAM 15 MG/1
15 TABLET ORAL DAILY
Qty: 30 TABLET | Refills: 0 | Status: SHIPPED | OUTPATIENT
Start: 2022-11-03 | End: 2023-01-17 | Stop reason: ALTCHOICE

## 2022-11-03 NOTE — PROGRESS NOTES
Subjective:       Patient ID: Curtis Navarro is a 72 y.o. female.    Chief Complaint: Back Pain    The patient is a 72-year-old who is here today with 3 issues.  Today we discussed the followin) right hand.  She has a hard spot inside her right hand.  She has noticed this for a few weeks.  She is not certain what made her notice it initially.  It is not painful.  It has not changed in size.  It has not been discolored.  2) low back pain.  For the past few months, she has been having left lower back pain.  She has been expecting the pain to get better but it has not.  She has this every day.  The pain is intermittent.  Some things will the pain worse such as increased activity, increased movement or bending.  She has a hard time finding a difficult position when she is sitting or laying down.  She denies any radicular symptoms.  She denies any weakness.  She currently rates her pain as a 7 on this pain scale.  She denies any injury or trauma to this area  3) hair loss.  She continues to have significant hair loss.  She notices that she is losing long strands of hair.  She is having hair thinning but no balding areas.  She is using biotin but has not found this to be helpful.          At her last visit, we had discuss starting Ozempic.  In the end, she decided not to start a medication and try to lose weight on her own.  She was able to lose 17 lb although she has gained some back as she was planning her mom's birthday party.  She plans to continue her efforts with weight loss and is encouraged that she has been successful on her own.    Review of Systems   Constitutional:  Negative for appetite change, chills, diaphoresis, fatigue, fever and unexpected weight change.   HENT:  Negative for congestion, ear pain, postnasal drip, rhinorrhea, sinus pressure, sneezing, sore throat and trouble swallowing.    Eyes:  Negative for pain, discharge and visual disturbance.   Respiratory:  Negative for cough, chest  tightness, shortness of breath and wheezing.    Cardiovascular:  Negative for chest pain, palpitations and leg swelling.   Gastrointestinal:  Negative for abdominal distention, abdominal pain, blood in stool, constipation, diarrhea, nausea and vomiting.   Genitourinary:  Negative for dysuria, hematuria and pelvic pain.   Musculoskeletal:  Positive for back pain.   Skin:  Negative for rash.   Neurological:  Negative for weakness, numbness and headaches.       Objective:      Physical Exam  Constitutional:       General: She is not in acute distress.     Appearance: Normal appearance. She is well-developed.   HENT:      Head: Normocephalic and atraumatic.      Right Ear: Hearing, tympanic membrane, ear canal and external ear normal.      Left Ear: Hearing, tympanic membrane, ear canal and external ear normal.      Nose: Nose normal.      Mouth/Throat:      Mouth: No oral lesions.      Pharynx: No oropharyngeal exudate or posterior oropharyngeal erythema.   Eyes:      General: Lids are normal. No scleral icterus.     Extraocular Movements: Extraocular movements intact.      Conjunctiva/sclera: Conjunctivae normal.      Pupils: Pupils are equal, round, and reactive to light.   Neck:      Thyroid: No thyroid mass or thyromegaly.      Vascular: No carotid bruit.   Cardiovascular:      Rate and Rhythm: Normal rate and regular rhythm. No extrasystoles are present.     Chest Wall: PMI is not displaced.      Heart sounds: Normal heart sounds. No murmur heard.    No gallop.   Pulmonary:      Effort: Pulmonary effort is normal. No accessory muscle usage or respiratory distress.      Breath sounds: Normal breath sounds.   Abdominal:      General: Bowel sounds are normal. There is no abdominal bruit.      Palpations: Abdomen is soft.      Tenderness: There is no abdominal tenderness. There is no rebound.   Musculoskeletal:      Cervical back: Normal range of motion and neck supple.   Lymphadenopathy:      Head:      Right side of  "head: No submental or submandibular adenopathy.      Left side of head: No submental or submandibular adenopathy.      Cervical:      Right cervical: No superficial, deep or posterior cervical adenopathy.     Left cervical: No superficial, deep or posterior cervical adenopathy.      Upper Body:      Right upper body: No supraclavicular adenopathy.      Left upper body: No supraclavicular adenopathy.   Skin:     General: Skin is warm and dry.   Neurological:      Mental Status: She is alert and oriented to person, place, and time.     Blood pressure 132/72, pulse 70, temperature 98.4 °F (36.9 °C), resp. rate 20, height 5' 3" (1.6 m), weight 86.7 kg (191 lb 0.5 oz), SpO2 100 %.Body mass index is 33.84 kg/m².              A/P:  1)  right palm with soft tissue nodule likely representing cyst verses Dupuytren's.  New.  If this changes in size or if she knows any contraction of her fingers, she will let me know.  For now we will continue to monitor this   2) left lower back pain.  Persistent.  We are going to try Mobic 15 mg once a day for 2 weeks.  We are also going to check an x-ray of her lumbar spine and pelvis.  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  Persistent.  She will continue with the biotin.  She could consider Rogaine or spironolactone and will let me know if she wants the spironolactone  4) osteopenia.  Status unknown.  We will check a DEXA scan soon    "

## 2022-11-04 ENCOUNTER — PATIENT MESSAGE (OUTPATIENT)
Dept: FAMILY MEDICINE | Facility: CLINIC | Age: 72
End: 2022-11-04
Payer: MEDICARE

## 2022-11-04 DIAGNOSIS — M54.9 BACK PAIN, UNSPECIFIED BACK LOCATION, UNSPECIFIED BACK PAIN LATERALITY, UNSPECIFIED CHRONICITY: Primary | ICD-10-CM

## 2022-11-14 ENCOUNTER — OFFICE VISIT (OUTPATIENT)
Dept: CARDIOLOGY | Facility: CLINIC | Age: 72
End: 2022-11-14
Payer: MEDICARE

## 2022-11-14 VITALS
BODY MASS INDEX: 33.98 KG/M2 | HEART RATE: 68 BPM | HEIGHT: 63 IN | DIASTOLIC BLOOD PRESSURE: 77 MMHG | SYSTOLIC BLOOD PRESSURE: 143 MMHG | WEIGHT: 191.81 LBS

## 2022-11-14 DIAGNOSIS — R93.1 ABNORMAL FINDINGS ON DIAGNOSTIC IMAGING OF HEART AND CORONARY CIRCULATION: ICD-10-CM

## 2022-11-14 DIAGNOSIS — K21.9 LPRD (LARYNGOPHARYNGEAL REFLUX DISEASE): ICD-10-CM

## 2022-11-14 DIAGNOSIS — E78.5 DYSLIPIDEMIA: Chronic | ICD-10-CM

## 2022-11-14 DIAGNOSIS — I10 PRIMARY HYPERTENSION: Primary | ICD-10-CM

## 2022-11-14 PROCEDURE — 99999 PR PBB SHADOW E&M-EST. PATIENT-LVL III: ICD-10-PCS | Mod: PBBFAC,,, | Performed by: INTERNAL MEDICINE

## 2022-11-14 PROCEDURE — 1126F PR PAIN SEVERITY QUANTIFIED, NO PAIN PRESENT: ICD-10-PCS | Mod: CPTII,S$GLB,, | Performed by: INTERNAL MEDICINE

## 2022-11-14 PROCEDURE — 3008F BODY MASS INDEX DOCD: CPT | Mod: CPTII,S$GLB,, | Performed by: INTERNAL MEDICINE

## 2022-11-14 PROCEDURE — 1101F PR PT FALLS ASSESS DOC 0-1 FALLS W/OUT INJ PAST YR: ICD-10-PCS | Mod: CPTII,S$GLB,, | Performed by: INTERNAL MEDICINE

## 2022-11-14 PROCEDURE — 3008F PR BODY MASS INDEX (BMI) DOCUMENTED: ICD-10-PCS | Mod: CPTII,S$GLB,, | Performed by: INTERNAL MEDICINE

## 2022-11-14 PROCEDURE — 4010F ACE/ARB THERAPY RXD/TAKEN: CPT | Mod: CPTII,S$GLB,, | Performed by: INTERNAL MEDICINE

## 2022-11-14 PROCEDURE — 1159F PR MEDICATION LIST DOCUMENTED IN MEDICAL RECORD: ICD-10-PCS | Mod: CPTII,S$GLB,, | Performed by: INTERNAL MEDICINE

## 2022-11-14 PROCEDURE — 3288F PR FALLS RISK ASSESSMENT DOCUMENTED: ICD-10-PCS | Mod: CPTII,S$GLB,, | Performed by: INTERNAL MEDICINE

## 2022-11-14 PROCEDURE — 3044F HG A1C LEVEL LT 7.0%: CPT | Mod: CPTII,S$GLB,, | Performed by: INTERNAL MEDICINE

## 2022-11-14 PROCEDURE — 3044F PR MOST RECENT HEMOGLOBIN A1C LEVEL <7.0%: ICD-10-PCS | Mod: CPTII,S$GLB,, | Performed by: INTERNAL MEDICINE

## 2022-11-14 PROCEDURE — 3078F DIAST BP <80 MM HG: CPT | Mod: CPTII,S$GLB,, | Performed by: INTERNAL MEDICINE

## 2022-11-14 PROCEDURE — 4010F PR ACE/ARB THEARPY RXD/TAKEN: ICD-10-PCS | Mod: CPTII,S$GLB,, | Performed by: INTERNAL MEDICINE

## 2022-11-14 PROCEDURE — 99214 PR OFFICE/OUTPT VISIT, EST, LEVL IV, 30-39 MIN: ICD-10-PCS | Mod: S$GLB,,, | Performed by: INTERNAL MEDICINE

## 2022-11-14 PROCEDURE — 3288F FALL RISK ASSESSMENT DOCD: CPT | Mod: CPTII,S$GLB,, | Performed by: INTERNAL MEDICINE

## 2022-11-14 PROCEDURE — 99499 UNLISTED E&M SERVICE: CPT | Mod: HCNC,S$GLB,, | Performed by: INTERNAL MEDICINE

## 2022-11-14 PROCEDURE — 3077F SYST BP >= 140 MM HG: CPT | Mod: CPTII,S$GLB,, | Performed by: INTERNAL MEDICINE

## 2022-11-14 PROCEDURE — 99499 RISK ADDL DX/OHS AUDIT: ICD-10-PCS | Mod: HCNC,S$GLB,, | Performed by: INTERNAL MEDICINE

## 2022-11-14 PROCEDURE — 3078F PR MOST RECENT DIASTOLIC BLOOD PRESSURE < 80 MM HG: ICD-10-PCS | Mod: CPTII,S$GLB,, | Performed by: INTERNAL MEDICINE

## 2022-11-14 PROCEDURE — 3077F PR MOST RECENT SYSTOLIC BLOOD PRESSURE >= 140 MM HG: ICD-10-PCS | Mod: CPTII,S$GLB,, | Performed by: INTERNAL MEDICINE

## 2022-11-14 PROCEDURE — 1160F PR REVIEW ALL MEDS BY PRESCRIBER/CLIN PHARMACIST DOCUMENTED: ICD-10-PCS | Mod: CPTII,S$GLB,, | Performed by: INTERNAL MEDICINE

## 2022-11-14 PROCEDURE — 1160F RVW MEDS BY RX/DR IN RCRD: CPT | Mod: CPTII,S$GLB,, | Performed by: INTERNAL MEDICINE

## 2022-11-14 PROCEDURE — 1159F MED LIST DOCD IN RCRD: CPT | Mod: CPTII,S$GLB,, | Performed by: INTERNAL MEDICINE

## 2022-11-14 PROCEDURE — 99999 PR PBB SHADOW E&M-EST. PATIENT-LVL III: CPT | Mod: PBBFAC,,, | Performed by: INTERNAL MEDICINE

## 2022-11-14 PROCEDURE — 1126F AMNT PAIN NOTED NONE PRSNT: CPT | Mod: CPTII,S$GLB,, | Performed by: INTERNAL MEDICINE

## 2022-11-14 PROCEDURE — 99214 OFFICE O/P EST MOD 30 MIN: CPT | Mod: S$GLB,,, | Performed by: INTERNAL MEDICINE

## 2022-11-14 PROCEDURE — 1101F PT FALLS ASSESS-DOCD LE1/YR: CPT | Mod: CPTII,S$GLB,, | Performed by: INTERNAL MEDICINE

## 2022-11-14 RX ORDER — OMEPRAZOLE 40 MG/1
40 CAPSULE, DELAYED RELEASE ORAL
Qty: 30 CAPSULE | Refills: 2 | Status: SHIPPED | OUTPATIENT
Start: 2022-11-14 | End: 2023-01-10 | Stop reason: DRUGHIGH

## 2022-11-14 NOTE — TELEPHONE ENCOUNTER
No new care gaps identified.  [facility] Embedded Care Gaps. Reference number: [id number]. 11/14/2022   3:02:33 PM CST

## 2022-11-14 NOTE — PROGRESS NOTES
Subjective:    Patient ID:  Curtis Navarro is a 72 y.o. female who presents for follow-up of Hypertension      HPI  She comes with no complaints, no chest pain, no shortness of breath  She did have a recent x-ray that showed aortic calcification.  Not exercising as much as before  Blood pressure normal at home  Taking Lipitor 10 mg a day    Review of Systems   Constitutional: Negative for decreased appetite, malaise/fatigue, weight gain and weight loss.   Cardiovascular:  Negative for chest pain, dyspnea on exertion, leg swelling, palpitations and syncope.   Respiratory:  Negative for cough and shortness of breath.    Gastrointestinal: Negative.    Neurological:  Negative for weakness.   All other systems reviewed and are negative.     Objective:      Physical Exam  Vitals and nursing note reviewed.   Constitutional:       Appearance: Normal appearance. She is well-developed.   HENT:      Head: Normocephalic.   Eyes:      Pupils: Pupils are equal, round, and reactive to light.   Neck:      Thyroid: No thyromegaly.      Vascular: No carotid bruit or JVD.   Cardiovascular:      Rate and Rhythm: Normal rate and regular rhythm.      Chest Wall: PMI is not displaced.      Pulses: Normal pulses and intact distal pulses.      Heart sounds: Normal heart sounds. No murmur heard.    No gallop.   Pulmonary:      Effort: Pulmonary effort is normal.      Breath sounds: Normal breath sounds.   Abdominal:      Palpations: Abdomen is soft. There is no mass.      Tenderness: There is no abdominal tenderness.   Musculoskeletal:         General: Normal range of motion.      Cervical back: Normal range of motion and neck supple.   Skin:     General: Skin is warm.   Neurological:      Mental Status: She is alert and oriented to person, place, and time.      Sensory: No sensory deficit.      Deep Tendon Reflexes: Reflexes are normal and symmetric.         Assessment:       1. Primary hypertension    2. Dyslipidemia         Plan:    Increase Lipitor to 20 mg daily  Continue all cardiac medications  Regular exercise program  Weight loss  Six-month follow-up with a stress nuclear

## 2022-11-15 ENCOUNTER — CLINICAL SUPPORT (OUTPATIENT)
Dept: REHABILITATION | Facility: HOSPITAL | Age: 72
End: 2022-11-15
Attending: FAMILY MEDICINE
Payer: MEDICARE

## 2022-11-15 DIAGNOSIS — M54.50 CHRONIC BILATERAL LOW BACK PAIN, UNSPECIFIED WHETHER SCIATICA PRESENT: ICD-10-CM

## 2022-11-15 DIAGNOSIS — G89.29 CHRONIC BILATERAL LOW BACK PAIN, UNSPECIFIED WHETHER SCIATICA PRESENT: ICD-10-CM

## 2022-11-15 DIAGNOSIS — M54.9 BACK PAIN, UNSPECIFIED BACK LOCATION, UNSPECIFIED BACK PAIN LATERALITY, UNSPECIFIED CHRONICITY: ICD-10-CM

## 2022-11-15 PROCEDURE — 97140 MANUAL THERAPY 1/> REGIONS: CPT | Mod: PO

## 2022-11-15 PROCEDURE — 97161 PT EVAL LOW COMPLEX 20 MIN: CPT | Mod: PO

## 2022-11-15 PROCEDURE — 97110 THERAPEUTIC EXERCISES: CPT | Mod: PO

## 2022-11-15 PROCEDURE — 97112 NEUROMUSCULAR REEDUCATION: CPT | Mod: PO

## 2022-11-15 NOTE — PROGRESS NOTES
[facility]  Physical Therapy Initial Evaluation    Name: Curtis Lopezbertson  [facility] Number: [id number]    Therapy Diagnosis:   Encounter Diagnoses   Name Primary?    Back pain, unspecified back location, unspecified back pain laterality, unspecified chronicity     Chronic bilateral low back pain, unspecified whether sciatica present      Physician: Komal Del Castillo MD    Physician Orders: PT Eval and Treat  Medical Diagnosis from Referral: Back pain, unspecified back location, unspecified back pain laterality, unspecified chronicity [M54.9]  Evaluation Date: 11/15/2022  Authorization Period Expiration: 11/4/2022 - 11/4/2023  Plan of Care Expiration: 12/27/22  Visit # / Visits authorized: 1/ 1    Time In: 1000  Time Out: 1000  Total Billable Time: 60 minutes    Precautions: Standard    Subjective   Date of onset: 2-3 months ago   History of current condition - Curtis reports: that she began having LBP for no reason 2-3 months ago when she was cleaning her house and doing normal chores. States she did not have this LBP before and it hurts on her left low back. States it hurts the most when she is coming back up from reaching down to get something. States that it has improved since she has first had the pain. States it bothers her at night when she rotates to her Left or Right. States that she can only stand for 15-20 min before having to rest. States OTC NSAIDs do not help much and she gets relief from laying down. Denies radicular sx/s, PMH cancer, and no surgeries or accidents in the last 5 years. States she would like to get back to ADLs and chores. She takes care of her >91 yo mother and her  who is ill.        Past Medical History:   Diagnosis Date    Allergy     Arthritis     Carotid arterial disease     5/21 next due 5/23    Cataract     Colon polyps     Diverticulosis     GERD (gastroesophageal reflux disease)     Graves disease     s/p radioactive iodine in 42    H/O  percutaneous left heart catheterization     normal 5/12    History of diverticulitis     History of skin cancer     L neck    Hyperlipidemia     Hypertension     Hypothyroidism     s/p radioactive iodine    IBS (irritable bowel syndrome)     Osteopenia     7/13, 7/15    S/P colonoscopy     7/17;  next due 7/22    Thyroid nodule     last usg 5/21;  next due 5/23     Curtis Navarro  has a past surgical history that includes Tonsillectomy; Cosmetic surgery; Skin cancer excision; Cardiac surgery (2013); Esophagogastroduodenoscopy (N/A, 07/12/2018); Colonoscopy (07/21/2010); Colonoscopy (N/A, 06/27/2017); Upper gastrointestinal endoscopy (08/04/2014); and Upper gastrointestinal endoscopy (06/27/2017).    Curtis has a current medication list which includes the following prescription(s): aspirin, atorvastatin, b.animalis,bifid,infantis,long, biotin, calcium-vitamin d3, glucosa holm 2kcl/chondroitin holm, krill/om-3/dha/epa/phospho/ast, levothyroxine, losartan, meloxicam, metoprolol succinate, multivitamin, omeprazole, and vibegron.    Review of patient's allergies indicates:   Allergen Reactions    Lisinopril Other (See Comments)     Cough    Azithromycin      Other reaction(s): Nausea  Other reaction(s): Diarrhea    Metronidazole hcl      Other reaction(s): Rash  Other reaction(s): Itching    Moxifloxacin      Other reaction(s): Rash    Sulfa (sulfonamide antibiotics)      Other reaction(s): Itching        Imaging,       Prior Therapy: none  Social History:  lives with their family  Prior Level of Function: Pt able to walk, stand >20 min and complete chores without pain.  Current Level of Function: Pt cannot walk >5 min, standing >20 min or complete chores without pain .    Pain:  Current 0/10, worst 8/10, best 0/10   Location: midline LBP  Description: Aching and Dull      Objective     Observation: ambulates with bilat trendelenburg     Lumbar Range of Motion:    Limitations Pain   Flexion 75%         Extension 25%            Left Side Bending 100%         Right Side Bending 100%             Lower Extremity Strength  Right LE  Left LE    Quadriceps: 4+/5 Quadriceps: 4+/5   Hamstrings: 4+/5 Hamstrings: 4+/5   Iliospoas: 4+/5 Iliospoas: 4+/5   Hip extension:  3-/5 Hip extension: 3/5   PGM: 3+/5 PGM: 3-/5   Hip ER:  3+/5 Hip ER: 3+/5   Hip IR: 4-/5 Hip IR: 4-/5   Ankle dorsiflexion: 5/5 Ankle dorsiflexion: 5/5   Ankle plantarflexion: 4+/5 Ankle plantarflexion: 4+/5       Hip Range of Motion:   Right Passive  Left Passive   Flexion 90 95   Abduction     Extension L5 L5   Ext. Rotation 65 50   Int. Rotation 25 40        Sensation:WFL    Reflexes:  -Patellar (L3-L4):   Left= 1+  Right= 2+  -Achilles (S1): WFL    Special Tests:  -SLR Test: -  -Repeated Flexion: -  -Repeated Ext: -  -Prone Instability Test: +  -Bridge Test: +  -Slump Test: + on LLE      Joint Mobility: hypomobile Left FA jt   -Shear testing:shear at L3-4 on Left       Palpation: tender to superficial palpation of L4 and L5 SP    Flexibility:   -Ely's test: unable due to knee pain       CMS Impairment/Limitation/Restriction for FOTO lumbar  Survey    Therapist reviewed FOTO scores for Curtis Navarro on 11/15/2022.   FOTO documents entered into [employer] - see Media section.    Limitation Score: 45%  Category: Mobility         TREATMENT     Total Treatment time separate from Evaluation: 42 minutes    Curtis received therapeutic exercises for 25 minutes including:  Pt edu on HEP, POC, PT eval findings, biomechanics, precautions, safety with ADL   Bridge 2x6 5s  SL clam 2x6 3s  Hooklying march x20   Left sciatic nerve glide       Curtis received the following manual therapy techniques:  for 8 minutes, including:  Ant hip mob Gr3-4  Pos/inf hip mob Gr 3-4    Curtis participated in neuromuscular re-education activities to improve:  for 9 minutes. The following activities were included:  Saggitall plane and front plane pelvic control   SL clam x5  Hooklying march  x5  Bridge  2x5     Home Exercises and Patient Education Provided    Education provided re: precautions, PT eval findings, biomechanics, safety with ADLs, form with activities, POC, goals for therapy, role of therapy for care, exercises/HEP    Written Home Exercises Provided: yes  Exercises were reviewed and Curtis was able to demonstrate them prior to the end of the session.   Pt received a written copy of exercises to perform at home. Curtis demonstrated good understanding of the education provided.     See EMR under patient instructions for exercises given.   Assessment   Curtis is a 72 y.o. female referred to outpatient Physical Therapy with a medical diagnosis of Back pain, unspecified back location, unspecified back pain laterality, unspecified chronicity [M54.9]. Pt presents with Left sided LBP and adverse neural tension. Pt has deficits in decreased range of motion, decreased joint mobility ,decreased strength and endurance , adverse neural tension, and increased pain. This limits the pts ability to bend, stoop, lift, walk, stand and complete chores. Pt is a good candidate for skilled physical therapy treatment to address the above limitations through manual therapy techniques, neuro-reeducation, therapeutic exercise, therapeutic activity, patient education, strength and endurance program, and balance training    Pt prognosis is Good.   Pt will benefit from skilled outpatient Physical Therapy to address the deficits stated above and in the chart below, provide pt/family education, and to maximize pt's level of independence.     Plan of care discussed with patient: yes  Pt's spiritual, cultural and educational needs considered and patient is agreeable to the plan of care and goals as stated below:     Anticipated Barriers for therapy: none    Medical Necessity is demonstrated by the following  History  Co-morbidities and personal factors that may impact the plan of care Co-morbidities:   high BMI    Personal Factors:    no deficits     low   Examination  Body Structures and Functions, activity limitations and participation restrictions that may impact the plan of care Body Regions:   back    Body Systems:    gross symmetry  ROM  strength  gross coordinated movement  balance  transfers  transitions  motor control    Participation Restrictions:   none    Activity limitations:   Learning and applying knowledge  no deficits    General Tasks and Commands  no deficits    Communication  no deficits    Mobility  no deficits    Self care  washing oneself (bathing, drying, washing hands)  caring for body parts (brushing teeth, shaving, grooming)    Domestic Life  doing house work (cleaning house, washing dishes, laundry)    Interactions/Relationships  no deficits    Life Areas  no deficits    Community and Social Life  no deficits         low   Clinical Presentation stable and uncomplicated low   Decision Making/ Complexity Score: low     Goals:  Short Term Goals: 1-3 weeks   Pt will be able to demonstrate > 50 deg Left hip ER.  Pt will be able to demonstrate x10 bridges with training affect achieved in glute maxs  Pt will be able to demonstrate dead bug hold >30s.  Pt will subjectively report improvements in standing tolerance >20 min.       Long Term Goals: 3-6 weeks   Pt will be able to demonstrate hip Range of Motion = to compared of contralateral side.   Pt will be able to demonstrate hip hinge x10 with no pain.  Pt will be able to demonstrate lifting 10-15# box 2x6 with no pain.   Pt will subjectively report pain with ADLS <5% of the time and standing tolerance >30 min.    Plan   Plan of care Certification: 11/15/2022 to 12/27/22.    Outpatient Physical Therapy 1 times weekly for 6 weeks to include the following interventions: Electrical Stimulation, Manual Therapy, Moist Heat/ Ice, Neuromuscular Re-ed, Patient Education, Self Care, Therapeutic Activities, and Therapeutic Exercise    Veronika Asher, PT, DPT

## 2022-11-28 ENCOUNTER — HOSPITAL ENCOUNTER (OUTPATIENT)
Dept: RADIOLOGY | Facility: HOSPITAL | Age: 72
Discharge: HOME | End: 2022-11-28
Attending: FAMILY MEDICINE
Payer: MEDICARE

## 2022-11-28 DIAGNOSIS — Z12.31 ENCOUNTER FOR SCREENING MAMMOGRAM FOR BREAST CANCER: ICD-10-CM

## 2022-11-28 PROCEDURE — 77067 MAMMO DIGITAL SCREENING BILAT WITH TOMO: ICD-10-PCS | Mod: 26,,, | Performed by: RADIOLOGY

## 2022-11-28 PROCEDURE — 77067 SCR MAMMO BI INCL CAD: CPT | Mod: TC,PO

## 2022-11-28 PROCEDURE — 77063 MAMMO DIGITAL SCREENING BILAT WITH TOMO: ICD-10-PCS | Mod: 26,,, | Performed by: RADIOLOGY

## 2022-11-28 PROCEDURE — 77063 BREAST TOMOSYNTHESIS BI: CPT | Mod: TC,PO

## 2022-11-28 PROCEDURE — 77067 SCR MAMMO BI INCL CAD: CPT | Mod: 26,,, | Performed by: RADIOLOGY

## 2022-11-28 PROCEDURE — 77063 BREAST TOMOSYNTHESIS BI: CPT | Mod: 26,,, | Performed by: RADIOLOGY

## 2022-11-30 ENCOUNTER — PATIENT MESSAGE (OUTPATIENT)
Dept: FAMILY MEDICINE | Facility: CLINIC | Age: 72
End: 2022-11-30
Payer: MEDICARE

## 2022-12-01 ENCOUNTER — HOSPITAL ENCOUNTER (OUTPATIENT)
Dept: RADIOLOGY | Facility: HOSPITAL | Age: 72
Discharge: HOME | End: 2022-12-01
Attending: FAMILY MEDICINE
Payer: MEDICARE

## 2022-12-01 DIAGNOSIS — Z78.0 ASYMPTOMATIC MENOPAUSAL STATE: ICD-10-CM

## 2022-12-01 DIAGNOSIS — M85.80 OSTEOPENIA, UNSPECIFIED LOCATION: ICD-10-CM

## 2022-12-01 PROCEDURE — 77080 DEXA BONE DENSITY SPINE HIP: ICD-10-PCS | Mod: 26,,, | Performed by: RADIOLOGY

## 2022-12-01 PROCEDURE — 77080 DXA BONE DENSITY AXIAL: CPT | Mod: 26,,, | Performed by: RADIOLOGY

## 2022-12-01 PROCEDURE — 77080 DXA BONE DENSITY AXIAL: CPT | Mod: TC,PO

## 2022-12-05 ENCOUNTER — PATIENT MESSAGE (OUTPATIENT)
Dept: FAMILY MEDICINE | Facility: CLINIC | Age: 72
End: 2022-12-05
Payer: MEDICARE

## 2022-12-06 NOTE — TELEPHONE ENCOUNTER
Care Due:                  Date            Visit Type   Department     Provider  --------------------------------------------------------------------------------                                MYCHART                              FOLLOWUP/OF  [facility]    Komal Del Castillo  Last Visit: 11-      FICE VISIT   MEDICINE       Anger  Next Visit: None Scheduled  None         None Found                                                            Last  Test          Frequency    Reason                     Performed    Due Date  --------------------------------------------------------------------------------    CMP.........  12 months..  atorvastatin, losartan...  03- 02-    Lipid Panel.  12 months..  atorvastatin.............  03- 02-    TSH.........  12 months..  levothyroxine............  03- 02-    [facility] Embedded Care Gaps. Reference number: [id number]. 12/06/2022   2:08:14 PM CST

## 2022-12-07 ENCOUNTER — CLINICAL SUPPORT (OUTPATIENT)
Dept: REHABILITATION | Facility: HOSPITAL | Age: 72
End: 2022-12-07
Attending: FAMILY MEDICINE
Payer: MEDICARE

## 2022-12-07 DIAGNOSIS — M54.50 CHRONIC BILATERAL LOW BACK PAIN WITHOUT SCIATICA: Primary | ICD-10-CM

## 2022-12-07 DIAGNOSIS — G89.29 CHRONIC BILATERAL LOW BACK PAIN WITHOUT SCIATICA: Primary | ICD-10-CM

## 2022-12-07 PROCEDURE — 97110 THERAPEUTIC EXERCISES: CPT | Mod: PO | Performed by: PHYSICAL THERAPIST

## 2022-12-07 PROCEDURE — 97140 MANUAL THERAPY 1/> REGIONS: CPT | Mod: PO | Performed by: PHYSICAL THERAPIST

## 2022-12-07 RX ORDER — OMEPRAZOLE 20 MG/1
20 CAPSULE, DELAYED RELEASE ORAL DAILY
Qty: 30 CAPSULE | Refills: 5 | OUTPATIENT
Start: 2022-12-07 | End: 2023-12-07

## 2022-12-07 NOTE — TELEPHONE ENCOUNTER
Refill Decision Note   Curtis Navarro  is requesting a refill authorization.  Brief Assessment and Rationale for Refill:  Quick Discontinue    -Medication-Related Problems Identified: Requires labs  Medication Therapy Plan:  Receipt confirmed by pharmacy (11/14/2022  6:49 PM CST) OMEPRAZOLE 40 MG    Medication Reconciliation Completed: No   Comments:     Provider Staff:     Action is required for this patient.   Please see care gap opportunities below in Care Due Message.     Thanks!  [facility]     Appointments      Date Provider   Last Visit   11/3/2022 Komal Del Castillo MD   Next Visit   Visit date not found Komal Del Castillo MD     Note composed:11:03 AM 12/07/2022           Note composed:11:03 AM 12/07/2022

## 2022-12-07 NOTE — PROGRESS NOTES
" [facility]   Physical Therapy Treatment Note     Name: Curtis Navarro  Clinic Number: [id number]    Therapy Diagnosis:   Encounter Diagnosis   Name Primary?    Chronic bilateral low back pain without sciatica Yes     Physician: Komal Del Castillo MD    Visit Date: 12/7/2022       Physician Orders: PT Eval and Treat  Medical Diagnosis from Referral: Back pain, unspecified back location, unspecified back pain laterality, unspecified chronicity [M54.9]  Evaluation Date: 11/15/2022  Authorization Period Expiration: 11/4/2022, 12/31/2023  Plan of Care Expiration: 12/27/22  Visit # / Visits authorized: 1/ 1, 1/40      PTA Visit #: 0/5       Precautions: Standard     Time In: 1105  Time Out: 1135  Total Billable Time: 25 minutes      SUBJECTIVE     Pt reports: that she was ill and then spouse. She had to miss a few appts. Her HEP helps some, but she has only been able to perform this little. Inconsistent sharp pain.  She was partially compliant with home exercise program.  Response to previous treatment: no adverse effect  Functional change: too early    Pain: 0/10  Location: left back      OBJECTIVE     Objective Measures updated at progress report unless specified.     Treatment     Curtis received the treatments listed below:      therapeutic exercises to develop strength, endurance, ROM, flexibility, posture, and core stabilization for 27 minutes including:  -LOWER TRUNK ROTATION rocking x20  -Bent knee fall out 2x8 each  -Bridge 3x10, 1" holds  -supine march 2x10  -Supine sciatic nerve glides x20 each  -clams side lying 3x8    manual therapy techniques: Joint mobilizations and Myofacial release were applied to the: Left SIJ and lumbar spine for 08 minutes, including:  P/a lumbar spine L4/5  Left rot mobs grade II in prone  Left pelvic half inf glide mobs grade I-II for the "catching/grabbing" pain    Patient Education and Home Exercises     Home Exercises Provided and Patient Education " Provided     Education provided:   - Cont HEP    Written Home Exercises Provided: Patient instructed to cont prior HEP. Exercises were reviewed and Curtis was able to demonstrate them prior to the end of the session.  Curtis demonstrated good  understanding of the education provided. See EMR under Patient Instructions for exercises provided during therapy sessions    ASSESSMENT     Curtis reported little pain starting session. She reported maybe a little better. We discussed her trying to avoid movement outside of straight plan movement as that seems to bother her and possible age related changes to spine that could cause pain with multiplanar movement.    Curtis Is progressing well towards her goals.   Pt prognosis is Good.     Pt will continue to benefit from skilled outpatient physical therapy to address the deficits listed in the problem list box on initial evaluation, provide pt/family education and to maximize pt's level of independence in the home and community environment.     Pt's spiritual, cultural and educational needs considered and pt agreeable to plan of care and goals.     Anticipated barriers to physical therapy: none    Goals:   Short Term Goals: 1-3 weeks   Pt will be able to demonstrate > 50 deg Left hip ER.  Pt will be able to demonstrate x10 bridges with training affect achieved in glute maxs  Pt will be able to demonstrate dead bug hold >30s.  Pt will subjectively report improvements in standing tolerance >20 min.         Long Term Goals: 3-6 weeks   Pt will be able to demonstrate hip Range of Motion = to compared of contralateral side.   Pt will be able to demonstrate hip hinge x10 with no pain.  Pt will be able to demonstrate lifting 10-15# box 2x6 with no pain.   Pt will subjectively report pain with ADLS <5% of the time and standing tolerance >30 min.    PLAN     Plan of care Certification: 11/15/2022 to 12/27/22.     Outpatient Physical Therapy 1 times weekly for 6 weeks to include the  following interventions: Electrical Stimulation, Manual Therapy, Moist Heat/ Ice, Neuromuscular Re-ed, Patient Education, Self Care, Therapeutic Activities, and Therapeutic Exercise    Leatha Santana, PT

## 2022-12-13 ENCOUNTER — CLINICAL SUPPORT (OUTPATIENT)
Dept: REHABILITATION | Facility: HOSPITAL | Age: 72
End: 2022-12-13
Attending: FAMILY MEDICINE
Payer: MEDICARE

## 2022-12-13 DIAGNOSIS — M54.50 CHRONIC BILATERAL LOW BACK PAIN WITHOUT SCIATICA: Primary | ICD-10-CM

## 2022-12-13 DIAGNOSIS — G89.29 CHRONIC BILATERAL LOW BACK PAIN WITHOUT SCIATICA: Primary | ICD-10-CM

## 2022-12-13 PROCEDURE — 97110 THERAPEUTIC EXERCISES: CPT | Mod: HCNC,PO | Performed by: PHYSICAL THERAPIST

## 2022-12-13 PROCEDURE — 97140 MANUAL THERAPY 1/> REGIONS: CPT | Mod: HCNC,PO | Performed by: PHYSICAL THERAPIST

## 2022-12-13 NOTE — PROGRESS NOTES
" [facility]   Physical Therapy Treatment Note     Name: Curtis Lopezbertson  Clinic Number: [id number]    Therapy Diagnosis:   Encounter Diagnosis   Name Primary?    Chronic bilateral low back pain without sciatica Yes     Physician: Komal Del Castillo MD    Visit Date: 12/13/2022       Physician Orders: PT Eval and Treat  Medical Diagnosis from Referral: Back pain, unspecified back location, unspecified back pain laterality, unspecified chronicity [M54.9]  Evaluation Date: 11/15/2022  Authorization Period Expiration: 11/4/2022, 12/31/2023  Plan of Care Expiration: 12/27/22  Visit # / Visits authorized: 1/ 1, 2/40      PTA Visit #: 0/5       Precautions: Standard     Time In: 1205  Time Out: 1255  Total Billable Time: 30 minutes      SUBJECTIVE     Pt reports: 4/10 Left sided back pain today. She was kind of hurrying and walking fast around her home. She had resulting pain from this. She had been feeling great prior.  She was partially compliant with home exercise program.  Response to previous treatment: no adverse effect  Functional change: too early    Pain: 2/10 pre tx and 0/10 post tx  Location: left back      OBJECTIVE     Objective Measures updated at progress report unless specified.     Treatment     Curtis received the treatments listed below:      therapeutic exercises to develop strength, endurance, ROM, flexibility, posture, and core stabilization for 22 minutes including:  -LOWER TRUNK ROTATION rocking x20  -Bent knee fall out 2x8 each  -Bridge 3x10, 1" holds  -supine march 2x10  -Supine sciatic nerve glides x20 each  -clams side lying 3x8    Additional glut med ex to be added next visit.    manual therapy techniques: Joint mobilizations and Myofacial release were applied to the: Left SIJ and lumbar spine for 08 minutes, including:  P/a lumbar spine L4/5-np  Left rot mobs grade II in prone  Left pelvic half inf glide mobs grade I-II for the "catching/grabbing" pain  Left hip " LAD    No pain at SIJs to palpation.  No glut, piriformis, or Left/s pain to palpation.    Patient Education and Home Exercises     Home Exercises Provided and Patient Education Provided     Education provided:   - Cont HEP  - Standing and side lying hip Abduction with EXTERNAL ROTATION, standing and prone hip extension for home.    Written Home Exercises Provided: Patient instructed to cont prior HEP. Exercises were reviewed and Curtis was able to demonstrate them prior to the end of the session.  Curtis demonstrated good  understanding of the education provided. See EMR under Patient Instructions for exercises provided during therapy sessions    ASSESSMENT     Curtis does not have a clear pattern to pain besides unexpected and usually angular movements through more than one plane. She has a trendelenburg gt with right sided hip drop demonstrating Left glut med weakness. Cont to address weaknesses to dec pain.    Curtis Is progressing well towards her goals.   Pt prognosis is Good.     Pt will continue to benefit from skilled outpatient physical therapy to address the deficits listed in the problem list box on initial evaluation, provide pt/family education and to maximize pt's level of independence in the home and community environment.     Pt's spiritual, cultural and educational needs considered and pt agreeable to plan of care and goals.     Anticipated barriers to physical therapy: none    Goals:   Short Term Goals: 1-3 weeks   Pt will be able to demonstrate > 50 deg Left hip ER.  Pt will be able to demonstrate x10 bridges with training affect achieved in glute maxs  Pt will be able to demonstrate dead bug hold >30s.  Pt will subjectively report improvements in standing tolerance >20 min.         Long Term Goals: 3-6 weeks   Pt will be able to demonstrate hip Range of Motion = to compared of contralateral side.   Pt will be able to demonstrate hip hinge x10 with no pain.  Pt will be able to demonstrate lifting  10-15# box 2x6 with no pain.   Pt will subjectively report pain with ADLS <5% of the time and standing tolerance >30 min.    PLAN     Plan of care Certification: 11/15/2022 to 12/27/22.     Outpatient Physical Therapy 1 times weekly for 6 weeks to include the following interventions: Electrical Stimulation, Manual Therapy, Moist Heat/ Ice, Neuromuscular Re-ed, Patient Education, Self Care, Therapeutic Activities, and Therapeutic Exercise    Leatha Santana, PT

## 2022-12-19 ENCOUNTER — CLINICAL SUPPORT (OUTPATIENT)
Dept: REHABILITATION | Facility: HOSPITAL | Age: 72
End: 2022-12-19
Attending: FAMILY MEDICINE
Payer: MEDICARE

## 2022-12-19 DIAGNOSIS — M54.50 CHRONIC BILATERAL LOW BACK PAIN WITHOUT SCIATICA: Primary | ICD-10-CM

## 2022-12-19 DIAGNOSIS — G89.29 CHRONIC BILATERAL LOW BACK PAIN WITHOUT SCIATICA: Primary | ICD-10-CM

## 2022-12-19 PROCEDURE — 97140 MANUAL THERAPY 1/> REGIONS: CPT | Mod: HCNC,PO,CQ

## 2022-12-19 PROCEDURE — 97110 THERAPEUTIC EXERCISES: CPT | Mod: HCNC,PO,CQ

## 2022-12-19 NOTE — PROGRESS NOTES
"  [facility]   Physical Therapy Treatment Note     Name: Curtis Navarro  Clinic Number: [id number]    Therapy Diagnosis:   Encounter Diagnosis   Name Primary?    Chronic bilateral low back pain without sciatica Yes     Physician: Komal Del Castillo MD    Visit Date: 12/19/2022       Physician Orders: PT Eval and Treat  Medical Diagnosis from Referral: Back pain, unspecified back location, unspecified back pain laterality, unspecified chronicity [M54.9]  Evaluation Date: 11/15/2022  Authorization Period Expiration: 11/4/2022, 12/31/2023  Plan of Care Expiration: 12/27/22  Visit # / Visits authorized: 1/ 1, 3/40    PTA Visit #: 1/5     Precautions: Standard     Time In: 1530  Time Out: 1623  Total Billable Time: 53 minutes    SUBJECTIVE     Pt reports: her lower back is pain free today. Patient states she was sore following last treatment session but once this resolved she felt better overall. She was partially compliant with home exercise program.  Response to previous treatment: no adverse effect  Functional change: too early    Pain: 0/10   Location: left back      OBJECTIVE     Objective Measures updated at progress report unless specified.     Treatment     Curtis received the treatments listed below:      therapeutic exercises to develop strength, endurance, ROM, flexibility, posture, and core stabilization for 45 minutes including:  -LOWER TRUNK ROTATION rocking x20  -Supine piriformis stretch 30 sec x 3   -Bent knee fall out 2x15 each (green TB)  -Bridge 3x10, 1" holds  -PPT + hip adduction ball squeeze x 2 minutes  -PPT + mini march 2x10  -clams side lying 2x10 (yellow TB)     -Standing hip abduction 2x10  -Lateral walking (Red TB at knees) x 30 feet x 2   -Forward ambulation with cues for level pelvis x 5 minutes    manual therapy techniques: Joint mobilizations and Myofacial release were applied to the: Left SIJ and lumbar spine for 08 minutes, including:  Left hip LAD and " ROM    Patient Education and Home Exercises     Home Exercises Provided and Patient Education Provided     Education provided:   - Cont HEP  - Standing and side lying hip Abduction with EXTERNAL ROTATION, standing and prone hip extension for home.    Written Home Exercises Provided: Patient instructed to cont prior HEP. Exercises were reviewed and Curtis was able to demonstrate them prior to the end of the session.  Curtis demonstrated good  understanding of the education provided. See EMR under Patient Instructions for exercises provided during therapy sessions    ASSESSMENT     Curtis able to progress hip stabilization exercises without adverse effects. Patient demonstrating trendelenburg gait pattern but she is able to achieve some correction with cues for core and gluteal activation. No complaints of pain during treatment session. Will continue to address glut med weakness in order to restore normal gait mechanics.    Curtis Is progressing well towards her goals.   Pt prognosis is Good.     Pt will continue to benefit from skilled outpatient physical therapy to address the deficits listed in the problem list box on initial evaluation, provide pt/family education and to maximize pt's level of independence in the home and community environment.     Pt's spiritual, cultural and educational needs considered and pt agreeable to plan of care and goals.     Anticipated barriers to physical therapy: none    Goals:   Short Term Goals: 1-3 weeks   Pt will be able to demonstrate > 50 deg Left hip ER.  Pt will be able to demonstrate x10 bridges with training affect achieved in glute maxs  Pt will be able to demonstrate dead bug hold >30s.  Pt will subjectively report improvements in standing tolerance >20 min.       Long Term Goals: 3-6 weeks   Pt will be able to demonstrate hip Range of Motion = to compared of contralateral side.   Pt will be able to demonstrate hip hinge x10 with no pain.  Pt will be able to demonstrate  lifting 10-15# box 2x6 with no pain.   Pt will subjectively report pain with ADLS <5% of the time and standing tolerance >30 min.    PLAN     Plan of care Certification: 11/15/2022 to 12/27/22.     Outpatient Physical Therapy 1 times weekly for 6 weeks to include the following interventions: Electrical Stimulation, Manual Therapy, Moist Heat/ Ice, Neuromuscular Re-ed, Patient Education, Self Care, Therapeutic Activities, and Therapeutic Exercise    Margaret Mayfield, PTA

## 2022-12-30 RX ORDER — ATORVASTATIN CALCIUM 20 MG/1
TABLET, FILM COATED ORAL
Qty: 90 TABLET | Refills: 0 | Status: SHIPPED | OUTPATIENT
Start: 2022-12-30 | End: 2023-02-12

## 2022-12-30 RX ORDER — LOSARTAN POTASSIUM 100 MG/1
100 TABLET ORAL DAILY
Qty: 90 TABLET | Refills: 0 | Status: SHIPPED | OUTPATIENT
Start: 2022-12-30 | End: 2023-03-25

## 2022-12-30 NOTE — TELEPHONE ENCOUNTER
No new care gaps identified.  [facility] Embedded Care Gaps. Reference number: [id number]. 12/30/2022   10:47:03 AM CST

## 2022-12-30 NOTE — TELEPHONE ENCOUNTER
Refill Routing Note   Medication(s) are not appropriate for processing by [facility] for the following reason(s):      - Required vitals are abnormal    [facility] action(s):  Defer  Approve          Medication reconciliation completed: No     Appointments  past 12m or future 3m with PCP    Date Provider   Last Visit   11/3/2022 Komal Del Castillo MD   Next Visit   Visit date not found Komal Del Castillo MD   ED visits in past 90 days: 0        Note composed:11:08 AM 12/30/2022

## 2023-01-04 ENCOUNTER — CLINICAL SUPPORT (OUTPATIENT)
Dept: REHABILITATION | Facility: HOSPITAL | Age: 73
End: 2023-01-04
Attending: FAMILY MEDICINE
Payer: MEDICARE

## 2023-01-04 DIAGNOSIS — M54.50 CHRONIC BILATERAL LOW BACK PAIN WITHOUT SCIATICA: Primary | ICD-10-CM

## 2023-01-04 DIAGNOSIS — G89.29 CHRONIC BILATERAL LOW BACK PAIN WITHOUT SCIATICA: Primary | ICD-10-CM

## 2023-01-04 PROCEDURE — 97110 THERAPEUTIC EXERCISES: CPT | Mod: HCNC,PO | Performed by: PHYSICAL THERAPIST

## 2023-01-04 NOTE — PROGRESS NOTES
"  [facility]   Reassessment: Physical Therapy Treatment Note     Name: Curtis Navarro  Clinic Number: [id number]    Therapy Diagnosis:   Encounter Diagnosis   Name Primary?    Chronic bilateral low back pain without sciatica Yes     Physician: Komal Del Castillo MD    Visit Date: 1/4/2023    Physician Orders: PT Eval and Treat  Medical Diagnosis from Referral: Back pain, unspecified back location, unspecified back pain laterality, unspecified chronicity [M54.9]  Evaluation Date: 11/15/2022  Authorization Period Expiration: 11/4/2022, 12/31/2023  Plan of Care Expiration: 12/27/22, 2/17/2023  Visit # / Visits authorized: 1/ 1, 3/40, 1/20    PTA Visit #: 1/5     Precautions: Standard     Time In: 1110  Time Out: 1200  Total Billable Time: 45 minutes    SUBJECTIVE     Pt reports: right knee pain which is her "bad knee". She received PT for the knee which helped at the time, but she does not perform a home program. Her back is no different and bothers her with ever day activities. Heat helps to dec pain. SINGLE LEG activities like step up onto curb or out of car cause pain. She was partially compliant with home exercise program.  Response to previous treatment: no adverse effect  Functional change: too early    Pain: 0/10   Location: left back      OBJECTIVE     Objective Measures updated at progress report unless specified.     1/4/2023:  Observation: ambulates with bilat trendelenburg (same)     Lumbar Range of Motion:     Limitations Pain   Flexion 75%           Extension 50%              Left Side Bending 100%           Right Side Bending 100%              Lower Extremity Strength  Right LE   Left LE     Quadriceps: 4+/5 Quadriceps: 4+/5   Hamstrings: 4+/5 Hamstrings: 4+/5   Iliospoas: 4+/5 Iliospoas: 4+/5   Hip extension:  4-/5 Hip extension: 4-/5   PGM: 3+/5 PGM: 3-/5   Hip ER:  3+/5 Hip ER: 3+/5   Hip IR: 4-/5 Hip IR: 4-/5   Ankle dorsiflexion: 5/5 Ankle dorsiflexion: 5/5   Ankle " "plantarflexion: 5/5 Ankle plantarflexion: 5/5      Hip Range of Motion:    Right Passive  Left Passive   Flexion 120 120   Abduction       Extension L5 NT L5 NT   Ext. Rotation 65 50   Int. Rotation 25 40      Reflexes:  -Patellar (L3-L4):   Left= 1+  Right= 2+  -Achilles (S1): WFL     Special Tests:  -SLR Test: -  -Repeated Flexion: -  -Repeated Ext: -  -Prone Instability Test: +  -Bridge Test: +  -Slump Test: + on LLE (NT)  - Left LLD (shorter)     Palpation: tender to superficial palpation of L4 and L5 SP (more lateral on palpation today but level to same vertebra)     CMS Impairment/Limitation/Restriction for FOTO lumbar Survey     Therapist reviewed FOTO scores for Curtis Navarro on 11/15/2022.   FOTO documents entered into [employer] - see Media section.     Limitation Score: 54%  Category: Mobility  1/4/2023: 50%          Treatment     Curtis received the treatments listed below:      therapeutic exercises to develop strength, endurance, ROM, flexibility, posture, and core stabilization for 45 minutes including:  -LOWER TRUNK ROTATION rocking x20  -Supine piriformis stretch 30 sec x 3 -np  -Bent knee fall out 2x15 each (green TB)-np  -Bridge 3x10, 1" holds  -PPT + hip adduction ball squeeze x 2 minutes  -PPT + mini march 2x10  -clams side lying 2x10 (yellow TB) -np    -Standing hip abduction 2x10  -Lateral walking (Red TB at knees) x 30 feet x 2   -Forward ambulation with cues for level pelvis x 5 minutes-np  -ADDITIONAL HEP GIVEN.    manual therapy techniques: Joint mobilizations and Myofacial release were applied to the: Left SIJ and lumbar spine for 00 minutes, including:  Left hip LAD and ROM    Patient Education and Home Exercises     Home Exercises Provided and Patient Education Provided     Education provided:   - Cont HEP    Written Home Exercises Provided: yes. Exercises were reviewed and Curtis was able to demonstrate them prior to the end of the session.  Curtis demonstrated good  understanding of " the education provided. See EMR under Patient Instructions for exercises provided during therapy sessions. 12/15/2022, 1/4/2023.    ASSESSMENT     Curtis able to progress hip stabilization exercises without adverse effects. Patient demonstrating trendelenburg gait pattern but she is able to achieve some correction with cues for core and gluteal activation. She demos ROM and strength improvements. LLD of Left LE is noted. Left heel lift corrects this some. PT did not have the pt size. Trial performed with one size larger. Pt may benefit from med size heel lift in Left shoe. Pt instructed to get this online. Left LE is shorter in supine, but left iliac crest higher in standing and likely from scoliosis and adaptive shortening. No complaints of pain during treatment session. Will continue to address glut med weakness in order to restore normal mechanics. Pt reports that she is satisfied with PT; however, she cannot say that she has been able to do her part. She would like to extend therapy and demonstrates eagerness to improve condition.    Curtis Is progressing well towards her goals.   Pt prognosis is Good.     Pt will continue to benefit from skilled outpatient physical therapy to address the deficits listed in the problem list box on initial evaluation, provide pt/family education and to maximize pt's level of independence in the home and community environment.     Pt's spiritual, cultural and educational needs considered and pt agreeable to plan of care and goals.     Anticipated barriers to physical therapy: none    Goals:   Short Term Goals: 1-3 weeks   Pt will be able to demonstrate > 50 deg Left hip ER.   -not met, ongoing.  Pt will be able to demonstrate x10 bridges with training affect achieved in glute maxs   -met  Pt will be able to demonstrate dead bug hold >30s.   -Not tested  Pt will subjectively report improvements in standing tolerance >20 min.    -not tested      Long Term Goals: 3-6 weeks   Pt will  be able to demonstrate hip Range of Motion = to compared of contralateral side.    -ongoing  Pt will be able to demonstrate hip hinge x10 with no pain.   -ongoing  Pt will be able to demonstrate lifting 10-15# box 2x6 with no pain.    -ongoing  Pt will subjectively report pain with ADLs <5% of the time and standing tolerance >30 min.   -ongoing    PLAN     Plan of care Certification: 11/15/2022 to 12/27/22.  Updated POC: 1/4/2023 to 2/17/2023     Outpatient Physical Therapy 1 times weekly for an additional 6 weeks to include the following interventions: Electrical Stimulation, Manual Therapy, Moist Heat/ Ice, Neuromuscular Re-ed, Patient Education, Self Care, Therapeutic Activities, and Therapeutic Exercise    Leatha Santana, PT

## 2023-01-05 NOTE — PLAN OF CARE
"  Outpatient Therapy Updated Plan of Care     Visit Date: 1/4/2023  Name: Curtis Navarro  Clinic Number: [id number]    Therapy Diagnosis:   Encounter Diagnosis   Name Primary?    Chronic bilateral low back pain without sciatica Yes     Physician: Komal Del Castillo MD    Physician Orders: PT Eval and Treat  Medical Diagnosis from Referral: Back pain, unspecified back location, unspecified back pain laterality, unspecified chronicity [M54.9]  Evaluation Date: 11/15/2022    Total Visits Received: 5  Cancelled Visits: 4  No Show Visits: 0    Current Certification Period:  11/15/2022 to 12/27/22  Precautions:  Standard  Visits from Evaluation Date:  4  Functional Level Prior to Evaluation:  Walking and activity tolerance impaired >20 mins.    Subjective     Update:   Pt reports: right knee pain which is her "bad knee". She received PT for the knee which helped at the time, but she does not perform a home program. Her back is no different and bothers her with ever day activities. Heat helps to dec pain. SINGLE LEG activities like step up onto curb or out of car cause pain. She was partially compliant with home exercise program.  Response to previous treatment: no adverse effect  Functional change: too early     Pain: 0/10   Location: left back      Objective     Update:   Objective Measures updated at progress report unless specified.      1/4/2023:  Observation: ambulates with bilat trendelenburg (same)     Lumbar Range of Motion:     Limitations Pain   Flexion 75%           Extension 50%              Left Side Bending 100%           Right Side Bending 100%              Lower Extremity Strength  Right LE   Left LE     Quadriceps: 4+/5 Quadriceps: 4+/5   Hamstrings: 4+/5 Hamstrings: 4+/5   Iliospoas: 4+/5 Iliospoas: 4+/5   Hip extension:  4-/5 Hip extension: 4-/5   PGM: 3+/5 PGM: 3-/5   Hip ER:  3+/5 Hip ER: 3+/5   Hip IR: 4-/5 Hip IR: 4-/5   Ankle dorsiflexion: 5/5 Ankle dorsiflexion: 5/5   Ankle plantarflexion: 5/5 " Ankle plantarflexion: 5/5      Hip Range of Motion:    Right Passive  Left Passive   Flexion 120 120   Abduction       Extension L5 NT L5 NT   Ext. Rotation 65 50   Int. Rotation 25 40      Reflexes:  -Patellar (L3-L4):   Left= 1+  Right= 2+  -Achilles (S1): WFL     Special Tests:  -SLR Test: -  -Repeated Flexion: -  -Repeated Ext: -  -Prone Instability Test: +  -Bridge Test: +  -Slump Test: + on LLE (NT)  - Left LLD (shorter)     Palpation: tender to superficial palpation of L4 and L5 SP (more lateral on palpation today but level to same vertebra)     CMS Impairment/Limitation/Restriction for FOTO lumbar Survey     Therapist reviewed FOTO scores for Curtis Navarro on 11/15/2022.   FOTO documents entered into [employer] - see Media section.     Limitation Score: 54%  Category: Mobility  1/4/2023: 50%           Assessment     Update:   Curtis able to progress hip stabilization exercises without adverse effects. Patient demonstrating trendelenburg gait pattern but she is able to achieve some correction with cues for core and gluteal activation. She demos ROM and strength improvements. LLD of Left LE is noted. Left heel lift corrects this some. PT did not have the pt size. Trial performed with one size larger. Pt may benefit from med size heel lift in Left shoe. Pt instructed to get this online. Left LE is shorter in supine, but left iliac crest higher in standing and likely from scoliosis and adaptive shortening. No complaints of pain during treatment session. Will continue to address glut med weakness in order to restore normal mechanics. Pt reports that she is satisfied with PT; however, she cannot say that she has been able to do her part. She would like to extend therapy and demonstrates eagerness to improve condition.     Curtis Is progressing well towards her goals.   Pt prognosis is Good.      Pt will continue to benefit from skilled outpatient physical therapy to address the deficits listed in the problem list  box on initial evaluation, provide pt/family education and to maximize pt's level of independence in the home and community environment.      Pt's spiritual, cultural and educational needs considered and pt agreeable to plan of care and goals.     Anticipated barriers to physical therapy: none    Previous Short Term Goals Status:     Short Term Goals: 1-3 weeks   Pt will be able to demonstrate > 50 deg Left hip ER.              -not met, ongoing.  Pt will be able to demonstrate x10 bridges with training affect achieved in glute maxs              -met  Pt will be able to demonstrate dead bug hold >30s.              -Not tested  Pt will subjectively report improvements in standing tolerance >20 min.               -not tested  New Short Term Goals Status:     Pt has attended few sessions, cont.  Long Term Goal Status:   continue per initial plan of care.  Reasons for Recertification of Therapy:   Pt has attended few sessions, LLD noted. Holidays over and pt better able to place more effort at home.    Plan     Updated Certification Period: 1/4/2023 to  2/17/2023  Recommended Treatment Plan: 1 times per week for 6 weeks: Aquatic Therapy, Cervical/Lumbar Traction, Electrical Stimulation IFC, Gait Training, Manual Therapy, Moist Heat/ Ice, Neuromuscular Re-ed, Patient Education, Self Care, Therapeutic Activities, Therapeutic Exercise, Ultrasound, and dry needlin.  Other Recommendations: jon Santana, PT  1/4/2023      I CERTIFY THE NEED FOR THESE SERVICES FURNISHED UNDER THIS PLAN OF TREATMENT AND WHILE UNDER MY CARE    Physician's comments:        Physician's Signature: ___________________________________________________

## 2023-01-09 ENCOUNTER — PATIENT MESSAGE (OUTPATIENT)
Dept: GASTROENTEROLOGY | Facility: CLINIC | Age: 73
End: 2023-01-09
Payer: MEDICARE

## 2023-01-10 ENCOUNTER — PATIENT MESSAGE (OUTPATIENT)
Dept: GASTROENTEROLOGY | Facility: CLINIC | Age: 73
End: 2023-01-10
Payer: MEDICARE

## 2023-01-10 DIAGNOSIS — K21.9 GASTROESOPHAGEAL REFLUX DISEASE WITHOUT ESOPHAGITIS: Primary | ICD-10-CM

## 2023-01-10 RX ORDER — OMEPRAZOLE 20 MG/1
20 CAPSULE, DELAYED RELEASE ORAL DAILY
Qty: 30 CAPSULE | Refills: 2 | Status: SHIPPED | OUTPATIENT
Start: 2023-01-10 | End: 2023-04-10 | Stop reason: SDUPTHER

## 2023-01-11 ENCOUNTER — CLINICAL SUPPORT (OUTPATIENT)
Dept: REHABILITATION | Facility: HOSPITAL | Age: 73
End: 2023-01-11
Attending: FAMILY MEDICINE
Payer: MEDICARE

## 2023-01-11 DIAGNOSIS — G89.29 CHRONIC BILATERAL LOW BACK PAIN WITHOUT SCIATICA: Primary | ICD-10-CM

## 2023-01-11 DIAGNOSIS — M54.50 CHRONIC BILATERAL LOW BACK PAIN WITHOUT SCIATICA: Primary | ICD-10-CM

## 2023-01-11 PROCEDURE — 97110 THERAPEUTIC EXERCISES: CPT | Mod: HCNC,PO | Performed by: PHYSICAL THERAPIST

## 2023-01-11 NOTE — PROGRESS NOTES
"  [facility]   Reassessment: Physical Therapy Treatment Note     Name: Curtis Navarro  Clinic Number: [id number]    Therapy Diagnosis:   Encounter Diagnosis   Name Primary?    Chronic bilateral low back pain without sciatica Yes     Physician: Komal Del Castillo MD    Visit Date: 1/11/2023    Physician Orders: PT Eval and Treat  Medical Diagnosis from Referral: Back pain, unspecified back location, unspecified back pain laterality, unspecified chronicity [M54.9]  Evaluation Date: 11/15/2022  Authorization Period Expiration: 11/4/2022, 12/31/2023  Plan of Care Expiration: 12/27/22, 2/17/2023  Visit # / Visits authorized: 1/ 1, 3/40, 2/20    PTA Visit #: 1/5     Precautions: Standard     Time In: 1105  Time Out: 1150  Total Billable Time: 40 minutes    SUBJECTIVE     Pt reports: no pain more of a discomfort. Home program is going well. Heel lift going well. She was partially compliant with home exercise program.  Response to previous treatment: no adverse effect  Functional change: too early    Pain: 0/10   Location: left back      OBJECTIVE     Objective Measures updated at progress report unless specified.       Treatment     Curtis received the treatments listed below:      therapeutic exercises to develop strength, endurance, ROM, flexibility, posture, and core stabilization for 40 minutes including:    -TRANSVERSE RECTUS ABDOMINUS with modified dead bug (opp arm and knee march)  -Bridge 3x10, 1" holds, PPT and ytb   -clams side lying 3 x 10 (red TB)   -Bridge 3x10, 1" holds    -Standing hip abduction 2x10, red band at knees  -Lateral walking (Red TB at knees) x 30 feet x 2     Rows green band 2 x 10  Shoulder extension green band 2 x 10  No money ytb 2x10  Mini squat with pallof chest press yellow band    manual therapy techniques: Joint mobilizations and Myofacial release were applied to the: Left SIJ and lumbar spine for 00 minutes, including:  Left hip LAD and ROM    Patient " Education and Home Exercises     Home Exercises Provided and Patient Education Provided     Education provided:   - Cont HEP    Written Home Exercises Provided: Patient instructed to cont prior HEP. Exercises were reviewed and Curtis was able to demonstrate them prior to the end of the session.  Curtis demonstrated good  understanding of the education provided. See EMR under Patient Instructions for exercises provided during therapy sessions. 12/15/2022, 1/4/2023.    ASSESSMENT     Curtis able to progress core and hip stabilization exercises without adverse effects. Some discomfort felt. Heel lift is going well in shoe.Patient unable to perform dead bug in chair starting position or lower due to core weakness. Instead allowed feet flat on mat in an alternating like march.    Curtis Is progressing well towards her goals.   Pt prognosis is Good.     Pt will continue to benefit from skilled outpatient physical therapy to address the deficits listed in the problem list box on initial evaluation, provide pt/family education and to maximize pt's level of independence in the home and community environment.     Pt's spiritual, cultural and educational needs considered and pt agreeable to plan of care and goals.     Anticipated barriers to physical therapy: none    Goals:   Short Term Goals: 1-3 weeks   Pt will be able to demonstrate > 50 deg Left hip ER.   -not met, ongoing.  Pt will be able to demonstrate x10 bridges with training affect achieved in glute maxs   -met  Pt will be able to demonstrate dead bug hold >30s.   -Not tested  Pt will subjectively report improvements in standing tolerance >20 min.    -not tested      Long Term Goals: 3-6 weeks   Pt will be able to demonstrate hip Range of Motion = to compared of contralateral side.    -ongoing  Pt will be able to demonstrate hip hinge x10 with no pain.   -ongoing  Pt will be able to demonstrate lifting 10-15# box 2x6 with no pain.    -ongoing  Pt will subjectively  report pain with ADLs <5% of the time and standing tolerance >30 min.   -ongoing    PLAN     Plan of care Certification: 11/15/2022 to 12/27/22.  Updated POC: 1/4/2023 to 2/17/2023     Outpatient Physical Therapy 1 times weekly for an additional 6 weeks to include the following interventions: Electrical Stimulation, Manual Therapy, Moist Heat/ Ice, Neuromuscular Re-ed, Patient Education, Self Care, Therapeutic Activities, and Therapeutic Exercise    Leatha Santana, PT

## 2023-01-17 ENCOUNTER — PATIENT MESSAGE (OUTPATIENT)
Dept: GASTROENTEROLOGY | Facility: CLINIC | Age: 73
End: 2023-01-17
Payer: MEDICARE

## 2023-01-17 ENCOUNTER — TELEPHONE (OUTPATIENT)
Dept: GASTROENTEROLOGY | Facility: CLINIC | Age: 73
End: 2023-01-17
Payer: MEDICARE

## 2023-01-17 ENCOUNTER — OFFICE VISIT (OUTPATIENT)
Dept: OBSTETRICS AND GYNECOLOGY | Facility: CLINIC | Age: 73
End: 2023-01-17
Payer: MEDICARE

## 2023-01-17 VITALS
BODY MASS INDEX: 34.14 KG/M2 | DIASTOLIC BLOOD PRESSURE: 74 MMHG | SYSTOLIC BLOOD PRESSURE: 120 MMHG | HEIGHT: 63 IN | WEIGHT: 192.69 LBS

## 2023-01-17 DIAGNOSIS — Z01.419 GYNECOLOGIC EXAM NORMAL: Primary | ICD-10-CM

## 2023-01-17 PROCEDURE — 99999 PR PBB SHADOW E&M-EST. PATIENT-LVL III: ICD-10-PCS | Mod: PBBFAC,,, | Performed by: OBSTETRICS & GYNECOLOGY

## 2023-01-17 PROCEDURE — 3008F BODY MASS INDEX DOCD: CPT | Mod: CPTII,S$GLB,, | Performed by: OBSTETRICS & GYNECOLOGY

## 2023-01-17 PROCEDURE — 3078F PR MOST RECENT DIASTOLIC BLOOD PRESSURE < 80 MM HG: ICD-10-PCS | Mod: CPTII,S$GLB,, | Performed by: OBSTETRICS & GYNECOLOGY

## 2023-01-17 PROCEDURE — 1159F PR MEDICATION LIST DOCUMENTED IN MEDICAL RECORD: ICD-10-PCS | Mod: CPTII,S$GLB,, | Performed by: OBSTETRICS & GYNECOLOGY

## 2023-01-17 PROCEDURE — 3288F FALL RISK ASSESSMENT DOCD: CPT | Mod: CPTII,S$GLB,, | Performed by: OBSTETRICS & GYNECOLOGY

## 2023-01-17 PROCEDURE — 3078F DIAST BP <80 MM HG: CPT | Mod: CPTII,S$GLB,, | Performed by: OBSTETRICS & GYNECOLOGY

## 2023-01-17 PROCEDURE — 3074F SYST BP LT 130 MM HG: CPT | Mod: CPTII,S$GLB,, | Performed by: OBSTETRICS & GYNECOLOGY

## 2023-01-17 PROCEDURE — 3288F PR FALLS RISK ASSESSMENT DOCUMENTED: ICD-10-PCS | Mod: CPTII,S$GLB,, | Performed by: OBSTETRICS & GYNECOLOGY

## 2023-01-17 PROCEDURE — 1101F PR PT FALLS ASSESS DOC 0-1 FALLS W/OUT INJ PAST YR: ICD-10-PCS | Mod: CPTII,S$GLB,, | Performed by: OBSTETRICS & GYNECOLOGY

## 2023-01-17 PROCEDURE — 3008F PR BODY MASS INDEX (BMI) DOCUMENTED: ICD-10-PCS | Mod: CPTII,S$GLB,, | Performed by: OBSTETRICS & GYNECOLOGY

## 2023-01-17 PROCEDURE — 1126F AMNT PAIN NOTED NONE PRSNT: CPT | Mod: CPTII,S$GLB,, | Performed by: OBSTETRICS & GYNECOLOGY

## 2023-01-17 PROCEDURE — 99999 PR PBB SHADOW E&M-EST. PATIENT-LVL III: CPT | Mod: PBBFAC,,, | Performed by: OBSTETRICS & GYNECOLOGY

## 2023-01-17 PROCEDURE — 88175 CYTOPATH C/V AUTO FLUID REDO: CPT | Performed by: OBSTETRICS & GYNECOLOGY

## 2023-01-17 PROCEDURE — G0101 CA SCREEN;PELVIC/BREAST EXAM: HCPCS | Mod: S$GLB,,, | Performed by: OBSTETRICS & GYNECOLOGY

## 2023-01-17 PROCEDURE — 3074F PR MOST RECENT SYSTOLIC BLOOD PRESSURE < 130 MM HG: ICD-10-PCS | Mod: CPTII,S$GLB,, | Performed by: OBSTETRICS & GYNECOLOGY

## 2023-01-17 PROCEDURE — 1159F MED LIST DOCD IN RCRD: CPT | Mod: CPTII,S$GLB,, | Performed by: OBSTETRICS & GYNECOLOGY

## 2023-01-17 PROCEDURE — 1101F PT FALLS ASSESS-DOCD LE1/YR: CPT | Mod: CPTII,S$GLB,, | Performed by: OBSTETRICS & GYNECOLOGY

## 2023-01-17 PROCEDURE — 1126F PR PAIN SEVERITY QUANTIFIED, NO PAIN PRESENT: ICD-10-PCS | Mod: CPTII,S$GLB,, | Performed by: OBSTETRICS & GYNECOLOGY

## 2023-01-17 PROCEDURE — G0101 PR CA SCREEN;PELVIC/BREAST EXAM: ICD-10-PCS | Mod: S$GLB,,, | Performed by: OBSTETRICS & GYNECOLOGY

## 2023-01-17 NOTE — TELEPHONE ENCOUNTER
----- Message from Danilo Moncada sent at 1/17/2023 12:03 PM CST -----  Type:  RX Refill Request    Who Called:  Demetrio     Refill or New Rx:Refill     RX Name and Strength:levothyroxine (SYNTHROID) 112 MCG tablet    How is the patient currently taking it? Sig - Route: Take 1 tablet (112 mcg total) by mouth before breakfast. - Oral  Sent to pharmacy as: levothyroxine (SYNTHROID) 112 MCG tablet  Class: Normal        Is this a 30 day or 90 day RX:    [facility] with phone number[facility] [place] [facility] [address]          Local or Mail Order: Local     Ordering Provider: Komal Del Castillo MD    Would the patient rather a call back or a response via [facility]? Call back     Best Call Back Number: [phone] (mobile)     Additional Information:

## 2023-01-17 NOTE — PROGRESS NOTES
"Chief Complaint   Patient presents with    Well Woman       History and Physical:  No LMP recorded. Patient is postmenopausal.       Curtis Navarro is a 72 y.o.   female who presents today for her routine annual GYN exam. The patient has no Gynecology complaints today. No Vaginal Bleeding , no bowel or bladder complaints.       Allergies:   Review of patient's allergies indicates:   Allergen Reactions    Lisinopril Other (See Comments)     Cough    Azithromycin      Other reaction(s): Nausea  Other reaction(s): Diarrhea    Metronidazole hcl      Other reaction(s): Rash  Other reaction(s): Itching    Moxifloxacin      Other reaction(s): Rash    Sulfa (sulfonamide antibiotics)      Other reaction(s): Itching       Past Medical History:   Diagnosis Date    Allergy     Arthritis     Carotid arterial disease      next due     Cataract     Colon polyps     Diverticulosis     GERD (gastroesophageal reflux disease)     Graves disease     s/p radioactive iodine in 42    H/O percutaneous left heart catheterization     normal     History of diverticulitis     History of skin cancer     L neck    Hyperlipidemia     Hypertension     Hypothyroidism     s/p radioactive iodine    IBS (irritable bowel syndrome)     Osteopenia     , 7/15    S/P colonoscopy     ;  next due     Thyroid nodule     last usg ;  next due        Past Surgical History:   Procedure Laterality Date    CARDIAC SURGERY      angiogram (negative)    COLONOSCOPY  2010    Dr. Ceballos; in legacy, repeat in 6-7 years    COLONOSCOPY N/A 2017    Procedure: COLONOSCOPY;  Surgeon: Hamlet Ceballos Jr., MD;  Location: [facility];  Service: Endoscopy;  Laterality: N/A; repeat in 5 years for surveillance    COSMETIC SURGERY      Liposuction to neck    ESOPHAGOGASTRODUODENOSCOPY N/A 2018    Dr. Ceballos: "White nummular lesions in esophageal mucosa (benign)", antritis, gastric polyps removed    SKIN CANCER " EXCISION      with Mohs on left neck    TONSILLECTOMY      UPPER GASTROINTESTINAL ENDOSCOPY  2014    Dr. Velázquez    UPPER GASTROINTESTINAL ENDOSCOPY  2017    DR. VELÁZQUEZ       MEDS:   Current Outpatient Medications on File Prior to Visit   Medication Sig Dispense Refill    aspirin (ECOTRIN) 81 MG EC tablet Take 81 mg by mouth once daily.      atorvastatin (LIPITOR) 20 MG tablet TAKE 1 TABLET (20 MG TOTAL) BY MOUTH ONCE DAILY. 90 tablet 0    B INFANTIS/B ANI/B JOSE M/B BIFID (PROBIOTIC 4X ORAL) Take 1 tablet by mouth once daily.      biotin 5 mg Cap Take 5 mg by mouth once daily.      calcium-vitamin D3 500 mg(1,250mg) -200 unit per tablet Take 1 tablet by mouth 2 (two) times daily with meals.      GLUCOSA HAHN 2KCL/CHONDROITIN HAHN (GLUCOSAMINE-CHONDROITIN DS ORAL) Take by mouth 2 (two) times daily.      KRILL/OM-3/DHA/EPA/PHOSPHO/AST (MEGARED OMEGA-3 KRILL OIL ORAL) Take 1 capsule by mouth once daily.      levothyroxine (SYNTHROID) 112 MCG tablet Take 1 tablet (112 mcg total) by mouth before breakfast. 90 tablet 1    losartan (COZAAR) 100 MG tablet TAKE 1 TABLET (100 MG TOTAL) BY MOUTH ONCE DAILY. 90 tablet 0    metoprolol succinate (TOPROL-XL) 50 MG 24 hr tablet Take 1 tablet (50 mg total) by mouth once daily. 90 tablet 1    MULTIVITAMIN ORAL Take by mouth.      omeprazole (PRILOSEC) 20 MG capsule Take 1 capsule (20 mg total) by mouth once daily. 30 capsule 2    vibegron 75 mg Tab Take 75 mg by mouth every morning. Take 1 by mouth every morning for OAB 90 tablet 3    [DISCONTINUED] meloxicam (MOBIC) 15 MG tablet Take 1 tablet (15 mg total) by mouth once daily. 30 tablet 0     No current facility-administered medications on file prior to visit.       OB History          1    Para   1    Term   1            AB        Living             SAB        IAB        Ectopic        Multiple        Live Births                     Social History     Socioeconomic History    Marital status:    Tobacco Use     Smoking status: Never    Smokeless tobacco: Never   Substance and Sexual Activity    Alcohol use: Never    Drug use: No    Sexual activity: Yes     Partners: Male     Birth control/protection: Post-menopausal   Social History Narrative         Social Determinants of Health     Financial Resource Strain: Low Risk     Difficulty of Paying Living Expenses: Not very hard   Food Insecurity: No Food Insecurity    Worried About Running Out of Food in the Last Year: Never true    Ran Out of Food in the Last Year: Never true   Transportation Needs: No Transportation Needs    Lack of Transportation (Medical): No    Lack of Transportation (Non-Medical): No   Physical Activity: Insufficiently Active    Days of Exercise per Week: 3 days    Minutes of Exercise per Session: 20 min   Stress: No Stress Concern Present    Feeling of Stress : Only a little   Social Connections: Unknown    Frequency of Communication with Friends and Family: More than three times a week    Frequency of Social Gatherings with Friends and Family: Twice a week    Active Member of Clubs or Organizations: No    Attends Club or Organization Meetings: Never    Marital Status:    Housing Stability: Low Risk     Unable to Pay for Housing in the Last Year: No    Number of Places Lived in the Last Year: 1    Unstable Housing in the Last Year: No       Family History   Problem Relation Age of Onset    Cataracts Mother     Colon polyps Mother         history of colitis- part colon removed    Cancer Sister     Ovarian cancer Sister 40    Cataracts Maternal Grandfather     Amblyopia Neg Hx     Blindness Neg Hx     Glaucoma Neg Hx     Hypertension Neg Hx     Macular degeneration Neg Hx     Retinal detachment Neg Hx     Strabismus Neg Hx     Stroke Neg Hx     Thyroid disease Neg Hx     Diabetes Neg Hx     Colon cancer Neg Hx     Esophageal cancer Neg Hx     Stomach cancer Neg Hx          Past medical and surgical history reviewed.   I have reviewed the  "patient's medical history in detail and updated the computerized patient record.    Review of System:   General: no chills, fever, night sweats, weight gain or weight loss  Psychological: no depression or suicidal ideation  Breasts: no new or changing breast lumps, nipple discharge or masses.  Respiratory: no cough, shortness of breath, or wheezing  Cardiovascular: no chest pain or dyspnea on exertion  Gastrointestinal: no abdominal pain, change in bowel habits, or black or bloody stools  Genito-Urinary: no incontinence, urinary frequency/urgency or vulvar/vaginal symptoms, pelvic pain or abnormal vaginal bleeding.  Musculoskeletal: no gait disturbance or muscular weakness      Physical Exam:   /74   Ht 5' 3" (1.6 m)   Wt 87.4 kg (192 lb 10.9 oz)   BMI 34.13 kg/m²   Constitutional: She appears alert and responsive. She appears well-developed, well-groomed, and well-nourished. No distress. OverWeight   HENT:   Head: Normocephalic and atraumatic.   Eyes: Conjunctivae and EOM are normal. No scleral icterus.   Neck: Symmetrical. Normal range of motion. Neck supple. No tracheal deviation present.   Cardiovascular: Normal rate, no rhythm abnormality noted. Extremities without swelling or edema, warm.    Pulmonary/Chest: Normal respiratory Effort. No distress or retractions. She exhibits no tenderness.  Breasts: are symmetrical.   Right breast exhibits no inverted nipple, no mass, no nipple discharge, no skin change and no tenderness.   Left breast exhibits no inverted nipple, no mass, no nipple discharge, no skin change and no tenderness.  Abdominal: Soft. She exhibits no distension, hernias or masses. There is no tenderness. No enlargement of liver edge or spleen.  There is no rebound and no guarding.   Genitourinary:    External rectal exam shows no thrombosed external hemorrhoids, no lesions.     Pelvic exam was performed with patient supine.   No labial fusion, and symmetrical.    There is no rash, lesion or " injury on the right labia.   There is no rash, lesion or injury on the left labia.   No bleeding and no signs of injury around the vaginal introitus, urethral meatus is normal size and without prolapse or lesions, urethra well supported. The cervix is visualized with no discharge, lesions or friability.   No vaginal discharge found.    No significant Cystocele, Enterocele or rectocele, and cervix and uterus well supported.   Bimanual exam:   The urethra is normal to palpation and there are no palpable vaginal wall masses.   Uterus is not deviated, not enlarged, not fixed, normal shape and not tender.   Cervix exhibits no motion tenderness.    Right adnexum displays no mass or nodularity and no tenderness.   Left adnexum displays no mass or nodularity and no tenderness.  Musculoskeletal: Normal range of motion.   Lymphadenopathy: No inguinal adenopathy present.   Neurological: She is alert and oriented to person, place, and time. Coordination normal.   Skin: Skin is warm and dry. She is not diaphoretic. No rashes, lesions or ulcers.   Psychiatric: She has a normal mood and affect, oriented to person, place, and time.      Assessment:   Normal annual GYN exam  1. Gynecologic exam normal  Liquid-Based Pap Smear, Screening      Thick endometrium 1 year ago, last us 4mm    Plan:   PAP  Mammogram  Follow up in 1 year.  Patient informed will be contacted with results within 2 weeks. Encouraged to please call back or email if she has not heard from us by then.

## 2023-01-17 NOTE — TELEPHONE ENCOUNTER
No new care gaps identified.  [facility] Embedded Care Gaps. Reference number: [id number]. 1/17/2023   11:56:41 AM CST

## 2023-01-18 ENCOUNTER — CLINICAL SUPPORT (OUTPATIENT)
Dept: REHABILITATION | Facility: HOSPITAL | Age: 73
End: 2023-01-18
Attending: FAMILY MEDICINE
Payer: MEDICARE

## 2023-01-18 DIAGNOSIS — M54.50 CHRONIC BILATERAL LOW BACK PAIN WITHOUT SCIATICA: Primary | ICD-10-CM

## 2023-01-18 DIAGNOSIS — G89.29 CHRONIC BILATERAL LOW BACK PAIN WITHOUT SCIATICA: Primary | ICD-10-CM

## 2023-01-18 PROCEDURE — 97110 THERAPEUTIC EXERCISES: CPT | Mod: PO,CQ

## 2023-01-18 RX ORDER — LEVOTHYROXINE SODIUM 112 UG/1
112 TABLET ORAL
Qty: 90 TABLET | Refills: 0 | Status: SHIPPED | OUTPATIENT
Start: 2023-01-18 | End: 2023-02-12

## 2023-01-18 NOTE — TELEPHONE ENCOUNTER
Refill Decision Note   Curtis Navarro  is requesting a refill authorization.  Brief Assessment and Rationale for Refill:  Approve     Medication Therapy Plan:       Medication Reconciliation Completed: No   Comments:     No Care Gaps recommended.     Note composed:7:24 AM 01/18/2023

## 2023-01-18 NOTE — PROGRESS NOTES
" [facility]   Reassessment: Physical Therapy Treatment Note     Name: Curtis Navarro  Clinic Number: [id number]    Therapy Diagnosis:   Encounter Diagnosis   Name Primary?    Chronic bilateral low back pain without sciatica Yes     Physician: Komal Del Castillo MD    Visit Date: 1/18/2023    Physician Orders: PT Eval and Treat  Medical Diagnosis from Referral: Back pain, unspecified back location, unspecified back pain laterality, unspecified chronicity [M54.9]  Evaluation Date: 11/15/2022  Authorization Period Expiration: 11/4/2022, 12/31/2023  Plan of Care Expiration: 12/27/22, 2/17/2023  Visit # / Visits authorized: 1/ 1, 3/40, 3/20    PTA Visit #: 1/5     Precautions: Standard     Time In: 1230  Time Out: 1315  Total Billable Time: 45 minutes    SUBJECTIVE     Pt reports: her daily lower back pain levels are much better. Patient states she was able to climb a ladder to decorate shelves without difficulty but she did feel discomfort in (L) lower back following this activity. She was partially compliant with home exercise program.  Response to previous treatment: no adverse effect  Functional change: too early    Pain: 0/10   Location: left back      OBJECTIVE     Objective Measures updated at progress report unless specified.       Treatment     Curtis received the treatments listed below:      therapeutic exercises to develop strength, endurance, ROM, flexibility, posture, and core stabilization for 45 minutes including:  Recumbent bike x 5 minutes    -TRANSVERSE RECTUS ABDOMINUS with modified dead bug (opp arm and knee march x 1 minute x 2   -Bridge 3x10, 1" holds, PPT and red TB   -clams side lying 3 x 10 (red TB)   -LTR on orange SB x 2 minutes, 3 sec hold   -Supine iso crunch (orange SB) x 2 minutes, 3 sec hold     -Standing hip abduction 2x10, red band at knees  -Lateral walking (Red TB at knees) x 30 feet x 2   -BLE Shuttle squats 3x10, 62.5#  -SL shuttle squats x 10, " 37.5#    Rows green band 3 x 10  Shoulder extension green band 2 x 10  No money yellow tb 2x10  Mini squat with pallof chest press red TB 2x10    manual therapy techniques: Joint mobilizations and Myofacial release were applied to the: Left SIJ and lumbar spine for 00 minutes, including:  Left hip LAD and ROM    Patient Education and Home Exercises     Home Exercises Provided and Patient Education Provided     Education provided:   - Cont HEP    Written Home Exercises Provided: Patient instructed to cont prior HEP. Exercises were reviewed and Curtis was able to demonstrate them prior to the end of the session.  Curtis demonstrated good  understanding of the education provided. See EMR under Patient Instructions for exercises provided during therapy sessions. 12/15/2022, 1/4/2023.    ASSESSMENT     Curtis able to progress core stabilization without pain but cues needed for proper recruitment without hold breath. Patient able to progress to squats on shuttle without adverse effect. Patient responding well to skilled care as pain with ADLs is consistently decreasing.     Curtis Is progressing well towards her goals.   Pt prognosis is Good.     Pt will continue to benefit from skilled outpatient physical therapy to address the deficits listed in the problem list box on initial evaluation, provide pt/family education and to maximize pt's level of independence in the home and community environment.     Pt's spiritual, cultural and educational needs considered and pt agreeable to plan of care and goals.     Anticipated barriers to physical therapy: none    Goals:   Short Term Goals: 1-3 weeks   Pt will be able to demonstrate > 50 deg Left hip ER.   -not met, ongoing.  Pt will be able to demonstrate x10 bridges with training affect achieved in glute maxs   -met  Pt will be able to demonstrate dead bug hold >30s.   -Not tested  Pt will subjectively report improvements in standing tolerance >20 min.    -not tested      Long  Term Goals: 3-6 weeks   Pt will be able to demonstrate hip Range of Motion = to compared of contralateral side.    -ongoing  Pt will be able to demonstrate hip hinge x10 with no pain.   -ongoing  Pt will be able to demonstrate lifting 10-15# box 2x6 with no pain.    -ongoing  Pt will subjectively report pain with ADLs <5% of the time and standing tolerance >30 min.   -ongoing    PLAN     Plan of care Certification: 11/15/2022 to 12/27/22.  Updated POC: 1/4/2023 to 2/17/2023     Outpatient Physical Therapy 1 times weekly for an additional 6 weeks to include the following interventions: Electrical Stimulation, Manual Therapy, Moist Heat/ Ice, Neuromuscular Re-ed, Patient Education, Self Care, Therapeutic Activities, and Therapeutic Exercise    Margaret Mayfield, PTA

## 2023-01-22 ENCOUNTER — PATIENT MESSAGE (OUTPATIENT)
Dept: OBSTETRICS AND GYNECOLOGY | Facility: CLINIC | Age: 73
End: 2023-01-22
Payer: MEDICARE

## 2023-01-22 NOTE — H&P
History & Physical - Short Stay  Gastroenterology      SUBJECTIVE:     Procedure: Gastroscopy    Chief Complaint/Indication for Procedure: GERD.   Hx of colon polyps.    History of Present Illness:  See recent GI OV note:  Office Visit   6/9/2022  [facility]  JAY Monsalve  Gastroenterology Encounter for monitoring long-term proton pump inhibitor therapy +6 more  Dx Medication Refill  Colonoscopy  Reason for Visit     Progress Notes    JAY Monsalve at 6/9/2022 11:00 AM    Status: Signed   Subjective:       Patient ID: Curtis Navarro is a 71 y.o. female Body mass index is 35.26 kg/m².     Chief Complaint: Medication Refill and Colonoscopy     Established patient of Dr. Ceballos & myself     Gastroesophageal Reflux  She complains of belching (occasional; not more than usual), coughing (occasional nonproductive, possibly allergies per patient report), globus sensation (occasional throat clearing, lump in throat sensation at night sometimes), heartburn (rare when on medication; weaned prilosec in the past & noticed reflux recurred) and a hoarse voice (chronic, improving; history of this and sees ENT; unchanged). She reports no abdominal pain, no chest pain, no choking, no dysphagia, no early satiety, no nausea or no sore throat. flatulence- taking fiber which causes gas. This is a chronic (several years ago) problem. The problem occurs rarely. The problem has been resolved (controlled with medication). The heartburn does not wake her from sleep. The heartburn changes with position. The symptoms are aggravated by caffeine, lying down and certain foods (late night, red gravy). Pertinent negatives include no fatigue, melena or weight loss. Risk factors include obesity and caffeine use. She has tried a PPI, a histamine-2 antagonist, head elevation and a diet change (probiotic daily; prilosec 20 mg every day; PAST: zantac, pepcid-no relief) for the symptoms. The treatment provided  significant relief. Past procedures include an EGD and a UGI.      Review of Systems   Constitutional: Negative for appetite change, chills, fatigue, fever and weight loss.   HENT: Positive for hoarse voice (chronic, improving; history of this and sees ENT; unchanged). Negative for sore throat and trouble swallowing.    Respiratory: Positive for cough (occasional nonproductive, possibly allergies per patient report). Negative for choking and shortness of breath.    Cardiovascular: Negative for chest pain.   Gastrointestinal: Positive for heartburn (rare when on medication; weaned prilosec in the past & noticed reflux recurred). Negative for abdominal pain, anal bleeding, blood in stool, constipation, diarrhea, dysphagia, melena, nausea, rectal pain and vomiting.     Wt Readings from Last 20 Encounters:   09/28/22 85.3 kg (188 lb)   01/17/23 87.4 kg (192 lb 10.9 oz)   11/14/22 87 kg (191 lb 12.8 oz)   11/03/22 86.7 kg (191 lb 0.5 oz)   09/15/22 84.5 kg (186 lb 2.9 oz)   07/14/22 90.8 kg (200 lb 2.8 oz)   07/11/22 89.8 kg (198 lb 1.3 oz)   07/07/22 90.4 kg (199 lb 4.7 oz)   06/09/22 90.3 kg (199 lb 1.2 oz)   05/25/22 88.7 kg (195 lb 8.8 oz)   01/10/22 87.9 kg (193 lb 12.6 oz)   01/06/22 87.6 kg (193 lb 3.7 oz)   10/04/21 86.3 kg (190 lb 4.1 oz)   05/11/21 87.9 kg (193 lb 14.3 oz)   03/30/21 87.1 kg (192 lb 2.1 oz)   01/15/21 86.5 kg (190 lb 11.2 oz)   11/06/20 82.3 kg (181 lb 7 oz)   10/02/20 84.9 kg (187 lb 2.7 oz)   08/10/20 85 kg (187 lb 6.4 oz)   07/06/20 83.3 kg (183 lb 10.3 oz)       Assessment:       1. Encounter for monitoring long-term proton pump inhibitor therapy    2. Gastroesophageal reflux disease without esophagitis    3. History of colon polyps    4. Anticoagulant long-term use    5. Cough    6. Hoarseness    7. Globus sensation        Plan:       Encounter for monitoring long-term proton pump inhibitor therapy & Gastroesophageal reflux disease without esophagitis  - discussed with patient about long  term use of reflux medications (preference to use lowest effective dose or discontinuing if possible), the risk and benefits of using these medications long term, the risk of untreated GERD such as grande's esophagus, and recommend a diet high in calcium and/or taking OTC calcium and vitamin d supplements as directed (such as Citracal +D), patient verbalized understanding and patient wants to continue omeprazole at current dosage at this time  - recommend annual monitoring with blood work to include CMP, CBC, vitamin B12, and magnesium.  - CONTINUE OMEPRAZOLE 20 MG ONCE DAILY AS DIRECTED; filled by PCP  - schedule EGD, discussed procedure with patient, including risks and benefits, patient verbalized understanding     History of colon polyps  - schedule Colonoscopy, discussed procedure with the patient, including risks and benefits, patient verbalized understanding     Anticoagulant long-term use  - informed patient that the anticoagulant(s) will likely need to be held for endoscopy, nurse will confirm with endoscopist, cardiologist, and/or PCP.     Cough  - schedule EGD, discussed procedure with patient, including risks and benefits, patient verbalized understanding  -     Ambulatory referral/consult to ENT; Future; Expected date: 06/16/2022  - Recommend follow-up with Primary Care Provider for continued evaluation and management.     Hoarseness  -     Ambulatory referral/consult to ENT; Future; Expected date: 06/16/2022     Globus sensation  -     Ambulatory referral/consult to ENT; Future; Expected date: 06/16/2022  - schedule EGD, discussed procedure with patient, including risks and benefits, patient verbalized understanding     Follow up in about 6 months (around 12/9/2022), or if symptoms worsen or fail to improve.              See recent ENT OV note:  Office Visit    7/14/2022  [facility] - ENT  Jaimie Lepe NP  Otolaryngology Nasal congestion +6 more  Dx Sinus Problem   Sore Throat   Otalgia; Referred by  "Komal Del Castillo MD  Reason for Visit     Progress Notes    Jaimie Lepe NP at 7/14/2022 11:20 AM    Status: Signed   Subjective:       Patient ID: Curtis Navarro is a 72 y.o. female.     Chief Complaint: Sinus Problem (R nostril is clogged), Sore Throat (Pt has a lot of mucus / post nasal drip / pt states she has reflux), and Otalgia     HPI   Patient is new to ENT, referred by STACEY Hussein for several ENT related issues. Patient states her #1 concern is lots of mucus in the back of her throat all night, which she suspects is reflux related. It affects her voice. Secondly, patient reports occasional soreness behind left ear and transient shooting pains AS. Thirdly, patient has to repeatedly open her ears to hear better. And lastly, patient reports nasal congestion bilaterally, R>L, has to pull laterally on her cheeks to breathe better.    Assessment:       Problem List Items Addressed This Visit    None              Visit Diagnoses      Nasal congestion    -  Primary     Relevant Medications     fluticasone propionate (FLONASE) 50 mcg/actuation nasal spray     Probable LPRD (laryngopharyngeal reflux disease)         Relevant Medications     omeprazole (PRILOSEC) 40 MG capsule     famotidine (PEPCID) 40 MG tablet     Nasal valve collapse         Globus sensation             ETD/Allergic rhinitis  Plan:     Flonase and Ponaris for nasal congestion and ETD relief.  Briefly discussed nasal valve collapse.    PND/Mucus management ("globus") handout given and discussed at length.  LPRD handout given and discussed. Recommend increasing PPI temporarily to see if that would help. Also advised avoidance of eating 3 hours before going to bed, shedding a few pounds, raising HOB, avoidance of trigger foods, etc.   Patient encouraged to return to clinic if symptoms worsen/persist and as needed for further ENT symptoms or concerns.              See last Upper GI endoscopy 7/12/2018  Indications:         Heartburn, Esophageal " reflux, Abnormal UGI series   Comorbidities        Hypertension, Obesity, Hyperlipidemia, Hypothyroidism   Impression:   - Normal oropharynx.                        - White nummular lesions in esophageal mucosa (benign).                        - Normal esophagus otherwise.                        - Z-line regular, 35 cm from the incisors.                        - Patulous lower esophageal sphincter (NERD).                        - Multiple gastric polyps. Incomplete resection.                        Resected tissue retrieved.                        - Minimal Antritis. Biopsied.                        - Normal stomach otherwise.                        - Normal examined duodenum.   Recommendation:      - Discharge patient to home.                        - Await pathology results.                        - Follow an antireflux regimen.                        - Continue present medications.                        - Use Prilosec (omeprazole) 40 mg PO daily.                        - Add Zantac (ranitidine) 300 mg PO nightly for 1-2 months.                        - Resume aspirin at prior dose in 10-14 days.                        - Call the G.I. clinic in 2 weeks for reports (if                        you haven't heard from us sooner) [phone].                        - Repeat upper endoscopy PRN.   Hamlet Ceballos MD   7/12/2018    FINAL PATHOLOGIC DIAGNOSIS   1. STOMACH, CARDIA POLYPS (BIOPSY):   Hyperplastic polyp ×1 Multiple fragments of oxyntic mucosa with glandular changes suggestive of treatment (PPI) effect No dysplasia   2. STOMACH, GREATER CURVE POLYPS (BIOPSY):   Fundic gland polyps, no dysplasia   3. STOMACH, GREATER CURVE (BIOPSY): Oxyntic mucosa with no significant histopathologic changes      See last Colonoscopy 6/27/2017:  Indications:         High risk colon cancer surveillance: Personal                        history of colonic polyps, Last colonoscopy: July 2010  Impression:          - One 2 mm by 4  mm polyp in the cecum, removed with                        a cold snare. Resected and retrieved.                        - Diverticulosis in the sigmoid colon and in the                        descending colon.                        - Diverticulosis in the transverse colon, at the                        hepatic flexure, in the ascending colon and in the cecum.                        - Mild colonic spasm consistent with irritable bowel syndrome.                        - Non-bleeding internal hemorrhoids.                        - The examination was otherwise normal.                        - The examined portion of the ileum was normal.   Recommendation:      - Discharge patient to home.                        - Await pathology results.                        - If the pathology report reveals adenomatous                        tissue, then repeat the colonoscopy for surveillance in 5 years.                        - If the pathology report indicates hyperplastic                        polyp, then repeat colonoscopy for surveillance in 7 years.                        - High fiber diet.                        - Take a PROBIOTIC, such as a carton of GREEK YOGURT                        (Chobani or Oikos, or Activia or Dannon); or tablets                        of ALIGN or CULTURELLE or LITA-Q (all                        non-prescription), every day for a month.                        - Call the G.I. clinic in 2 weeks for reports (if                        you haven't heard from us sooner) [phone].                        - Continue present medications.                        - Return to normal activities tomorrow.   Hamlet Ceballos MD   6/27/2017    COLON, CECAL POLYPS (BIOPSY): - Tubular adenoma      PTA Medications   Medication Sig    aspirin (ECOTRIN) 81 MG EC tablet Take 81 mg by mouth once daily.    atorvastatin (LIPITOR) 20 MG tablet TAKE 1 TABLET (20 MG TOTAL) BY MOUTH ONCE DAILY.    B INFANTIS/B ANI/B JOSE M/B  BIFID (PROBIOTIC 4X ORAL) Take 1 tablet by mouth once daily.    biotin 5 mg Cap Take 5 mg by mouth once daily.    calcium-vitamin D3 500 mg(1,250mg) -200 unit per tablet Take 1 tablet by mouth 2 (two) times daily with meals.    GLUCOSA HAHN 2KCL/CHONDROITIN HAHN (GLUCOSAMINE-CHONDROITIN DS ORAL) Take by mouth 2 (two) times daily.    KRILL/OM-3/DHA/EPA/PHOSPHO/AST (MEGARED OMEGA-3 KRILL OIL ORAL) Take 1 capsule by mouth once daily.    levothyroxine (SYNTHROID) 112 MCG tablet TAKE 1 TABLET (112 MCG TOTAL) BY MOUTH BEFORE BREAKFAST.    losartan (COZAAR) 100 MG tablet TAKE 1 TABLET (100 MG TOTAL) BY MOUTH ONCE DAILY.    metoprolol succinate (TOPROL-XL) 50 MG 24 hr tablet Take 1 tablet (50 mg total) by mouth once daily.    MULTIVITAMIN ORAL Take by mouth.    omeprazole (PRILOSEC) 20 MG capsule Take 1 capsule (20 mg total) by mouth once daily.    vibegron 75 mg Tab Take 75 mg by mouth every morning. Take 1 by mouth every morning for OAB       Review of patient's allergies indicates:   Allergen Reactions    Lisinopril Other (See Comments)     Cough    Azithromycin      Other reaction(s): Nausea  Other reaction(s): Diarrhea    Metronidazole hcl      Other reaction(s): Rash  Other reaction(s): Itching    Moxifloxacin      Other reaction(s): Rash    Sulfa (sulfonamide antibiotics)      Other reaction(s): Itching        Past Medical History:   Diagnosis Date    Allergy     Arthritis     Carotid arterial disease     5/21 next due 5/23    Cataract     Colon polyps     Diverticulosis     GERD (gastroesophageal reflux disease)     Graves disease     s/p radioactive iodine in 42    H/O percutaneous left heart catheterization     normal 5/12    History of diverticulitis     History of skin cancer     L neck    Hyperlipidemia     Hypertension     Hypothyroidism     s/p radioactive iodine    IBS (irritable bowel syndrome)     Osteopenia     7/13, 7/15    S/P colonoscopy     7/17;  next due 7/22    Thyroid nodule     last usg 5/21;  next  "due 5/23     Past Surgical History:   Procedure Laterality Date    CARDIAC SURGERY  2013    angiogram (negative)    COLONOSCOPY  07/21/2010    Dr. Velázquez; in legacy, repeat in 6-7 years    COLONOSCOPY N/A 06/27/2017    Procedure: COLONOSCOPY;  Surgeon: Hamlet Velázquez Jr., MD;  Location: [facility];  Service: Endoscopy;  Laterality: N/A; repeat in 5 years for surveillance    COSMETIC SURGERY      Liposuction to neck    ESOPHAGOGASTRODUODENOSCOPY N/A 07/12/2018    Dr. Velázquez: "White nummular lesions in esophageal mucosa (benign)", antritis, gastric polyps removed    SKIN CANCER EXCISION      with Mohs on left neck    TONSILLECTOMY      UPPER GASTROINTESTINAL ENDOSCOPY  08/04/2014    Dr. Velázquez    UPPER GASTROINTESTINAL ENDOSCOPY  06/27/2017    DR. VELÁZQUEZ     Family History   Problem Relation Age of Onset    Cataracts Mother     Colon polyps Mother         history of colitis- part colon removed    Cancer Sister     Ovarian cancer Sister 40    Cataracts Maternal Grandfather     Amblyopia Neg Hx     Blindness Neg Hx     Glaucoma Neg Hx     Hypertension Neg Hx     Macular degeneration Neg Hx     Retinal detachment Neg Hx     Strabismus Neg Hx     Stroke Neg Hx     Thyroid disease Neg Hx     Diabetes Neg Hx     Colon cancer Neg Hx     Esophageal cancer Neg Hx     Stomach cancer Neg Hx      Social History     Tobacco Use    Smoking status: Never    Smokeless tobacco: Never   Substance Use Topics    Alcohol use: Never    Drug use: No         OBJECTIVE:     Vital Signs (Most Recent)  Temp: 97.6 °F (36.4 °C) (01/23/23 0950)  Pulse: 70 (01/23/23 0950)  Resp: 16 (01/23/23 0950)  BP: (!) 148/67 (01/23/23 0950)  SpO2: 97 % (01/23/23 0950)    Physical Exam:  : Ht: 5' 3" (160 cm)   Wt: 85.3 kg (188 lb)   BMI: 34.13 kg/m²  .                                                       GENERAL:  Comfortable, in no acute distress.                                 HEENT EXAM:  Nonicteric.  No adenopathy.  Oropharynx is clear.               NECK:  " Supple.                                                               LUNGS:  Clear.                                                               CARDIAC:  Regular rate and rhythm.  S1, S2.  No murmur.                      ABDOMEN:  Obese.   Soft, positive bowel sounds, nontender.  No hepatosplenomegaly or masses.  No rebound or guarding.                                             EXTREMITIES:  No edema.     MENTAL STATUS:  Alert and oriented.    ASSESSMENT/PLAN:     Assessment: GERD.   Hx of colon polyps.    Plan: Gastroscopy    Anesthesia Plan:   MAC / General Anaesthesia    ASA Grade: ASA 2 - Patient with mild systemic disease with no functional limitations    MALLAMPATI SCORE: II (hard and soft palate, upper portion of tonsils anduvula visible)

## 2023-01-23 ENCOUNTER — PATIENT MESSAGE (OUTPATIENT)
Dept: OBSTETRICS AND GYNECOLOGY | Facility: CLINIC | Age: 73
End: 2023-01-23
Payer: MEDICARE

## 2023-01-23 ENCOUNTER — HOSPITAL ENCOUNTER (OUTPATIENT)
Facility: HOSPITAL | Age: 73
Discharge: HOME | End: 2023-01-23
Attending: INTERNAL MEDICINE | Admitting: INTERNAL MEDICINE
Payer: MEDICARE

## 2023-01-23 ENCOUNTER — ANESTHESIA EVENT (OUTPATIENT)
Dept: ENDOSCOPY | Facility: HOSPITAL | Age: 73
End: 2023-01-23
Payer: MEDICARE

## 2023-01-23 ENCOUNTER — ANESTHESIA (OUTPATIENT)
Dept: ENDOSCOPY | Facility: HOSPITAL | Age: 73
End: 2023-01-23
Payer: MEDICARE

## 2023-01-23 DIAGNOSIS — K21.9 GERD (GASTROESOPHAGEAL REFLUX DISEASE): ICD-10-CM

## 2023-01-23 PROBLEM — Z86.010 HX OF COLONIC POLYPS: Status: ACTIVE | Noted: 2023-01-23

## 2023-01-23 PROBLEM — Z86.0100 HX OF COLONIC POLYPS: Status: ACTIVE | Noted: 2023-01-23

## 2023-01-23 PROCEDURE — 27201012 HC FORCEPS, HOT/COLD, DISP: Mod: PO | Performed by: INTERNAL MEDICINE

## 2023-01-23 PROCEDURE — 37000009 HC ANESTHESIA EA ADD 15 MINS: Mod: PO | Performed by: INTERNAL MEDICINE

## 2023-01-23 PROCEDURE — 43239 EGD BIOPSY SINGLE/MULTIPLE: CPT | Mod: 51,,, | Performed by: INTERNAL MEDICINE

## 2023-01-23 PROCEDURE — 63600175 PHARM REV CODE 636 W HCPCS: Mod: PO | Performed by: INTERNAL MEDICINE

## 2023-01-23 PROCEDURE — D9220A PRA ANESTHESIA: ICD-10-PCS | Mod: ANES,,, | Performed by: ANESTHESIOLOGY

## 2023-01-23 PROCEDURE — 43239 PR EGD, FLEX, W/BIOPSY, SGL/MULTI: ICD-10-PCS | Mod: 51,,, | Performed by: INTERNAL MEDICINE

## 2023-01-23 PROCEDURE — 88305 TISSUE EXAM BY PATHOLOGIST: CPT | Performed by: PATHOLOGY

## 2023-01-23 PROCEDURE — 27201089 HC SNARE, DISP (ANY): Mod: PO | Performed by: INTERNAL MEDICINE

## 2023-01-23 PROCEDURE — 43239 EGD BIOPSY SINGLE/MULTIPLE: CPT | Mod: PO | Performed by: INTERNAL MEDICINE

## 2023-01-23 PROCEDURE — 88305 TISSUE EXAM BY PATHOLOGIST: CPT | Mod: 26,,, | Performed by: PATHOLOGY

## 2023-01-23 PROCEDURE — 25000003 PHARM REV CODE 250: Mod: PO | Performed by: NURSE ANESTHETIST, CERTIFIED REGISTERED

## 2023-01-23 PROCEDURE — 45385 PR COLONOSCOPY,REMV LESN,SNARE: ICD-10-PCS | Mod: PT,,, | Performed by: INTERNAL MEDICINE

## 2023-01-23 PROCEDURE — 63600175 PHARM REV CODE 636 W HCPCS: Mod: PO | Performed by: NURSE ANESTHETIST, CERTIFIED REGISTERED

## 2023-01-23 PROCEDURE — D9220A PRA ANESTHESIA: Mod: CRNA,,, | Performed by: NURSE ANESTHETIST, CERTIFIED REGISTERED

## 2023-01-23 PROCEDURE — 88305 TISSUE EXAM BY PATHOLOGIST: ICD-10-PCS | Mod: 26,,, | Performed by: PATHOLOGY

## 2023-01-23 PROCEDURE — D9220A PRA ANESTHESIA: Mod: ANES,,, | Performed by: ANESTHESIOLOGY

## 2023-01-23 PROCEDURE — D9220A PRA ANESTHESIA: ICD-10-PCS | Mod: CRNA,,, | Performed by: NURSE ANESTHETIST, CERTIFIED REGISTERED

## 2023-01-23 PROCEDURE — 45385 COLONOSCOPY W/LESION REMOVAL: CPT | Mod: PT,,, | Performed by: INTERNAL MEDICINE

## 2023-01-23 PROCEDURE — 45385 COLONOSCOPY W/LESION REMOVAL: CPT | Mod: PT,PO | Performed by: INTERNAL MEDICINE

## 2023-01-23 PROCEDURE — 37000008 HC ANESTHESIA 1ST 15 MINUTES: Mod: PO | Performed by: INTERNAL MEDICINE

## 2023-01-23 RX ORDER — SODIUM CHLORIDE 0.9 % (FLUSH) 0.9 %
10 SYRINGE (ML) INJECTION
Status: DISCONTINUED | OUTPATIENT
Start: 2023-01-23 | End: 2023-01-23 | Stop reason: HOSPADM

## 2023-01-23 RX ORDER — LIDOCAINE HCL/PF 100 MG/5ML
SYRINGE (ML) INTRAVENOUS
Status: DISCONTINUED | OUTPATIENT
Start: 2023-01-23 | End: 2023-01-23

## 2023-01-23 RX ORDER — SODIUM CHLORIDE, SODIUM LACTATE, POTASSIUM CHLORIDE, CALCIUM CHLORIDE 600; 310; 30; 20 MG/100ML; MG/100ML; MG/100ML; MG/100ML
INJECTION, SOLUTION INTRAVENOUS CONTINUOUS
Status: DISCONTINUED | OUTPATIENT
Start: 2023-01-23 | End: 2023-01-23 | Stop reason: HOSPADM

## 2023-01-23 RX ORDER — PROPOFOL 10 MG/ML
VIAL (ML) INTRAVENOUS
Status: DISCONTINUED | OUTPATIENT
Start: 2023-01-23 | End: 2023-01-23

## 2023-01-23 RX ADMIN — PROPOFOL 50 MG: 10 INJECTION, EMULSION INTRAVENOUS at 11:01

## 2023-01-23 RX ADMIN — PROPOFOL 50 MG: 10 INJECTION, EMULSION INTRAVENOUS at 10:01

## 2023-01-23 RX ADMIN — PROPOFOL 25 MG: 10 INJECTION, EMULSION INTRAVENOUS at 11:01

## 2023-01-23 RX ADMIN — PROPOFOL 25 MG: 10 INJECTION, EMULSION INTRAVENOUS at 10:01

## 2023-01-23 RX ADMIN — LIDOCAINE HYDROCHLORIDE 100 MG: 20 INJECTION, SOLUTION INTRAVENOUS at 10:01

## 2023-01-23 RX ADMIN — SODIUM CHLORIDE, POTASSIUM CHLORIDE, SODIUM LACTATE AND CALCIUM CHLORIDE: 600; 310; 30; 20 INJECTION, SOLUTION INTRAVENOUS at 09:01

## 2023-01-23 RX ADMIN — PROPOFOL 150 MG: 10 INJECTION, EMULSION INTRAVENOUS at 10:01

## 2023-01-23 NOTE — [employer] PATIENT INSTRUCTIONS
Discharge Summary/Instructions after an Endoscopic Procedure  Patient Name: Curtis Navarro  Patient MRN: [id number]  Patient YOB: 1950 Monday, January 23, 2023  Hamlet Ceballos MD  Dear patient,  As a result of recent federal legislation (The Federal Cures Act), you may   receive lab or pathology results from your procedure in your [facility]   account before your physician is able to contact you. Your physician or   their representative will relay the results to you with their   recommendations at their soonest availability.  Thank you,  RESTRICTIONS:  During your procedure today, you received medications for sedation.  These   medications may affect your judgment, balance and coordination.  Therefore,   for 24 hours, you have the following restrictions:   - DO NOT drive a car, operate machinery, make legal/financial decisions,   sign important papers or drink alcohol.    ACTIVITY:  Today: no heavy lifting, straining or running due to procedural   sedation/anesthesia.  The following day: return to full activity including work.  DIET:  Eat and drink normally unless instructed otherwise.     TREATMENT FOR COMMON SIDE EFFECTS:  - Mild abdominal pain, nausea, belching, bloating or excessive gas:  rest,   eat lightly and use a heating pad.  - Sore Throat: treat with throat lozenges and/or gargle with warm salt   water.  - Because air was used during the procedure, expelling large amounts of air   from your rectum or belching is normal.  - If a bowel prep was taken, you may not have a bowel movement for 1-3 days.    This is normal.  SYMPTOMS TO WATCH FOR AND REPORT TO YOUR PHYSICIAN:  1. Abdominal pain or bloating, other than gas cramps.  2. Chest pain.  3. Back pain.  4. Signs of infection such as: chills or fever occurring within 24 hours   after the procedure.  5. Rectal bleeding, which would show as bright red, maroon, or black stools.   (A tablespoon of blood from the rectum is not serious,  especially if   hemorrhoids are present.)  6. Vomiting.  7. Weakness or dizziness.  GO DIRECTLY TO THE NEAREST EMERGENCY ROOM IF YOU HAVE ANY OF THE FOLLOWING:      Difficulty breathing              Chills and/or fever over 101 F   Persistent vomiting and/or vomiting blood   Severe abdominal pain   Severe chest pain   Black, tarry stools   Bleeding- more than one tablespoon   Any other symptom or condition that you feel may need urgent attention  Your doctor recommends these additional instructions:  If any biopsies were taken, your doctors clinic will contact you in 1 to 2   weeks with any results.  Follow an antireflux regimen.  This includes:       - Do not lie down for at least 3 to 4 hours after meals.        - Raise the head of the bed 4 to 6 inches.        - Decrease excess weight.        - Avoid citrus juices and other acidic foods, alcohol, chocolate, mints,   coffee and other caffeinated beverages, carbonated beverages, fatty and   fried foods.        - Avoid tight-fitting clothing.        - Avoid cigarettes and other tobacco products.   Especially:  Exercise and weight loss.  Continue your present medications.  For questions, problems or results please call your physician - Hamlet Ceballos MD at Work:  [phone].  EMERGENCY PHONE NUMBER: [phone], LAB RESULTS: [phone]  IF A COMPLICATION OR EMERGENCY SITUATION ARISES AND YOU ARE UNABLE TO REACH   YOUR PHYSICIAN - GO DIRECTLY TO THE EMERGENCY ROOM.  ___________________________________________  Nurse Signature  ___________________________________________  Patient/Designated Responsible Party Signature  Hamlet Ceballos MD  1/23/2023 11:32:06 AM  This report has been verified and signed electronically.  Dear patient,  As a result of recent federal legislation (The Federal Cures Act), you may   receive lab or pathology results from your procedure in your [facility]   account before your physician is able to contact you. Your physician  or   their representative will relay the results to you with their   recommendations at their soonest availability.  Thank you.  [employer]ATION

## 2023-01-23 NOTE — BRIEF OP NOTE
Discharge Note  Short Stay      SUMMARY     Admit Date: 1/23/2023    Attending Physician: Hamlet Ceballos Jr., MD     Discharge Physician: Hamlet Ceballos Jr., MD    Discharge Date: 1/23/2023 11:46 AM    Final Diagnosis: Hx of gastroesophageal reflux (GERD) [Z87.19]  Hx of colonic polyps [Z86.010]    Impression:            - Normal oropharynx.                          - Normal larynx.                          - Normal esophagus.                          - Reflux esophagitis.                          - Z-line regular, 35 cm from the incisors.                          - Patulous lower esophageal sphincter.                          - Multiple fundic gland polyps. Resected and                          retrieved.                          - Minimal antritis. Biopsied.                          - Normal stomach otherwise.                          - Normal pylorus.                          - Normal examined duodenum.                          - Normal major papilla.   Recommendation:        - Perform a colonoscopy as previously scheduled.                          - Discharge patient to home after that.                          - Await pathology results.                          - Follow an antireflux regimen.                          - Especially: Exercise and weight loss.                          - Continue present medications.                          - Call the G[facility] clinic in 2 weeks for reports (if                          you haven't heard from us sooner) [phone].     Impression:            - One 2 to 3 mm polyp in the cecum, removed with a                          cold snare. Resected and retrieved.                          - Diverticulosis in the sigmoid colon and in the                          distal descending colon.                          - Moderate colonic spasm consistent with irritable                          bowel syndrome.                          - Diverticulosis in the proximal transverse colon,                           at the hepatic flexure and in the ascending colon.                          - Non-bleeding internal hemorrhoids.                          - Redundant colon.                          - The examination was otherwise normal.   Recommendation:        - Discharge patient to home.                          - Await pathology results.                          - Repeat colonoscopy in 5 years for surveillance.                          - High fiber diet and low fat diet.                          - Use fiber, for example Citrucel, Fibercon,                          Konsyl or Metamucil.                          - Call the G.I[facility] clinic in 2 weeks for reports (if                          you haven't heard from us sooner) [phone].                          - Continue present medications.                       Hamlet Ceballos MD   1/23/2023       Disposition: HOME OR SELF CARE    Patient Instructions:   Current Discharge Medication List        CONTINUE these medications which have NOT CHANGED    Details   aspirin (ECOTRIN) 81 MG EC tablet Take 81 mg by mouth once daily.      atorvastatin (LIPITOR) 20 MG tablet TAKE 1 TABLET (20 MG TOTAL) BY MOUTH ONCE DAILY.  Qty: 90 tablet, Refills: 0      B INFANTIS/B ANI/B JOSE M/B BIFID (PROBIOTIC 4X ORAL) Take 1 tablet by mouth once daily.      biotin 5 mg Cap Take 5 mg by mouth once daily.      calcium-vitamin D3 500 mg(1,250mg) -200 unit per tablet Take 1 tablet by mouth 2 (two) times daily with meals.      GLUCOSA HAHN 2KCL/CHONDROITIN HAHN (GLUCOSAMINE-CHONDROITIN DS ORAL) Take by mouth 2 (two) times daily.      KRILL/OM-3/DHA/EPA/PHOSPHO/AST (MEGARED OMEGA-3 KRILL OIL ORAL) Take 1 capsule by mouth once daily.      levothyroxine (SYNTHROID) 112 MCG tablet TAKE 1 TABLET (112 MCG TOTAL) BY MOUTH BEFORE BREAKFAST.  Qty: 90 tablet, Refills: 0      losartan (COZAAR) 100 MG tablet TAKE 1 TABLET (100 MG TOTAL) BY MOUTH ONCE DAILY.  Qty: 90 tablet, Refills: 0      metoprolol  succinate (TOPROL-XL) 50 MG 24 hr tablet Take 1 tablet (50 mg total) by mouth once daily.  Qty: 90 tablet, Refills: 1    Comments: .      MULTIVITAMIN ORAL Take by mouth.      omeprazole (PRILOSEC) 20 MG capsule Take 1 capsule (20 mg total) by mouth once daily.  Qty: 30 capsule, Refills: 2    Associated Diagnoses: Gastroesophageal reflux disease without esophagitis      vibegron 75 mg Tab Take 75 mg by mouth every morning. Take 1 by mouth every morning for OAB  Qty: 90 tablet, Refills: 3             Discharge Procedure Orders (must include Diet, Follow-up, Activity)    Follow Up:  Follow up with PCP as per your routine.  Please follow an anti reflux diet and a high fiber low fat diet.  Activity as tolerated.    No driving day of procedure.

## 2023-01-23 NOTE — ANESTHESIA PREPROCEDURE EVALUATION
01/23/2023  Curtis Navarro is a 72 y.o., female.    Pre-op Assessment    I have reviewed the Patient Summary Reports.    I have reviewed the Nursing Notes.    I have reviewed the Medications.     Review of Systems  Anesthesia Hx:  No problems with previous Anesthesia  Denies Family Hx of Anesthesia complications.   Denies Personal Hx of Anesthesia complications.   Social:  Non-Smoker, No Alcohol Use    Cardiovascular:   Hypertension hyperlipidemia    Pulmonary:  Pulmonary Normal    Renal/:  Renal/ Normal     Hepatic/GI:   GERD    Musculoskeletal:   Arthritis     Neurological:  Neurology Normal    Endocrine:   Hypothyroidism        Physical Exam  General:  Obesity      Airway/Jaw/Neck:  Airway Findings: Mouth Opening: Normal   General Airway Assessment: Adult Mallampati: II  Jaw/Neck Findings:  Neck ROM: Extension Decreased, Mild        Chest/Lungs:  Chest/Lungs Findings: Clear to auscultation, Normal Respiratory Rate      Heart/Vascular:  Heart Findings: Rate: Normal  Rhythm: Regular Rhythm  Sounds: Normal  Heart murmur: negative Vascular Findings: Normal (No carotid bruits.)       Mental Status:  Mental Status Findings:  Cooperative, Alert and Oriented         Anesthesia Plan  Type of Anesthesia, risks & benefits discussed:  Anesthesia Type:  Gen Natural Airway    Patient's Preference:   Plan Factors:          Intra-op Monitoring Plan: Standard ASA Monitors  Intra-op Monitoring Plan Comments:   Post Op Pain Control Plan:   Post Op Pain Control Plan Comments:     Induction:   IV  Beta Blocker:  Patient is not currently on a Beta-Blocker (No further documentation required).       Informed Consent: Informed consent signed with the Patient and all parties understand the risks and agree with anesthesia plan.  All questions answered.  Anesthesia consent signed with patient.  ASA Score: 2     Day of  Surgery Review of History & Physical:    H&P Update referred to the surgeon/provider.          Ready For Surgery From Anesthesia Perspective.           Physical Exam  General: Obesity    Airway:  Mallampati: II   Mouth Opening: Normal  Neck ROM: Extension Decreased, Mild    Chest/Lungs:  Clear to auscultation, Normal Respiratory Rate    Heart:  Rate: Normal  Rhythm: Regular Rhythm  Sounds: Normal          Anesthesia Plan  Type of Anesthesia, risks & benefits discussed:    Anesthesia Type: Gen Natural Airway  Intra-op Monitoring Plan: Standard ASA Monitors  Induction:  IV  Informed Consent: Informed consent signed with the Patient and all parties understand the risks and agree with anesthesia plan.  All questions answered.   ASA Score: 2  Day of Surgery Review of History & Physical: H&P Update referred to the surgeon/provider.    Ready For Surgery From Anesthesia Perspective.       .

## 2023-01-23 NOTE — PATIENT INSTRUCTIONS
Recovery After Procedural Sedation (Adult)   You have been given medicine by vein to make you sleep during your surgery. This may have included both a pain medicine and sleeping medicine. Most of the effects have worn off. But you may still have some drowsiness for the next 6 to 8 hours.  Home care  Follow these guidelines when you get home:  For the next 8 hours, you should be watched by a responsible adult. This person should make sure your condition is not getting worse.  Don't drink any alcohol for the next 24 hours.  Don't drive, operate dangerous machinery, or make important business or personal decisions during the next 24 hours.  To prevent injury or falls, use caution when standing and walking for at least 24 hours after your procedure.  Note: Your healthcare provider may tell you not to take any medicine by mouth for pain or sleep in the next 4 hours. These medicines may react with the medicines you were given in the hospital. This could cause a much stronger response than usual.  Follow-up care  Follow up with your healthcare provider if you are not alert and back to your usual level of activity within 12 hours.  When to seek medical advice  Call your healthcare provider right away if any of these occur:  Drowsiness gets worse  Weakness or dizziness gets worse  Repeated vomiting  You can't be awakened  Fever  New rash  [employer] last reviewed this educational content on 9/1/2019  © 8958-7818 [employer]. [address] [place] [address], [place] [address]. All rights reserved. This information is not intended as a substitute for professional medical care. Always follow your healthcare professional's instructions         Tips to Control Acid Reflux    To control acid reflux, youll need to make some basic diet and lifestyle changes. The simple steps outlined below may be all youll need to ease discomfort.  Watch what you eat  Avoid fatty foods and spicy foods.  Eat fewer acidic foods, such as citrus  and tomato-based foods. These can increase symptoms.  Limit drinking alcohol, caffeine, and fizzy beverages. All increase acid reflux.  Try limiting chocolate, peppermint, and spearmint. These can worsen acid reflux in some people.  Watch when you eat  Avoid lying down for 3 hours after eating.  Do not snack before going to bed.  Raise your head  Raising your head and upper body by 4 to 6 inches helps limit reflux when youre lying down. Put blocks under the head of your bed frame to raise it.  Other changes  Lose weight, if you need to  Dont exercise near bedtime  Avoid tight-fitting clothes  Limit aspirin and ibuprofen  Stop smoking   Date Last Reviewed: 7/1/2016  © 1508-1600 [employer]. [address] [place] [address], [place] [address]. All rights reserved. This information is not intended as a substitute for professional medical care. Always follow your healthcare professional's instructions.         High-Fiber Diet  Fiber is in fruits, vegetables, cereals, and grains. Fiber passes through your body undigested. A high-fiber diet helps food move through your intestinal tract. The added bulk is helpful in preventing constipation. In people with diverticulosis, fiber helps clean out the pouches along the colon wall. It also prevents new pouches from forming. A high-fiber diet reduces the risk of colon cancer. It also lowers blood cholesterol and prevents high blood sugar in people with diabetes.    The fiber-rich foods listed below should be part of your diet. If you are not used to high-fiber foods, start with 1 or 2 foods from this list. Every 3 to 4 days add a new one to your diet. Do this until you are eating 4 high-fiber foods per day. This should give you 20 to 35 grams of fiber a day. It is also important to drink a lot of water when you are on this diet. You should have 6 to 8 glasses of water a day. Water makes the fiber swell and increases the benefit.  Foods high in dietary fiber  The following  foods are high in dietary fiber:  Breads. Breads made with 100% whole-wheat flour; joss, wheat, or rye crackers; whole-grain tortillas, bran muffins.  Cereals. Whole-grain and bran cereals with bran (shredded wheat, wheat flakes, raisin bran, corn bran); oatmeal, rolled oats, granola, and brown rice.  Fruits. Fresh fruits and their edible skins (pears, prunes, raisins, berries, apples, and apricots); bananas, citrus fruit, mangoes, pineapple; and prune juice.  Nuts. Any nuts and seeds.  Vegetables. Best served raw or lightly cooked. All types, especially: green peas, celery, eggplant, potatoes, spinach, broccoli, Avery sprouts, winter squash, carrots, cauliflower, soybeans, lentils, and fresh and dried beans of all kinds.  Other. Popcorn, any spices.  Date Last Reviewed: 8/1/2016  © 6287-6899 [employer]. [address] [place] [address]. All rights reserved. This information is not intended as a substitute for professional medical care. Always follow your healthcare professional's instructions.

## 2023-01-23 NOTE — TRANSFER OF CARE
"Anesthesia Transfer of Care Note    Patient: Curtis Navarro    Procedure(s) Performed: Procedure(s) (LRB):  EGD (ESOPHAGOGASTRODUODENOSCOPY) (N/A)  COLONOSCOPY (N/A)    Patient location: PACU    Anesthesia Type: general    Transport from OR: Transported from OR on room air with adequate spontaneous ventilation    Post pain: adequate analgesia    Post assessment: no apparent anesthetic complications and tolerated procedure well    Post vital signs: stable    Level of consciousness: sedated and awake    Nausea/Vomiting: no nausea/vomiting    Complications: none    Transfer of care protocol was followed      Last vitals:   Visit Vitals  BP (!) 148/67   Pulse 70   Temp 36.4 °C (97.6 °F)   Resp 16   Ht 5' 3" (1.6 m)   Wt 85.3 kg (188 lb)   SpO2 97%   Breastfeeding No   BMI 33.30 kg/m²     "

## 2023-01-23 NOTE — [employer] PATIENT INSTRUCTIONS
Discharge Summary/Instructions for after Colonoscopy with   Biopsy/Polypectomy  Curtis Navarro    Monday, January 23, 2023  Hamlet Ceballos MD  RESTRICTIONS ON ACTIVITY:  - Do not drive a car or operate machinery until the day after the procedure.      - The following day: return to full activity including work.  - For  3 days: No heavy lifting, straining or running.  - Diet: You can have solid foods, but no gassy foods (i.e. beans, broccoli,   cabbage, etc).  TREATMENT FOR COMMON SIDE EFFECTS:  - Mild abdominal pain and bloating or excessive gas: rest, eat lightly and   use a heating pad.  SYMPTOMS TO WATCH FOR AND REPORT TO YOUR PHYSICIAN:  1. Severe abdominal pain.  2. Fever within 24 hours after a procedure.  3. A large amount of rectal bleeding. (A small amount of blood from the   rectum is not serious, especially if hemorrhoids are present.  3.  Because air was put into your colon during the procedure, expelling   large amounts of air from your rectum is normal.  4.  You may not have a bowel movement for 1-3 days because of the   colonoscopy prep.  This is normal.  5.  Call immediately if you notice any of the following:   Chills and/or fever over 101   Persistent vomiting   Severe abdominal pain, other than gas cramps   Severe chest pain   Black, tarry stools   Any bleeding - exceeding one tablespoon  Your doctor recommends these additional instructions:  We are waiting for your pathology results.   Your physician has recommended a repeat colonoscopy in 5 years for   surveillance.   Eat a high fiber diet and eat a low fat diet.   Take a fiber supplement, for example Citrucel, Fibercon, Konsyl or   Metamucil.  None  If you have any questions or problems, please call your physician.  EMERGENCY PHONE NUMBER: [phone]  LAB RESULTS: Call in two (2) weeks for lab results, [phone]  ___________________________________________  Nurse  Signature  ___________________________________________  Patient/Designated Responsible Party Signature  Hamlet Ceballos MD  1/23/2023 11:44:58 AM  This report has been verified and signed electronically.  Dear patient,  As a result of recent federal legislation (The Federal Cures Act), you may   receive lab or pathology results from your procedure in your [facility]   account before your physician is able to contact you. Your physician or   their representative will relay the results to you with their   recommendations at their soonest availability.  Thank you.  [employer]

## 2023-01-24 VITALS
HEART RATE: 69 BPM | RESPIRATION RATE: 16 BRPM | TEMPERATURE: 98 F | DIASTOLIC BLOOD PRESSURE: 57 MMHG | SYSTOLIC BLOOD PRESSURE: 121 MMHG | BODY MASS INDEX: 33.31 KG/M2 | OXYGEN SATURATION: 99 % | WEIGHT: 188 LBS | HEIGHT: 63 IN

## 2023-01-24 NOTE — ANESTHESIA POSTPROCEDURE EVALUATION
Anesthesia Post Evaluation    Patient: Curtis Navarro    Procedure(s) Performed: Procedure(s) (LRB):  EGD (ESOPHAGOGASTRODUODENOSCOPY) (N/A)  COLONOSCOPY (N/A)    Final Anesthesia Type: general      Patient location during evaluation: PACU  Patient participation: Yes- Able to Participate  Level of consciousness: awake and alert  Post-procedure vital signs: reviewed and stable  Pain management: adequate  Airway patency: patent    PONV status at discharge: No PONV  Anesthetic complications: no      Cardiovascular status: blood pressure returned to baseline  Respiratory status: unassisted and room air  Hydration status: euvolemic  Follow-up not needed.          Vitals Value Taken Time   /57 01/23/23 1135   Temp  01/24/23 1032   Pulse 69 01/23/23 1135   Resp 16 01/23/23 1135   SpO2 99 % 01/23/23 1135         Event Time   Out of Recovery 11:43:00         Pain/Judy Score: Judy Score: 10 (1/23/2023 11:35 AM)

## 2023-01-27 LAB
FINAL PATHOLOGIC DIAGNOSIS: NORMAL
GROSS: NORMAL
Lab: NORMAL

## 2023-02-06 ENCOUNTER — PATIENT MESSAGE (OUTPATIENT)
Dept: UROLOGY | Facility: CLINIC | Age: 73
End: 2023-02-06

## 2023-02-06 ENCOUNTER — TELEPHONE (OUTPATIENT)
Dept: UROGYNECOLOGY | Facility: CLINIC | Age: 73
End: 2023-02-06
Payer: MEDICARE

## 2023-02-06 ENCOUNTER — OFFICE VISIT (OUTPATIENT)
Dept: UROLOGY | Facility: CLINIC | Age: 73
End: 2023-02-06
Payer: MEDICARE

## 2023-02-06 VITALS
HEIGHT: 63 IN | HEART RATE: 67 BPM | WEIGHT: 192.56 LBS | SYSTOLIC BLOOD PRESSURE: 148 MMHG | DIASTOLIC BLOOD PRESSURE: 70 MMHG | BODY MASS INDEX: 34.12 KG/M2

## 2023-02-06 DIAGNOSIS — N39.41 URGE INCONTINENCE: Primary | ICD-10-CM

## 2023-02-06 LAB
BILIRUBIN, UA POC OHS: NEGATIVE
BLOOD, UA POC OHS: NEGATIVE
CLARITY, UA POC OHS: CLEAR
COLOR, UA POC OHS: YELLOW
GLUCOSE, UA POC OHS: NEGATIVE
KETONES, UA POC OHS: NEGATIVE
LEUKOCYTES, UA POC OHS: NEGATIVE
NITRITE, UA POC OHS: NEGATIVE
PH, UA POC OHS: 6
PROTEIN, UA POC OHS: NEGATIVE
SPECIFIC GRAVITY, UA POC OHS: <=1.005
UROBILINOGEN, UA POC OHS: 0.2

## 2023-02-06 PROCEDURE — 3008F BODY MASS INDEX DOCD: CPT | Mod: HCNC,CPTII,S$GLB, | Performed by: UROLOGY

## 2023-02-06 PROCEDURE — 1160F RVW MEDS BY RX/DR IN RCRD: CPT | Mod: HCNC,CPTII,S$GLB, | Performed by: UROLOGY

## 2023-02-06 PROCEDURE — 99999 PR PBB SHADOW E&M-EST. PATIENT-LVL IV: ICD-10-PCS | Mod: PBBFAC,HCNC,, | Performed by: UROLOGY

## 2023-02-06 PROCEDURE — 3078F DIAST BP <80 MM HG: CPT | Mod: HCNC,CPTII,S$GLB, | Performed by: UROLOGY

## 2023-02-06 PROCEDURE — 99215 OFFICE O/P EST HI 40 MIN: CPT | Mod: HCNC,S$GLB,, | Performed by: UROLOGY

## 2023-02-06 PROCEDURE — 99999 PR PBB SHADOW E&M-EST. PATIENT-LVL IV: CPT | Mod: PBBFAC,HCNC,, | Performed by: UROLOGY

## 2023-02-06 PROCEDURE — 3077F SYST BP >= 140 MM HG: CPT | Mod: HCNC,CPTII,S$GLB, | Performed by: UROLOGY

## 2023-02-06 PROCEDURE — 1101F PT FALLS ASSESS-DOCD LE1/YR: CPT | Mod: HCNC,CPTII,S$GLB, | Performed by: UROLOGY

## 2023-02-06 PROCEDURE — 3288F FALL RISK ASSESSMENT DOCD: CPT | Mod: HCNC,CPTII,S$GLB, | Performed by: UROLOGY

## 2023-02-06 PROCEDURE — 3288F PR FALLS RISK ASSESSMENT DOCUMENTED: ICD-10-PCS | Mod: HCNC,CPTII,S$GLB, | Performed by: UROLOGY

## 2023-02-06 PROCEDURE — 1101F PR PT FALLS ASSESS DOC 0-1 FALLS W/OUT INJ PAST YR: ICD-10-PCS | Mod: HCNC,CPTII,S$GLB, | Performed by: UROLOGY

## 2023-02-06 PROCEDURE — 1126F AMNT PAIN NOTED NONE PRSNT: CPT | Mod: HCNC,CPTII,S$GLB, | Performed by: UROLOGY

## 2023-02-06 PROCEDURE — 1160F PR REVIEW ALL MEDS BY PRESCRIBER/CLIN PHARMACIST DOCUMENTED: ICD-10-PCS | Mod: HCNC,CPTII,S$GLB, | Performed by: UROLOGY

## 2023-02-06 PROCEDURE — 99499 NO LOS: ICD-10-PCS | Mod: HCNC,S$GLB,, | Performed by: UROLOGY

## 2023-02-06 PROCEDURE — 99499 UNLISTED E&M SERVICE: CPT | Mod: HCNC,S$GLB,, | Performed by: UROLOGY

## 2023-02-06 PROCEDURE — 99215 PR OFFICE/OUTPT VISIT, EST, LEVL V, 40-54 MIN: ICD-10-PCS | Mod: HCNC,S$GLB,, | Performed by: UROLOGY

## 2023-02-06 PROCEDURE — 3078F PR MOST RECENT DIASTOLIC BLOOD PRESSURE < 80 MM HG: ICD-10-PCS | Mod: HCNC,CPTII,S$GLB, | Performed by: UROLOGY

## 2023-02-06 PROCEDURE — 1159F MED LIST DOCD IN RCRD: CPT | Mod: HCNC,CPTII,S$GLB, | Performed by: UROLOGY

## 2023-02-06 PROCEDURE — 81003 URINALYSIS AUTO W/O SCOPE: CPT | Mod: QW,HCNC,S$GLB, | Performed by: UROLOGY

## 2023-02-06 PROCEDURE — 1126F PR PAIN SEVERITY QUANTIFIED, NO PAIN PRESENT: ICD-10-PCS | Mod: HCNC,CPTII,S$GLB, | Performed by: UROLOGY

## 2023-02-06 PROCEDURE — 3008F PR BODY MASS INDEX (BMI) DOCUMENTED: ICD-10-PCS | Mod: HCNC,CPTII,S$GLB, | Performed by: UROLOGY

## 2023-02-06 PROCEDURE — 1159F PR MEDICATION LIST DOCUMENTED IN MEDICAL RECORD: ICD-10-PCS | Mod: HCNC,CPTII,S$GLB, | Performed by: UROLOGY

## 2023-02-06 PROCEDURE — 81003 POCT URINALYSIS(INSTRUMENT): ICD-10-PCS | Mod: QW,HCNC,S$GLB, | Performed by: UROLOGY

## 2023-02-06 PROCEDURE — 3077F PR MOST RECENT SYSTOLIC BLOOD PRESSURE >= 140 MM HG: ICD-10-PCS | Mod: HCNC,CPTII,S$GLB, | Performed by: UROLOGY

## 2023-02-06 NOTE — PATIENT INSTRUCTIONS
Curtis Navarro is a 72 y.o. female    1. Urge incontinence         Plan:     Referring to urogynecology for posterior tibial nerve stimulation.  We discussed options for urge incontinence.  Options include Botox which would have to be repeated every few months, peripheral nerve evaluation which would be the 1st evaluation without anesthesia and if she had improvement then the 2nd with anesthesia and she really feels uncomfortable proceeding with any type of surgery as she is never had surgery before.  Also discussed doing the peripheral nerve evaluation and if she had improvement then proceed with posterior tibial nerve but she wants to do posterior tibial nerve 1st.  She would like to try to find someone in [place].  I do not have any but he I can refer her to there for this.  Sending her to [facility] here.  Could always see if a pessary would help.  She has a cystocele may help some with the urgency.    F/u in 6 months to see how PTNS helping

## 2023-02-06 NOTE — PROGRESS NOTES
[facility] Note - [place]  Staff: MD Ashley    Referring provider and please cc: Carlyn  PCP: Richelle Schiro [facility]: active     Chief Complaint: oab    Subjective:        HPI: Curtis Navarro is a 72 y.o. female presents with     oab  -she's had this issue for 20 years  -she initially saw her ob gyn for this who tried her on myrbetriq 25 and 50 and had 50 %improvement in frequency but was still having incontinence. Prior to this she had tried detrol at some point. She then saw jemal on 5/14/18 and pvr was 170, myrbetriq dc'd and started on flomax. She was then seen on 7/6/18 and although sx improved some she they returned and jemal discontinued the flomax and started vesicare 5mg daily which improved her nocturia to1-2x a night. She had her then stop the vesicare for voiding diary which she brought with her today. Prior to starting the medicine per day: 14x a day or more, night time voids: 3x a night, 1-2 leaks of drops to large volume, urgency: 5, 2 pads a day.  Since starting vesicare she has had 50% improvement - voids q1-2 hours, nocturia 2x a night, no leaks, urgency down to 3, 0 pads. +dry mouth (not that bothersome) and constipation but takes metamucil and has a soft bm daily.   -no significant SHAWN   -She also was drinking coffee every morning and 1-2 cokes a day. Now she drinks 1 decaf coffee in the morning and coke zero 1-2x a week. No recurrent utis. G1, P 1, vaginal   -saw her 10/2018, stress test only showed leakage with 150cc but repeated standing and was negative.     Pelvic exam 10/12/18: negative stress test with coughing supine, negative stress test with valsalva supine, In and out cath performed with 20cc residual - urine was not sent for sample, Bladder filled to 150cc cc, Sensation to void felt at 90 cc Catheter removed, +stress test with coughing supine, negative stress test with valsalva supine, Negative stress test with coughing or valsalva   Standing. Stage 2 cystocele     Stable bladder and discontinued vesicare bc of the dry mouth and constipation and started myrbetriq 50mg. She has had improvement with the meds an no caffeine.3d voiding Diary (uploaded) shows voids 6-8x a day. Nocturia 1x a night. 1-2 leaks with urge. Urgency 3-5 (still pretty strong), 0 pads. Much improved.     Interval history 7/25/19: Up she was last seen in January 2019 and she had significant improvement in her overactive bladder with Myrbetriq 50 mg daily.  She was asked to continue this in follow-up and today states that she continues to have minimal leakage less than 1 time a day and does not really ever have to wear pads.  She voids every few hours.  However she still concerned because of the urgency and occasional urge incontinence that does occur.  She has a trip planned to [place] in November and is concerned about having to look for bathrooms.  She is possibly interested in proceeding with another form of treatment.    Interval history 7/7/22:  At last visit 3 years ago discussed botox vs interstim. Was having urgency still although frequency and UUI had improved. She says she is still on myrbetriq 50mg which helps with nocturia (1-2x) and during day every 1-3 hours. Sometimes having some UUI with drop and rare SHAWN. It's becoming bothersome again now.     Wears 3-4 thick pads/day damp but not wet. 1 cup of coffee in am. No sodas.     Interval history since last visit with me:  She has tried detrol, vesicare (caused dry mouth) and is currently on myrbetriq and switched her to vibegron on 7/17/22 bc she was having increasing frequency, urgency and more importantly urge incontinenence. Vibegron was covered. She says it helped with frequency. Diary 1 month after taking shows she voids 9-12x/day (so maybe going more often than this).   Went from 3 to 4 pads a day to 2 pads a day. She doesn't feel when she leaks.  Still having urgency every time she urinates but feels  like vibegron helping.   She's interested in ptns but doesn't know anyone in [place] that does this.   Ua today neg. Pvr by sc: 8    Interval history 2/6/23:  Returns today stating that she could not take the oxybutynin which we had added to the vibegron.  Caused her to feel to to funny.  She is leaking multiple times a day.  Changes her pad frequently.  Leaking so much that it goes through her pad.  This is occurring a few times a week.  She is still worried about getting anesthesia.  She is never had anesthesia procedure.  She is asking about PTNS.  Date: 2/6/23 Pelvic exam (stress test/cmg to evaluate stress vs urge incontinence) : SUPINE STRESS/LEAKTEST EMPTY: (--) stress test with cough, (--) stress test with valsalva.  In and out cath with 20cc residual - urine was not sent for sample. BLADDER FILLING: First sensation to void felt at 50 cc. Significant urge was met (130cc). Bladder filled to 130 cc. The catheter was then removed. SUPINE STRESS/LEAK TEST FULL: (--) stress test with coughing , (--) stress test with valsalva . STANDING STRESS/LEAK TEST FULL: (--) stress test with coughing.  (--) stress test with valsalva . Other:: +anterior prolapse. (--) atrophic vaginitis. (--) urethral caruncle. (--) vaginal discharge (was not sent for vaginosis screen).     Urine history : anticoagulation: none. Cardiologist: yearly for checkup (stress test- ).  2/6/23  Void: neg  9/15/22 Void: neg pvr by scan: 18  7/7/22  Neg, pvr by scan: 14  11/12/18 No cx, void: neg, 1 rbc  10/12/18 No cx, void: tr prot/tr blood - send for automated urinalysis, pvr by in and out: 20  8/15/18 No cx, void: neg  7/6/18  No cx, void: neg  6/13/18 Multiple org, void: neg  5/14/18 Ng, Void: neg, pvr by I&o: 170  6/6/17  E.coli, void: neg  3/20/15 ng, void: neg  11/27/06 Multiple org, void: 1+ blood    REVIEW OF SYSTEMS:  General ROS: no fevers, no chills  Psychological ROS: no depression  Endocrine ROS: no heat or cold  Respiratory  "ROS: no SOB  Cardiovascular ROS: no CP  Gastrointestinal ROS: no abdominal pain, no constipation, no diarrhea, noBRBPR  Musculoskeletal ROS: no muscle pain  Neurological ROS: non headaches  Dermatological ROS: no rashes  HEENT: noglasses, no sinus   ROS: per HPI     Allergies:  Lisinopril, Azithromycin, Metronidazole hcl, Moxifloxacin, and Sulfa (sulfonamide antibiotics)   Some of these she just had a small rash to but she's not sure. One of them actually caused her to "itch" in her ears and vagina/body and she had a "weird" feeling. May   Anticoagulation: Yes - asa 81mg    Objective:     Vitals:    02/06/23 1215   BP: (!) 148/70   Pulse: 67     Stress test as above        Assessment:       Curtis Navarro is a 72 y.o. female    1. Urge incontinence         Plan:     Referring to urogynecology for posterior tibial nerve stimulation.  We discussed options for urge incontinence.  Options include Botox which would have to be repeated every few months, peripheral nerve evaluation which would be the 1st evaluation without anesthesia and if she had improvement then the 2nd with anesthesia and she really feels uncomfortable proceeding with any type of surgery as she is never had surgery before.  Also discussed doing the peripheral nerve evaluation and if she had improvement then proceed with posterior tibial nerve but she wants to do posterior tibial nerve 1st.  She would like to try to find someone in [place].  I do not have any but he I can refer her to there for this.  Sending her to Radha eric here.  Could always see if a pessary would help.  She has a cystocele may help some with the urgency.    F/u in 6 months to see how PTNS helping        Eliana Ramirez MD    "

## 2023-02-06 NOTE — TELEPHONE ENCOUNTER
----- Message from Eliana Ramirez MD sent at 2/6/2023  1:10 PM CST -----  Regarding: referring for ptns streatments, female  Pt with demonstrated UUI. Doesn't want PNE or interstim. Wants ptns. Long discusion. Doesn't want botox either. She would rather do in [place] but I don't know anyone doing it over there. Willing to drive here. Could you guys schedule her for PTNS treatments?

## 2023-02-07 ENCOUNTER — TELEPHONE (OUTPATIENT)
Dept: GASTROENTEROLOGY | Facility: CLINIC | Age: 73
End: 2023-02-07
Payer: MEDICARE

## 2023-02-07 NOTE — TELEPHONE ENCOUNTER
----- Message from JAY Benitez sent at 2/7/2023  3:29 PM CST -----  Regarding: FW: referring for ptns streatments, female  Karolina Lee,     I didn't know if you saw this message.  I think someone in the urology group in [place] does PTNS, but I'm not sure who.  I guess we can offer the patient their number to see if she can get PTNS there if it is closer for her.    Thanks,    Eli  ----- Message -----  From: Eliana Ramirez MD  Sent: 2/6/2023   1:11 PM CST  To: JAY Benitez, #  Subject: referring for ptns streatments, female           Pt with demonstrated UUI. Doesn't want PNE or interstim. Wants ptns. Long discusion. Doesn't want botox either. She would rather do in [place] but I don't know anyone doing it over there. Willing to drive here. Could you guys schedule her for PTNS treatments?

## 2023-02-08 ENCOUNTER — CLINICAL SUPPORT (OUTPATIENT)
Dept: REHABILITATION | Facility: HOSPITAL | Age: 73
End: 2023-02-08
Attending: FAMILY MEDICINE
Payer: MEDICARE

## 2023-02-08 ENCOUNTER — PATIENT MESSAGE (OUTPATIENT)
Dept: OPTOMETRY | Facility: CLINIC | Age: 73
End: 2023-02-08
Payer: MEDICARE

## 2023-02-08 DIAGNOSIS — M54.50 CHRONIC BILATERAL LOW BACK PAIN WITHOUT SCIATICA: Primary | ICD-10-CM

## 2023-02-08 DIAGNOSIS — G89.29 CHRONIC BILATERAL LOW BACK PAIN WITHOUT SCIATICA: Primary | ICD-10-CM

## 2023-02-08 PROCEDURE — 97110 THERAPEUTIC EXERCISES: CPT | Mod: HCNC,PO,CQ

## 2023-02-08 NOTE — PROGRESS NOTES
" [facility]   Reassessment: Physical Therapy Treatment Note     Name: Curtis Navarro  Clinic Number: [id number]    Therapy Diagnosis:   Encounter Diagnosis   Name Primary?    Chronic bilateral low back pain without sciatica Yes     Physician: Komal Del Castillo MD    Visit Date: 2/8/2023    Physician Orders: PT Eval and Treat  Medical Diagnosis from Referral: Back pain, unspecified back location, unspecified back pain laterality, unspecified chronicity [M54.9]  Evaluation Date: 11/15/2022  Authorization Period Expiration: 11/4/2022, 12/31/2023  Plan of Care Expiration: 12/27/22, 2/17/2023  Visit # / Visits authorized: 1/ 1, 3/40, 4/20    PTA Visit #: 1/5     Precautions: Standard     Time In: 1233  Time Out: 1330  Total Billable Time: 55 minutes    SUBJECTIVE     Pt reports: her back and hip have been feeling much better. Patient states she does notice some hip pain at night when lying on that side. Patient states she is comfortable with HEP and she would like to discharge today. She was partially compliant with home exercise program.  Response to previous treatment: no adverse effect  Functional change: performing ADLs without pain.     Pain: 0/10   Location: left back      OBJECTIVE     Objective Measures updated at progress report unless specified.     Treatment     Curtis received the treatments listed below:      therapeutic exercises to develop strength, endurance, ROM, flexibility, posture, and core stabilization for 55 minutes including:    Recumbent bike x 5 minutes    -TRANSVERSE RECTUS ABDOMINUS with modified dead bug (opp arm and knee march x 1 minute x 2   -Bridge 3x10, 1" holds, PPT and red TB   -clams side lying 3 x 10 (red TB)   -LTR on orange SB x 2 minutes, 3 sec hold   -Supine iso crunch (orange SB) x 2 minutes, 3 sec hold     -Standing hip abduction 2x10, red band at knees  -Lateral walking (Red TB at knees) x 30 feet x 2   -BLE Shuttle squats 3x10, 62.5#  -SL " shuttle squats x 10, 37.5#    Rows green band 3 x 10  Shoulder extension green band 2 x 10  No money yellow tb 2x10  Mini squat with pallof chest press red TB 2x10  Step ups (6 inch) 2x10    manual therapy techniques: Joint mobilizations and Myofacial release were applied to the: Left SIJ and lumbar spine for 00 minutes, including:  Left hip LAD and ROM    Patient Education and Home Exercises     Home Exercises Provided and Patient Education Provided     Education provided:   - Cont HEP    Written Home Exercises Provided: Patient instructed to cont prior HEP. Exercises were reviewed and Curtis was able to demonstrate them prior to the end of the session.  Curtis demonstrated good  understanding of the education provided. See EMR under Patient Instructions for exercises provided during therapy sessions. 12/15/2022, 1/4/2023.    ASSESSMENT     Curtis able to progress to step ups today while maintaining good control of hip stabilizers. Patient without complaints of back or hip pain this treatment session. Some difficulty with coordination of dead bugs but this improves with repetition. Patient would like to trial discharge with HEP today.     Curtis Is progressing well towards her goals.   Pt prognosis is Good.     Pt will continue to benefit from skilled outpatient physical therapy to address the deficits listed in the problem list box on initial evaluation, provide pt/family education and to maximize pt's level of independence in the home and community environment.     Pt's spiritual, cultural and educational needs considered and pt agreeable to plan of care and goals.     Anticipated barriers to physical therapy: none    Goals:   Short Term Goals: 1-3 weeks   Pt will be able to demonstrate > 50 deg Left hip ER.   -not met, ongoing.  Pt will be able to demonstrate x10 bridges with training affect achieved in glute maxs   -met  Pt will be able to demonstrate dead bug hold >30s.   -Not tested  Pt will subjectively  report improvements in standing tolerance >20 min.    -not tested      Long Term Goals: 3-6 weeks   Pt will be able to demonstrate hip Range of Motion = to compared of contralateral side.    -ongoing  Pt will be able to demonstrate hip hinge x10 with no pain.   -ongoing  Pt will be able to demonstrate lifting 10-15# box 2x6 with no pain.    -ongoing  Pt will subjectively report pain with ADLs <5% of the time and standing tolerance >30 min.   -ongoing    PLAN     Plan of care Certification: 11/15/2022 to 12/27/22.  Updated POC: 1/4/2023 to 2/17/2023     Outpatient Physical Therapy 1 times weekly for an additional 6 weeks to include the following interventions: Electrical Stimulation, Manual Therapy, Moist Heat/ Ice, Neuromuscular Re-ed, Patient Education, Self Care, Therapeutic Activities, and Therapeutic Exercise    Margaret Mayfield, PTA

## 2023-02-09 ENCOUNTER — OFFICE VISIT (OUTPATIENT)
Dept: OPTOMETRY | Facility: CLINIC | Age: 73
End: 2023-02-09
Payer: MEDICARE

## 2023-02-09 DIAGNOSIS — H52.7 REFRACTIVE ERROR: Primary | ICD-10-CM

## 2023-02-09 DIAGNOSIS — H25.13 NUCLEAR SCLEROSIS, BILATERAL: Primary | ICD-10-CM

## 2023-02-09 DIAGNOSIS — Z00.00 ENCOUNTER FOR MEDICARE ANNUAL WELLNESS EXAM: ICD-10-CM

## 2023-02-09 DIAGNOSIS — H52.7 REFRACTIVE ERROR: ICD-10-CM

## 2023-02-09 PROCEDURE — 1159F PR MEDICATION LIST DOCUMENTED IN MEDICAL RECORD: ICD-10-PCS | Mod: HCNC,CPTII,S$GLB, | Performed by: OPTOMETRIST

## 2023-02-09 PROCEDURE — 99999 PR PBB SHADOW E&M-EST. PATIENT-LVL III: ICD-10-PCS | Mod: PBBFAC,HCNC,, | Performed by: OPTOMETRIST

## 2023-02-09 PROCEDURE — 1101F PR PT FALLS ASSESS DOC 0-1 FALLS W/OUT INJ PAST YR: ICD-10-PCS | Mod: HCNC,CPTII,S$GLB, | Performed by: OPTOMETRIST

## 2023-02-09 PROCEDURE — 3288F PR FALLS RISK ASSESSMENT DOCUMENTED: ICD-10-PCS | Mod: HCNC,CPTII,S$GLB, | Performed by: OPTOMETRIST

## 2023-02-09 PROCEDURE — 99499 NO LOS: ICD-10-PCS | Mod: HCNC,S$GLB,, | Performed by: OPTOMETRIST

## 2023-02-09 PROCEDURE — 99499 UNLISTED E&M SERVICE: CPT | Mod: HCNC,S$GLB,, | Performed by: OPTOMETRIST

## 2023-02-09 PROCEDURE — 1160F RVW MEDS BY RX/DR IN RCRD: CPT | Mod: HCNC,CPTII,S$GLB, | Performed by: OPTOMETRIST

## 2023-02-09 PROCEDURE — 99999 PR PBB SHADOW E&M-EST. PATIENT-LVL III: CPT | Mod: PBBFAC,HCNC,, | Performed by: OPTOMETRIST

## 2023-02-09 PROCEDURE — 92014 COMPRE OPH EXAM EST PT 1/>: CPT | Mod: HCNC,S$GLB,, | Performed by: OPTOMETRIST

## 2023-02-09 PROCEDURE — 1126F AMNT PAIN NOTED NONE PRSNT: CPT | Mod: HCNC,CPTII,S$GLB, | Performed by: OPTOMETRIST

## 2023-02-09 PROCEDURE — 1160F PR REVIEW ALL MEDS BY PRESCRIBER/CLIN PHARMACIST DOCUMENTED: ICD-10-PCS | Mod: HCNC,CPTII,S$GLB, | Performed by: OPTOMETRIST

## 2023-02-09 PROCEDURE — 1126F PR PAIN SEVERITY QUANTIFIED, NO PAIN PRESENT: ICD-10-PCS | Mod: HCNC,CPTII,S$GLB, | Performed by: OPTOMETRIST

## 2023-02-09 PROCEDURE — 3288F FALL RISK ASSESSMENT DOCD: CPT | Mod: HCNC,CPTII,S$GLB, | Performed by: OPTOMETRIST

## 2023-02-09 PROCEDURE — 1159F MED LIST DOCD IN RCRD: CPT | Mod: HCNC,CPTII,S$GLB, | Performed by: OPTOMETRIST

## 2023-02-09 PROCEDURE — 1101F PT FALLS ASSESS-DOCD LE1/YR: CPT | Mod: HCNC,CPTII,S$GLB, | Performed by: OPTOMETRIST

## 2023-02-09 PROCEDURE — 92014 PR EYE EXAM, EST PATIENT,COMPREHESV: ICD-10-PCS | Mod: HCNC,S$GLB,, | Performed by: OPTOMETRIST

## 2023-02-09 NOTE — PROGRESS NOTES
Assessment /Plan     For exam results, see Encounter Report.    Refractive error      Contact lens Rx released to pt. Daily wear only advised, with education to risks of extended wear.  Discussed lens care, compliance and solutions. RTC yearly contact lens follow up.

## 2023-02-10 NOTE — PROGRESS NOTES
HPI    DLS 01/10/2022      Pt here for new CL's rx.  Ran out and getting by wearing OTC +1.75 to   drive etc.  Wears + 3.25 Readers.  Denies flashes, no new floaters.  Every   now and then uses lubricating eye drop.   Last edited by Mckenna Powell on 2/9/2023  3:50 PM.            Assessment /Plan     For exam results, see Encounter Report.    Nuclear sclerosis, bilateral    Refractive error      Educated pt on presence of cataracts and effects on vision. No surgery at this time. Recheck in one year.

## 2023-02-13 ENCOUNTER — TELEPHONE (OUTPATIENT)
Dept: UROGYNECOLOGY | Facility: CLINIC | Age: 73
End: 2023-02-13
Payer: MEDICARE

## 2023-02-13 NOTE — TELEPHONE ENCOUNTER
Called and spoke to patient and she would like to get closer to home , if they have something in [place] that would be great to get the PTNS done. Informed would reach out to the urology group to see if they and who does the PTNS.

## 2023-02-13 NOTE — TELEPHONE ENCOUNTER
----- Message from Venita Mcdonough sent at 2/13/2023  4:09 PM CST -----  Contact: Patient    ----- Message -----  From: Nicki Lomeli  Sent: 2/13/2023   3:10 PM CST  To: Aslhey Monroy Staff    Type:  Patient Returning Call    Who Called: Patient    Who Left Message for Patient: Rosa    Does the patient know what this is regarding?: seeing another Dr    Would the patient rather a call back or a response via [facility]?  Call    Best Call Back Number: [phone] (home)    Additional Information:

## 2023-02-20 ENCOUNTER — PATIENT MESSAGE (OUTPATIENT)
Dept: OPTOMETRY | Facility: CLINIC | Age: 73
End: 2023-02-20
Payer: MEDICARE

## 2023-03-13 ENCOUNTER — PATIENT MESSAGE (OUTPATIENT)
Dept: GASTROENTEROLOGY | Facility: CLINIC | Age: 73
End: 2023-03-13
Payer: MEDICARE

## 2023-03-21 DIAGNOSIS — E03.4 HYPOTHYROIDISM DUE TO ACQUIRED ATROPHY OF THYROID: Chronic | ICD-10-CM

## 2023-03-21 DIAGNOSIS — I10 PRIMARY HYPERTENSION: Primary | ICD-10-CM

## 2023-03-21 DIAGNOSIS — I10 HYPERTENSION: ICD-10-CM

## 2023-03-23 NOTE — TELEPHONE ENCOUNTER
Refill Routing Note   Medication(s) are not appropriate for processing by [facility] for the following reason(s):      Required labs outdated  Required vitals abnormal    [facility] action(s):  Defer            Appointments  past 12m or future 3m with PCP    Date Provider   Last Visit   11/3/2022 Komal Del Castillo MD   Next Visit   5/18/2023 Komal Del Castillo MD   ED visits in past 90 days: 0        Note composed:3:55 PM 03/23/2023

## 2023-03-23 NOTE — TELEPHONE ENCOUNTER
No new care gaps identified.  [facility] Embedded Care Gaps. Reference number: [id number]. 3/23/2023   2:40:32 PM CDT

## 2023-03-25 RX ORDER — LOSARTAN POTASSIUM 100 MG/1
100 TABLET ORAL DAILY
Qty: 90 TABLET | Refills: 0 | Status: SHIPPED | OUTPATIENT
Start: 2023-03-25 | End: 2023-06-10

## 2023-03-25 NOTE — TELEPHONE ENCOUNTER
No new care gaps identified.  [facility] Embedded Care Gaps. Reference number: [id number]. 3/25/2023   9:18:50 AM CDT

## 2023-03-26 ENCOUNTER — PATIENT MESSAGE (OUTPATIENT)
Dept: FAMILY MEDICINE | Facility: CLINIC | Age: 73
End: 2023-03-26
Payer: MEDICARE

## 2023-03-26 RX ORDER — ATORVASTATIN CALCIUM 20 MG/1
TABLET, FILM COATED ORAL
Qty: 90 TABLET | Refills: 0 | Status: SHIPPED | OUTPATIENT
Start: 2023-03-26 | End: 2023-06-01

## 2023-03-26 NOTE — TELEPHONE ENCOUNTER
Refill Routing Note   Medication(s) are not appropriate for processing by [facility] for the following reason(s):      Required labs outdated    [facility] action(s):  Defer            Appointments  past 12m or future 3m with PCP    Date Provider   Last Visit   11/3/2022 Komal Del Castillo MD   Next Visit   5/18/2023 Komal Del Castillo MD   ED visits in past 90 days: 0        Note composed:7:18 PM 03/25/2023

## 2023-04-10 DIAGNOSIS — K21.9 GASTROESOPHAGEAL REFLUX DISEASE WITHOUT ESOPHAGITIS: ICD-10-CM

## 2023-04-10 RX ORDER — OMEPRAZOLE 20 MG/1
20 CAPSULE, DELAYED RELEASE ORAL DAILY
Qty: 30 CAPSULE | Refills: 2 | Status: SHIPPED | OUTPATIENT
Start: 2023-04-10 | End: 2023-10-02

## 2023-05-04 ENCOUNTER — PATIENT OUTREACH (OUTPATIENT)
Dept: ADMINISTRATIVE | Facility: HOSPITAL | Age: 73
End: 2023-05-04
Payer: MEDICARE

## 2023-05-04 NOTE — PROGRESS NOTES
2023 Care Everywhere updates requested and reviewed.  Immunizations reconciled. Media reports reviewed.  Duplicate HM overrides and  orders removed.  Overdue HM topic chart audit and/or requested.  Overdue lab testing linked to upcoming lab appointments if applies.    Future Appointments   Date Time Provider Department Center   2023  8:00 AM [address]   2023 10:00 AM STRESS, [facility] CARDDIA [place]   2023 10:05 AM [address]   2023  1:00 PM Komal Del Castillo MD [facility] MED [place]   2023 11:30 AM Pieter Corral MD [facility] CARDIO [place]   2023 12:00 PM Eliana Ramirez MD [facility] UROLOGY [facility]       Health Maintenance Due   Topic Date Due    Shingles Vaccine (3 of 3) 2021    Lipid Panel  2023

## 2023-05-09 ENCOUNTER — TELEPHONE (OUTPATIENT)
Dept: FAMILY MEDICINE | Facility: CLINIC | Age: 73
End: 2023-05-09
Payer: MEDICARE

## 2023-05-09 DIAGNOSIS — Z00.00 ANNUAL PHYSICAL EXAM: Primary | ICD-10-CM

## 2023-05-09 DIAGNOSIS — R79.9 ABNORMAL FINDING OF BLOOD CHEMISTRY, UNSPECIFIED: ICD-10-CM

## 2023-05-09 NOTE — TELEPHONE ENCOUNTER
Pt coming in on 6/1 for her annual.  Zakiya put in some lab orders, please advise regarding additional orders.  Pt will need to be scheduled for labs once completed.

## 2023-05-11 ENCOUNTER — PATIENT MESSAGE (OUTPATIENT)
Dept: CARDIOLOGY | Facility: HOSPITAL | Age: 73
End: 2023-05-11
Payer: MEDICARE

## 2023-05-12 ENCOUNTER — HOSPITAL ENCOUNTER (OUTPATIENT)
Dept: RADIOLOGY | Facility: HOSPITAL | Age: 73
Discharge: HOME | End: 2023-05-12
Attending: INTERNAL MEDICINE
Payer: MEDICARE

## 2023-05-12 ENCOUNTER — PATIENT MESSAGE (OUTPATIENT)
Dept: FAMILY MEDICINE | Facility: CLINIC | Age: 73
End: 2023-05-12
Payer: MEDICARE

## 2023-05-12 ENCOUNTER — CLINICAL SUPPORT (OUTPATIENT)
Dept: CARDIOLOGY | Facility: HOSPITAL | Age: 73
End: 2023-05-12
Attending: INTERNAL MEDICINE
Payer: MEDICARE

## 2023-05-12 VITALS — HEIGHT: 63 IN | WEIGHT: 192 LBS | BODY MASS INDEX: 34.02 KG/M2

## 2023-05-12 DIAGNOSIS — I10 PRIMARY HYPERTENSION: ICD-10-CM

## 2023-05-12 DIAGNOSIS — E78.5 DYSLIPIDEMIA: Chronic | ICD-10-CM

## 2023-05-12 DIAGNOSIS — R93.1 ABNORMAL FINDINGS ON DIAGNOSTIC IMAGING OF HEART AND CORONARY CIRCULATION: ICD-10-CM

## 2023-05-12 LAB
CV STRESS BASE HR: 82 BPM
DIASTOLIC BLOOD PRESSURE: 90 MMHG
NUC STRESS EJECTION FRACTION: 69 %
OHS CV CPX 1 MINUTE RECOVERY HEART RATE: 93 BPM
OHS CV CPX 85 PERCENT MAX PREDICTED HEART RATE MALE: 123
OHS CV CPX ESTIMATED METS: 6
OHS CV CPX MAX PREDICTED HEART RATE: 144
OHS CV CPX PATIENT IS FEMALE: 1
OHS CV CPX PATIENT IS MALE: 0
OHS CV CPX PEAK DIASTOLIC BLOOD PRESSURE: 70 MMHG
OHS CV CPX PEAK HEAR RATE: 146 BPM
OHS CV CPX PEAK RATE PRESSURE PRODUCT: NORMAL
OHS CV CPX PEAK SYSTOLIC BLOOD PRESSURE: 201 MMHG
OHS CV CPX PERCENT MAX PREDICTED HEART RATE ACHIEVED: 101
OHS CV CPX RATE PRESSURE PRODUCT PRESENTING: NORMAL
STRESS ECHO POST EXERCISE DUR MIN: 4 MINUTES
STRESS ECHO POST EXERCISE DUR SEC: 8 SECONDS
SYSTOLIC BLOOD PRESSURE: 163 MMHG

## 2023-05-12 PROCEDURE — 93016 NUCLEAR STRESS - CARDIOLOGY INTERPRETED (CUPID ONLY): ICD-10-PCS | Mod: ,,, | Performed by: INTERNAL MEDICINE

## 2023-05-12 PROCEDURE — 93016 CV STRESS TEST SUPVJ ONLY: CPT | Mod: ,,, | Performed by: INTERNAL MEDICINE

## 2023-05-12 PROCEDURE — A9502 TC99M TETROFOSMIN: HCPCS | Mod: PO

## 2023-05-12 PROCEDURE — 78452 NUCLEAR STRESS - CARDIOLOGY INTERPRETED (CUPID ONLY): ICD-10-PCS | Mod: 26,,, | Performed by: INTERNAL MEDICINE

## 2023-05-12 PROCEDURE — 93018 CV STRESS TEST I&R ONLY: CPT | Mod: ,,, | Performed by: INTERNAL MEDICINE

## 2023-05-12 PROCEDURE — 93018 PR CARDIAC STRESS TST,INTERP/REPT ONLY: ICD-10-PCS | Mod: ,,, | Performed by: INTERNAL MEDICINE

## 2023-05-12 PROCEDURE — 78452 HT MUSCLE IMAGE SPECT MULT: CPT | Mod: 26,,, | Performed by: INTERNAL MEDICINE

## 2023-05-12 PROCEDURE — 93017 CV STRESS TEST TRACING ONLY: CPT | Mod: PO

## 2023-05-12 PROCEDURE — 78452 HT MUSCLE IMAGE SPECT MULT: CPT | Mod: PO

## 2023-05-24 ENCOUNTER — OFFICE VISIT (OUTPATIENT)
Dept: CARDIOLOGY | Facility: CLINIC | Age: 73
End: 2023-05-24
Payer: MEDICARE

## 2023-05-24 VITALS
WEIGHT: 199.31 LBS | BODY MASS INDEX: 35.32 KG/M2 | DIASTOLIC BLOOD PRESSURE: 74 MMHG | HEART RATE: 67 BPM | HEIGHT: 63 IN | SYSTOLIC BLOOD PRESSURE: 151 MMHG

## 2023-05-24 DIAGNOSIS — I10 PRIMARY HYPERTENSION: ICD-10-CM

## 2023-05-24 DIAGNOSIS — E78.5 DYSLIPIDEMIA: Primary | Chronic | ICD-10-CM

## 2023-05-24 DIAGNOSIS — R00.2 PALPITATIONS: ICD-10-CM

## 2023-05-24 PROCEDURE — 1101F PR PT FALLS ASSESS DOC 0-1 FALLS W/OUT INJ PAST YR: ICD-10-PCS | Mod: CPTII,S$GLB,, | Performed by: INTERNAL MEDICINE

## 2023-05-24 PROCEDURE — 4010F PR ACE/ARB THEARPY RXD/TAKEN: ICD-10-PCS | Mod: CPTII,S$GLB,, | Performed by: INTERNAL MEDICINE

## 2023-05-24 PROCEDURE — 3008F PR BODY MASS INDEX (BMI) DOCUMENTED: ICD-10-PCS | Mod: CPTII,S$GLB,, | Performed by: INTERNAL MEDICINE

## 2023-05-24 PROCEDURE — 3077F SYST BP >= 140 MM HG: CPT | Mod: CPTII,S$GLB,, | Performed by: INTERNAL MEDICINE

## 2023-05-24 PROCEDURE — 3078F PR MOST RECENT DIASTOLIC BLOOD PRESSURE < 80 MM HG: ICD-10-PCS | Mod: CPTII,S$GLB,, | Performed by: INTERNAL MEDICINE

## 2023-05-24 PROCEDURE — 3008F BODY MASS INDEX DOCD: CPT | Mod: CPTII,S$GLB,, | Performed by: INTERNAL MEDICINE

## 2023-05-24 PROCEDURE — 1160F PR REVIEW ALL MEDS BY PRESCRIBER/CLIN PHARMACIST DOCUMENTED: ICD-10-PCS | Mod: CPTII,S$GLB,, | Performed by: INTERNAL MEDICINE

## 2023-05-24 PROCEDURE — 99999 PR PBB SHADOW E&M-EST. PATIENT-LVL III: CPT | Mod: PBBFAC,,, | Performed by: INTERNAL MEDICINE

## 2023-05-24 PROCEDURE — 4010F ACE/ARB THERAPY RXD/TAKEN: CPT | Mod: CPTII,S$GLB,, | Performed by: INTERNAL MEDICINE

## 2023-05-24 PROCEDURE — 99213 OFFICE O/P EST LOW 20 MIN: CPT | Mod: S$GLB,,, | Performed by: INTERNAL MEDICINE

## 2023-05-24 PROCEDURE — 1126F PR PAIN SEVERITY QUANTIFIED, NO PAIN PRESENT: ICD-10-PCS | Mod: CPTII,S$GLB,, | Performed by: INTERNAL MEDICINE

## 2023-05-24 PROCEDURE — 1159F MED LIST DOCD IN RCRD: CPT | Mod: CPTII,S$GLB,, | Performed by: INTERNAL MEDICINE

## 2023-05-24 PROCEDURE — 1101F PT FALLS ASSESS-DOCD LE1/YR: CPT | Mod: CPTII,S$GLB,, | Performed by: INTERNAL MEDICINE

## 2023-05-24 PROCEDURE — 1160F RVW MEDS BY RX/DR IN RCRD: CPT | Mod: CPTII,S$GLB,, | Performed by: INTERNAL MEDICINE

## 2023-05-24 PROCEDURE — 3078F DIAST BP <80 MM HG: CPT | Mod: CPTII,S$GLB,, | Performed by: INTERNAL MEDICINE

## 2023-05-24 PROCEDURE — 1126F AMNT PAIN NOTED NONE PRSNT: CPT | Mod: CPTII,S$GLB,, | Performed by: INTERNAL MEDICINE

## 2023-05-24 PROCEDURE — 3288F PR FALLS RISK ASSESSMENT DOCUMENTED: ICD-10-PCS | Mod: CPTII,S$GLB,, | Performed by: INTERNAL MEDICINE

## 2023-05-24 PROCEDURE — 99999 PR PBB SHADOW E&M-EST. PATIENT-LVL III: ICD-10-PCS | Mod: PBBFAC,,, | Performed by: INTERNAL MEDICINE

## 2023-05-24 PROCEDURE — 3288F FALL RISK ASSESSMENT DOCD: CPT | Mod: CPTII,S$GLB,, | Performed by: INTERNAL MEDICINE

## 2023-05-24 PROCEDURE — 3077F PR MOST RECENT SYSTOLIC BLOOD PRESSURE >= 140 MM HG: ICD-10-PCS | Mod: CPTII,S$GLB,, | Performed by: INTERNAL MEDICINE

## 2023-05-24 PROCEDURE — 99213 PR OFFICE/OUTPT VISIT, EST, LEVL III, 20-29 MIN: ICD-10-PCS | Mod: S$GLB,,, | Performed by: INTERNAL MEDICINE

## 2023-05-24 PROCEDURE — 1159F PR MEDICATION LIST DOCUMENTED IN MEDICAL RECORD: ICD-10-PCS | Mod: CPTII,S$GLB,, | Performed by: INTERNAL MEDICINE

## 2023-05-24 NOTE — PROGRESS NOTES
Subjective:    Patient ID:  Curtis Navarro is a 72 y.o. female who presents for follow-up of hypertension and dyslipidemia    HPI  She comes with no complaints, no chest pain, no shortness of breath  BP normal at home  No issues at this time      Review of Systems   Constitutional: Negative for decreased appetite, malaise/fatigue, weight gain and weight loss.   Cardiovascular:  Negative for chest pain, dyspnea on exertion, leg swelling, palpitations and syncope.   Respiratory:  Negative for cough and shortness of breath.    Gastrointestinal: Negative.    Neurological:  Negative for weakness.   All other systems reviewed and are negative.     Objective:      Physical Exam  Vitals and nursing note reviewed.   Constitutional:       Appearance: Normal appearance. She is well-developed.   HENT:      Head: Normocephalic.   Eyes:      Pupils: Pupils are equal, round, and reactive to light.   Neck:      Thyroid: No thyromegaly.      Vascular: No carotid bruit or JVD.   Cardiovascular:      Rate and Rhythm: Normal rate and regular rhythm.      Chest Wall: PMI is not displaced.      Pulses: Normal pulses and intact distal pulses.      Heart sounds: Normal heart sounds. No murmur heard.    No gallop.   Pulmonary:      Effort: Pulmonary effort is normal.      Breath sounds: Normal breath sounds.   Abdominal:      Palpations: Abdomen is soft. There is no mass.      Tenderness: There is no abdominal tenderness.   Musculoskeletal:         General: Normal range of motion.      Cervical back: Normal range of motion and neck supple.   Skin:     General: Skin is warm.   Neurological:      Mental Status: She is alert and oriented to person, place, and time.      Sensory: No sensory deficit.      Deep Tendon Reflexes: Reflexes are normal and symmetric.       Stress nuclear reviewed, negative for ischemia with normal LV systolic function at rest    Assessment:       1. Dyslipidemia    2. Primary hypertension    3. Palpitations          Plan:     Continue all cardiac medications  Regular exercise program  Weight loss  1 year follow-up

## 2023-05-29 ENCOUNTER — LAB VISIT (OUTPATIENT)
Dept: LAB | Facility: HOSPITAL | Age: 73
End: 2023-05-29
Attending: FAMILY MEDICINE
Payer: MEDICARE

## 2023-05-29 DIAGNOSIS — I10 HYPERTENSION: ICD-10-CM

## 2023-05-29 DIAGNOSIS — E03.4 HYPOTHYROIDISM DUE TO ACQUIRED ATROPHY OF THYROID: Chronic | ICD-10-CM

## 2023-05-29 DIAGNOSIS — I10 PRIMARY HYPERTENSION: ICD-10-CM

## 2023-05-29 DIAGNOSIS — Z00.00 ANNUAL PHYSICAL EXAM: ICD-10-CM

## 2023-05-29 DIAGNOSIS — R79.9 ABNORMAL FINDING OF BLOOD CHEMISTRY, UNSPECIFIED: ICD-10-CM

## 2023-05-29 LAB
ALBUMIN SERPL BCP-MCNC: 3.8 G/DL (ref 3.5–5.2)
ALP SERPL-CCNC: 101 U/L (ref 55–135)
ALT SERPL W/O P-5'-P-CCNC: 22 U/L (ref 10–44)
ANION GAP SERPL CALC-SCNC: 8 MMOL/L (ref 8–16)
AST SERPL-CCNC: 22 U/L (ref 10–40)
BASOPHILS # BLD AUTO: 0.06 K/UL (ref 0–0.2)
BASOPHILS NFR BLD: 0.9 % (ref 0–1.9)
BILIRUB SERPL-MCNC: 0.4 MG/DL (ref 0.1–1)
BUN SERPL-MCNC: 19 MG/DL (ref 8–23)
CALCIUM SERPL-MCNC: 10.2 MG/DL (ref 8.7–10.5)
CHLORIDE SERPL-SCNC: 103 MMOL/L (ref 95–110)
CHOLEST SERPL-MCNC: 199 MG/DL (ref 120–199)
CHOLEST/HDLC SERPL: 3.4 {RATIO} (ref 2–5)
CO2 SERPL-SCNC: 28 MMOL/L (ref 23–29)
CREAT SERPL-MCNC: 1 MG/DL (ref 0.5–1.4)
DIFFERENTIAL METHOD: ABNORMAL
EOSINOPHIL # BLD AUTO: 0.2 K/UL (ref 0–0.5)
EOSINOPHIL NFR BLD: 3.4 % (ref 0–8)
ERYTHROCYTE [DISTWIDTH] IN BLOOD BY AUTOMATED COUNT: 13.1 % (ref 11.5–14.5)
EST. GFR  (NO RACE VARIABLE): >60 ML/MIN/1.73 M^2
ESTIMATED AVG GLUCOSE: 108 MG/DL (ref 68–131)
GLUCOSE SERPL-MCNC: 97 MG/DL (ref 70–110)
HBA1C MFR BLD: 5.4 % (ref 4–5.6)
HCT VFR BLD AUTO: 39.4 % (ref 37–48.5)
HDLC SERPL-MCNC: 59 MG/DL (ref 40–75)
HDLC SERPL: 29.6 % (ref 20–50)
HGB BLD-MCNC: 12.4 G/DL (ref 12–16)
IMM GRANULOCYTES # BLD AUTO: 0.02 K/UL (ref 0–0.04)
IMM GRANULOCYTES NFR BLD AUTO: 0.3 % (ref 0–0.5)
LDLC SERPL CALC-MCNC: 113.6 MG/DL (ref 63–159)
LYMPHOCYTES # BLD AUTO: 3 K/UL (ref 1–4.8)
LYMPHOCYTES NFR BLD: 42.7 % (ref 18–48)
MCH RBC QN AUTO: 28.2 PG (ref 27–31)
MCHC RBC AUTO-ENTMCNC: 31.5 G/DL (ref 32–36)
MCV RBC AUTO: 90 FL (ref 82–98)
MONOCYTES # BLD AUTO: 0.7 K/UL (ref 0.3–1)
MONOCYTES NFR BLD: 10.1 % (ref 4–15)
NEUTROPHILS # BLD AUTO: 3 K/UL (ref 1.8–7.7)
NEUTROPHILS NFR BLD: 42.6 % (ref 38–73)
NONHDLC SERPL-MCNC: 140 MG/DL
NRBC BLD-RTO: 0 /100 WBC
PLATELET # BLD AUTO: 280 K/UL (ref 150–450)
PMV BLD AUTO: 10.6 FL (ref 9.2–12.9)
POTASSIUM SERPL-SCNC: 4.5 MMOL/L (ref 3.5–5.1)
PROT SERPL-MCNC: 6.9 G/DL (ref 6–8.4)
RBC # BLD AUTO: 4.4 M/UL (ref 4–5.4)
SODIUM SERPL-SCNC: 139 MMOL/L (ref 136–145)
TRIGL SERPL-MCNC: 132 MG/DL (ref 30–150)
TSH SERPL DL<=0.005 MIU/L-ACNC: 1.36 UIU/ML (ref 0.4–4)
WBC # BLD AUTO: 7.05 K/UL (ref 3.9–12.7)

## 2023-05-29 PROCEDURE — 84443 ASSAY THYROID STIM HORMONE: CPT | Performed by: FAMILY MEDICINE

## 2023-05-29 PROCEDURE — 85025 COMPLETE CBC W/AUTO DIFF WBC: CPT | Performed by: FAMILY MEDICINE

## 2023-05-29 PROCEDURE — 80061 LIPID PANEL: CPT | Performed by: FAMILY MEDICINE

## 2023-05-29 PROCEDURE — 83036 HEMOGLOBIN GLYCOSYLATED A1C: CPT | Performed by: FAMILY MEDICINE

## 2023-05-29 PROCEDURE — 36415 COLL VENOUS BLD VENIPUNCTURE: CPT | Mod: PO | Performed by: FAMILY MEDICINE

## 2023-05-29 PROCEDURE — 80053 COMPREHEN METABOLIC PANEL: CPT | Performed by: FAMILY MEDICINE

## 2023-06-01 ENCOUNTER — OFFICE VISIT (OUTPATIENT)
Dept: FAMILY MEDICINE | Facility: CLINIC | Age: 73
End: 2023-06-01
Payer: MEDICARE

## 2023-06-01 VITALS
OXYGEN SATURATION: 96 % | TEMPERATURE: 98 F | HEIGHT: 63 IN | HEART RATE: 69 BPM | WEIGHT: 196.88 LBS | BODY MASS INDEX: 34.88 KG/M2 | RESPIRATION RATE: 16 BRPM | DIASTOLIC BLOOD PRESSURE: 76 MMHG | SYSTOLIC BLOOD PRESSURE: 142 MMHG

## 2023-06-01 DIAGNOSIS — E04.2 MULTIPLE THYROID NODULES: ICD-10-CM

## 2023-06-01 DIAGNOSIS — E78.5 HYPERLIPIDEMIA, UNSPECIFIED HYPERLIPIDEMIA TYPE: ICD-10-CM

## 2023-06-01 DIAGNOSIS — Z00.00 ANNUAL PHYSICAL EXAM: Primary | ICD-10-CM

## 2023-06-01 DIAGNOSIS — E03.4 HYPOTHYROIDISM DUE TO ACQUIRED ATROPHY OF THYROID: ICD-10-CM

## 2023-06-01 DIAGNOSIS — I10 HYPERTENSION, ESSENTIAL: ICD-10-CM

## 2023-06-01 PROCEDURE — 1159F PR MEDICATION LIST DOCUMENTED IN MEDICAL RECORD: ICD-10-PCS | Mod: CPTII,S$GLB,, | Performed by: FAMILY MEDICINE

## 2023-06-01 PROCEDURE — 3077F PR MOST RECENT SYSTOLIC BLOOD PRESSURE >= 140 MM HG: ICD-10-PCS | Mod: CPTII,S$GLB,, | Performed by: FAMILY MEDICINE

## 2023-06-01 PROCEDURE — 1101F PR PT FALLS ASSESS DOC 0-1 FALLS W/OUT INJ PAST YR: ICD-10-PCS | Mod: CPTII,S$GLB,, | Performed by: FAMILY MEDICINE

## 2023-06-01 PROCEDURE — 1126F PR PAIN SEVERITY QUANTIFIED, NO PAIN PRESENT: ICD-10-PCS | Mod: CPTII,S$GLB,, | Performed by: FAMILY MEDICINE

## 2023-06-01 PROCEDURE — 1101F PT FALLS ASSESS-DOCD LE1/YR: CPT | Mod: CPTII,S$GLB,, | Performed by: FAMILY MEDICINE

## 2023-06-01 PROCEDURE — 3288F FALL RISK ASSESSMENT DOCD: CPT | Mod: CPTII,S$GLB,, | Performed by: FAMILY MEDICINE

## 2023-06-01 PROCEDURE — 1160F RVW MEDS BY RX/DR IN RCRD: CPT | Mod: CPTII,S$GLB,, | Performed by: FAMILY MEDICINE

## 2023-06-01 PROCEDURE — 1159F MED LIST DOCD IN RCRD: CPT | Mod: CPTII,S$GLB,, | Performed by: FAMILY MEDICINE

## 2023-06-01 PROCEDURE — 99397 PER PM REEVAL EST PAT 65+ YR: CPT | Mod: S$GLB,,, | Performed by: FAMILY MEDICINE

## 2023-06-01 PROCEDURE — 4010F ACE/ARB THERAPY RXD/TAKEN: CPT | Mod: CPTII,S$GLB,, | Performed by: FAMILY MEDICINE

## 2023-06-01 PROCEDURE — 3077F SYST BP >= 140 MM HG: CPT | Mod: CPTII,S$GLB,, | Performed by: FAMILY MEDICINE

## 2023-06-01 PROCEDURE — 1160F PR REVIEW ALL MEDS BY PRESCRIBER/CLIN PHARMACIST DOCUMENTED: ICD-10-PCS | Mod: CPTII,S$GLB,, | Performed by: FAMILY MEDICINE

## 2023-06-01 PROCEDURE — 3078F DIAST BP <80 MM HG: CPT | Mod: CPTII,S$GLB,, | Performed by: FAMILY MEDICINE

## 2023-06-01 PROCEDURE — 3288F PR FALLS RISK ASSESSMENT DOCUMENTED: ICD-10-PCS | Mod: CPTII,S$GLB,, | Performed by: FAMILY MEDICINE

## 2023-06-01 PROCEDURE — 3044F HG A1C LEVEL LT 7.0%: CPT | Mod: CPTII,S$GLB,, | Performed by: FAMILY MEDICINE

## 2023-06-01 PROCEDURE — 3008F PR BODY MASS INDEX (BMI) DOCUMENTED: ICD-10-PCS | Mod: CPTII,S$GLB,, | Performed by: FAMILY MEDICINE

## 2023-06-01 PROCEDURE — 3078F PR MOST RECENT DIASTOLIC BLOOD PRESSURE < 80 MM HG: ICD-10-PCS | Mod: CPTII,S$GLB,, | Performed by: FAMILY MEDICINE

## 2023-06-01 PROCEDURE — 4010F PR ACE/ARB THEARPY RXD/TAKEN: ICD-10-PCS | Mod: CPTII,S$GLB,, | Performed by: FAMILY MEDICINE

## 2023-06-01 PROCEDURE — 3008F BODY MASS INDEX DOCD: CPT | Mod: CPTII,S$GLB,, | Performed by: FAMILY MEDICINE

## 2023-06-01 PROCEDURE — 99397 PR PREVENTIVE VISIT,EST,65 & OVER: ICD-10-PCS | Mod: S$GLB,,, | Performed by: FAMILY MEDICINE

## 2023-06-01 PROCEDURE — 3044F PR MOST RECENT HEMOGLOBIN A1C LEVEL <7.0%: ICD-10-PCS | Mod: CPTII,S$GLB,, | Performed by: FAMILY MEDICINE

## 2023-06-01 PROCEDURE — 1126F AMNT PAIN NOTED NONE PRSNT: CPT | Mod: CPTII,S$GLB,, | Performed by: FAMILY MEDICINE

## 2023-06-01 RX ORDER — ZOSTER VACCINE RECOMBINANT, ADJUVANTED 50 MCG/0.5
0.5 KIT INTRAMUSCULAR ONCE
Qty: 1 EACH | Refills: 1 | Status: SHIPPED | OUTPATIENT
Start: 2023-06-01 | End: 2023-06-01

## 2023-06-01 RX ORDER — ROSUVASTATIN CALCIUM 10 MG/1
10 TABLET, COATED ORAL DAILY
Qty: 90 TABLET | Refills: 0 | Status: SHIPPED | OUTPATIENT
Start: 2023-06-01 | End: 2023-08-25

## 2023-06-02 ENCOUNTER — PATIENT MESSAGE (OUTPATIENT)
Dept: ADMINISTRATIVE | Facility: HOSPITAL | Age: 73
End: 2023-06-02
Payer: MEDICARE

## 2023-06-02 ENCOUNTER — PATIENT OUTREACH (OUTPATIENT)
Dept: ADMINISTRATIVE | Facility: HOSPITAL | Age: 73
End: 2023-06-02
Payer: MEDICARE

## 2023-06-02 NOTE — PROGRESS NOTES
Uncontrolled BP REPORT  BP Readings from Last 3 Encounters:   06/01/23 (!) 142/76   05/24/23 (!) 151/74   02/06/23 (!) 148/70       Non-compliant report chart audits for HYPERTENSION MANAGEMENT     Outreach to patient in reference to hypertension management       BLOOD PRESSURE FOLLOW UP NEEDED WITH A CARE TEAM MEMBER    ACTIVE PORTAL MESSAGE OR LETTER SENT

## 2023-06-03 NOTE — TELEPHONE ENCOUNTER
No care due was identified.  [facility] Embedded Care Due Messages. Reference number: [id number].   6/03/2023 12:18:51 PM CDT

## 2023-06-04 RX ORDER — METOPROLOL SUCCINATE 50 MG/1
TABLET, EXTENDED RELEASE ORAL
Qty: 30 TABLET | Refills: 1 | Status: SHIPPED | OUTPATIENT
Start: 2023-06-04 | End: 2023-10-02

## 2023-06-04 NOTE — PROGRESS NOTES
Subjective:       Patient ID: Curtis Navarro is a 72 y.o. female.    Chief Complaint: Annual Exam    HPI  The patient is a 72-year-old who is here today for her annual exam.  Overall, she is doing pretty good.  She recently had labs which we reviewed today.  Her mammogram was done in November.  Her DEXA scan was done in December.  In January, she had an EGD and colonoscopy.  She did have a stress test in May which was normal.  She is due for her Shingrix shot and COVID booster and would like orders to get these done at the pharmacy.    Today we discussed the followin) hypertension.  Today her blood pressure is 142/76.  She is currently taking Cozaar and Toprol  2) hyperlipidemia.  She is having trouble with muscle pain on the lipitor.  She has decreased the lipitor dose but is still having muscle pain.  Her recent total cholesterol was 199 and her LDL was 113  3) hypothyroidism.  She is doing well with her Synthroid.  Her recent TSH was normal at 1.364   4) Thyroid nodule.  She is due for repeat thyroid ultrasound with her last 1 being 2 years ago.    5) urinary incontinence.  She is following with the urologist.  She is considering next steps with the urologist  6) GERD.  She is currently taking Prilosec 20 mg once a day.  If she misses the Prilosec, she does acid reflux.  She is aware of the risks of Prilosec and the benefits of Prilosec    Review of systems is remarkable for fatigue.  She feels tired and does not feel like doing things.  She really has to push herself to get things done.  She typically goes to sleep around 12 30-1 a.m. and wakes up around 8-830 a.m..  She does snore but is not certain if she has any apneic episodes.  We did discuss an ANNE evaluation but she wants to defer that at this time.    Review of Systems   Constitutional:  Positive for fatigue. Negative for appetite change, chills, diaphoresis, fever and unexpected weight change.   HENT:  Negative for congestion, dental  problem, ear pain, hearing loss, postnasal drip, rhinorrhea, sneezing, sore throat and trouble swallowing.    Eyes:  Negative for photophobia, pain, discharge and visual disturbance.   Respiratory:  Negative for cough, chest tightness, shortness of breath and wheezing.    Cardiovascular:  Negative for chest pain, palpitations and leg swelling.   Gastrointestinal:  Negative for abdominal distention, abdominal pain, blood in stool, constipation, diarrhea, nausea and vomiting.   Endocrine: Negative for cold intolerance, heat intolerance, polydipsia and polyuria.   Genitourinary:  Negative for dysuria, flank pain, frequency, genital sores, hematuria, menstrual problem and vaginal discharge.   Musculoskeletal:  Negative for arthralgias, joint swelling and myalgias.   Skin:  Negative for rash.   Neurological:  Negative for dizziness, syncope, light-headedness and headaches.   Hematological:  Negative for adenopathy. Does not bruise/bleed easily.   Psychiatric/Behavioral:  Negative for dysphoric mood, self-injury, sleep disturbance and suicidal ideas. The patient is not nervous/anxious.        Objective:      Physical Exam  Constitutional:       General: She is not in acute distress.     Appearance: Normal appearance. She is well-developed.   HENT:      Head: Normocephalic and atraumatic.      Right Ear: Hearing, tympanic membrane, ear canal and external ear normal.      Left Ear: Hearing, tympanic membrane, ear canal and external ear normal.      Nose: Nose normal.      Mouth/Throat:      Mouth: No oral lesions.      Pharynx: No oropharyngeal exudate or posterior oropharyngeal erythema.   Eyes:      General: Lids are normal. No scleral icterus.     Extraocular Movements: Extraocular movements intact.      Conjunctiva/sclera: Conjunctivae normal.      Pupils: Pupils are equal, round, and reactive to light.   Neck:      Thyroid: No thyroid mass or thyromegaly.      Vascular: No carotid bruit.   Cardiovascular:      Rate and  "Rhythm: Normal rate and regular rhythm. No extrasystoles are present.     Chest Wall: PMI is not displaced.      Heart sounds: Normal heart sounds. No murmur heard.    No gallop.   Pulmonary:      Effort: Pulmonary effort is normal. No accessory muscle usage or respiratory distress.      Breath sounds: Normal breath sounds.   Abdominal:      General: Bowel sounds are normal. There is no abdominal bruit.      Palpations: Abdomen is soft.      Tenderness: There is no abdominal tenderness. There is no rebound.   Musculoskeletal:      Cervical back: Normal range of motion and neck supple.   Lymphadenopathy:      Head:      Right side of head: No submental or submandibular adenopathy.      Left side of head: No submental or submandibular adenopathy.      Cervical:      Right cervical: No superficial, deep or posterior cervical adenopathy.     Left cervical: No superficial, deep or posterior cervical adenopathy.      Upper Body:      Right upper body: No supraclavicular adenopathy.      Left upper body: No supraclavicular adenopathy.   Skin:     General: Skin is warm and dry.   Neurological:      Mental Status: She is alert and oriented to person, place, and time.      Cranial Nerves: No cranial nerve deficit.      Sensory: No sensory deficit.   Psychiatric:         Speech: Speech normal.         Behavior: Behavior normal.         Thought Content: Thought content normal.     Blood pressure (!) 142/76, pulse 69, temperature 98.2 °F (36.8 °C), temperature source Temporal, resp. rate 16, height 5' 3" (1.6 m), weight 89.3 kg (196 lb 13.9 oz), SpO2 96 %.Body mass index is 34.87 kg/m².              A/P:  1) annual exam.  Health maintenance issues and anticipatory guidance issues were discussed.  She will continue to stay physically active and consume a healthy diet.  She will keep up-to-date with her cancer screenings.  She will get the last Shingrix and COVID booster soon.  2) hypertension.   Uncertain control.  For now she " will continue with the Cozaar and Toprol.  We will see her back in 2 weeks for blood pressure recheck and make adjustments at that time if needed   3) hyperlipidemia.  Well controlled.  We will change with crestor.  We will recheck fasting lipid profile in 3 months  4) hypothyroidism.  Well controlled.  Continue with Synthroid.  Recheck TSH in 1 year.    5) Thyroid nodule.  Status unknown.  We will check a thyroid ultrasound   6) urinary incontinence.  Persistent.  Follow-up with urology  7) GERD.  Well controlled.  Continue with Prilosec  8) fatigue.  Recent labs were normal.  We will consider an ANNE evaluation if this continues

## 2023-06-04 NOTE — TELEPHONE ENCOUNTER
Refill Routing Note   Refill Routing Note   Medication(s) are not appropriate for processing by [facility] for the following reason(s):      Required vitals abnormal    [facility] action(s):  Defer None identified     Medication Therapy Plan: BP 6/01/23 (!) 142/76  Medication reconciliation completed: No     Appointments  past 12m or future 3m with PCP    Date Provider   Last Visit   6/1/2023 Komal Del Castillo MD   Next Visit   Visit date not found Koaml Del Castillo MD   ED visits in past 90 days: 0        Note composed:2:05 PM 06/04/2023

## 2023-06-05 ENCOUNTER — TELEPHONE (OUTPATIENT)
Dept: FAMILY MEDICINE | Facility: CLINIC | Age: 73
End: 2023-06-05
Payer: MEDICARE

## 2023-06-05 NOTE — TELEPHONE ENCOUNTER
----- Message from Komal Del Castillo MD sent at 6/4/2023  9:15 AM CDT -----  Can we bring her back in 2 wks for BP recheck?  Thank you@

## 2023-06-07 NOTE — TELEPHONE ENCOUNTER
No care due was identified.  [facility] Embedded Care Due Messages. Reference number: [id number].   6/07/2023 9:54:59 AM CDT

## 2023-06-08 ENCOUNTER — HOSPITAL ENCOUNTER (OUTPATIENT)
Dept: RADIOLOGY | Facility: HOSPITAL | Age: 73
Discharge: HOME | End: 2023-06-08
Attending: FAMILY MEDICINE
Payer: MEDICARE

## 2023-06-08 DIAGNOSIS — E04.2 MULTIPLE THYROID NODULES: ICD-10-CM

## 2023-06-08 PROCEDURE — 76536 US EXAM OF HEAD AND NECK: CPT | Mod: 26,,, | Performed by: RADIOLOGY

## 2023-06-08 PROCEDURE — 76536 US EXAM OF HEAD AND NECK: CPT | Mod: TC,PO

## 2023-06-08 PROCEDURE — 76536 US THYROID: ICD-10-PCS | Mod: 26,,, | Performed by: RADIOLOGY

## 2023-06-08 NOTE — TELEPHONE ENCOUNTER
Refill Routing Note   Medication(s) are not appropriate for processing by [facility] for the following reason(s):      Required vitals abnormal:  BP     [facility] action(s):  Defer Care Due:  None identified          Appointments  past 12m or future 3m with PCP    Date Provider   Last Visit   6/1/2023 Komal Del Castillo MD   Next Visit   Visit date not found Komal Del Castillo MD   ED visits in past 90 days: 0        Note composed:9:05 AM 06/08/2023

## 2023-06-09 ENCOUNTER — PATIENT MESSAGE (OUTPATIENT)
Dept: FAMILY MEDICINE | Facility: CLINIC | Age: 73
End: 2023-06-09
Payer: MEDICARE

## 2023-06-10 ENCOUNTER — PATIENT MESSAGE (OUTPATIENT)
Dept: FAMILY MEDICINE | Facility: CLINIC | Age: 73
End: 2023-06-10
Payer: MEDICARE

## 2023-06-10 RX ORDER — LOSARTAN POTASSIUM 100 MG/1
100 TABLET ORAL DAILY
Qty: 90 TABLET | Refills: 0 | Status: SHIPPED | OUTPATIENT
Start: 2023-06-10 | End: 2024-01-02

## 2023-06-14 ENCOUNTER — OFFICE VISIT (OUTPATIENT)
Dept: FAMILY MEDICINE | Facility: CLINIC | Age: 73
End: 2023-06-14
Payer: MEDICARE

## 2023-06-14 VITALS
SYSTOLIC BLOOD PRESSURE: 122 MMHG | HEIGHT: 63 IN | BODY MASS INDEX: 35.91 KG/M2 | WEIGHT: 202.69 LBS | TEMPERATURE: 98 F | OXYGEN SATURATION: 97 % | HEART RATE: 93 BPM | DIASTOLIC BLOOD PRESSURE: 58 MMHG

## 2023-06-14 DIAGNOSIS — J02.9 SORE THROAT: Primary | ICD-10-CM

## 2023-06-14 DIAGNOSIS — J30.9 ALLERGIC RHINITIS, UNSPECIFIED SEASONALITY, UNSPECIFIED TRIGGER: ICD-10-CM

## 2023-06-14 DIAGNOSIS — R05.1 ACUTE COUGH: ICD-10-CM

## 2023-06-14 PROCEDURE — 99213 OFFICE O/P EST LOW 20 MIN: CPT | Mod: S$GLB,,, | Performed by: NURSE PRACTITIONER

## 2023-06-14 PROCEDURE — 1160F PR REVIEW ALL MEDS BY PRESCRIBER/CLIN PHARMACIST DOCUMENTED: ICD-10-PCS | Mod: CPTII,S$GLB,, | Performed by: NURSE PRACTITIONER

## 2023-06-14 PROCEDURE — 3044F HG A1C LEVEL LT 7.0%: CPT | Mod: CPTII,S$GLB,, | Performed by: NURSE PRACTITIONER

## 2023-06-14 PROCEDURE — 3074F PR MOST RECENT SYSTOLIC BLOOD PRESSURE < 130 MM HG: ICD-10-PCS | Mod: CPTII,S$GLB,, | Performed by: NURSE PRACTITIONER

## 2023-06-14 PROCEDURE — 3078F DIAST BP <80 MM HG: CPT | Mod: CPTII,S$GLB,, | Performed by: NURSE PRACTITIONER

## 2023-06-14 PROCEDURE — 1159F PR MEDICATION LIST DOCUMENTED IN MEDICAL RECORD: ICD-10-PCS | Mod: CPTII,S$GLB,, | Performed by: NURSE PRACTITIONER

## 2023-06-14 PROCEDURE — 3008F PR BODY MASS INDEX (BMI) DOCUMENTED: ICD-10-PCS | Mod: CPTII,S$GLB,, | Performed by: NURSE PRACTITIONER

## 2023-06-14 PROCEDURE — 3074F SYST BP LT 130 MM HG: CPT | Mod: CPTII,S$GLB,, | Performed by: NURSE PRACTITIONER

## 2023-06-14 PROCEDURE — 4010F PR ACE/ARB THEARPY RXD/TAKEN: ICD-10-PCS | Mod: CPTII,S$GLB,, | Performed by: NURSE PRACTITIONER

## 2023-06-14 PROCEDURE — 1126F AMNT PAIN NOTED NONE PRSNT: CPT | Mod: CPTII,S$GLB,, | Performed by: NURSE PRACTITIONER

## 2023-06-14 PROCEDURE — 1160F RVW MEDS BY RX/DR IN RCRD: CPT | Mod: CPTII,S$GLB,, | Performed by: NURSE PRACTITIONER

## 2023-06-14 PROCEDURE — 1126F PR PAIN SEVERITY QUANTIFIED, NO PAIN PRESENT: ICD-10-PCS | Mod: CPTII,S$GLB,, | Performed by: NURSE PRACTITIONER

## 2023-06-14 PROCEDURE — 3044F PR MOST RECENT HEMOGLOBIN A1C LEVEL <7.0%: ICD-10-PCS | Mod: CPTII,S$GLB,, | Performed by: NURSE PRACTITIONER

## 2023-06-14 PROCEDURE — 99213 PR OFFICE/OUTPT VISIT, EST, LEVL III, 20-29 MIN: ICD-10-PCS | Mod: S$GLB,,, | Performed by: NURSE PRACTITIONER

## 2023-06-14 PROCEDURE — 3078F PR MOST RECENT DIASTOLIC BLOOD PRESSURE < 80 MM HG: ICD-10-PCS | Mod: CPTII,S$GLB,, | Performed by: NURSE PRACTITIONER

## 2023-06-14 PROCEDURE — 4010F ACE/ARB THERAPY RXD/TAKEN: CPT | Mod: CPTII,S$GLB,, | Performed by: NURSE PRACTITIONER

## 2023-06-14 PROCEDURE — 1159F MED LIST DOCD IN RCRD: CPT | Mod: CPTII,S$GLB,, | Performed by: NURSE PRACTITIONER

## 2023-06-14 PROCEDURE — 3008F BODY MASS INDEX DOCD: CPT | Mod: CPTII,S$GLB,, | Performed by: NURSE PRACTITIONER

## 2023-06-14 RX ORDER — BENZONATATE 200 MG/1
200 CAPSULE ORAL 3 TIMES DAILY PRN
Qty: 30 CAPSULE | Refills: 0 | Status: SHIPPED | OUTPATIENT
Start: 2023-06-14 | End: 2023-06-24

## 2023-06-14 RX ORDER — AMOXICILLIN AND CLAVULANATE POTASSIUM 875; 125 MG/1; MG/1
1 TABLET, FILM COATED ORAL 2 TIMES DAILY
Qty: 20 TABLET | Refills: 0 | Status: SHIPPED | OUTPATIENT
Start: 2023-06-14 | End: 2023-06-24

## 2023-06-14 NOTE — PROGRESS NOTES
Subjective:       Patient ID: Curtis Navarro is a 72 y.o. female.    Chief Complaint: Sore Throat, Cough, and Nasal Congestion    Sore Throat   Associated symptoms include congestion (on and off), coughing (dry cough) and ear pain (pressure, not pain). Pertinent negatives include no abdominal pain, diarrhea, headaches, shortness of breath or vomiting.   Cough  Associated symptoms include ear pain (pressure, not pain), postnasal drip (has improved) and a sore throat (hurts to swallow). Pertinent negatives include no chest pain, fever, headaches, shortness of breath or wheezing.     BP manual 122/58,  BP per her home cuff: 113/62.  She had nurse visit on Friday for BP check. Will just do it today since she is here.     See ROS/assessment and plan    The following portion of the patients history was reviewed and updated as appropriate: allergies, current medications, past medical and surgical history. Past social history and problem list reviewed. Family PMH and Past social history reviewed. Tobacco, Illicit drug use reviewed.      Review of patient's allergies indicates:   Allergen Reactions    Lisinopril Other (See Comments)     Cough    Azithromycin      Other reaction(s): Nausea  Other reaction(s): Diarrhea    Metronidazole hcl      Other reaction(s): Rash  Other reaction(s): Itching    Moxifloxacin      Other reaction(s): Rash    Sulfa (sulfonamide antibiotics)      Other reaction(s): Itching         Current Outpatient Medications:     aspirin (ECOTRIN) 81 MG EC tablet, Take 81 mg by mouth once daily., Disp: , Rfl:     B INFANTIS/B ANI/B JOSE M/B BIFID (PROBIOTIC 4X ORAL), Take 1 tablet by mouth once daily., Disp: , Rfl:     biotin 5 mg Cap, Take 5 mg by mouth once daily., Disp: , Rfl:     calcium-vitamin D3 500 mg(1,250mg) -200 unit per tablet, Take 1 tablet by mouth 2 (two) times daily with meals., Disp: , Rfl:     GLUCOSA HAHN 2KCL/CHONDROITIN HAHN (GLUCOSAMINE-CHONDROITIN DS ORAL), Take by mouth 2 (two) times  daily., Disp: , Rfl:     KRILL/OM-3/DHA/EPA/PHOSPHO/AST (MEGARED OMEGA-3 KRILL OIL ORAL), Take 1 capsule by mouth once daily., Disp: , Rfl:     losartan (COZAAR) 100 MG tablet, TAKE 1 TABLET (100 MG TOTAL) BY MOUTH ONCE DAILY., Disp: 90 tablet, Rfl: 0    metoprolol succinate (TOPROL-XL) 50 MG 24 hr tablet, TAKE 1 TABLET BY MOUTH ONCE DAILY, Disp: 30 tablet, Rfl: 1    MULTIVITAMIN ORAL, Take by mouth., Disp: , Rfl:     omeprazole (PRILOSEC) 20 MG capsule, Take 1 capsule (20 mg total) by mouth once daily., Disp: 30 capsule, Rfl: 2    rosuvastatin (CRESTOR) 10 MG tablet, Take 1 tablet (10 mg total) by mouth once daily., Disp: 90 tablet, Rfl: 0    vibegron 75 mg Tab, Take 75 mg by mouth every morning. Take 1 by mouth every morning for OAB, Disp: 90 tablet, Rfl: 3    levothyroxine (SYNTHROID) 112 MCG tablet, TAKE 1 TABLET (112 MCG TOTAL) BY MOUTH BEFORE BREAKFAST., Disp: 90 tablet, Rfl: 0    Past Medical History:   Diagnosis Date    Allergy     Arthritis     Cataract     Colon polyps     Diverticulosis     GERD (gastroesophageal reflux disease)     Graves disease     s/p radioactive iodine in 42    H/O cardiovascular stress test     normal 5/23    History of diverticulitis     History of skin cancer     L neck    Hyperlipidemia     Hypertension     Hypothyroidism     s/p radioactive iodine    IBS (irritable bowel syndrome)     Osteopenia     7/13, 7/15    Thyroid nodule     last usg 5/23;  next due 5/25       Past Surgical History:   Procedure Laterality Date    CARDIAC SURGERY  2013    angiogram (negative)    COLONOSCOPY  07/21/2010    Dr. Ceballos; in legacy, repeat in 6-7 years    COLONOSCOPY N/A 06/27/2017    Procedure: COLONOSCOPY;  Surgeon: Hamlet Ceballos Jr., MD;  Location: [facility] ENDO;  Service: Endoscopy;  Laterality: N/A; repeat in 5 years for surveillance    COLONOSCOPY N/A 1/23/2023    Procedure: COLONOSCOPY;  Surgeon: Hamlet Ceballos Jr., MD;  Location: [facility] ENDO;  Service: Endoscopy;  Laterality: N/A;     "COSMETIC SURGERY      Liposuction to neck    ESOPHAGOGASTRODUODENOSCOPY N/A 07/12/2018    Dr. Velázquez: "White nummular lesions in esophageal mucosa (benign)", antritis, gastric polyps removed    ESOPHAGOGASTRODUODENOSCOPY N/A 1/23/2023    Procedure: EGD (ESOPHAGOGASTRODUODENOSCOPY);  Surgeon: Hamlet Velázquez Jr., MD;  Location: [facility];  Service: Endoscopy;  Laterality: N/A;    SKIN CANCER EXCISION      with Mohs on left neck    TONSILLECTOMY      UPPER GASTROINTESTINAL ENDOSCOPY  08/04/2014    Dr. Velázquez    UPPER GASTROINTESTINAL ENDOSCOPY  06/27/2017    DR. VELÁZQUEZ       Social History     Socioeconomic History    Marital status:    Tobacco Use    Smoking status: Never    Smokeless tobacco: Never   Substance and Sexual Activity    Alcohol use: Never    Drug use: No    Sexual activity: Yes     Partners: Male     Birth control/protection: Post-menopausal   Social History Narrative         Social Determinants of Health     Financial Resource Strain: Low Risk     Difficulty of Paying Living Expenses: Not very hard   Food Insecurity: No Food Insecurity    Worried About Running Out of Food in the Last Year: Never true    Ran Out of Food in the Last Year: Never true   Transportation Needs: No Transportation Needs    Lack of Transportation (Medical): No    Lack of Transportation (Non-Medical): No   Physical Activity: Insufficiently Active    Days of Exercise per Week: 3 days    Minutes of Exercise per Session: 20 min   Stress: No Stress Concern Present    Feeling of Stress : Only a little   Social Connections: Unknown    Frequency of Communication with Friends and Family: More than three times a week    Frequency of Social Gatherings with Friends and Family: Twice a week    Active Member of Clubs or Organizations: No    Attends Club or Organization Meetings: Never    Marital Status:    Housing Stability: Low Risk     Unable to Pay for Housing in the Last Year: No    Number of Places Lived in the Last " "Year: 1    Unstable Housing in the Last Year: No     Review of Systems   Constitutional:  Negative for fatigue and fever.   HENT:  Positive for congestion (on and off), ear pain (pressure, not pain), postnasal drip (has improved) and sore throat (hurts to swallow). Negative for sinus pressure and sinus pain.         Onset last weekend. Taking mucinex.    Respiratory:  Positive for cough (dry cough). Negative for chest tightness, shortness of breath and wheezing.    Cardiovascular:  Negative for chest pain, palpitations and leg swelling.   Gastrointestinal:  Negative for abdominal pain, diarrhea, nausea and vomiting.   Musculoskeletal:  Negative for arthralgias.   Neurological:  Negative for headaches.     Objective:      BP (!) 122/58 (BP Location: Right arm, Patient Position: Sitting)   Pulse 93   Temp 97.7 °F (36.5 °C)   Ht 5' 3" (1.6 m)   Wt 92 kg (202 lb 11.4 oz)   SpO2 97%   BMI 35.91 kg/m²      Physical Exam  Constitutional:       Appearance: Normal appearance. She is obese.   HENT:      Head: Normocephalic.      Right Ear: Ear canal and external ear normal. Tympanic membrane is injected.      Left Ear: Ear canal and external ear normal. Tympanic membrane is injected.      Nose: Rhinorrhea present. No congestion.      Right Sinus: No maxillary sinus tenderness.      Left Sinus: No maxillary sinus tenderness.      Mouth/Throat:      Pharynx: Posterior oropharyngeal erythema present. No oropharyngeal exudate.   Neck:      Thyroid: No thyromegaly.      Vascular: No carotid bruit.   Cardiovascular:      Rate and Rhythm: Normal rate and regular rhythm.      Pulses: Normal pulses.      Heart sounds: Normal heart sounds.   Pulmonary:      Effort: Pulmonary effort is normal.      Breath sounds: Normal breath sounds. No decreased breath sounds or wheezing.   Abdominal:      General: Bowel sounds are normal.      Tenderness: There is no abdominal tenderness.   Musculoskeletal:      Cervical back: Normal range of " motion.      Comments: Gait normal   Lymphadenopathy:      Head:      Right side of head: No submandibular adenopathy.      Left side of head: No submandibular adenopathy.   Skin:     General: Skin is warm and dry.      Capillary Refill: Capillary refill takes less than 2 seconds.   Neurological:      General: No focal deficit present.      Mental Status: She is alert.   Psychiatric:         Attention and Perception: Attention and perception normal.         Mood and Affect: Mood and affect normal.         Speech: Speech normal.         Behavior: Behavior normal.       Assessment:       1. Sore throat    2. Allergic rhinitis, unspecified seasonality, unspecified trigger    3. Acute cough        Plan:       Sore throat: strep negative  -     POCT Strep A, Molecular    Allergic rhinitis, unspecified seasonality, unspecified trigger: plain mucinex or claritin. Nothing with D or DM.  Can use flonase nasal spray.      No indication antibiotics are needed at this time. If symptoms continue to progress with fever, sinus pain, discolored mucous then fill antibiotics.     Acute cough: tessalon perles TID prn.    Other orders  -     benzonatate (TESSALON) 200 MG capsule; Take 1 capsule (200 mg total) by mouth 3 (three) times daily as needed.  Dispense: 30 capsule; Refill: 0  -     amoxicillin-clavulanate 875-125mg (AUGMENTIN) 875-125 mg per tablet; Take 1 tablet by mouth 2 (two) times daily. for 10 days  Dispense: 20 tablet; Refill: 0       Continue current medication  Take medications only as prescribed  Healthy diet, exercise  Adequate rest  Adequate hydration  Avoid allergens  Avoid excessive caffeine      Follow up if not improving

## 2023-07-11 RX ORDER — VIBEGRON 75 MG/1
TABLET, FILM COATED ORAL
Qty: 30 TABLET | Refills: 3 | Status: SHIPPED | OUTPATIENT
Start: 2023-07-11 | End: 2023-12-05

## 2023-07-19 RX ORDER — LEVOTHYROXINE SODIUM 112 UG/1
112 TABLET ORAL
Qty: 90 TABLET | Refills: 2 | Status: SHIPPED | OUTPATIENT
Start: 2023-07-19 | End: 2024-01-23

## 2023-07-19 NOTE — TELEPHONE ENCOUNTER
No care due was identified.  [facility] Embedded Care Due Messages. Reference number: [id number].   7/19/2023 2:14:14 PM CDT

## 2023-08-23 NOTE — TELEPHONE ENCOUNTER
Refill Routing Note   Medication(s) are not appropriate for processing by [facility] for the following reason(s):      New or recently adjusted medication    [facility] action(s):  Defer Care Due:  None identified              Appointments  past 12m or future 3m with PCP    Date Provider   Last Visit   6/1/2023 Komal Del Castillo MD   Next Visit   Visit date not found Komal Del Castillo MD   ED visits in past 90 days: 0        Note composed:5:27 PM 08/23/2023

## 2023-08-23 NOTE — TELEPHONE ENCOUNTER
No care due was identified.  [facility] Embedded Care Due Messages. Reference number: [id number].   8/23/2023 5:24:20 PM CDT

## 2023-08-25 RX ORDER — ROSUVASTATIN CALCIUM 10 MG/1
10 TABLET, COATED ORAL DAILY
Qty: 90 TABLET | Refills: 0 | Status: SHIPPED | OUTPATIENT
Start: 2023-08-25 | End: 2023-11-17

## 2023-08-31 NOTE — TELEPHONE ENCOUNTER
No care due was identified.  [facility] Embedded Care Due Messages. Reference number: [id number].   8/31/2023 1:58:24 PM CDT

## 2023-09-01 ENCOUNTER — LAB VISIT (OUTPATIENT)
Dept: LAB | Facility: HOSPITAL | Age: 73
End: 2023-09-01
Attending: FAMILY MEDICINE
Payer: MEDICARE

## 2023-09-01 DIAGNOSIS — E78.5 HYPERLIPIDEMIA, UNSPECIFIED HYPERLIPIDEMIA TYPE: ICD-10-CM

## 2023-09-01 LAB
CHOLEST SERPL-MCNC: 171 MG/DL (ref 120–199)
CHOLEST/HDLC SERPL: 3.2 {RATIO} (ref 2–5)
HDLC SERPL-MCNC: 54 MG/DL (ref 40–75)
HDLC SERPL: 31.6 % (ref 20–50)
LDLC SERPL CALC-MCNC: 97.4 MG/DL (ref 63–159)
NONHDLC SERPL-MCNC: 117 MG/DL
TRIGL SERPL-MCNC: 98 MG/DL (ref 30–150)

## 2023-09-01 PROCEDURE — 80061 LIPID PANEL: CPT | Mod: HCNC | Performed by: FAMILY MEDICINE

## 2023-09-01 PROCEDURE — 36415 COLL VENOUS BLD VENIPUNCTURE: CPT | Mod: HCNC,PO | Performed by: FAMILY MEDICINE

## 2023-09-01 RX ORDER — MONTELUKAST SODIUM 10 MG/1
10 TABLET ORAL NIGHTLY
Qty: 90 TABLET | Refills: 1 | Status: SHIPPED | OUTPATIENT
Start: 2023-09-01 | End: 2024-02-28

## 2023-09-03 ENCOUNTER — PATIENT MESSAGE (OUTPATIENT)
Dept: FAMILY MEDICINE | Facility: CLINIC | Age: 73
End: 2023-09-03
Payer: MEDICARE

## 2023-09-29 ENCOUNTER — TELEPHONE (OUTPATIENT)
Dept: FAMILY MEDICINE | Facility: CLINIC | Age: 73
End: 2023-09-29
Payer: MEDICARE

## 2023-09-29 NOTE — TELEPHONE ENCOUNTER
----- Message from Justyna Higgins sent at 9/27/2023  2:06 PM CDT -----  Regarding: sooner apt  Contact: patient  Type:  Sooner Appointment Request    Caller is requesting a sooner appointment.  Caller declined first available appointment listed below.  Caller will not accept being placed on the waitlist and is requesting a message be sent to doctor.    Name of Caller:  Patient  When is the first available appointment?  Dec   Symptoms:  flu apt  Best Call Back Number:  [phone]    Additional Information:  call to have pt scheduled thanks!

## 2023-10-02 DIAGNOSIS — K21.9 GASTROESOPHAGEAL REFLUX DISEASE WITHOUT ESOPHAGITIS: ICD-10-CM

## 2023-10-02 RX ORDER — METOPROLOL SUCCINATE 50 MG/1
TABLET, EXTENDED RELEASE ORAL
Qty: 90 TABLET | Refills: 2 | Status: SHIPPED | OUTPATIENT
Start: 2023-10-02

## 2023-10-02 RX ORDER — OMEPRAZOLE 20 MG/1
20 CAPSULE, DELAYED RELEASE ORAL DAILY
Qty: 90 CAPSULE | Refills: 3 | Status: SHIPPED | OUTPATIENT
Start: 2023-10-02 | End: 2024-10-01

## 2023-10-02 NOTE — TELEPHONE ENCOUNTER
Refill Decision Note   Curtis Ramon  is requesting a refill authorization.  Brief Assessment and Rationale for Refill:  Approve     Medication Therapy Plan:         Comments:     Note composed:1:08 PM 10/02/2023

## 2023-10-02 NOTE — TELEPHONE ENCOUNTER
No care due was identified.  [facility] Embedded Care Due Messages. Reference number: [id number].   10/02/2023 11:08:16 AM CDT

## 2023-10-03 ENCOUNTER — PATIENT MESSAGE (OUTPATIENT)
Dept: FAMILY MEDICINE | Facility: CLINIC | Age: 73
End: 2023-10-03
Payer: MEDICARE

## 2023-10-03 DIAGNOSIS — E78.5 HYPERLIPIDEMIA, UNSPECIFIED HYPERLIPIDEMIA TYPE: Primary | ICD-10-CM

## 2023-11-17 RX ORDER — ROSUVASTATIN CALCIUM 10 MG/1
10 TABLET, COATED ORAL DAILY
Qty: 90 TABLET | Refills: 1 | Status: SHIPPED | OUTPATIENT
Start: 2023-11-17 | End: 2023-12-08

## 2023-11-17 NOTE — TELEPHONE ENCOUNTER
No care due was identified.  [facility] Embedded Care Due Messages. Reference number: [id number].   11/17/2023 3:10:50 PM CST

## 2023-11-17 NOTE — TELEPHONE ENCOUNTER
Refill Decision Note   Curtis Ramon  is requesting a refill authorization.  Brief Assessment and Rationale for Refill:  Approve     Medication Therapy Plan:         Comments:     Note composed:4:48 PM 11/17/2023

## 2023-11-28 ENCOUNTER — OFFICE VISIT (OUTPATIENT)
Dept: UROLOGY | Facility: CLINIC | Age: 73
End: 2023-11-28
Payer: MEDICARE

## 2023-11-28 VITALS — HEART RATE: 72 BPM | SYSTOLIC BLOOD PRESSURE: 155 MMHG | DIASTOLIC BLOOD PRESSURE: 68 MMHG

## 2023-11-28 DIAGNOSIS — N39.41 URGE INCONTINENCE: Primary | ICD-10-CM

## 2023-11-28 DIAGNOSIS — N32.81 OAB (OVERACTIVE BLADDER): ICD-10-CM

## 2023-11-28 LAB
BILIRUBIN, UA POC OHS: NEGATIVE
BLOOD, UA POC OHS: NEGATIVE
CLARITY, UA POC OHS: CLEAR
COLOR, UA POC OHS: YELLOW
GLUCOSE, UA POC OHS: NEGATIVE
KETONES, UA POC OHS: NEGATIVE
LEUKOCYTES, UA POC OHS: NEGATIVE
NITRITE, UA POC OHS: NEGATIVE
PH, UA POC OHS: 6
POC RESIDUAL URINE VOLUME: 0 ML (ref 0–100)
PROTEIN, UA POC OHS: NEGATIVE
SPECIFIC GRAVITY, UA POC OHS: 1.01
UROBILINOGEN, UA POC OHS: 0.2

## 2023-11-28 PROCEDURE — 99999 PR PBB SHADOW E&M-EST. PATIENT-LVL III: CPT | Mod: PBBFAC,HCNC,, | Performed by: UROLOGY

## 2023-11-28 PROCEDURE — 3077F PR MOST RECENT SYSTOLIC BLOOD PRESSURE >= 140 MM HG: ICD-10-PCS | Mod: HCNC,CPTII,S$GLB, | Performed by: UROLOGY

## 2023-11-28 PROCEDURE — 3044F HG A1C LEVEL LT 7.0%: CPT | Mod: HCNC,CPTII,S$GLB, | Performed by: UROLOGY

## 2023-11-28 PROCEDURE — 1160F PR REVIEW ALL MEDS BY PRESCRIBER/CLIN PHARMACIST DOCUMENTED: ICD-10-PCS | Mod: HCNC,CPTII,S$GLB, | Performed by: UROLOGY

## 2023-11-28 PROCEDURE — 1159F PR MEDICATION LIST DOCUMENTED IN MEDICAL RECORD: ICD-10-PCS | Mod: HCNC,CPTII,S$GLB, | Performed by: UROLOGY

## 2023-11-28 PROCEDURE — 1126F PR PAIN SEVERITY QUANTIFIED, NO PAIN PRESENT: ICD-10-PCS | Mod: HCNC,CPTII,S$GLB, | Performed by: UROLOGY

## 2023-11-28 PROCEDURE — 4010F ACE/ARB THERAPY RXD/TAKEN: CPT | Mod: HCNC,CPTII,S$GLB, | Performed by: UROLOGY

## 2023-11-28 PROCEDURE — 51798 POCT BLADDER SCAN: ICD-10-PCS | Mod: HCNC,S$GLB,, | Performed by: UROLOGY

## 2023-11-28 PROCEDURE — 1101F PR PT FALLS ASSESS DOC 0-1 FALLS W/OUT INJ PAST YR: ICD-10-PCS | Mod: HCNC,CPTII,S$GLB, | Performed by: UROLOGY

## 2023-11-28 PROCEDURE — 3044F PR MOST RECENT HEMOGLOBIN A1C LEVEL <7.0%: ICD-10-PCS | Mod: HCNC,CPTII,S$GLB, | Performed by: UROLOGY

## 2023-11-28 PROCEDURE — 51798 US URINE CAPACITY MEASURE: CPT | Mod: HCNC,S$GLB,, | Performed by: UROLOGY

## 2023-11-28 PROCEDURE — 99999 PR PBB SHADOW E&M-EST. PATIENT-LVL III: ICD-10-PCS | Mod: PBBFAC,HCNC,, | Performed by: UROLOGY

## 2023-11-28 PROCEDURE — 99215 OFFICE O/P EST HI 40 MIN: CPT | Mod: HCNC,S$GLB,, | Performed by: UROLOGY

## 2023-11-28 PROCEDURE — 81003 URINALYSIS AUTO W/O SCOPE: CPT | Mod: QW,HCNC,S$GLB, | Performed by: UROLOGY

## 2023-11-28 PROCEDURE — 1101F PT FALLS ASSESS-DOCD LE1/YR: CPT | Mod: HCNC,CPTII,S$GLB, | Performed by: UROLOGY

## 2023-11-28 PROCEDURE — 3288F PR FALLS RISK ASSESSMENT DOCUMENTED: ICD-10-PCS | Mod: HCNC,CPTII,S$GLB, | Performed by: UROLOGY

## 2023-11-28 PROCEDURE — 3288F FALL RISK ASSESSMENT DOCD: CPT | Mod: HCNC,CPTII,S$GLB, | Performed by: UROLOGY

## 2023-11-28 PROCEDURE — 1159F MED LIST DOCD IN RCRD: CPT | Mod: HCNC,CPTII,S$GLB, | Performed by: UROLOGY

## 2023-11-28 PROCEDURE — 1126F AMNT PAIN NOTED NONE PRSNT: CPT | Mod: HCNC,CPTII,S$GLB, | Performed by: UROLOGY

## 2023-11-28 PROCEDURE — 3078F DIAST BP <80 MM HG: CPT | Mod: HCNC,CPTII,S$GLB, | Performed by: UROLOGY

## 2023-11-28 PROCEDURE — 81003 POCT URINALYSIS(INSTRUMENT): ICD-10-PCS | Mod: QW,HCNC,S$GLB, | Performed by: UROLOGY

## 2023-11-28 PROCEDURE — 1160F RVW MEDS BY RX/DR IN RCRD: CPT | Mod: HCNC,CPTII,S$GLB, | Performed by: UROLOGY

## 2023-11-28 PROCEDURE — 4010F PR ACE/ARB THEARPY RXD/TAKEN: ICD-10-PCS | Mod: HCNC,CPTII,S$GLB, | Performed by: UROLOGY

## 2023-11-28 PROCEDURE — 3077F SYST BP >= 140 MM HG: CPT | Mod: HCNC,CPTII,S$GLB, | Performed by: UROLOGY

## 2023-11-28 PROCEDURE — 3078F PR MOST RECENT DIASTOLIC BLOOD PRESSURE < 80 MM HG: ICD-10-PCS | Mod: HCNC,CPTII,S$GLB, | Performed by: UROLOGY

## 2023-11-28 PROCEDURE — 99215 PR OFFICE/OUTPT VISIT, EST, LEVL V, 40-54 MIN: ICD-10-PCS | Mod: HCNC,S$GLB,, | Performed by: UROLOGY

## 2023-11-28 NOTE — PROGRESS NOTES
[facility] Note - [place]  Staff: MD Ashley    Referring provider and please cc: Carlyn  PCP: Richelle Schiro [facility]: active     Chief Complaint: oab    Subjective:        HPI: Curtis Navarro is a 73 y.o. female presents with     oab  -she's had this issue for 20 years  -she initially saw her ob gyn for this who tried her on myrbetriq 25 and 50 and had 50 %improvement in frequency but was still having incontinence. Prior to this she had tried detrol at some point. She then saw jemal on 5/14/18 and pvr was 170, myrbetriq dc'd and started on flomax. She was then seen on 7/6/18 and although sx improved some she they returned and jemal discontinued the flomax and started vesicare 5mg daily which improved her nocturia to1-2x a night. She had her then stop the vesicare for voiding diary which she brought with her today. Prior to starting the medicine per day: 14x a day or more, night time voids: 3x a night, 1-2 leaks of drops to large volume, urgency: 5, 2 pads a day.  Since starting vesicare she has had 50% improvement - voids q1-2 hours, nocturia 2x a night, no leaks, urgency down to 3, 0 pads. +dry mouth (not that bothersome) and constipation but takes metamucil and has a soft bm daily.   -no significant SHAWN   -She also was drinking coffee every morning and 1-2 cokes a day. Now she drinks 1 decaf coffee in the morning and coke zero 1-2x a week. No recurrent utis. G1, P 1, vaginal   -saw her 10/2018, stress test only showed leakage with 150cc but repeated standing and was negative.     Pelvic exam 10/12/18: negative stress test with coughing supine, negative stress test with valsalva supine, In and out cath performed with 20cc residual - urine was not sent for sample, Bladder filled to 150cc cc, Sensation to void felt at 90 cc Catheter removed, +stress test with coughing supine, negative stress test with valsalva supine, Negative stress test with coughing or valsalva   Standing. Stage 2 cystocele     Stable bladder and discontinued vesicare bc of the dry mouth and constipation and started myrbetriq 50mg. She has had improvement with the meds an no caffeine.3d voiding Diary (uploaded) shows voids 6-8x a day. Nocturia 1x a night. 1-2 leaks with urge. Urgency 3-5 (still pretty strong), 0 pads. Much improved.     Interval history 7/25/19: Up she was last seen in January 2019 and she had significant improvement in her overactive bladder with Myrbetriq 50 mg daily.  She was asked to continue this in follow-up and today states that she continues to have minimal leakage less than 1 time a day and does not really ever have to wear pads.  She voids every few hours.  However she still concerned because of the urgency and occasional urge incontinence that does occur.  She has a trip planned to [place] in November and is concerned about having to look for bathrooms.  She is possibly interested in proceeding with another form of treatment.    Interval history 7/7/22:  At last visit 3 years ago discussed botox vs interstim. Was having urgency still although frequency and UUI had improved. She says she is still on myrbetriq 50mg which helps with nocturia (1-2x) and during day every 1-3 hours. Sometimes having some UUI with drop and rare SHAWN. It's becoming bothersome again now.     Wears 3-4 thick pads/day damp but not wet. 1 cup of coffee in am. No sodas.     Interval history since last visit with me:  She has tried detrol, vesicare (caused dry mouth) and is currently on myrbetriq and switched her to vibegron on 7/17/22 bc she was having increasing frequency, urgency and more importantly urge incontinenence. Vibegron was covered. She says it helped with frequency. Diary 1 month after taking shows she voids 9-12x/day (so maybe going more often than this).   Went from 3 to 4 pads a day to 2 pads a day. She doesn't feel when she leaks.  Still having urgency every time she urinates but feels  like vibegron helping.   She's interested in ptns but doesn't know anyone in [place] that does this.   Ua today neg. Pvr by sc: 8    Interval history 2/6/23:  Returns today stating that she could not take the oxybutynin which we had added to the vibegron.  Caused her to feel to to funny.  She is leaking multiple times a day.  Changes her pad frequently.  Leaking so much that it goes through her pad.  This is occurring a few times a week.  She is still worried about getting anesthesia.  She is never had anesthesia procedure.  She is asking about PTNS.  Date: 2/6/23 Pelvic exam (stress test/cmg to evaluate stress vs urge incontinence) : SUPINE STRESS/LEAKTEST EMPTY: (--) stress test with cough, (--) stress test with valsalva.  In and out cath with 20cc residual - urine was not sent for sample. BLADDER FILLING: First sensation to void felt at 50 cc. Significant urge was met (130cc). Bladder filled to 130 cc. The catheter was then removed. SUPINE STRESS/LEAK TEST FULL: (--) stress test with coughing , (--) stress test with valsalva . STANDING STRESS/LEAK TEST FULL: (--) stress test with coughing.  (--) stress test with valsalva . Other:: +anterior prolapse. (--) atrophic vaginitis. (--) urethral caruncle. (--) vaginal discharge (was not sent for vaginosis screen).     Interval history by ME in CLINIC on 11/28/23for oab and UUI:  At her last visit I referred her to Uro gyn.  We had discussed different options for overactive bladder treatments since she had dried failed multiple medications or had some improvement but wanted to see if she could have better improvement.  Add referred her to Uro gyn for posterior tibial nerve stimulation but this because she lives in [place] in would have been too much driving  Currently she is on gemtesa only in wants to find out if there are other options beyond posterior tibial nerve and ready to discuss possible procedures.  She is urinating every 2-3 hours during the day, 1-2 times  "a night it wearing 3 thick pads a day for urge incontinence.  Average urgency about 2 to 3/5.  Has incontinence mainly when she tried to make it home to use the bathroom.  Once it starts it is hard for her to stop..  Rare constipation.  No h/o diabetes, no h/o skin abscesses, on asa 81mg - sees cardiology for checkup (- last workup a year ago)    Urine history : anticoagulation: none. Cardiologist: yearly for checkup (stress test- ).  2/6/23  Void: neg  9/15/22 Void: neg pvr by scan: 18  7/7/22  Neg, pvr by scan: 14  11/12/18 No cx, void: neg, 1 rbc  10/12/18 No cx, void: tr prot/tr blood - send for automated urinalysis, pvr by in and out: 20  8/15/18 No cx, void: neg  7/6/18  No cx, void: neg  6/13/18 Multiple org, void: neg  5/14/18 Ng, Void: neg, pvr by I&o: 170  6/6/17  E.coli, void: neg  3/20/15 ng, void: neg  11/27/06 Multiple org, void: 1+ blood    REVIEW OF SYSTEMS: neg     Allergies:  Lisinopril, Azithromycin, Metronidazole hcl, Moxifloxacin, and Sulfa (sulfonamide antibiotics)   Some of these she just had a small rash to but she's not sure. One of them actually caused her to "itch" in her ears and vagina/body and she had a "weird" feeling. May   Anticoagulation: Yes - asa 81mg    Objective:     Vitals:    11/28/23 1030   BP: (!) 155/68   Pulse: 72       Assessment:       Curtis Navarro is a 73 y.o. female    1. Urge incontinence    2. OAB (overactive bladder)         Plan:     We discussed all the treatment options including Botox again and interstim - peripheral nerve evaluation (oupatient awake) versus 2 stage procedure (outpatient asleep x 2) and posterior tibial nerve stimulation (outpatient clinic visit x 12)  Did ask about ecoin PtNS which we do not have available here yet  After all the discussions she said she wants to proceed with posterior tibial nerve stimulation.    However if she changes her mind we could again discussed peripheral nerve evaluation which does not " require anesthesia.   She will check to see if any other urologist closer to her near [place] do PTNS to make this more convenient for her.  If not she is wants to come back here and see Radha in [facility].  Continue gemtesa for now.       Fu in a year or sooner if she changes her mind.           Eliana Ramirez MD

## 2023-11-28 NOTE — PATIENT INSTRUCTIONS
Curtis Navarro is a 73 y.o. female    1. Urge incontinence    2. OAB (overactive bladder)         Plan:     We discussed all the treatment options including Botox again and interstim - peripheral nerve evaluation (oupatient awake) versus 2 stage procedure (outpatient asleep x 2) and posterior tibial nerve stimulation (outpatient clinic visit x 12)  Did ask about ecoin PtNS which we do not have available here yet  After all the discussions she said she wants to proceed with posterior tibial nerve stimulation.  However if she changes her mind we could again discussed peripheral nerve evaluation which does not require anesthesia.   She will check to see if any other urologist closer to her near [place] do PTNS to make this more convenient for her.  If not she is wants to come back here and see Radha in [facility].

## 2023-12-05 RX ORDER — VIBEGRON 75 MG/1
TABLET, FILM COATED ORAL
Qty: 30 TABLET | Refills: 3 | Status: SHIPPED | OUTPATIENT
Start: 2023-12-05 | End: 2024-03-05

## 2023-12-06 ENCOUNTER — PATIENT MESSAGE (OUTPATIENT)
Dept: FAMILY MEDICINE | Facility: CLINIC | Age: 73
End: 2023-12-06
Payer: MEDICARE

## 2023-12-07 ENCOUNTER — TELEPHONE (OUTPATIENT)
Dept: OBSTETRICS AND GYNECOLOGY | Facility: CLINIC | Age: 73
End: 2023-12-07
Payer: MEDICARE

## 2023-12-07 NOTE — TELEPHONE ENCOUNTER
----- Message from Ines Isaiah sent at 12/7/2023 12:12 PM CST -----  Contact: self  Type:  Mammogram    Caller is requesting to schedule their annual mammogram appointment.  Order is not listed in EPIC.  Please enter order and contact patient to schedule.  Name of Caller:self  Where would they like the mammogram performed?[facility]  Would the patient rather a call back or a response via [facility]? call  Best Call Back Number:[phone] (home)     Additional Information: please advise and thank you.

## 2023-12-08 RX ORDER — PITAVASTATIN CALCIUM 2.09 MG/1
2 TABLET, FILM COATED ORAL NIGHTLY
Qty: 90 TABLET | Refills: 0 | Status: SHIPPED | OUTPATIENT
Start: 2023-12-08 | End: 2023-12-11

## 2023-12-11 ENCOUNTER — TELEPHONE (OUTPATIENT)
Dept: FAMILY MEDICINE | Facility: CLINIC | Age: 73
End: 2023-12-11
Payer: MEDICARE

## 2023-12-11 RX ORDER — PITAVASTATIN MAGNESIUM 2 MG/1
1 TABLET, FILM COATED ORAL NIGHTLY
Qty: 90 TABLET | Refills: 1 | Status: SHIPPED | OUTPATIENT
Start: 2023-12-11

## 2023-12-29 ENCOUNTER — PATIENT MESSAGE (OUTPATIENT)
Dept: FAMILY MEDICINE | Facility: CLINIC | Age: 73
End: 2023-12-29
Payer: MEDICARE

## 2023-12-31 NOTE — TELEPHONE ENCOUNTER
No care due was identified.  [facility] Embedded Care Due Messages. Reference number: [id number].   12/31/2023 1:00:52 PM CST

## 2024-01-02 RX ORDER — LOSARTAN POTASSIUM 100 MG/1
100 TABLET ORAL DAILY
Qty: 90 TABLET | Refills: 1 | Status: SHIPPED | OUTPATIENT
Start: 2024-01-02

## 2024-01-02 NOTE — TELEPHONE ENCOUNTER
Refill Routing Note   Medication(s) are not appropriate for processing by [facility] for the following reason(s):        Required vitals abnormal    [facility] action(s):  Defer               Appointments  past 12m or future 3m with PCP    Date Provider   Last Visit   6/1/2023 Komal Del Castillo MD   Next Visit   Visit date not found Komal Del Castillo MD   ED visits in past 90 days: 0        Note composed:11:53 AM 01/02/2024

## 2024-01-05 ENCOUNTER — HOSPITAL ENCOUNTER (OUTPATIENT)
Dept: RADIOLOGY | Facility: HOSPITAL | Age: 74
Discharge: HOME | End: 2024-01-05
Attending: FAMILY MEDICINE
Payer: MEDICARE

## 2024-01-05 DIAGNOSIS — Z12.31 ENCOUNTER FOR SCREENING MAMMOGRAM FOR BREAST CANCER: ICD-10-CM

## 2024-01-05 PROCEDURE — 77067 SCR MAMMO BI INCL CAD: CPT | Mod: 26,HCNC,, | Performed by: RADIOLOGY

## 2024-01-05 PROCEDURE — 77063 BREAST TOMOSYNTHESIS BI: CPT | Mod: 26,HCNC,, | Performed by: RADIOLOGY

## 2024-01-05 PROCEDURE — 77067 SCR MAMMO BI INCL CAD: CPT | Mod: TC,HCNC,PO

## 2024-01-22 NOTE — TELEPHONE ENCOUNTER
No care due was identified.  [facility] Embedded Care Due Messages. Reference number: [id number].   1/22/2024 12:33:28 PM CST

## 2024-01-23 RX ORDER — LEVOTHYROXINE SODIUM 112 UG/1
112 TABLET ORAL
Qty: 90 TABLET | Refills: 1 | Status: SHIPPED | OUTPATIENT
Start: 2024-01-23

## 2024-01-23 NOTE — TELEPHONE ENCOUNTER
Refill Decision Note   Curtis Ramon  is requesting a refill authorization.  Brief Assessment and Rationale for Refill:  Approve     Medication Therapy Plan:         Comments:     Note composed:3:05 AM 01/23/2024

## 2024-03-05 RX ORDER — VIBEGRON 75 MG/1
TABLET, FILM COATED ORAL
Qty: 90 TABLET | Refills: 3 | Status: SHIPPED | OUTPATIENT
Start: 2024-03-05

## 2024-04-19 ENCOUNTER — HOSPITAL ENCOUNTER (OUTPATIENT)
Dept: RADIOLOGY | Facility: HOSPITAL | Age: 74
Discharge: HOME | End: 2024-04-19
Attending: NURSE PRACTITIONER
Payer: MEDICARE

## 2024-04-19 ENCOUNTER — OFFICE VISIT (OUTPATIENT)
Dept: FAMILY MEDICINE | Facility: CLINIC | Age: 74
End: 2024-04-19
Payer: MEDICARE

## 2024-04-19 VITALS
DIASTOLIC BLOOD PRESSURE: 62 MMHG | OXYGEN SATURATION: 96 % | BODY MASS INDEX: 34.3 KG/M2 | WEIGHT: 193.56 LBS | RESPIRATION RATE: 20 BRPM | SYSTOLIC BLOOD PRESSURE: 122 MMHG | TEMPERATURE: 98 F | HEIGHT: 63 IN | HEART RATE: 84 BPM

## 2024-04-19 DIAGNOSIS — I70.0 ATHEROSCLEROSIS OF AORTA: ICD-10-CM

## 2024-04-19 DIAGNOSIS — R10.32 LLQ PAIN: Primary | ICD-10-CM

## 2024-04-19 DIAGNOSIS — R10.32 LLQ PAIN: ICD-10-CM

## 2024-04-19 PROBLEM — E66.01 SEVERE OBESITY (BMI 35.0-39.9) WITH COMORBIDITY: Status: RESOLVED | Noted: 2022-07-11 | Resolved: 2024-04-19

## 2024-04-19 PROCEDURE — 1125F AMNT PAIN NOTED PAIN PRSNT: CPT | Mod: CPTII,S$GLB,, | Performed by: NURSE PRACTITIONER

## 2024-04-19 PROCEDURE — 3008F BODY MASS INDEX DOCD: CPT | Mod: CPTII,S$GLB,, | Performed by: NURSE PRACTITIONER

## 2024-04-19 PROCEDURE — 1101F PT FALLS ASSESS-DOCD LE1/YR: CPT | Mod: CPTII,S$GLB,, | Performed by: NURSE PRACTITIONER

## 2024-04-19 PROCEDURE — 3288F FALL RISK ASSESSMENT DOCD: CPT | Mod: CPTII,S$GLB,, | Performed by: NURSE PRACTITIONER

## 2024-04-19 PROCEDURE — 99214 OFFICE O/P EST MOD 30 MIN: CPT | Mod: S$GLB,,, | Performed by: NURSE PRACTITIONER

## 2024-04-19 PROCEDURE — 3078F DIAST BP <80 MM HG: CPT | Mod: CPTII,S$GLB,, | Performed by: NURSE PRACTITIONER

## 2024-04-19 PROCEDURE — 4010F ACE/ARB THERAPY RXD/TAKEN: CPT | Mod: CPTII,S$GLB,, | Performed by: NURSE PRACTITIONER

## 2024-04-19 PROCEDURE — 1159F MED LIST DOCD IN RCRD: CPT | Mod: CPTII,S$GLB,, | Performed by: NURSE PRACTITIONER

## 2024-04-19 PROCEDURE — 74019 RADEX ABDOMEN 2 VIEWS: CPT | Mod: 26,HCNC,, | Performed by: RADIOLOGY

## 2024-04-19 PROCEDURE — 3074F SYST BP LT 130 MM HG: CPT | Mod: CPTII,S$GLB,, | Performed by: NURSE PRACTITIONER

## 2024-04-19 PROCEDURE — 74019 RADEX ABDOMEN 2 VIEWS: CPT | Mod: TC,HCNC,FY,PO

## 2024-04-19 NOTE — PROGRESS NOTES
"Subjective:       Patient ID: Curtis Navarro is a 73 y.o. female.    Chief Complaint: Bloated (And discomfort that comes and goes)    Abdominal Pain  This is a recurrent problem. The current episode started 1 to 4 weeks ago. The onset quality is gradual. The problem occurs daily. The problem has been resolved. The pain is located in the LLQ. Pertinent negatives include no anorexia, arthralgias, fever or headaches. The pain is aggravated by movement. Her past medical history is significant for GERD and irritable bowel syndrome. There is no history of abdominal surgery, colon cancer, Crohn's disease, gallstones, pancreatitis, PUD or ulcerative colitis. Patient's medical history does not include kidney stones and UTI.     Review of Systems   Constitutional:  Negative for appetite change, chills and fever.   HENT:  Negative for ear pain and postnasal drip.    Eyes:  Negative for pain and itching.   Respiratory:  Negative for chest tightness and shortness of breath.    Gastrointestinal:  Positive for abdominal pain. Negative for abdominal distention and anorexia.   Endocrine: Negative for polydipsia and polyuria.   Genitourinary:  Negative for difficulty urinating and flank pain.   Musculoskeletal:  Negative for arthralgias.   Skin:  Negative for color change and pallor.   Neurological:  Negative for light-headedness and headaches.   Hematological:  Negative for adenopathy. Does not bruise/bleed easily.   Psychiatric/Behavioral:  Negative for agitation.        Past medical, surgical, family and social history reviewed.  Objective:     Vitals:    04/19/24 1458   BP: 122/62   Pulse: 84   Resp: 20   Temp: 98.1 °F (36.7 °C)   SpO2: 96%   Weight: 87.8 kg (193 lb 9 oz)   Height: 5' 3" (1.6 m)   PainSc:   3   PainLoc: Knee     Body mass index is 34.29 kg/m².     Physical Exam  Constitutional:       Appearance: She is well-developed.   HENT:      Head: Normocephalic and atraumatic.      Right Ear: External ear normal.      " Left Ear: External ear normal.      Nose: Nose normal.   Eyes:      General: No scleral icterus.        Right eye: No discharge.         Left eye: No discharge.      Conjunctiva/sclera: Conjunctivae normal.   Neck:      Trachea: No tracheal deviation.   Cardiovascular:      Rate and Rhythm: Normal rate and regular rhythm.      Heart sounds: Normal heart sounds. No murmur heard.     No friction rub.   Pulmonary:      Effort: Pulmonary effort is normal. No respiratory distress.      Breath sounds: Normal breath sounds. No stridor. No wheezing or rales.   Chest:      Chest wall: No tenderness.   Musculoskeletal:         General: Normal range of motion.      Cervical back: Normal range of motion and neck supple.   Lymphadenopathy:      Cervical: No cervical adenopathy.   Skin:     General: Skin is warm and dry.   Neurological:      Mental Status: She is alert and oriented to person, place, and time.         Assessment:       1. LLQ pain    2. Atherosclerosis of aorta        Plan:       Curtis was seen today for bloated.    Diagnoses and all orders for this visit:    LLQ pain  -     X-Ray Abdomen Flat And Erect; Future    Atherosclerosis of aorta  Continue risk reductiion         Aortic calcific atherosclerosis. X-ray lumbar spine 11/4/22

## 2024-04-21 ENCOUNTER — PATIENT MESSAGE (OUTPATIENT)
Dept: FAMILY MEDICINE | Facility: CLINIC | Age: 74
End: 2024-04-21
Payer: MEDICARE

## 2024-04-21 DIAGNOSIS — R10.32 LEFT LOWER QUADRANT PAIN: Primary | ICD-10-CM

## 2024-04-23 DIAGNOSIS — E78.5 HYPERLIPIDEMIA, UNSPECIFIED HYPERLIPIDEMIA TYPE: Primary | ICD-10-CM

## 2024-04-23 NOTE — TELEPHONE ENCOUNTER
Refill Routing Note   Medication(s) are not appropriate for processing by [facility] for the following reason(s):        Outside of protocol    [facility] action(s):  Route               Appointments  past 12m or future 3m with PCP    Date Provider   Last Visit   6/1/2023 Komal Del Castillo MD   Next Visit   6/14/2024 Komal Del Castillo MD   ED visits in past 90 days: 0        Note composed:8:58 AM 04/23/2024

## 2024-04-27 RX ORDER — PITAVASTATIN MAGNESIUM 2 MG/1
1 TABLET, FILM COATED ORAL NIGHTLY
Qty: 90 TABLET | Refills: 0 | Status: SHIPPED | OUTPATIENT
Start: 2024-04-27

## 2024-04-27 NOTE — TELEPHONE ENCOUNTER
Done but overdue for FLP   Since she's coming up due for all labs, I have reentered annual labs to include a FLP

## 2024-04-29 ENCOUNTER — OFFICE VISIT (OUTPATIENT)
Dept: OPTOMETRY | Facility: CLINIC | Age: 74
End: 2024-04-29
Payer: MEDICARE

## 2024-04-29 DIAGNOSIS — H25.13 NUCLEAR SCLEROSIS, BILATERAL: Primary | ICD-10-CM

## 2024-04-29 DIAGNOSIS — Z46.0 FITTING AND ADJUSTMENT OF SPECTACLES AND CONTACT LENSES: Primary | ICD-10-CM

## 2024-04-29 DIAGNOSIS — H52.7 REFRACTIVE ERROR: ICD-10-CM

## 2024-04-29 PROCEDURE — 99999 PR PBB SHADOW E&M-EST. PATIENT-LVL III: CPT | Mod: PBBFAC,HCNC,, | Performed by: OPTOMETRIST

## 2024-04-29 PROCEDURE — 1126F AMNT PAIN NOTED NONE PRSNT: CPT | Mod: HCNC,CPTII,S$GLB, | Performed by: OPTOMETRIST

## 2024-04-29 PROCEDURE — 92014 COMPRE OPH EXAM EST PT 1/>: CPT | Mod: HCNC,S$GLB,, | Performed by: OPTOMETRIST

## 2024-04-29 PROCEDURE — 92310 CONTACT LENS FITTING OU: CPT | Mod: CSM,HCNC,S$GLB, | Performed by: OPTOMETRIST

## 2024-04-29 PROCEDURE — 1101F PT FALLS ASSESS-DOCD LE1/YR: CPT | Mod: HCNC,CPTII,S$GLB, | Performed by: OPTOMETRIST

## 2024-04-29 PROCEDURE — 3288F FALL RISK ASSESSMENT DOCD: CPT | Mod: HCNC,CPTII,S$GLB, | Performed by: OPTOMETRIST

## 2024-04-29 PROCEDURE — 4010F ACE/ARB THERAPY RXD/TAKEN: CPT | Mod: HCNC,CPTII,S$GLB, | Performed by: OPTOMETRIST

## 2024-04-29 NOTE — PROGRESS NOTES
HPI    Routine-dle-2/23    Pt denies any blurred va. Needing updated glasses and contacts. No   flashes, some floaters. Possibly interested in another contact lens   brand-getting expensive.   Last edited by Anai Wagoner on 4/29/2024 11:00 AM.            Assessment /Plan     For exam results, see Encounter Report.    Nuclear sclerosis, bilateral    Refractive error      Educated pt on presence of cataracts and effects on vision. No surgery at this time. Recheck in one year.  2. New Spectacle Rx given and Contact lens Rx released to pt. Daily wear only advised, with education to risks of extended wear.  Discussed lens care, compliance and solutions. RTC yearly contact lens follow up. , discussed different options for glasses. RTC 1 year routine eye exam.

## 2024-05-05 ENCOUNTER — PATIENT MESSAGE (OUTPATIENT)
Dept: OPTOMETRY | Facility: CLINIC | Age: 74
End: 2024-05-05
Payer: MEDICARE

## 2024-05-09 ENCOUNTER — PATIENT MESSAGE (OUTPATIENT)
Dept: FAMILY MEDICINE | Facility: CLINIC | Age: 74
End: 2024-05-09
Payer: MEDICARE

## 2024-05-09 DIAGNOSIS — E83.52 HYPERCALCEMIA: Primary | ICD-10-CM

## 2024-05-16 ENCOUNTER — LAB VISIT (OUTPATIENT)
Dept: LAB | Facility: HOSPITAL | Age: 74
End: 2024-05-16
Attending: FAMILY MEDICINE
Payer: MEDICARE

## 2024-05-16 ENCOUNTER — TELEPHONE (OUTPATIENT)
Dept: FAMILY MEDICINE | Facility: CLINIC | Age: 74
End: 2024-05-16
Payer: MEDICARE

## 2024-05-16 DIAGNOSIS — E83.52 HYPERCALCEMIA: ICD-10-CM

## 2024-05-16 LAB
25(OH)D3+25(OH)D2 SERPL-MCNC: 57 NG/ML (ref 30–96)
CA-I BLDV-SCNC: 1.25 MMOL/L (ref 1.06–1.42)
PTH-INTACT SERPL-MCNC: 74.5 PG/ML (ref 9–77)

## 2024-05-16 PROCEDURE — 36415 COLL VENOUS BLD VENIPUNCTURE: CPT | Mod: HCNC,PO | Performed by: FAMILY MEDICINE

## 2024-05-16 PROCEDURE — 82306 VITAMIN D 25 HYDROXY: CPT | Mod: HCNC | Performed by: FAMILY MEDICINE

## 2024-05-16 PROCEDURE — 83970 ASSAY OF PARATHORMONE: CPT | Mod: HCNC | Performed by: FAMILY MEDICINE

## 2024-05-16 PROCEDURE — 82330 ASSAY OF CALCIUM: CPT | Mod: HCNC | Performed by: FAMILY MEDICINE

## 2024-05-16 NOTE — TELEPHONE ENCOUNTER
----- Message from Addie Harper sent at 5/15/2024  3:26 PM CDT -----  Contact: self  Type: Needs Medical Advice  Who Called:  the patient     Best Call Back Number: [phone]  Additional Information: pt states she hurt her back/sciatic and was wondering if she could be seen sooner. She also states life is very complicated justin and would like to speak to a nurse.

## 2024-05-16 NOTE — TELEPHONE ENCOUNTER
Called pt and scheduled appt for tomorrow.  Pt has to reschedule another appt.  Will call back to cancel if unable to keep it

## 2024-05-17 ENCOUNTER — OFFICE VISIT (OUTPATIENT)
Dept: FAMILY MEDICINE | Facility: CLINIC | Age: 74
End: 2024-05-17
Payer: MEDICARE

## 2024-05-17 VITALS
SYSTOLIC BLOOD PRESSURE: 124 MMHG | DIASTOLIC BLOOD PRESSURE: 70 MMHG | WEIGHT: 195.69 LBS | RESPIRATION RATE: 16 BRPM | TEMPERATURE: 97 F | OXYGEN SATURATION: 96 % | HEIGHT: 63 IN | BODY MASS INDEX: 34.67 KG/M2 | HEART RATE: 76 BPM

## 2024-05-17 DIAGNOSIS — M54.42 LOW BACK PAIN WITH LEFT-SIDED SCIATICA, UNSPECIFIED BACK PAIN LATERALITY, UNSPECIFIED CHRONICITY: Primary | ICD-10-CM

## 2024-05-17 PROCEDURE — 3288F FALL RISK ASSESSMENT DOCD: CPT | Mod: CPTII,S$GLB,, | Performed by: FAMILY MEDICINE

## 2024-05-17 PROCEDURE — 1160F RVW MEDS BY RX/DR IN RCRD: CPT | Mod: CPTII,S$GLB,, | Performed by: FAMILY MEDICINE

## 2024-05-17 PROCEDURE — 99214 OFFICE O/P EST MOD 30 MIN: CPT | Mod: S$GLB,,, | Performed by: FAMILY MEDICINE

## 2024-05-17 PROCEDURE — 3078F DIAST BP <80 MM HG: CPT | Mod: CPTII,S$GLB,, | Performed by: FAMILY MEDICINE

## 2024-05-17 PROCEDURE — 1125F AMNT PAIN NOTED PAIN PRSNT: CPT | Mod: CPTII,S$GLB,, | Performed by: FAMILY MEDICINE

## 2024-05-17 PROCEDURE — 3074F SYST BP LT 130 MM HG: CPT | Mod: CPTII,S$GLB,, | Performed by: FAMILY MEDICINE

## 2024-05-17 PROCEDURE — 1101F PT FALLS ASSESS-DOCD LE1/YR: CPT | Mod: CPTII,S$GLB,, | Performed by: FAMILY MEDICINE

## 2024-05-17 PROCEDURE — 4010F ACE/ARB THERAPY RXD/TAKEN: CPT | Mod: CPTII,S$GLB,, | Performed by: FAMILY MEDICINE

## 2024-05-17 PROCEDURE — 3008F BODY MASS INDEX DOCD: CPT | Mod: CPTII,S$GLB,, | Performed by: FAMILY MEDICINE

## 2024-05-17 PROCEDURE — 1159F MED LIST DOCD IN RCRD: CPT | Mod: CPTII,S$GLB,, | Performed by: FAMILY MEDICINE

## 2024-05-17 RX ORDER — GABAPENTIN 300 MG/1
CAPSULE ORAL
Qty: 63 CAPSULE | Refills: 0 | Status: SHIPPED | OUTPATIENT
Start: 2024-05-17 | End: 2024-06-19

## 2024-05-17 RX ORDER — MELOXICAM 15 MG/1
15 TABLET ORAL DAILY
Qty: 30 TABLET | Refills: 1 | Status: SHIPPED | OUTPATIENT
Start: 2024-05-17

## 2024-05-18 ENCOUNTER — PATIENT MESSAGE (OUTPATIENT)
Dept: FAMILY MEDICINE | Facility: CLINIC | Age: 74
End: 2024-05-18
Payer: MEDICARE

## 2024-05-26 NOTE — PROGRESS NOTES
Subjective:       Patient ID: Curtis Navarro is a 73 y.o. female.    Chief Complaint: Back Pain    HPI  The patient is a 73-year-old who is here today with back pain.  She has had back pain for a long time but recently her pain has been worse.  In addition to the back pain, she is also getting pain down into her left buttocks which will extend down to her knee and sometimes down into her foot and her ankle.  This increased back pain with associated left leg pain has been going on for the past week.  Because of her back and radicular pain, she has been having hard time sitting and walking.  Her pain is currently a 6 on the pain scale.  She has previously done physical therapy with some improvement.  An x-ray in 2022 showed marked degenerative changes.    Overall, she is doing okay but not great.  Her mom  in March.  Her  had a brain bleed (possibly a subdural per her description) in April and went to rehab and is now home.  She is watching her 4-year-old grandchild  through .  She is doing a lot of caregiving and her back pain and sciatica is particularly problematic.    Review of Systems   Constitutional:  Negative for appetite change, chills, diaphoresis, fatigue, fever and unexpected weight change.   HENT:  Negative for congestion, ear pain, postnasal drip, rhinorrhea, sinus pressure, sneezing, sore throat and trouble swallowing.    Eyes:  Negative for pain, discharge and visual disturbance.   Respiratory:  Negative for cough, chest tightness, shortness of breath and wheezing.    Cardiovascular:  Negative for chest pain, palpitations and leg swelling.   Gastrointestinal:  Negative for abdominal distention, abdominal pain, blood in stool, constipation, diarrhea, nausea and vomiting.   Musculoskeletal:  Positive for back pain.   Skin:  Negative for rash.         Objective:      Physical Exam  Constitutional:       General: She is not in acute distress.     Appearance:  Normal appearance. She is well-developed.   HENT:      Head: Normocephalic and atraumatic.      Right Ear: Hearing, tympanic membrane, ear canal and external ear normal.      Left Ear: Hearing, tympanic membrane, ear canal and external ear normal.      Nose: Nose normal.      Mouth/Throat:      Mouth: No oral lesions.      Pharynx: No oropharyngeal exudate or posterior oropharyngeal erythema.   Eyes:      General: Lids are normal. No scleral icterus.     Extraocular Movements: Extraocular movements intact.      Conjunctiva/sclera: Conjunctivae normal.      Pupils: Pupils are equal, round, and reactive to light.   Neck:      Thyroid: No thyroid mass or thyromegaly.      Vascular: No carotid bruit.   Cardiovascular:      Rate and Rhythm: Normal rate and regular rhythm. No extrasystoles are present.     Chest Wall: PMI is not displaced.      Heart sounds: Normal heart sounds. No murmur heard.     No gallop.   Pulmonary:      Effort: Pulmonary effort is normal. No accessory muscle usage or respiratory distress.      Breath sounds: Normal breath sounds.   Abdominal:      General: Bowel sounds are normal. There is no abdominal bruit.      Palpations: Abdomen is soft.      Tenderness: There is no abdominal tenderness. There is no rebound.   Musculoskeletal:      Cervical back: Normal range of motion and neck supple.      Lumbar back: Deformity present. No bony tenderness. Decreased range of motion. Positive right straight leg raise test and positive left straight leg raise test.   Lymphadenopathy:      Head:      Right side of head: No submental or submandibular adenopathy.      Left side of head: No submental or submandibular adenopathy.      Cervical:      Right cervical: No superficial, deep or posterior cervical adenopathy.     Left cervical: No superficial, deep or posterior cervical adenopathy.      Upper Body:      Right upper body: No supraclavicular adenopathy.      Left upper body: No supraclavicular adenopathy.  "  Skin:     General: Skin is warm and dry.   Neurological:      Mental Status: She is alert and oriented to person, place, and time.       Blood pressure 124/70, pulse 76, temperature 97.1 °F (36.2 °C), resp. rate 16, height 5' 3" (1.6 m), weight 88.7 kg (195 lb 10.5 oz), SpO2 96%.Body mass index is 34.66 kg/m².          A/P:  1) low back pain with left sided sciatica.  Acute on chronic.  We did discuss a course of prednisone but she does not do well with prednisone.  In the end, we decided to start Mobic 15 mg once a day for 2 weeks and gabapentin initially 300 mg at night and increasing to twice a day if needed.  If her symptoms do not improve or if they worsen, she will let me know    "

## 2024-05-27 ENCOUNTER — OFFICE VISIT (OUTPATIENT)
Dept: CARDIOLOGY | Facility: CLINIC | Age: 74
End: 2024-05-27
Payer: MEDICARE

## 2024-05-27 VITALS
HEIGHT: 63 IN | BODY MASS INDEX: 35.35 KG/M2 | DIASTOLIC BLOOD PRESSURE: 68 MMHG | WEIGHT: 199.5 LBS | SYSTOLIC BLOOD PRESSURE: 136 MMHG | HEART RATE: 59 BPM

## 2024-05-27 DIAGNOSIS — E78.5 DYSLIPIDEMIA: Chronic | ICD-10-CM

## 2024-05-27 DIAGNOSIS — I70.0 ATHEROSCLEROSIS OF AORTA: ICD-10-CM

## 2024-05-27 DIAGNOSIS — I10 PRIMARY HYPERTENSION: Primary | ICD-10-CM

## 2024-05-27 DIAGNOSIS — R00.2 PALPITATIONS: ICD-10-CM

## 2024-05-27 PROCEDURE — 3288F FALL RISK ASSESSMENT DOCD: CPT | Mod: HCNC,CPTII,S$GLB, | Performed by: INTERNAL MEDICINE

## 2024-05-27 PROCEDURE — 4010F ACE/ARB THERAPY RXD/TAKEN: CPT | Mod: HCNC,CPTII,S$GLB, | Performed by: INTERNAL MEDICINE

## 2024-05-27 PROCEDURE — 3075F SYST BP GE 130 - 139MM HG: CPT | Mod: HCNC,CPTII,S$GLB, | Performed by: INTERNAL MEDICINE

## 2024-05-27 PROCEDURE — 1101F PT FALLS ASSESS-DOCD LE1/YR: CPT | Mod: HCNC,CPTII,S$GLB, | Performed by: INTERNAL MEDICINE

## 2024-05-27 PROCEDURE — 1159F MED LIST DOCD IN RCRD: CPT | Mod: HCNC,CPTII,S$GLB, | Performed by: INTERNAL MEDICINE

## 2024-05-27 PROCEDURE — 3078F DIAST BP <80 MM HG: CPT | Mod: HCNC,CPTII,S$GLB, | Performed by: INTERNAL MEDICINE

## 2024-05-27 PROCEDURE — 1160F RVW MEDS BY RX/DR IN RCRD: CPT | Mod: HCNC,CPTII,S$GLB, | Performed by: INTERNAL MEDICINE

## 2024-05-27 PROCEDURE — 1126F AMNT PAIN NOTED NONE PRSNT: CPT | Mod: HCNC,CPTII,S$GLB, | Performed by: INTERNAL MEDICINE

## 2024-05-27 PROCEDURE — 99213 OFFICE O/P EST LOW 20 MIN: CPT | Mod: HCNC,S$GLB,, | Performed by: INTERNAL MEDICINE

## 2024-05-27 PROCEDURE — 99999 PR PBB SHADOW E&M-EST. PATIENT-LVL IV: CPT | Mod: PBBFAC,HCNC,, | Performed by: INTERNAL MEDICINE

## 2024-05-27 PROCEDURE — 3008F BODY MASS INDEX DOCD: CPT | Mod: HCNC,CPTII,S$GLB, | Performed by: INTERNAL MEDICINE

## 2024-05-27 NOTE — PROGRESS NOTES
Subjective:    Patient ID:  Curtis Navarro is a 73 y.o. female who presents for follow-up of hypertension, dyslipidemia    HPI  She comes with no complaints, no chest pain, no shortness of breath  Not losing weight  BP ok at home      Review of Systems   Constitutional: Negative for decreased appetite, malaise/fatigue, weight gain and weight loss.   Cardiovascular:  Negative for chest pain, dyspnea on exertion, leg swelling, palpitations and syncope.   Respiratory:  Negative for cough and shortness of breath.    Gastrointestinal: Negative.    Neurological:  Negative for weakness.   All other systems reviewed and are negative.     Objective:      Physical Exam  Vitals and nursing note reviewed.   Constitutional:       Appearance: Normal appearance. She is well-developed.   HENT:      Head: Normocephalic.   Eyes:      Pupils: Pupils are equal, round, and reactive to light.   Neck:      Thyroid: No thyromegaly.      Vascular: No carotid bruit or JVD.   Cardiovascular:      Rate and Rhythm: Normal rate and regular rhythm.      Chest Wall: PMI is not displaced.      Pulses: Normal pulses and intact distal pulses.      Heart sounds: Normal heart sounds. No murmur heard.     No gallop.   Pulmonary:      Effort: Pulmonary effort is normal.      Breath sounds: Normal breath sounds.   Abdominal:      Palpations: Abdomen is soft. There is no mass.      Tenderness: There is no abdominal tenderness.   Musculoskeletal:         General: Normal range of motion.      Cervical back: Normal range of motion and neck supple.   Skin:     General: Skin is warm.   Neurological:      Mental Status: She is alert and oriented to person, place, and time.      Sensory: No sensory deficit.      Deep Tendon Reflexes: Reflexes are normal and symmetric.         Most Recent EKG Results  No results found for this or any previous visit.    Most Recent Echocardiogram Results  Results for orders placed in visit on 08/02/19    Transthoracic echo  (TTE) 2D with Color Flow    Interpretation Summary  · Normal left ventricular systolic function. The estimated ejection fraction is 60%  · Normal LV diastolic function.  · Normal right ventricular systolic function.  · The estimated PA systolic pressure is 29 mm Hg      Most Recent Nuclear Stress Test Results  Results for orders placed during the hospital encounter of 05/12/23    Nuclear Stress - Cardiology Interpreted    Interpretation Summary    Normal myocardial perfusion scan. There is no evidence of myocardial ischemia or infarction.    The gated perfusion images showed an ejection fraction of 69% post stress.    The ECG portion of the study is negative for ischemia.    The patient reported no chest pain during the stress test.    The exercise capacity was mildly impaired.      Most Recent Cardiac PET Stress Test Results  No results found for this or any previous visit.      Most Recent Cardiovascular Angiogram results  No results found for this or any previous visit.      Other Most Recent Cardiology Results  Results for orders placed in visit on 08/02/19    Cardiac event monitor    Interpretation Summary  Single episode of palpitations correlating with sinus tachycardia.      Labs reviewed    Assessment:       1. Primary hypertension    2. Dyslipidemia    3. Palpitations    4. Atherosclerosis of aorta         Plan:     Continue:  ARB, Beta blocker, and Statin  Regular exercise program  Weight loss  Low cholesterol diet    1 yr f/u with greg go

## 2024-06-21 ENCOUNTER — OFFICE VISIT (OUTPATIENT)
Dept: FAMILY MEDICINE | Facility: CLINIC | Age: 74
End: 2024-06-21
Payer: MEDICARE

## 2024-06-21 VITALS
RESPIRATION RATE: 16 BRPM | DIASTOLIC BLOOD PRESSURE: 80 MMHG | WEIGHT: 199.5 LBS | BODY MASS INDEX: 35.35 KG/M2 | SYSTOLIC BLOOD PRESSURE: 134 MMHG | TEMPERATURE: 99 F | OXYGEN SATURATION: 96 % | HEIGHT: 63 IN | HEART RATE: 68 BPM

## 2024-06-21 DIAGNOSIS — E66.9 OBESITY, CLASS II, BMI 35-39.9: ICD-10-CM

## 2024-06-21 DIAGNOSIS — E78.5 HYPERLIPIDEMIA, UNSPECIFIED HYPERLIPIDEMIA TYPE: ICD-10-CM

## 2024-06-21 DIAGNOSIS — Z00.00 ANNUAL PHYSICAL EXAM: Primary | ICD-10-CM

## 2024-06-21 DIAGNOSIS — E66.01 SEVERE OBESITY (BMI 35.0-39.9) WITH COMORBIDITY: ICD-10-CM

## 2024-06-21 PROCEDURE — 99397 PER PM REEVAL EST PAT 65+ YR: CPT | Mod: S$GLB,,, | Performed by: FAMILY MEDICINE

## 2024-06-21 PROCEDURE — 1159F MED LIST DOCD IN RCRD: CPT | Mod: CPTII,S$GLB,, | Performed by: FAMILY MEDICINE

## 2024-06-21 PROCEDURE — 3079F DIAST BP 80-89 MM HG: CPT | Mod: CPTII,S$GLB,, | Performed by: FAMILY MEDICINE

## 2024-06-21 PROCEDURE — 3288F FALL RISK ASSESSMENT DOCD: CPT | Mod: CPTII,S$GLB,, | Performed by: FAMILY MEDICINE

## 2024-06-21 PROCEDURE — 1160F RVW MEDS BY RX/DR IN RCRD: CPT | Mod: CPTII,S$GLB,, | Performed by: FAMILY MEDICINE

## 2024-06-21 PROCEDURE — 3008F BODY MASS INDEX DOCD: CPT | Mod: CPTII,S$GLB,, | Performed by: FAMILY MEDICINE

## 2024-06-21 PROCEDURE — 1101F PT FALLS ASSESS-DOCD LE1/YR: CPT | Mod: CPTII,S$GLB,, | Performed by: FAMILY MEDICINE

## 2024-06-21 PROCEDURE — 1126F AMNT PAIN NOTED NONE PRSNT: CPT | Mod: CPTII,S$GLB,, | Performed by: FAMILY MEDICINE

## 2024-06-21 PROCEDURE — 4010F ACE/ARB THERAPY RXD/TAKEN: CPT | Mod: CPTII,S$GLB,, | Performed by: FAMILY MEDICINE

## 2024-06-21 PROCEDURE — 3075F SYST BP GE 130 - 139MM HG: CPT | Mod: CPTII,S$GLB,, | Performed by: FAMILY MEDICINE

## 2024-06-21 RX ORDER — PITAVASTATIN CALCIUM 4.18 MG/1
1 TABLET, FILM COATED ORAL NIGHTLY
Qty: 90 TABLET | Refills: 1 | Status: SHIPPED | OUTPATIENT
Start: 2024-06-21 | End: 2025-06-21

## 2024-06-21 RX ORDER — SEMAGLUTIDE 0.25 MG/.5ML
0.25 INJECTION, SOLUTION SUBCUTANEOUS WEEKLY
Qty: 2 ML | Refills: 0 | Status: SHIPPED | OUTPATIENT
Start: 2024-06-21

## 2024-06-25 ENCOUNTER — PATIENT MESSAGE (OUTPATIENT)
Dept: FAMILY MEDICINE | Facility: CLINIC | Age: 74
End: 2024-06-25
Payer: MEDICARE

## 2024-06-30 NOTE — PROGRESS NOTES
Subjective:       Patient ID: Curtis Navarro is a 73 y.o. female.    Chief Complaint: Annual Exam    HPI  This is a 73-year-old who is here today for her annual exam.  Overall, she is doing well except for her weight.  She recently had labs which we discussed today.  She had her mammogram earlier this year.  Her colonoscopy is up-to-date.    Today we discussed the followin) hypertension.  Today blood pressure is 134/80.  She is taking Cozaar and Toprol which she is tolerating well  2) hypothyroidism.  She is doing well with her Synthroid.  Her recent TSH was normal at 0.401  3) hyperlipidemia.  She is taking her Livalo.  Her recent total cholesterol was 208 and her LDL was 111.  We did discuss adjusting her dose of the livalo which she would be interested in doing  4) obesity.  Today her BMI is 35.34.  She is interested in trying Ozempic.  She previously got a prescription for the Ozempic but never started it.    Of note, her low back pain with radicular symptoms have improved since her last visit except for some occasional numbness on the sole of her foot.  She did find the meloxicam to be very helpful and did not need the gabapentin            Review of Systems   Constitutional:  Negative for appetite change, chills, diaphoresis, fatigue, fever and unexpected weight change.   HENT:  Negative for congestion, dental problem, ear pain, hearing loss, postnasal drip, rhinorrhea, sneezing, sore throat and trouble swallowing.    Eyes:  Negative for photophobia, pain, discharge and visual disturbance.   Respiratory:  Negative for cough, chest tightness, shortness of breath and wheezing.    Cardiovascular:  Negative for chest pain, palpitations and leg swelling.   Gastrointestinal:  Negative for abdominal distention, abdominal pain, blood in stool, constipation, diarrhea, nausea and vomiting.   Endocrine: Negative for cold intolerance, heat intolerance, polydipsia and polyuria.   Genitourinary:  Negative for  dysuria, flank pain, frequency, genital sores, hematuria, menstrual problem and vaginal discharge.   Musculoskeletal:  Negative for arthralgias, joint swelling and myalgias.   Skin:  Negative for rash.   Neurological:  Negative for dizziness, syncope, light-headedness and headaches.   Hematological:  Negative for adenopathy. Does not bruise/bleed easily.   Psychiatric/Behavioral:  Negative for dysphoric mood, self-injury, sleep disturbance and suicidal ideas. The patient is not nervous/anxious.          Objective:      Physical Exam  Constitutional:       General: She is not in acute distress.     Appearance: Normal appearance. She is well-developed.   HENT:      Head: Normocephalic and atraumatic.      Right Ear: Hearing, tympanic membrane, ear canal and external ear normal.      Left Ear: Hearing, tympanic membrane, ear canal and external ear normal.      Nose: Nose normal.      Mouth/Throat:      Mouth: No oral lesions.      Pharynx: No oropharyngeal exudate or posterior oropharyngeal erythema.   Eyes:      General: Lids are normal. No scleral icterus.     Extraocular Movements: Extraocular movements intact.      Conjunctiva/sclera: Conjunctivae normal.      Pupils: Pupils are equal, round, and reactive to light.   Neck:      Thyroid: No thyroid mass or thyromegaly.      Vascular: No carotid bruit.   Cardiovascular:      Rate and Rhythm: Normal rate and regular rhythm. No extrasystoles are present.     Chest Wall: PMI is not displaced.      Heart sounds: Normal heart sounds. No murmur heard.     No gallop.   Pulmonary:      Effort: Pulmonary effort is normal. No accessory muscle usage or respiratory distress.      Breath sounds: Normal breath sounds.   Abdominal:      General: Bowel sounds are normal. There is no abdominal bruit.      Palpations: Abdomen is soft.      Tenderness: There is no abdominal tenderness. There is no rebound.   Musculoskeletal:      Cervical back: Normal range of motion and neck supple.  "  Lymphadenopathy:      Head:      Right side of head: No submental or submandibular adenopathy.      Left side of head: No submental or submandibular adenopathy.      Cervical:      Right cervical: No superficial, deep or posterior cervical adenopathy.     Left cervical: No superficial, deep or posterior cervical adenopathy.      Upper Body:      Right upper body: No supraclavicular adenopathy.      Left upper body: No supraclavicular adenopathy.   Skin:     General: Skin is warm and dry.   Neurological:      Mental Status: She is alert and oriented to person, place, and time.      Cranial Nerves: No cranial nerve deficit.      Sensory: No sensory deficit.   Psychiatric:         Speech: Speech normal.         Behavior: Behavior normal.         Thought Content: Thought content normal.       Blood pressure 134/80, pulse 68, temperature 98.7 °F (37.1 °C), temperature source Oral, resp. rate 16, height 5' 3" (1.6 m), weight 90.5 kg (199 lb 8.3 oz), SpO2 96%.Body mass index is 35.34 kg/m².              A/P:  1) annual exam.  Health maintenance issues and anticipatory guidance issues were discussed.  She will continue to stay physically active and consume a healthy diet  2)  hypertension.  Well controlled.  Continue with Cozaar and Toprol  3) hypothyroidism.  Well controlled.  Continue with Synthroid.  Recheck TSH in 1 year  4) hyperlipidemia.  Suboptimally controlled.  We are going to increase her livalo to 4 mg once a day.  We will check a fasting lipid profile in 3 months  5) obesity with a BMI is 35.34.  Persistent.  We will try Ozempic.  She is able to start this, she will let me know how she is doing with this after the 1st month of this medicine    As long as she does well, I will see her back in 1 year sooner if needed    "

## 2024-07-24 RX ORDER — LOSARTAN POTASSIUM 100 MG/1
100 TABLET ORAL DAILY
Qty: 90 TABLET | Refills: 3 | Status: SHIPPED | OUTPATIENT
Start: 2024-07-24

## 2024-07-24 NOTE — TELEPHONE ENCOUNTER
Care Due:                  Date            Visit Type   Department     Provider  --------------------------------------------------------------------------------                                EP -                              PRIMARY      [facility]    Komal Wetzeldon  Last Visit: 06-      CARE ([facility])   MEDICINE       Anger                              EP -                              PRIMARY      [facility]    Komal Lee Abundio  Next Visit: 06-      CARE ([facility])   MEDICINE       Anger                                                            Last  Test          Frequency    Reason                     Performed    Due Date  --------------------------------------------------------------------------------    HBA1C.......  6 months...  semaglutide,.............  05- 11-    Health [facility] Embedded Care Due Messages. Reference number: [id number].   7/24/2024 11:27:50 AM CDT

## 2024-07-24 NOTE — TELEPHONE ENCOUNTER
Provider Staff:  Action required for this patient    Requires labs      Please see care gap opportunities below in Care Due Message.    Thanks!  [facility]     Appointments      Date Provider   Last Visit   6/21/2024 Komal Del Castillo MD   Next Visit   Visit date not found Komal Del Castillo MD     Refill Decision Note   Curtis Navarro  is requesting a refill authorization.  Brief Assessment and Rationale for Refill:  Approve     Medication Therapy Plan:         Comments:     Note composed:4:21 PM 07/24/2024

## 2024-08-08 RX ORDER — METOPROLOL SUCCINATE 50 MG/1
TABLET, EXTENDED RELEASE ORAL
Qty: 90 TABLET | Refills: 3 | Status: SHIPPED | OUTPATIENT
Start: 2024-08-08

## 2024-08-09 ENCOUNTER — TELEPHONE (OUTPATIENT)
Dept: CARDIOLOGY | Facility: CLINIC | Age: 74
End: 2024-08-09
Payer: MEDICARE

## 2024-08-12 ENCOUNTER — TELEPHONE (OUTPATIENT)
Dept: FAMILY MEDICINE | Facility: CLINIC | Age: 74
End: 2024-08-12
Payer: MEDICARE

## 2024-08-12 NOTE — TELEPHONE ENCOUNTER
----- Message from Lauren Ibrahim sent at 8/9/2024 11:10 AM CDT -----  Type: Needs Medical Advice  Who Called:  Curtis Montana Call Back Number: [phone]   Additional Information: pt has to have knee replacement mid September and was notified that she needed to get in touch with the office to verify if anything needs to be done via your office for this procedure

## 2024-08-12 NOTE — TELEPHONE ENCOUNTER
Pt having knee surgery . Pt will need surgical clearance. Pt advised clearance is good for 30 days , she will call us back once her surgery is scheduled.--lp

## 2024-08-13 ENCOUNTER — CLINICAL SUPPORT (OUTPATIENT)
Dept: REHABILITATION | Facility: HOSPITAL | Age: 74
End: 2024-08-13
Payer: MEDICARE

## 2024-08-13 ENCOUNTER — PATIENT MESSAGE (OUTPATIENT)
Dept: FAMILY MEDICINE | Facility: CLINIC | Age: 74
End: 2024-08-13
Payer: MEDICARE

## 2024-08-13 ENCOUNTER — PATIENT MESSAGE (OUTPATIENT)
Dept: CARDIOLOGY | Facility: CLINIC | Age: 74
End: 2024-08-13
Payer: MEDICARE

## 2024-08-13 DIAGNOSIS — R29.898 WEAKNESS OF RIGHT LOWER EXTREMITY: Primary | ICD-10-CM

## 2024-08-13 PROCEDURE — 97112 NEUROMUSCULAR REEDUCATION: CPT | Mod: PO | Performed by: PHYSICAL THERAPIST

## 2024-08-13 PROCEDURE — 97162 PT EVAL MOD COMPLEX 30 MIN: CPT | Mod: PO | Performed by: PHYSICAL THERAPIST

## 2024-08-14 PROBLEM — R29.898 WEAKNESS OF RIGHT LOWER EXTREMITY: Status: ACTIVE | Noted: 2024-08-14

## 2024-08-14 NOTE — PLAN OF CARE
[facility]   Physical Therapy Initial Evaluation      Name: Curtis Navarro  [facility] Number: [id number]    Therapy Diagnosis:   Encounter Diagnosis   Name Primary?    Weakness of right lower extremity Yes        Physician: Joselo Helton,*    Physician Orders: PT Eval and Treat Post Surgical?No Eval and TreatYes Type of TherapyOutpatient Therapy Location:Knees  PREHAB FOR TKA  Medical Diagnosis from Referral:   Diagnosis   M17.11 (ICD-10-CM) - Osteoarthritis of right knee     Evaluation Date: 8/13/2024  Authorization Period Expiration: 12/31/2024  Plan of Care Expiration: 9/13/2024  Progress Note Due: 9/12/2024  Date of Surgery: na  Visit # / Visits authorized: 1/ 1   FOTO: 1/ 3 (8/13/2024)    Precautions: Standard, [place]    Time In: 905  Time Out: 950  Total Billable Time: 45 minutes    Subjective     Date of onset: chronic right knee pain    History of current condition - Curtis reports: that she is here to received prehab for a scheduled right TKA on or near 9/11/24. Her right knee has progressively worsened in pain. She has lost 13# which has helped pain a little.    Falls: 0    Imaging: none    Prior Therapy: years ago after a fall  Social History:  lives with their spouse  Occupation: retired  Prior Level of Function: Chronic right knee pain limited daily function.  Current Level of Function: Pt is noted to limp with right knee pain. She has pain with squatting to get in/out car and while driving. She also has difficulty with stairs should she have to do this, but does not have home stairs.    Pain:  Current 5/10, worst 7/10, best 3/10   Location: right knee    Description: Aching, Dull, and Sharp  Aggravating Factors: Standing and Bending  Easing Factors: pain medication, Tylenol    Patients goals: to learn ex and ROM and strengthen prior to sx.     Medical History:   Past Medical History:   Diagnosis Date    Allergy     Arthritis     Cataract     Colon polyps      Diverticulosis     GERD (gastroesophageal reflux disease)     Graves disease     s/p radioactive iodine in 42    H/O cardiovascular stress test     normal 5/23    History of diverticulitis     History of skin cancer     L neck    Hyperlipidemia     Hypertension     Hypothyroidism     s/p radioactive iodine    IBS (irritable bowel syndrome)     Osteopenia     Thyroid nodule     last usg 5/23;  next due 5/25       Surgical History:   Curtis Navarro  has a past surgical history that includes Tonsillectomy; Cosmetic surgery; Skin cancer excision; Cardiac surgery (2013); Esophagogastroduodenoscopy (N/A, 07/12/2018); Colonoscopy (07/21/2010); Colonoscopy (N/A, 06/27/2017); Upper gastrointestinal endoscopy (08/04/2014); Upper gastrointestinal endoscopy (06/27/2017); Esophagogastroduodenoscopy (N/A, 1/23/2023); and Colonoscopy (N/A, 1/23/2023).    Medications:   Curtis has a current medication list which includes the following prescription(s): aspirin, b.animalis,bifid,infantis,long, biotin, calcium-vitamin d3, glucosa holm 2kcl/chondroitin holm, krill/om-3/dha/epa/phospho/ast, levothyroxine, losartan, metoprolol succinate, multivitamin, omeprazole, pimecrolimus, pitavastatin calcium, wegovy, and gemtesa.    Allergies:   Review of patient's allergies indicates:   Allergen Reactions    Lisinopril Other (See Comments)     Cough    Azithromycin      Other reaction(s): Nausea  Other reaction(s): Diarrhea    Metronidazole hcl      Other reaction(s): Rash  Other reaction(s): Itching    Moxifloxacin      Other reaction(s): Rash    Sulfa (sulfonamide antibiotics)      Other reaction(s): Itching      Objective      Observation: Pt is walking with NARROW BASE OF SUPPORT and tends to scissors legs most of the time. She also has a chronic tilt to right side.    Posture: impaired    Full hip AROM bilaterally.    Range of Motion: 0-120 deg bilaterally    Lower Extremity Strength  Right LE  Left LE    Knee extension: 4/5 Knee extension:  4+/5   Knee flexion: 4-/5 Knee flexion: 4/5   Hip flexion: 4-/5 Hip flexion: 4/5   Hip extension:  4/5 Hip extension: 4-/5   Hip abduction: 3+/5 Hip abduction: 3/5   Hip adduction: 4/5 Hip adduction 4-/5   Ankle dorsiflexion: 5/5 Ankle dorsiflexion: 5/5   Ankle plantarflexion: 5/5 Ankle plantarflexion: 5/5     Joint Mobility: hypomobile in all directions Patellar    Palpation: general knee soreness    Sensation: intact right knee    Edema: nil    Intake Outcome Measure for FOTO KNEE Survey    Therapist reviewed FOTO scores for Curtis Navarro on 8/13/2024.   FOTO report - see Media section or FOTO account episode details.    Intake Score: 50  GOAL: 61       Treatment     Total Treatment time (time-based codes) separate from Evaluation: 23 minutes     Curtis received the treatments listed below:      neuromuscular re-education activities to improve: Sense, Proprioception, and education for 23 minutes. The following activities were included:  2 x 10, 2 daily:  QS  SLR  AAROM heel slides  SLR x 3  LONG ARC QUAD  Bridge    3 x/day  HS and ITB 3 x 30s    Patient Education and Home Exercises     Education provided:   - HEP  - Pt/family was provided educational information, including: role of PT, role of pt/caregiver, goals for PT, POC, scheduling, and attendance policy.- pt verbalized understanding.     Written Home Exercises Provided: Yes. Exercises were reviewed and Curtis was able to demonstrate them prior to the end of the session.  Curtis demonstrated good  understanding of the education provided. See EMR under Patient Instructions for exercises provided during therapy sessions.    Assessment     Curtis is a 74 y.o. female referred to outpatient Physical Therapy with a medical diagnosis of Osteoarthritis of right knee. Patient presents with impaired right knee ROM, strength, and presence of pain. She also has gt deviations. Pt is here to optimize function prior to undergoing right TKA. Pt will benefit from  improving function prior to surgery to optimize post surgical rehab outcomes.    Patient prognosis is Good.   Patient will benefit from skilled outpatient Physical Therapy to address the deficits stated above and in the chart below, provide patient /family education, and to maximize patientt's level of independence.     Plan of care discussed with patient: Yes  Patient's spiritual, cultural and educational needs considered and patient is agreeable to the plan of care and goals as stated below:     Anticipated Barriers for therapy: age, chronic condition.    Medical Necessity is demonstrated by the following  History  Co-morbidities and personal factors that may impact the plan of care [] LOW: no personal factors / co-morbidities  [] MODERATE: 1-2 personal factors / co-morbidities  [x] HIGH: 3+ personal factors / co-morbidities    Moderate / High Support Documentation:   Co-morbidities affecting plan of care:     Past Medical History:   Diagnosis Date    Allergy     Arthritis     Cataract     Colon polyps     Diverticulosis     GERD (gastroesophageal reflux disease)     Graves disease     s/p radioactive iodine in 42    H/O cardiovascular stress test     normal 5/23    History of diverticulitis     History of skin cancer     L neck    Hyperlipidemia     Hypertension     Hypothyroidism     s/p radioactive iodine    IBS (irritable bowel syndrome)     Osteopenia     Thyroid nodule     last usg 5/23;  next due 5/25     Personal Factors:   age  lifestyle     Examination  Body Structures and Functions, activity limitations and participation restrictions that may impact the plan of care [] LOW: addressing 1-2 elements  [x] MODERATE: 3+ elements  [] HIGH: 4+ elements (please support below)    Moderate / High Support Documentation: Patient presents with impaired right knee ROM, strength, and presence of pain. She also has gt deviations.      Clinical Presentation [] LOW: stable  [x] MODERATE: Evolving  [] HIGH: Unstable      Decision Making/ Complexity Score: moderate       Goals:  Short Term Goals: 2 weeks   Pt will be (I) in initial HEP.  Right knee pain report improved by 25%.    Long Term Goals: 4 weeks   FOTO score improved to 58 for best post op outcomes.  Pt will demo bilateral hip Abduction strength to 4-/5 in preparation of TKA.    Plan     Plan of care Certification: 8/13/2024 to 9/13/2024.    Outpatient Physical Therapy 1-2 times weekly for 4 weeks to include the following interventions: Electrical Stimulation IFC, Gait Training, Manual Therapy, Moist Heat/ Ice, Neuromuscular Re-ed, Patient Education, Therapeutic Activities, and Therapeutic Exercise.     Leatha Santana, PT        I CERTIFY THE NEED FOR THESE SERVICES FURNISHED UNDER THIS PLAN OF TREATMENT AND WHILE UNDER MY CARE    Physician's comments:        Physician's Signature: ___________________________________________________ DATE:_______________________

## 2024-08-20 ENCOUNTER — CLINICAL SUPPORT (OUTPATIENT)
Dept: REHABILITATION | Facility: HOSPITAL | Age: 74
End: 2024-08-20
Payer: MEDICARE

## 2024-08-20 DIAGNOSIS — R29.898 WEAKNESS OF RIGHT LOWER EXTREMITY: Primary | ICD-10-CM

## 2024-08-20 PROCEDURE — 97110 THERAPEUTIC EXERCISES: CPT | Mod: PO,CQ

## 2024-08-20 PROCEDURE — 97112 NEUROMUSCULAR REEDUCATION: CPT | Mod: PO,CQ

## 2024-08-20 NOTE — PROGRESS NOTES
[facility]   Physical Therapy Treatment Note     Name: Curtis Navarro  [facility] Number: [id number]    Therapy Diagnosis:   Encounter Diagnosis   Name Primary?    Weakness of right lower extremity Yes     Physician: Joselo Helton,*    Visit Date: 8/20/2024  Physician Orders: PT Eval and Treat Post Surgical?No Eval and TreatYes Type of TherapyOutpatient Therapy Location:Knees  PREHAB FOR TKA  Medical Diagnosis from Referral:   Diagnosis   M17.11 (ICD-10-CM) - Osteoarthritis of right knee      Evaluation Date: 8/13/2024  Authorization Period Expiration: 12/31/2024  Plan of Care Expiration: 9/13/2024  Progress Note Due: 9/12/2024  Date of Surgery: na  Visit # / Visits authorized: 1/ 20  FOTO: 1/ 3 (8/13/2024)    PTA Visit #: 1/5     Precautions: Standard and [place] , prehab for (R) TKA 9/11/24    Time In: 1002 AM  Time Out: 1055 AM  Total Billable Time: 25 minutes    SUBJECTIVE     Pt reports: her knee is okay this morning. Patient states her walking is off due to pain. She was compliant with home exercise program.  Response to previous treatment: no adverse effects   Functional change: too soon to determine     Pain: 2/10  Location: right knee      OBJECTIVE     Objective Measures updated at progress report unless specified.     Treatment     Curtis received the treatments listed below:      therapeutic exercises to develop strength, endurance, ROM, flexibility, posture, and core stabilization for 30 minutes including:  Nu step x 7 minutes  Long sitting gastroc stretch with strap x 1 minute x 3   Heel prop x 3 minutes, 3#  AAROM heel slides with strap x 3 minutes, 5 sec hold   LAQ 3x10    manual therapy techniques: Joint mobilizations were applied to the: (R) knee for 05 minutes, including:  Patellar mobilizations   Joint mobilizations: P/A, A/P     neuromuscular re-education activities to improve: Balance, Coordination, Proprioception, and Posture for 20 minutes. The following  activities were included:  Quad set (no towel roll) 2x10, 5 sec hold   Quad set + SLR 2x10  Bridge + green TB 2x10  S/L clamshell (Green TB) 2x10  Lateral weight shifting x 2 minutes    therapeutic activities to improve functional performance for 00 minutes, including:  Next session:  Step ups  Mini squats  Sit to stands    Patient Education and Home Exercises     Home Exercises Provided and Patient Education Provided     Education provided:   - HEP compliance     Written Home Exercises Provided: Patient instructed to cont prior HEP. Exercises were reviewed and Curtis was able to demonstrate them prior to the end of the session.  Curtis demonstrated good  understanding of the education provided. See EMR under Patient Instructions for exercises provided during therapy sessions    ASSESSMENT     Emphasis on full knee extension and quad function. Good tolerance as patient was able to achieve full active knee extension post passive interventions. Patient required cues for isolated quad activation initially but this improved with repetition and she was eventually able to maintain with SLR. Progressed to hip stabilization exercise to address proximal chain deficits. Progress functional training next treatment session.     Curtis Is progressing well towards her goals.   Pt prognosis is Good.     Pt will continue to benefit from skilled outpatient physical therapy to address the deficits listed in the problem list box on initial evaluation, provide pt/family education and to maximize pt's level of independence in the home and community environment.     Pt's spiritual, cultural and educational needs considered and pt agreeable to plan of care and goals.     Anticipated barriers to physical therapy: age, chronic condition    Goals:   Short Term Goals: 2 weeks   Pt will be (I) in initial HEP.  Right knee pain report improved by 25%.     Long Term Goals: 4 weeks   FOTO score improved to 58 for best post op outcomes.  Pt will  demo bilateral hip Abduction strength to 4-/5 in preparation of TKA.    PLAN     Continue current POC with emphasis on optimizing functional outcomes.     Margaret Mayfield, PTA

## 2024-08-22 ENCOUNTER — CLINICAL SUPPORT (OUTPATIENT)
Dept: REHABILITATION | Facility: HOSPITAL | Age: 74
End: 2024-08-22
Payer: MEDICARE

## 2024-08-22 DIAGNOSIS — R29.898 WEAKNESS OF RIGHT LOWER EXTREMITY: Primary | ICD-10-CM

## 2024-08-22 PROCEDURE — 97112 NEUROMUSCULAR REEDUCATION: CPT | Mod: PO,CQ

## 2024-08-22 PROCEDURE — 97110 THERAPEUTIC EXERCISES: CPT | Mod: PO,CQ

## 2024-08-22 NOTE — PROGRESS NOTES
[facility]   Physical Therapy Treatment Note     Name: Curtis Navarro  [facility] Number: [id number]    Therapy Diagnosis:   Encounter Diagnosis   Name Primary?    Weakness of right lower extremity Yes     Physician: Joselo Helton,*    Visit Date: 8/22/2024  Physician Orders: PT Eval and Treat Post Surgical?No Eval and TreatYes Type of TherapyOutpatient Therapy Location:Knees  PREHAB FOR TKA  Medical Diagnosis from Referral:   Diagnosis   M17.11 (ICD-10-CM) - Osteoarthritis of right knee      Evaluation Date: 8/13/2024  Authorization Period Expiration: 12/31/2024  Plan of Care Expiration: 9/13/2024  Progress Note Due: 9/12/2024  Date of Surgery: na  Visit # / Visits authorized: 2/ 20  FOTO: 1/ 3 (8/13/2024)    PTA Visit #: 2/5     Precautions: Standard and CA , prehab for (R) TKA 9/11/24    Time In: 0906 AM  Time Out: 0958 AM  Total Billable Time: 30 minutes    SUBJECTIVE     Pt reports:her knee was achy following last visit but it is feeling better this morning. Patient states he soreness usually increases as the day goes on. She was compliant with home exercise program.  Response to previous treatment: no adverse effects   Functional change: too soon to determine     Pain: 2/10  Location: right knee      OBJECTIVE     Objective Measures updated at progress report unless specified.     Treatment     Curtis received the treatments listed below:      therapeutic exercises to develop strength, endurance, ROM, flexibility, posture, and core stabilization for 30 minutes including:  Nu step x 7 minutes  Long sitting gastroc stretch with strap x 1 minute x 3   Heel prop x 3 minutes, 3# (NP)  AAROM heel slides with strap x 3 minutes, 5 sec hold   LAQ 3x10  Standing heel raises 2x10    manual therapy techniques: Joint mobilizations were applied to the: (R) knee for 05 minutes, including:  Patellar mobilizations   Joint mobilizations: P/A, A/P     neuromuscular re-education activities to  improve: Balance, Coordination, Proprioception, and Posture for 20 minutes. The following activities were included:  Quad set (no towel roll) 2x10, 5 sec hold   Quad set + SLR 2x10  Bridge + green TB 2x10  S/L clamshell (Green TB) 2x10  Lateral weight shifting x 2 minutes    therapeutic activities to improve functional performance for 05 minutes, including:  Step ups (4 inch) 2x10  Mini squats 2x10  Sit to stands (next session)    Patient Education and Home Exercises     Home Exercises Provided and Patient Education Provided     Education provided:   - HEP compliance     Written Home Exercises Provided: Patient instructed to cont prior HEP. Exercises were reviewed and Curtis was able to demonstrate them prior to the end of the session.  Curtis demonstrated good  understanding of the education provided. See EMR under Patient Instructions for exercises provided during therapy sessions    ASSESSMENT     Emphasis on full knee extension and quad function. Patient able to progress to step ups with cues needed for correction of valgus collapse and proper quad activation. Patient more sore today than initial visit when performing exercises focused on knee extension. Performed these exercises to tolerance today. Monitor tolerance and progress toward functional focused activities.      Curtis Is progressing well towards her goals.   Pt prognosis is Good.     Pt will continue to benefit from skilled outpatient physical therapy to address the deficits listed in the problem list box on initial evaluation, provide pt/family education and to maximize pt's level of independence in the home and community environment.     Pt's spiritual, cultural and educational needs considered and pt agreeable to plan of care and goals.     Anticipated barriers to physical therapy: age, chronic condition    Goals:   Short Term Goals: 2 weeks   Pt will be (I) in initial HEP.  Right knee pain report improved by 25%.     Long Term Goals: 4 weeks    FOTO score improved to 58 for best post op outcomes.  Pt will demo bilateral hip Abduction strength to 4-/5 in preparation of TKA.    PLAN     Continue current POC with emphasis on optimizing functional outcomes.     Margaret Mayfield, PTA

## 2024-08-27 ENCOUNTER — PATIENT MESSAGE (OUTPATIENT)
Dept: CARDIOLOGY | Facility: CLINIC | Age: 74
End: 2024-08-27
Payer: MEDICARE

## 2024-08-27 ENCOUNTER — TELEPHONE (OUTPATIENT)
Dept: CARDIOLOGY | Facility: CLINIC | Age: 74
End: 2024-08-27
Payer: MEDICARE

## 2024-08-27 ENCOUNTER — CLINICAL SUPPORT (OUTPATIENT)
Dept: REHABILITATION | Facility: HOSPITAL | Age: 74
End: 2024-08-27
Payer: MEDICARE

## 2024-08-27 DIAGNOSIS — R29.898 WEAKNESS OF RIGHT LOWER EXTREMITY: Primary | ICD-10-CM

## 2024-08-27 PROCEDURE — 97530 THERAPEUTIC ACTIVITIES: CPT | Mod: PO,CQ

## 2024-08-27 PROCEDURE — 97112 NEUROMUSCULAR REEDUCATION: CPT | Mod: PO,CQ

## 2024-08-27 NOTE — PROGRESS NOTES
[facility]   Physical Therapy Treatment Note     Name: Curtis Navarro  [facility] Number: [id number]    Therapy Diagnosis:   Encounter Diagnosis   Name Primary?    Weakness of right lower extremity Yes     Physician: Joselo Helton,*    Visit Date: 8/27/2024  Physician Orders: PT Eval and Treat Post Surgical?No Eval and TreatYes Type of TherapyOutpatient Therapy Location:Knees  PREHAB FOR TKA  Medical Diagnosis from Referral:   Diagnosis   M17.11 (ICD-10-CM) - Osteoarthritis of right knee      Evaluation Date: 8/13/2024  Authorization Period Expiration: 12/31/2024  Plan of Care Expiration: 9/13/2024  Progress Note Due: 9/12/2024  Date of Surgery: na  Visit # / Visits authorized: 3/ 20  FOTO: 1/ 3 (8/13/2024)    PTA Visit #: 3/5     Precautions: Standard and CA , prehab for (R) TKA 9/11/24    Time In: 1300  Time Out: 1358  Total Billable Time: 30 minutes    SUBJECTIVE     Pt reports: her knee is doing okay. Patient states she has been sore after PT but this isn't anything she didn't expect. She was compliant with home exercise program.  Response to previous treatment: no adverse effects   Functional change: too soon to determine     Pain: 2/10  Location: right knee      OBJECTIVE     Objective Measures updated at progress report unless specified.     Treatment     Curtis received the treatments listed below:      therapeutic exercises to develop strength, endurance, ROM, flexibility, posture, and core stabilization for 30 minutes including:  Nu step x 7 minutes  Long sitting gastroc stretch with strap x 1 minute x 3   Heel prop x 3 minutes, 3#   AAROM heel slides with strap x 3 minutes, 5 sec hold   LAQ 3x10  Standing heel raises 2x10    manual therapy techniques: Joint mobilizations were applied to the: (R) knee for 05 minutes, including:  Patellar mobilizations   Joint mobilizations: P/A, A/P     neuromuscular re-education activities to improve: Balance, Coordination,  Proprioception, and Posture for 20 minutes. The following activities were included:  Quad set (no towel roll) 2x10, 5 sec hold   Quad set + SLR 2x10  Bridge + green TB 2x10  S/L clamshell (Green TB) 2x10  Lateral weight shifting x 2 minutes    Next session: bridging and clamshells     therapeutic activities to improve functional performance for 10 minutes, including:  Step ups (4 inch) 2x10  Mini squats 2x10  Sit to stands from chair 2x10    Patient Education and Home Exercises     Home Exercises Provided and Patient Education Provided     Education provided:   - HEP compliance     Written Home Exercises Provided: Patient instructed to cont prior HEP. Exercises were reviewed and Curtis was able to demonstrate them prior to the end of the session.  Curtis demonstrated good  understanding of the education provided. See EMR under Patient Instructions for exercises provided during therapy sessions    ASSESSMENT     Improved tolerance to step ups today but patient utilized 1 UE assist due to LE weakness and decreased stability. Good tolerance to repetitive sit to stands. Improved tolerance to knee extension exercises today. Educated patient on progression to hip stabilization activities next treatment session in order to improve proximal stability.       Curtis Is progressing well towards her goals.   Pt prognosis is Good.     Pt will continue to benefit from skilled outpatient physical therapy to address the deficits listed in the problem list box on initial evaluation, provide pt/family education and to maximize pt's level of independence in the home and community environment.     Pt's spiritual, cultural and educational needs considered and pt agreeable to plan of care and goals.     Anticipated barriers to physical therapy: age, chronic condition    Goals:   Short Term Goals: 2 weeks   Pt will be (I) in initial HEP.  Right knee pain report improved by 25%.     Long Term Goals: 4 weeks   FOTO score improved to 58 for  best post op outcomes.  Pt will demo bilateral hip Abduction strength to 4-/5 in preparation of TKA.    PLAN     Continue current POC with emphasis on optimizing functional outcomes.     Margaret Mayfield, PTA

## 2024-08-27 NOTE — TELEPHONE ENCOUNTER
Cardiac clearance for Rt TKA on 9/11. Spinal anesthesia. Pt taking ASA.     PRAVEEN  Fax: [phone]

## 2024-08-29 ENCOUNTER — CLINICAL SUPPORT (OUTPATIENT)
Dept: REHABILITATION | Facility: HOSPITAL | Age: 74
End: 2024-08-29
Payer: MEDICARE

## 2024-08-29 DIAGNOSIS — R29.898 WEAKNESS OF RIGHT LOWER EXTREMITY: Primary | ICD-10-CM

## 2024-08-29 PROCEDURE — 97112 NEUROMUSCULAR REEDUCATION: CPT | Mod: PO | Performed by: PHYSICAL THERAPIST

## 2024-08-29 PROCEDURE — 97110 THERAPEUTIC EXERCISES: CPT | Mod: PO | Performed by: PHYSICAL THERAPIST

## 2024-08-29 PROCEDURE — 97530 THERAPEUTIC ACTIVITIES: CPT | Mod: PO | Performed by: PHYSICAL THERAPIST

## 2024-08-29 NOTE — PROGRESS NOTES
[facility]   Physical Therapy Treatment Note     Name: Curtis Navarro  [facility] Number: [id number]    Therapy Diagnosis:   Encounter Diagnosis   Name Primary?    Weakness of right lower extremity Yes     Physician: oJselo Helton,*    Visit Date: 8/29/2024  Physician Orders: PT Eval and Treat Post Surgical?No Eval and TreatYes Type of TherapyOutpatient Therapy Location:Knees  PREHAB FOR TKA  Medical Diagnosis from Referral:   Diagnosis   M17.11 (ICD-10-CM) - Osteoarthritis of right knee      Evaluation Date: 8/13/2024  Authorization Period Expiration: 12/31/2024  Plan of Care Expiration: 9/13/2024  Progress Note Due: 9/12/2024  Date of Surgery: na  Visit # / Visits authorized: 4/ 20  FOTO: 2/ 3 (8/13/2024, 8/29/2024)    PTA Visit #: 0/5     Precautions: Standard and CA , prehab for (R) TKA 9/11/24    Time In: 1000  Time Out: 1100  Total Billable Time: 30 minutes (1:1)    SUBJECTIVE     Pt reports: her knee is doing okay. Patient states she has been sore after PT but this isn't anything she didn't expect. She was compliant with home exercise program.  Response to previous treatment: no adverse effects   Functional change: too soon to determine     Pain: 0/10  Location: right knee      OBJECTIVE     Objective Measures updated at progress report unless specified.     8/29/2024: 59 (+9)    Treatment     Curtis received the treatments listed below:      therapeutic exercises to develop strength, endurance, ROM, flexibility, posture, and core stabilization for 30 minutes including:  Nu step x 7 minutes  Long sitting gastroc stretch with strap x 1 minute x 3   Heel prop x 3 minutes, 3#   AAROM heel slides with strap x 3 minutes, 5 sec hold   LAQ 3x10  Standing heel raises 2 x 10    neuromuscular re-education activities to improve: Balance, Coordination, Proprioception, and Posture for 20 minutes. The following activities were included:  Quad set (no towel roll) 2x10, 5 sec hold   Quad  set + SLR 2x10  Bridge + green TB 2x10  S/L clamshell (Green TB) 2x10  Lateral weight shifting x 2 minutes    Next session: bridging and clamshells     therapeutic activities to improve functional performance for 10 minutes, including:  Step ups (4 inch) 2x10  Mini squats 2x10  Sit to stands from chair 2x10    Patient Education and Home Exercises     Home Exercises Provided and Patient Education Provided     Education provided:   - HEP compliance     Written Home Exercises Provided: Patient instructed to cont prior HEP. Exercises were reviewed and Curtis was able to demonstrate them prior to the end of the session.  Curtis demonstrated good  understanding of the education provided. See EMR under Patient Instructions for exercises provided during therapy sessions.    ASSESSMENT     Pt is doing well in prehab. + TKE this date in standing.  FOTO score improved +9 points. Educated patient on progression to hip stabilization activities next treatment session in order to improve proximal stability.       Curtis Is progressing well towards her goals.   Pt prognosis is Good.     Pt will continue to benefit from skilled outpatient physical therapy to address the deficits listed in the problem list box on initial evaluation, provide pt/family education and to maximize pt's level of independence in the home and community environment.     Pt's spiritual, cultural and educational needs considered and pt agreeable to plan of care and goals.     Anticipated barriers to physical therapy: age, chronic condition    Goals:   Short Term Goals: 2 weeks   Pt will be (I) in initial HEP.-MET, DAILY  Right knee pain report improved by 25%. -MET, 25%     Long Term Goals: 4 weeks   FOTO score improved to 58 for best post op outcomes.-MET  Pt will demo bilateral hip Abduction strength to 4-/5 in preparation of TKA.    PLAN     Continue current POC with emphasis on optimizing functional outcomes.     Leatha Santana, PT

## 2024-08-30 ENCOUNTER — OFFICE VISIT (OUTPATIENT)
Dept: FAMILY MEDICINE | Facility: CLINIC | Age: 74
End: 2024-08-30
Payer: MEDICARE

## 2024-08-30 VITALS
HEIGHT: 63 IN | WEIGHT: 188.81 LBS | HEART RATE: 97 BPM | SYSTOLIC BLOOD PRESSURE: 130 MMHG | OXYGEN SATURATION: 97 % | DIASTOLIC BLOOD PRESSURE: 70 MMHG | RESPIRATION RATE: 16 BRPM | TEMPERATURE: 98 F | BODY MASS INDEX: 33.45 KG/M2

## 2024-08-30 DIAGNOSIS — E78.5 HYPERLIPIDEMIA, UNSPECIFIED HYPERLIPIDEMIA TYPE: ICD-10-CM

## 2024-08-30 DIAGNOSIS — L30.4 INTERTRIGO: ICD-10-CM

## 2024-08-30 DIAGNOSIS — I10 HYPERTENSION, ESSENTIAL: ICD-10-CM

## 2024-08-30 DIAGNOSIS — M17.10 ARTHRITIS OF KNEE: Primary | ICD-10-CM

## 2024-08-30 RX ORDER — MICONAZOLE NITRATE 2 G/100G
POWDER TOPICAL
Qty: 85 G | Refills: 3 | Status: SHIPPED | OUTPATIENT
Start: 2024-08-30

## 2024-08-30 NOTE — PROGRESS NOTES
Subjective:       Patient ID: Curtis Navarro is a 74 y.o. female.    Chief Complaint: Pre-op Exam    HPI  The patient is a 74-year-old who is here today for preop evaluation for knee surgery.  Since I have seen her last, her right knee has gotten much worse.  She is going to have a right total knee replacement at [facility] on September 11th.  Previously, she has done well with anesthesia.  Her sister and son are going to be able to take care of her immediately after her surgery.  She is going to have home health at home.  She has not had any recent illness.     Of note, since her last visit 2 months ago, she has been able to lose 11 lb.  She has been able to lose this weight by cutting out sweets.  She never started the Ozempic    Also of note, she has been tolerating Livalo well with no side effects.  She does have September lab appointment    Review of systems is remarkable for rash beneath her breast in her groin area    Review of Systems   Constitutional:  Negative for appetite change, chills, diaphoresis, fatigue, fever and unexpected weight change.   HENT:  Negative for congestion, ear pain, postnasal drip, rhinorrhea, sinus pressure, sneezing, sore throat and trouble swallowing.    Eyes:  Negative for pain, discharge and visual disturbance.   Respiratory:  Negative for cough, chest tightness, shortness of breath and wheezing.    Cardiovascular:  Negative for chest pain, palpitations and leg swelling.   Gastrointestinal:  Negative for abdominal distention, abdominal pain, blood in stool, constipation, diarrhea, nausea and vomiting.   Skin:  Positive for rash.         Objective:      Physical Exam  Constitutional:       General: She is not in acute distress.     Appearance: Normal appearance. She is well-developed.   HENT:      Head: Normocephalic and atraumatic.      Right Ear: Hearing, tympanic membrane, ear canal and external ear normal.      Left Ear: Hearing, tympanic membrane, ear canal and external ear  "normal.      Nose: Nose normal.      Mouth/Throat:      Mouth: No oral lesions.      Pharynx: No oropharyngeal exudate or posterior oropharyngeal erythema.   Eyes:      General: Lids are normal. No scleral icterus.     Extraocular Movements: Extraocular movements intact.      Conjunctiva/sclera: Conjunctivae normal.      Pupils: Pupils are equal, round, and reactive to light.   Neck:      Thyroid: No thyroid mass or thyromegaly.      Vascular: No carotid bruit.   Cardiovascular:      Rate and Rhythm: Normal rate and regular rhythm. No extrasystoles are present.     Chest Wall: PMI is not displaced.      Heart sounds: Normal heart sounds. No murmur heard.     No gallop.   Pulmonary:      Effort: Pulmonary effort is normal. No accessory muscle usage or respiratory distress.      Breath sounds: Normal breath sounds.   Abdominal:      General: Bowel sounds are normal. There is no abdominal bruit.      Palpations: Abdomen is soft.      Tenderness: There is no abdominal tenderness. There is no rebound.   Musculoskeletal:      Cervical back: Normal range of motion and neck supple.   Lymphadenopathy:      Head:      Right side of head: No submental or submandibular adenopathy.      Left side of head: No submental or submandibular adenopathy.      Cervical:      Right cervical: No superficial, deep or posterior cervical adenopathy.     Left cervical: No superficial, deep or posterior cervical adenopathy.      Upper Body:      Right upper body: No supraclavicular adenopathy.      Left upper body: No supraclavicular adenopathy.   Skin:     General: Skin is warm and dry.      Comments: She has not erythematous rash beneath the breast and in the groin area consistent with intertrigo   Neurological:      Mental Status: She is alert and oriented to person, place, and time.       Blood pressure 130/70, pulse 97, temperature 98.4 °F (36.9 °C), resp. rate 16, height 5' 3" (1.6 m), weight 85.6 kg (188 lb 13.2 oz), SpO2 97%.Body mass " index is 33.45 kg/m².            A/P:  1) right knee arthritis.  Severe.  She is a low risk surgical candidate.  Preop surgical forms were completed and sent to the orthopedist    2) hypertension.  Well controlled.  Continue with Toprol and Cozaar   3)  Hypothyroidism.  Well controlled.  Continue with Synthroid    4) hyperlipidemia.  Continue with Livalo and check labs as planned in September    5) intertrigo.  We did discuss vinegar soaks and antifungal powder.  If this does not improve, she will let me know

## 2024-09-03 ENCOUNTER — CLINICAL SUPPORT (OUTPATIENT)
Dept: REHABILITATION | Facility: HOSPITAL | Age: 74
End: 2024-09-03
Payer: MEDICARE

## 2024-09-03 DIAGNOSIS — R29.898 WEAKNESS OF RIGHT LOWER EXTREMITY: Primary | ICD-10-CM

## 2024-09-03 PROCEDURE — 97112 NEUROMUSCULAR REEDUCATION: CPT | Mod: PO | Performed by: PHYSICAL THERAPIST

## 2024-09-03 PROCEDURE — 97110 THERAPEUTIC EXERCISES: CPT | Mod: PO | Performed by: PHYSICAL THERAPIST

## 2024-09-03 NOTE — PROGRESS NOTES
"[facility]   Physical Therapy Treatment Note     Name: Curtis Navarro  Clinic Number: [id number]    Therapy Diagnosis:   Encounter Diagnosis   Name Primary?    Weakness of right lower extremity Yes     Physician: Joselo Helton,*    Visit Date: 9/3/2024  Physician Orders: PT Eval and Treat Post Surgical?No Eval and TreatYes Type of TherapyOutpatient Therapy Location:Knees  PREHAB FOR TKA  Medical Diagnosis from Referral:   Diagnosis   M17.11 (ICD-10-CM) - Osteoarthritis of right knee      Evaluation Date: 8/13/2024  Authorization Period Expiration: 12/31/2024  Plan of Care Expiration: 9/13/2024  Progress Note Due: 9/12/2024  Date of Surgery: na  Visit # / Visits authorized: 5/ 20  FOTO: 2/ 3 (8/13/2024, 8/29/2024)    PTA Visit #: 0/5     Precautions: Standard and CA , prehab for (R) TKA 9/11/24    Time In: 1000  Time Out: 1100  Total Billable Time: 33 minutes (1:1)    SUBJECTIVE     Pt reports: feeling pulling in posterior knee which started yesterday. She was compliant with home exercise program.  Response to previous treatment: no adverse effects   Functional change: improving strength.    Pain: 0/10  Location: right knee      OBJECTIVE     Objective Measures updated at progress report unless specified.     8/29/2024: 59 (+9)    Treatment     Curtis received the treatments listed below:      therapeutic exercises to develop strength, endurance, ROM, flexibility, posture, and core stabilization for 30 minutes including:  Nu step x 7 minutes-np  Recumbent bike 8 mins  Long sitting gastroc stretch with strap x 1 minute x 3   Heel prop x 3 minutes, 3#   AAROM heel slides with strap x 3 minutes, 5 sec hold   LAQ 3 x 10, 2#  Standing heel raises 2 x 10, 2#  Standing TKE gtb 3 x 10    HS stretch 3 x 30"    neuromuscular re-education activities to improve: Balance, Coordination, Proprioception, and Posture for 20 minutes. The following activities were included:  Quad set (no towel " roll) 2x10, 5 sec hold   Quad set + SLR 2x10  Bridge + green TB 2x10  S/L clamshell (Green TB) 2x10  Lateral weight shifting x 2 minutes    therapeutic activities to improve functional performance for 00 minutes, including:  Step ups (4 inch) 2x10  Mini squats 2x10  Sit to stands from chair 2x10    Patient Education and Home Exercises     Home Exercises Provided and Patient Education Provided     Education provided:   - HEP compliance     Written Home Exercises Provided: Patient instructed to cont prior HEP. Exercises were reviewed and Curtis was able to demonstrate them prior to the end of the session.  Curtis demonstrated good  understanding of the education provided. See EMR under Patient Instructions for exercises provided during therapy sessions.    ASSESSMENT     Pt continues doing well. She is challenged by strengthening activities. Pain is present with tibial rotation of contralateral knee. Pt conts prehab to optimize post op gains. Surgery set for next week.    Curtis Is progressing well towards her goals.   Pt prognosis is Good.     Pt will continue to benefit from skilled outpatient physical therapy to address the deficits listed in the problem list box on initial evaluation, provide pt/family education and to maximize pt's level of independence in the home and community environment.     Pt's spiritual, cultural and educational needs considered and pt agreeable to plan of care and goals.     Anticipated barriers to physical therapy: age, chronic condition    Goals:   Short Term Goals: 2 weeks   Pt will be (I) in initial HEP.-MET, DAILY  Right knee pain report improved by 25%. -MET, 25%     Long Term Goals: 4 weeks   FOTO score improved to 58 for best post op outcomes.-MET  Pt will demo bilateral hip Abduction strength to 4-/5 in preparation of TKA.    PLAN     Continue current POC with emphasis on optimizing functional outcomes.     Leatha Santana, PT

## 2024-09-04 ENCOUNTER — TELEPHONE (OUTPATIENT)
Dept: CARDIOLOGY | Facility: CLINIC | Age: 74
End: 2024-09-04
Payer: MEDICARE

## 2024-09-05 ENCOUNTER — CLINICAL SUPPORT (OUTPATIENT)
Dept: REHABILITATION | Facility: HOSPITAL | Age: 74
End: 2024-09-05
Payer: MEDICARE

## 2024-09-05 DIAGNOSIS — R29.898 WEAKNESS OF RIGHT LOWER EXTREMITY: Primary | ICD-10-CM

## 2024-09-05 PROBLEM — G89.29 CHRONIC BILATERAL LOW BACK PAIN: Status: RESOLVED | Noted: 2022-11-15 | Resolved: 2024-09-05

## 2024-09-05 PROBLEM — M54.50 CHRONIC BILATERAL LOW BACK PAIN: Status: RESOLVED | Noted: 2022-11-15 | Resolved: 2024-09-05

## 2024-09-05 PROCEDURE — 97530 THERAPEUTIC ACTIVITIES: CPT | Mod: KX,PO | Performed by: PHYSICAL THERAPIST

## 2024-09-05 PROCEDURE — 97112 NEUROMUSCULAR REEDUCATION: CPT | Mod: KX,PO | Performed by: PHYSICAL THERAPIST

## 2024-09-05 PROCEDURE — 97110 THERAPEUTIC EXERCISES: CPT | Mod: KX,PO | Performed by: PHYSICAL THERAPIST

## 2024-09-07 ENCOUNTER — PATIENT MESSAGE (OUTPATIENT)
Dept: REHABILITATION | Facility: HOSPITAL | Age: 74
End: 2024-09-07
Payer: MEDICARE

## 2024-09-09 ENCOUNTER — CLINICAL SUPPORT (OUTPATIENT)
Dept: REHABILITATION | Facility: HOSPITAL | Age: 74
End: 2024-09-09
Payer: MEDICARE

## 2024-09-09 DIAGNOSIS — R29.898 WEAKNESS OF RIGHT LOWER EXTREMITY: Primary | ICD-10-CM

## 2024-09-09 PROCEDURE — 97530 THERAPEUTIC ACTIVITIES: CPT | Mod: PO | Performed by: PHYSICAL THERAPIST

## 2024-09-09 PROCEDURE — 97112 NEUROMUSCULAR REEDUCATION: CPT | Mod: PO | Performed by: PHYSICAL THERAPIST

## 2024-09-09 PROCEDURE — 97110 THERAPEUTIC EXERCISES: CPT | Mod: PO | Performed by: PHYSICAL THERAPIST

## 2024-09-09 NOTE — PROGRESS NOTES
[facility]  PT Discharge Note    [facility] [id number]    Therapy Diagnosis:   Encounter Diagnosis   Name Primary?    Weakness of right lower extremity Yes     Physician: Joselo Helton,*    Physician Orders: PT Eval and Treat Post Surgical?No Eval and TreatYes Type of TherapyOutpatient Therapy Location:Knees  PREHAB FOR TKA  Medical Diagnosis from Referral:   Diagnosis   M17.11 (ICD-10-CM) - Osteoarthritis of right knee      Evaluation Date: 8/13/2024      Date of Last visit: 9/9/2024  Total Visits Received: 8    NS: 0  C/c: 0    ASSESSMENT      Curtis is prepared for right TKA. She has improved bilateral LE strength to optimize post op recovery.    Discharge reason: Patient has met all of his/her goals.    Discharge FOTO Score:  59 (+9)    Goals: see below    PLAN   This patient is discharged from Physical Therapy.      Leatha Santana, PT    [facility]   Physical Therapy Treatment Note     Name: Curtis Navarro  [facility] Number: [id number]    Therapy Diagnosis:   Encounter Diagnosis   Name Primary?    Weakness of right lower extremity Yes     Physician: Joselo Helton,*    Visit Date: 9/9/2024  Physician Orders: PT Eval and Treat Post Surgical?No Eval and TreatYes Type of TherapyOutpatient Therapy Location:Knees  PREHAB FOR TKA  Medical Diagnosis from Referral:   Diagnosis   M17.11 (ICD-10-CM) - Osteoarthritis of right knee      Evaluation Date: 8/13/2024  Authorization Period Expiration: 12/31/2024  Plan of Care Expiration: 9/13/2024  Progress Note Due: 9/12/2024  Date of Surgery: na  Visit # / Visits authorized: 7/ 20  FOTO: 3/ 3 (8/13/2024, 8/29/2024, 9/9/2024)    PTA Visit #: 0/5     Precautions: Standard and CA , prehab for (R) TKA 9/11/24    Time In: 1005  Time Out: 1100  Total Billable Time: 55 minutes (1:1)    SUBJECTIVE     Pt reports: she is ready for d/c and ready for TKA. She was compliant with home  "exercise program.  Response to previous treatment: no adverse effects   Functional change: improving strength.    Pain: 0/10  Location: right knee      OBJECTIVE     Objective Measures updated at progress report unless specified.     Observation: Pt is walking with NARROW BASE OF SUPPORT and tends to scissors legs most of the time. She also has a chronic tilt to right side.     Posture: impaired     Full hip AROM bilaterally.     Range of Motion: 0-120 deg bilaterally     Lower Extremity Strength  Right LE   Left LE     Knee extension: 4+/5 Knee extension: 4+/5   Knee flexion: 4+/5 Knee flexion: 4+/5   Hip flexion: 4/5 Hip flexion: 4/5   Hip extension:  4/5 Hip extension: 4-/5   Hip abduction: 4-/5 Hip abduction: 4-/5   Hip adduction: 4/5 Hip adduction 4/5   Ankle dorsiflexion: 5/5 Ankle dorsiflexion: 5/5   Ankle plantarflexion: 5/5 Ankle plantarflexion: 5/5      Joint Mobility: hypomobile in all directions Patellar     Palpation: general knee soreness     Sensation: intact right knee     Edema: nil     Intake Outcome Measure for FOTO KNEE Survey     Therapist reviewed FOTO scores for Curtis Navarro on 8/13/2024.   FOTO report - see Media section or FOTO account episode details.     Intake Score: 50  GOAL: 61  9/9/2024: 59 (+9)         Treatment     Curtis received the treatments listed below:      therapeutic exercises to develop strength, endurance, ROM, flexibility, posture, and core stabilization for 25 minutes including:  Nu step x 8 minutes  Long sitting gastroc stretch with strap x 1 minute x 3   Heel prop x 3 minutes, 3#   AAROM heel slides with strap x 3 minutes, 5 sec hold   LAQ 3 x 10, 3#  Standing heel raises 2 x 10, 2#  Standing TKE gtb 3 x 10    HS stretch 3 x 30"    neuromuscular re-education activities to improve: Balance, Coordination, Proprioception, and Posture for 20 minutes. The following activities were included:  Quad set (no towel roll) 2x10, 5 sec hold   Quad set + SLR 2x10  Bridge + " green TB 2x10  S/L clamshell (Green TB) 2x10    therapeutic activities to improve functional performance for 10 minutes, including:  Step ups (6 inch) 2x10  Mini squats 2x10  Sit to stands from chair 2x10    Patient Education and Home Exercises     Home Exercises Provided and Patient Education Provided     Education provided:   - Discharge planning    Written Home Exercises Provided: yes. Exercises were reviewed and Curtis was able to demonstrate them prior to the end of the session.  Curtis demonstrated good  understanding of the education provided. See EMR under Patient Instructions for exercises provided during therapy sessions.    ASSESSMENT     She has optimized pre op gains for post op optimal outcomes. Sx is this week.    Goals:   Short Term Goals: 2 weeks   Pt will be (I) in initial HEP.-MET, DAILY  Right knee pain report improved by 25%. -MET, 25%     Long Term Goals: 4 weeks   FOTO score improved to 58 for best post op outcomes.-MET  Pt will demo bilateral hip Abduction strength to 4-/5 in preparation of TKA.-MET    PLAN     Discharge.    Leatha Santana, PT

## 2024-09-09 NOTE — PLAN OF CARE
[facility]  PT Discharge Note     [id number]     Therapy Diagnosis:        Encounter Diagnosis   Name Primary?    Weakness of right lower extremity Yes      Physician: Joselo Helton,*     Physician Orders: PT Eval and Treat Post Surgical?No Eval and TreatYes Type of TherapyOutpatient Therapy Location:Knees  PREHAB FOR TKA  Medical Diagnosis from Referral:   Diagnosis   M17.11 (ICD-10-CM) - Osteoarthritis of right knee      Evaluation Date: 8/13/2024        Date of Last visit: 9/9/2024  Total Visits Received: 8     NS: 0  C/c: 0     ASSESSMENT       Curtis is prepared for right TKA. She has improved bilateral LE strength to optimize post op recovery.     Discharge reason: Patient has met all of his/her goals.     Discharge FOTO Score:  59 (+9)     Goals: see below     PLAN   This patient is discharged from Physical Therapy.        Leatha Santana, PT    [facility]   Physical Therapy Treatment Note      Name: Curtis Lopezbertson  [facility] Number: [id number]     Therapy Diagnosis:        Encounter Diagnosis   Name Primary?    Weakness of right lower extremity Yes      Physician: Joselo Helton,*     Visit Date: 9/9/2024  Physician Orders: PT Eval and Treat Post Surgical?No Eval and TreatYes Type of TherapyOutpatient Therapy Location:Knees  PREHAB FOR TKA  Medical Diagnosis from Referral:   Diagnosis   M17.11 (ICD-10-CM) - Osteoarthritis of right knee      Evaluation Date: 8/13/2024  Authorization Period Expiration: 12/31/2024  Plan of Care Expiration: 9/13/2024  Progress Note Due: 9/12/2024  Date of Surgery: na  Visit # / Visits authorized: 7/ 20  FOTO: 3/ 3 (8/13/2024, 8/29/2024, 9/9/2024)     PTA Visit #: 0/5      Precautions: Standard and CA , prehab for (R) TKA 9/11/24     Time In: 1005  Time Out: 1100  Total Billable Time: 55 minutes (1:1)     SUBJECTIVE      Pt reports: she is ready for d/c and ready for TKA.  "She was compliant with home exercise program.  Response to previous treatment: no adverse effects   Functional change: improving strength.     Pain: 0/10  Location: right knee       OBJECTIVE      Objective Measures updated at progress report unless specified.      Observation: Pt is walking with NARROW BASE OF SUPPORT and tends to scissors legs most of the time. She also has a chronic tilt to right side.     Posture: impaired     Full hip AROM bilaterally.     Range of Motion: 0-120 deg bilaterally     Lower Extremity Strength  Right LE   Left LE     Knee extension: 4+/5 Knee extension: 4+/5   Knee flexion: 4+/5 Knee flexion: 4+/5   Hip flexion: 4/5 Hip flexion: 4/5   Hip extension:  4/5 Hip extension: 4-/5   Hip abduction: 4-/5 Hip abduction: 4-/5   Hip adduction: 4/5 Hip adduction 4/5   Ankle dorsiflexion: 5/5 Ankle dorsiflexion: 5/5   Ankle plantarflexion: 5/5 Ankle plantarflexion: 5/5      Joint Mobility: hypomobile in all directions Patellar     Palpation: general knee soreness     Sensation: intact right knee     Edema: nil     Intake Outcome Measure for FOTO KNEE Survey     Therapist reviewed FOTO scores for Curtis Navarro on 8/13/2024.   FOTO report - see Media section or FOTO account episode details.     Intake Score: 50  GOAL: 61  9/9/2024: 59 (+9)         Treatment      Curtis received the treatments listed below:       therapeutic exercises to develop strength, endurance, ROM, flexibility, posture, and core stabilization for 25 minutes including:  Nu step x 8 minutes  Long sitting gastroc stretch with strap x 1 minute x 3   Heel prop x 3 minutes, 3#   AAROM heel slides with strap x 3 minutes, 5 sec hold   LAQ 3 x 10, 3#  Standing heel raises 2 x 10, 2#  Standing TKE gtb 3 x 10     HS stretch 3 x 30"     neuromuscular re-education activities to improve: Balance, Coordination, Proprioception, and Posture for 20 minutes. The following activities were included:  Quad set (no towel roll) 2x10, 5 sec " hold   Quad set + SLR 2x10  Bridge + green TB 2x10  S/L clamshell (Green TB) 2x10     therapeutic activities to improve functional performance for 10 minutes, including:  Step ups (6 inch) 2x10  Mini squats 2x10  Sit to stands from chair 2x10     Patient Education and Home Exercises      Home Exercises Provided and Patient Education Provided      Education provided:   - Discharge planning     Written Home Exercises Provided: yes. Exercises were reviewed and Curtis was able to demonstrate them prior to the end of the session.  Curtis demonstrated good  understanding of the education provided. See EMR under Patient Instructions for exercises provided during therapy sessions.     ASSESSMENT      She has optimized pre op gains for post op optimal outcomes. Sx is this week.     Goals:   Short Term Goals: 2 weeks   Pt will be (I) in initial HEP.-MET, DAILY  Right knee pain report improved by 25%. -MET, 25%     Long Term Goals: 4 weeks   FOTO score improved to 58 for best post op outcomes.-MET  Pt will demo bilateral hip Abduction strength to 4-/5 in preparation of TKA.-MET     PLAN      Discharge.     Leatha Santana, PT

## 2024-09-16 ENCOUNTER — PATIENT MESSAGE (OUTPATIENT)
Dept: FAMILY MEDICINE | Facility: CLINIC | Age: 74
End: 2024-09-16
Payer: MEDICARE

## 2024-09-16 RX ORDER — METOPROLOL SUCCINATE 50 MG/1
50 TABLET, EXTENDED RELEASE ORAL DAILY
Qty: 90 TABLET | Refills: 3 | Status: CANCELLED | OUTPATIENT
Start: 2024-09-16

## 2024-09-16 NOTE — TELEPHONE ENCOUNTER
No care due was identified.  [facility] Embedded Care Due Messages. Reference number: [id number].   9/16/2024 3:36:34 PM CDT

## 2024-09-18 ENCOUNTER — TELEPHONE (OUTPATIENT)
Dept: FAMILY MEDICINE | Facility: CLINIC | Age: 74
End: 2024-09-18
Payer: MEDICARE

## 2024-09-18 NOTE — TELEPHONE ENCOUNTER
----- Message from Des Gaitan sent at 9/17/2024  3:20 PM CDT -----  Regarding: pharmacy  Contact: [facility]  Type:  Pharmacy Calling to Clarify an RX    Name of Caller:  [facility] - [place] - [id number] [facility] [address]  Phone: [phone] Fax: [phone]  Pharmacy Name:  Prescription Name:pitavastatin calcium (LIVALO) 4 mg Tab  What do they need to clarify?:this medication needs PA but if office sends new order for: Zetia this can be filled today  Best Call Back Number:  Additional Information:

## 2024-09-19 RX ORDER — EZETIMIBE 10 MG/1
10 TABLET ORAL DAILY
Qty: 90 TABLET | Refills: 0 | Status: SHIPPED | OUTPATIENT
Start: 2024-09-19 | End: 2025-09-19

## 2024-10-07 DIAGNOSIS — M25.561 RIGHT KNEE PAIN: Primary | ICD-10-CM

## 2024-10-08 ENCOUNTER — CLINICAL SUPPORT (OUTPATIENT)
Dept: REHABILITATION | Facility: HOSPITAL | Age: 74
End: 2024-10-08
Payer: MEDICARE

## 2024-10-08 DIAGNOSIS — R29.898 RIGHT LEG WEAKNESS: ICD-10-CM

## 2024-10-08 DIAGNOSIS — M25.661 DECREASED RANGE OF MOTION (ROM) OF RIGHT KNEE: Primary | ICD-10-CM

## 2024-10-08 PROCEDURE — 97162 PT EVAL MOD COMPLEX 30 MIN: CPT | Mod: HCNC,PO | Performed by: PHYSICAL THERAPIST

## 2024-10-08 PROCEDURE — 97112 NEUROMUSCULAR REEDUCATION: CPT | Mod: HCNC,PO | Performed by: PHYSICAL THERAPIST

## 2024-10-08 NOTE — PLAN OF CARE
[facility] OUTPATIENT THERAPY AND WELLNESS   Physical Therapy Initial Evaluation      Name: Curtis Navarro  [facility] Number: [id number]    Therapy Diagnosis:   Encounter Diagnoses   Name Primary?    Decreased range of motion (ROM) of right knee Yes    Right leg weakness         Physician: Jeromy Rojo PA    Physician Orders: PT Eval and Treat   Question Answer   Post Surgical? Yes   Eval and Treat Yes   Type of Therapy Outpatient Therapy   Location: Knees     Medical Diagnosis from Referral:   Diagnosis   M25.561 (ICD-10-CM) - Right knee pain     Evaluation Date: 10/8/2024  Authorization Period Expiration: 12/31/2024  Plan of Care Expiration: 11/22/2024  Progress Note Due: 11/7/2024  Date of Surgery: 9/18/2024  Visit # / Visits authorized: 1/ 1   FOTO: 1/ 3 (10/8/2024)    Precautions: Standard and arthritis, osteopenia, HTN      Time In: 1000  Time Out: 1040  Total Billable Time: 40 minutes    Subjective     Date of onset: s/p right TKA 9/18/2024    History of current condition - Curtis reports: s/p right TKA with right knee pain and difficulty sleeping. She is sleeping in a recliner currently.    Falls: 0    Imaging: none: outside facility    Prior Therapy: 4-6 weeks pre op PT with mild improvement. 2-3 weeks of HH s/p.0  Social History:  lives with their spouse  Occupation: retired  Prior Level of Function: Impaired walking with right knee pain.  Current Level of Function: Antalgic and impaired gt. She has pain of right knee. Difficulty with stair navigation, walking, and HH chores.    Pain:  Current 0/10, worst 8/10, best 0/10   Location: right knee    Description: Aching and Dull  Aggravating Factors: Standing, Laying, Bending, and Walking  Easing Factors: pain medication and ice    Patients goals: to improve pain, ROM, and strength to walk better and have improved QOL.     Medical History:   Past Medical History:   Diagnosis Date    Allergy     Arthritis     Cataract     Colon polyps     Diverticulosis      GERD (gastroesophageal reflux disease)     Graves disease     s/p radioactive iodine in 42    H/O cardiovascular stress test     normal 5/23    History of diverticulitis     History of skin cancer     L neck    Hyperlipidemia     Hypertension     Hypothyroidism     s/p radioactive iodine    IBS (irritable bowel syndrome)     Osteopenia     Thyroid nodule     last usg 5/23;  next due 5/25     Surgical History:   Curtis Navarro  has a past surgical history that includes Tonsillectomy; Cosmetic surgery; Skin cancer excision; Cardiac surgery (2013); Esophagogastroduodenoscopy (N/A, 07/12/2018); Colonoscopy (07/21/2010); Colonoscopy (N/A, 06/27/2017); Upper gastrointestinal endoscopy (08/04/2014); Upper gastrointestinal endoscopy (06/27/2017); Esophagogastroduodenoscopy (N/A, 1/23/2023); and Colonoscopy (N/A, 1/23/2023).    Medications:   Curtis has a current medication list which includes the following prescription(s): aspirin, b.animalis,bifid,infantis,long, biotin, calcium-vitamin d3, ezetimibe, glucosa holm 2kcl/chondroitin holm, krill/om-3/dha/epa/phospho/ast, levothyroxine, losartan, metoprolol succinate, miconazole nitrate 2 %, multivitamin, omeprazole, pimecrolimus, pitavastatin calcium, and gemtesa.    Allergies:   Review of patient's allergies indicates:   Allergen Reactions    Lisinopril Other (See Comments)     Cough    Azithromycin      Other reaction(s): Nausea  Other reaction(s): Diarrhea    Metronidazole hcl      Other reaction(s): Rash  Other reaction(s): Itching    Moxifloxacin      Other reaction(s): Rash    Sulfa (sulfonamide antibiotics)      Other reaction(s): Itching      Objective      Observation: Pt is ambulating with an antalgic gt with decreased knee extension and flexion noted. She does not use an AD. Steri strip intact.    Posture: impaired    Range of Motion:   Knee Left active Right Active R passive   Flexion 120 80 90   Extension -2 -13 -10     Lower Extremity Strength  Right LE  Left LE     Knee extension: Deferred s/p Knee extension: 5/5   Knee flexion: Deferred s/p Knee flexion: 5/5   Hip flexion: 4/5 Hip flexion: 5/5   Hip extension:  5/5 Hip extension: 5/5   Hip abduction: 4/5 Hip abduction: 4-/5   Hip adduction: 3+/5 Hip adduction 4/5   Ankle dorsiflexion: 4+/5 Ankle dorsiflexion: 5/5   Ankle plantarflexion: 4+/5 Ankle plantarflexion: 5/5     Function:    - SLS R: 04 s  - SLS L: 06 s  - Squat: impaired   - Sit <--> Stand: moderate (I)   - Bed Mobility: moderate (I) slow    Joint Mobility: general right knee patellar hypo    Palpation: nt    Sensation: dec right knee s/p    Edema: +    Girth Measurement Joint line 5 cm below 5 cm above   Left 40 cm 35 cm 42.5 cm   Right 40 cm 37 cm 44 cm      Intake Outcome Measure for FOTO KNEE Survey    Therapist reviewed FOTO scores for Curtis Navarro on 10/8/2024.   FOTO report - see Media section or FOTO account episode details.    Intake Score: 50  Goal: 75       Treatment     Total Treatment time (time-based codes) separate from Evaluation: 10 minutes     Curtis received the treatments listed below:      neuromuscular re-education activities to improve: Sense, Proprioception, and education for 10 minutes. The following activities were included:  QS, SLR, Heelslides, LONG ARC QUAD, SAQ, HS stretching, static extension stretching    Post op TKA protocol  Ice machine 10-20 mins on and 40 mins off  Avoid recliner or promote knee extension with pillows at ANKLE only    Patient Education and Home Exercises     Education provided:   - HEP  - Pt/family was provided educational information, including: role of PT, role of pt/caregiver, goals for PT, POC, scheduling, and attendance policy.- pt verbalized understanding.     Written Home Exercises Provided: Yes. Exercises were reviewed and Curtis was able to demonstrate them prior to the end of the session.  Curtis demonstrated good  understanding of the education provided. See EMR under Patient Instructions for  exercises provided during therapy sessions.    Assessment     Curtis is a 74 y.o. female referred to outpatient Physical Therapy with a medical diagnosis of Right knee pain. Patient presents with impaired right knee ROM, strength, gt, mobility, balance, and QOL. Pt will benefit from skilled PT to address deficits.    Patient prognosis is Good.   Patient will benefit from skilled outpatient Physical Therapy to address the deficits stated above and in the chart below, provide patient /family education, and to maximize patientt's level of independence.     Plan of care discussed with patient: Yes  Patient's spiritual, cultural and educational needs considered and patient is agreeable to the plan of care and goals as stated below:     Anticipated Barriers for therapy: coping style    Medical Necessity is demonstrated by the following  History  Co-morbidities and personal factors that may impact the plan of care [] LOW: no personal factors / co-morbidities  [] MODERATE: 1-2 personal factors / co-morbidities  [x] HIGH: 3+ personal factors / co-morbidities    Moderate / High Support Documentation:   Co-morbidities affecting plan of care:   Past Medical History:   Diagnosis Date    Allergy     Arthritis     Cataract     Colon polyps     Diverticulosis     GERD (gastroesophageal reflux disease)     Graves disease     s/p radioactive iodine in 42    H/O cardiovascular stress test     normal 5/23    History of diverticulitis     History of skin cancer     L neck    Hyperlipidemia     Hypertension     Hypothyroidism     s/p radioactive iodine    IBS (irritable bowel syndrome)     Osteopenia     Thyroid nodule     last usg 5/23;  next due 5/25       Personal Factors:   age  coping style  lifestyle     Examination  Body Structures and Functions, activity limitations and participation restrictions that may impact the plan of care [] LOW: addressing 1-2 elements  [] MODERATE: 3+ elements  [x] HIGH: 4+ elements (please support  below)    Moderate / High Support Documentation: Patient presents with impaired right knee ROM, strength, gt, mobility, balance, and QOL.     Clinical Presentation [] LOW: stable  [x] MODERATE: Evolving  [] HIGH: Unstable     Decision Making/ Complexity Score: moderate       Goals:  Short Term Goals: 3 weeks   Pt will reach -5 deg right knee AROM to improve gt.  Pt will reach 100 deg right knee AROM for improving squat abilities.  Right knee strength grossly 4-/5 for improving strength.  Improve right knee edema by 1 cm for improving quad fx.  FOTO score improved to 60 for improving fx.    Long Term Goals: 6 weeks   FOTO score improved to 70 for improving fx.  Right LE strength grossly 4/5 for improving strength and d/c to home program s/p.    Plan     Plan of care Certification: 10/8/2024 to 11/22/2024.    Outpatient Physical Therapy 2-3 times weekly for 6 weeks to include the following interventions: Gait Training, Manual Therapy, Moist Heat/ Ice, Neuromuscular Re-ed, Patient Education, Therapeutic Activities, and Therapeutic Exercise.     Leatha Santana, PT    I CERTIFY THE NEED FOR THESE SERVICES FURNISHED UNDER THIS PLAN OF TREATMENT AND WHILE UNDER MY CARE    Physician's comments:        Physician's Signature: ___________________________________________________ DATE:_______________________

## 2024-10-08 NOTE — PATIENT INSTRUCTIONS
Accelerated Recovery after Knee Replacement Surgery     Congratulations on taking the next step in your health care journey to gain the mobility you need to be the hero you can be.  We have designed an accelerated protocol at [facility] to improve range of motion (ROM), strength, endurance, balance, and overall function.  Most of our patients are finished with formal therapy after 6 weeks.  To attain the full benefit of your recent surgery, we will need to commit to a structured physical therapy program     The [facility] protocol requires a dedicated healthcare team and a committed patient.  We recommend that you work on the exercises like its your job.  This can be broken up throughout the day based on discussions with your health care team but for the next 6 weeks, we will all work on your recovery with dedication and commitment to excellence.     The protocol was designed through years of working with patients to get the best possible outcomes in the optimal amount of time.  Our goal is for you to get your life back.  We break the program up in to 3 phases, each lasting 2 weeks.  There are instances that we can extend your rehabilitation program, but that decision will be made after discussion with your entire health care team.     When patients choose to have surgery with our fellowship trained physicians and follow this protocol we know that over 90% report high satisfaction with their level of pain and function within 6 weeks of surgery.    We have included a summary of the program as well as daily checklists to help you stay on track with your exercises.  There are additional blanks on each page so that your therapist can customize exercises per your specific needs.  Once you have completed 6 weeks, we will usually allow you to complete your recovery journey on your own.  There are continued exercises we want you doing from 6 weeks to 6 months after your surgery and those will be included in the final phase.  If  you need further instruction or need to get back to a high level of work or recreation, we have extended wellness options to discuss with your therapist.     Keep in mind that these exercises are a guide to be used with your surgeon and physical therapist and should be followed only under the direction and coordination of your healthcare team.  If you experience any significant pain, dizziness, shortness of breath, or chest discomfort, please stop exercising and contact your dr, or go to emergency room.        Total Knee Protocol Summary    Weeks 1-2 Weeks 3-4 Weeks 5-6   Goals Control swelling Active SLR without lag Advance strengthening     Restore quad control Sit-stand independently Walk on uneven surfaces     Safe independent gait ROM 0-100 wk. 3 ROM 0-120 wk. 6     ROM 0-75 wk. 1  0-90 wk. 2 ROM 0-110 wk. 4     Range of Heel prop Heel prop with weight Heel prop with weight   Motion Seated hamstring/calf stretch Seated hamstring stretch Standing hamstring stretch     Heel slides - active Heel slides - with strap Body weight knee flexion       Quad stretch with strap Quad stretch with strap   Strengthening Quad sets Terminal knee extension Squats              Short arc quads Standing heel raise Step ups    Long arc quads Sit to stand squats Lunge progression      Step ups 4 inches Step ups 8 inches      Standing hip 3 way with band Step downs   Endurance Household Walking Community Walking  Walking for exercise     Recumbent stepper Recumbent stepper Bike    Balance Static double leg balance Dynamic double leg balance Dynamic single leg balance       Static single leg balance     Function Walk with least restrictive   assistive device Sit to stand  Vary heights / support Able to get on and off floor       Ascending stairs Descending stairs           Pre-Physical Therapy  We recommend that you perform at least one visit of physical therapy before your scheduled surgery.  Depending on your functional level and  how your knee is moving we may do therapy for a few extra visits to make sure we have good ROM and quadriceps control.  While pre-operative range of motion is predictive of post operative range of motion, it is important to have a calm knee prior to surgery.    Your physician recommends 1-3 visits of physical therapy prior to your surgery for many benefits.     Establish your rehabilitation location and team before your surgery.  Learn instructions and exercises to improve strength and ROM in preparation for surgery.  Reinforce precautions that you will need to follow after surgery.  Confirm rehabilitation schedule for entire 6 week program.  Obtain baseline data for recovery comparison after surgery.    Goals for Pre-Surgery Phase  Prepare patient for scheduled surgery.  Optimize quadriceps strength and control.  Enhance range of motion in flexion and extension.      Exercises to master prior to your surgery:        QUAD SET - TOWEL UNDER KNEE - ISOMETRIC QUADS     Place a small towel roll under your knee, tighten your top thigh muscle to press the back of your knee downward while pressing on the towel.     Reps Sets Hold Perform   10  3 10 sec 10 times per day            KNEE EXTENSION STRETCH - PROPPED     While seated, prop your foot up on another chair and allow gravity to stretch your knee towards a more straightened position.     Reps Sets Hold Perform   1  5 min 10 times per day                    SUPINE HEEL SLIDES WITH STRAP     While lying on your back place a belt, towel, strap or bed sheet around your foot and start by pulling with your arms to bend your knee into a bent position until a gentle stretch is felt and hold this position.        Reps Sets Hold Perform   10  3 5 sec 5 times per day             STRAIGHT LEG RAISE - SLR     While lying on your back, raise up your leg with a straight knee. Keep the opposite knee bent with the foot planted on the ground.     Reps Sets Hold Perform   10 3 5 sec 5  times per day           Comprehensive Outpatient Protocol  Phase 1 - Restoration  Weeks 1 and 2      Phase 1 - This phase sets the foundation for the rest of therapy.  Your symptoms and progress may vary over the next 2 weeks so try to complete these exercises on schedule regardless of how you feel.  We need to establish a few things early in this phase to make the rest of therapy more manageable.  It is important to establish a good line of communication with your therapist to address your concerns about pain, swelling, movement, function, etc. on your very first PT visit.  It is important to realize that your surgeon was able to move and bend your knee right after surgery while you were still under anesthesia.  The limitations you feel are not from the prosthesis itself but more from your body's response to the surgery.  The post surgical knee is extremely stable and strong with no mechanical risks associated to the exercises prescribed in PT when performed appropriately.  Most of the limitations in your knee during week 1 have to do with swelling and the quadriceps muscle not firing correctly.  By week 2, we need to have the knee completely straight and be able to tolerate bending so we can prepare for the mobility phase.     Phase 1 Goals  Minimize swelling, inflammation and pain   Restore patellar mobility  Establish quadriceps control  ROM 0-75 Week 1  0-90 Week 2  Safe, independent ambulation with least restrictive assistive device     Phase 1 Post-operative concerns  Monitor for signs of infection  Monitor for signs of DVT  Address yellow flags early and often     Phase 1 Priorities for Progression to next phase  Must establish active quad set before SAQ  Minimize guarding in extension stretch before adding overpressure  NO lag with SLR       KNEE EXTENSION STRETCH - PROPPED     While seated, prop your foot up on another chair and allow gravity to stretch your knee towards a more straightened position.      Reps Sets Hold Perform     5 min 10 times per day              QUAD SET - TOWEL UNDER KNEE - ISOMETRIC QUADS     Place a small towel roll under your knee, tighten your top thigh muscle to press the back of your knee downward while pressing on the towel.     Reps Sets Hold Perform   10  3 10 sec 10 times per day               SEATED HEEL SLIDES WITH TOWEL     While in a seated position place your foot on top of a small towel. Then, slowly slide your foot closer towards you. Hold a gentle stretch and then return foot forward to original position.     Reps Sets Hold Perform   10  3 5 sec 5 times per day                       SEATED CALF STRETCH - GASTROCNEMIUS     While sitting, use a towel or other strap looped around your foot. Gently pull your ankle back until a stretch is felt along the back of your lower leg. Maintain your target knee straight the entire time.     Reps Sets Hold Perform   5  3 30 sec 5 times per day                 SHORT ARC QUAD - SAQ - KNEE EXTENSION     Place a ball or rolled up towel under your knee and slowly straighten your knee as you lift your foot. Lower back down and repeat.     Reps Sets Hold Perform   10  5 10 sec 5 times per day              LONG ARC QUAD - LAQ - KNEE EXTENSION     Start in a seated position with your knee bent as shown, slowly straighten your knee as you raise your foot upwards as shown. Return to starting position and repeat.     Reps Sets Hold Perform   10  3 10 sec 5 times per day             STRAIGHT LEG RAISE - SLR     While lying on your back, raise up your leg with a straight knee. Keep the opposite knee bent with the foot planted on the ground.     Reps Sets Hold Perform   10  3 5 seconds 2 times per day            Phase 2 - Mobility  Weeks 3 and 4    Phase 2 transitions to the mobility portion of your recovery journey.  By this time, we should have full knee extension and good quad control and should be walking with the least restrictive assistive device.   We will begin to work on the mobility of the knee more specifically as well as overall function.   If we don't have a good quadriceps contraction and good extension, we are unable to progress trying to gain flexion ROM.  We will start progressing our ROM exercises to the point of discomfort and for maximum improvement.     Phase 2 Goals  Progress gait to less restrictive assistive device, such as a single-point cane on level and unlevel surfaces  Progress functional strengthening in a closed-chain position   ROM 0-100 week 3  0-110 week 4     Phase 2 Post-operative concerns  Contact physician if not past 90 degrees flexion depending on pre-surgical measurements.  Contact physician if lacking full extension depending on pre-surgical measurements.  Ok to push motion but limit recreational or extreme levels of activity.  No kneeling      Phase 2 Priorities for Progression to phase 3  Can do 2x10 SLR without lag.  No instances of reported buckling in past week.  10 sit to stand from 20 inches before progressing to step downs.             BALL TKE - TERMINAL KNEE EXTENSION     Start in a standing position with a ball behind your knee and against a wall. The knee should be partially. Next, press the back of your knee against the ball while you try and straighten your knee.     Reps Sets Hold Perform   10  3  10 sec. 2 times per day           PRONE QUAD STRETCH WITH MULTI-LOOP STRAP     Start by lying on your stomach with a stretching strap linked looped around your affected side foot. Next, use the belt to pull the knee into a bent position allowing for a stretch as shown.     Reps Sets Hold Perform   5  1 20 sec. 5 times per day           DOUBLE LEG HEEL RAISES WITH SUPPORT - CALF RAISES     While standing next to a chair or countertop for support, raise up on your toes as you lift your heels off the ground. Return your heels to the floor and repeat.     Reps Sets Hold Perform   10  3  5 sec. 2 times per day               SIT TO STAND - NO SUPPORT     Start by scooting close to the front of the chair. Next, lean forward at your trunk and reach forward with your arms and rise to standing without using your hands to push off from the chair or other object. Use your arms as a counter-balance by reaching forward when in sitting and lower them as you approach standing.     Reps Sets Hold Perform   10  3   2 times per day           STEP UP AND STEP DOWN - IPSILATERAL (SAME SIDE)     Start by standing in front of a step/step stool with both feet on the floor. Step forward and up the step with your target leg and use that leg to lift your body weight up onto the step with the other leg. Once both feet are on the step, step back down backward with the other leg first so that your target leg does the work to lower your body back down to the ground. Then return the target leg to the floor next to your other leg. You may need something to hold on to for balance support. Repeat this on the same side.     Reps Sets Hold Perform   10  3   2 times per day             LOOPED ELASTIC BAND HIP ABDUCTION     While standing with an elastic band looped around your ankles, move the target leg out to the side as shown.     Reps Sets Hold Perform   10  3  2 sec. 2 times per day       LOOPED ELASTIC BAND HIP EXTENSION                  While standing with an elastic band looped around your ankles, move the target leg back as shown. Keep your knees straight the entire time.     Reps Sets Hold Perform   10  3  2 sec. 2 times per day          Phase - Functional / Advanced  Weeks 5-6     Exercise plan needs to meet the functional demands of the patient.  Many patients don't need to progress beyond phase 2 exercises.  Weeks 5 and 6     Phase 3 prepares you to continue your rehab program independently before discharge from PT. In this phase you should have full knee ROM and walk independently in the community. You will gain confidence in performing your  Activities of Daily Living (ADLs) in addition to any functional tasks/activities necessary for return to work, recreation, etc. We will advance balance activities and functional strengthening to maximize your independence and promote a more active lifestyle with the goal of therapy discharge by the end of week 6.     Phase 3 Goals  Maintain Full Knee Extension  Walk independently on uneven surfaces  Confidently perform all ADLs  ROM to at least pre-op level by week 6     Phase 3 Post-operative concerns  Inability to shift weight in squat to surgery leg.  Inability to balance on singe leg with knee bent.     Phase 3 criteria for progression to discharge  Control and balance on double leg exercises before single leg  Able to walk greater than 500 feet without assistance  Return to at least pre-op range of motion  Safely able to navigate stairs required for home                      SQUAT     While standing with feet shoulder width apart and in front of a chair that is facing you, bend your knees and lower your body towards the floor. The chair seat is a guide so that your knees do not pass over your toes. Your body weight should mostly be directed through the heels of your feet. Return to a standing position. Knees should bend in line with the 2nd toe and not pass beyond the toes.     Reps Sets Hold Perform   10  3   1 time per day           STEP UP AND OVER - LATERAL     While standing next to a box or raised surface, step up and to the side on to the surface. Both feet should touch the raised surface. Then step down and onto the floor towards the opposite side you started from. Repeat in the other direction.     Reps Sets Hold Perform   10  3   1 time per day             SINGLE LEG STANCE - SLS     Stand on one leg and maintain your balance.    Reps Sets Hold Perform   5   30 sec. 1 time per day             STEP DOWN - LATERAL     Start with both feet on top of a step/box. Next, slowly lower the unaffected leg down  off the side of the step/box to lightly touch the heel to the floor. Then return to the original position with both feet on the step/box. Maintain proper knee alignment: Knee in line with the 2nd toe and not passing in front of the toes.     Reps Sets Hold Perform   10  3   3 days per week           LUNGE     Start by standing with feet shoulder-width-apart. Next, take a step forward and slightly out to the side and allow your front knee to bend. Your back knee may bend as well. Then, return to original position and repeat with the same leg. Keep your pelvis level and straight the entire time. Your front knee should bend in line with the 2nd toe and not pass the front of the foot.     Reps Sets Hold Perform   10  3   3 days per week           LATERAL LUNGE - ALTERNATE     Stand with a small space between feet. Next, step to the side and bend that knee in to a lunge position. As the knee bends keep pushing your hips backwards. Keep knees in line with toes. You can raise your arms forward with each knee bend for a counter balance. Return to original position and repeat on the other side.  Advanced Only.     Reps Sets Hold Perform   10  3   3 days per week        Home Exercise Checklist Tracker    Date of Surgery _______  Post op day 1 _________  First Day of PT ________    Place check devante when complete for the day:    Sunday Monday Tuesday Wednesday Thursday Friday Saturday   Week 1                 Week 2                 Week 3                 Week 4                 Week 5                 Week 6                 Week 6 cont.            Exercise Schedule     Day of surgery  Quad sets   Heel props     Week 1 = Day1 - Day 7  Quad sets  Heel slides  Heel props  Seated calf stretch     Week 2  SAQ  LAQ  Heel props  Seated calf stretch  Seated heel slides     Week 3  TKE  Standing calf raise  SLR  Standing 3 way hip    Week 4  Sit to stands  Step ups  Prone quad stretch with loop     Week 5  Chair squats  Step ups  lateral  Single leg balance  Step downs     Week 6  Chair squats  Step ups lateral  Single leg balance  Step down  Lunges

## 2024-10-10 ENCOUNTER — CLINICAL SUPPORT (OUTPATIENT)
Dept: REHABILITATION | Facility: HOSPITAL | Age: 74
End: 2024-10-10
Payer: MEDICARE

## 2024-10-10 DIAGNOSIS — R29.898 RIGHT LEG WEAKNESS: ICD-10-CM

## 2024-10-10 DIAGNOSIS — M25.661 DECREASED RANGE OF MOTION (ROM) OF RIGHT KNEE: Primary | ICD-10-CM

## 2024-10-10 PROCEDURE — 97112 NEUROMUSCULAR REEDUCATION: CPT | Mod: HCNC,PO | Performed by: PHYSICAL THERAPIST

## 2024-10-10 PROCEDURE — 97140 MANUAL THERAPY 1/> REGIONS: CPT | Mod: HCNC,PO | Performed by: PHYSICAL THERAPIST

## 2024-10-10 PROCEDURE — 97110 THERAPEUTIC EXERCISES: CPT | Mod: HCNC,PO | Performed by: PHYSICAL THERAPIST

## 2024-10-10 NOTE — PROGRESS NOTES
"[facility]   Physical Therapy Treatment Note      Name: Curtis Navarro  Clinic Number: [id number]    Therapy Diagnosis:   Encounter Diagnoses   Name Primary?    Decreased range of motion (ROM) of right knee Yes    Right leg weakness      Physician: Jeromy Rojo PA    Visit Date: 10/10/2024    Physician Orders: PT Eval and Treat   Question Answer   Post Surgical? Yes   Eval and Treat Yes   Type of Therapy Outpatient Therapy   Location: Knees      Medical Diagnosis from Referral:   Diagnosis   M25.561 (ICD-10-CM) - Right knee pain      Evaluation Date: 10/8/2024  Authorization Period Expiration: 12/31/2024  Plan of Care Expiration: 11/22/2024  Progress Note Due: 11/7/2024  Date of Surgery: 9/18/2024  Visit # / Visits authorized: 1/ 1, 1/20 pending   FOTO: 1/ 3 (10/8/2024)     Precautions: Standard and arthritis, osteopenia, HTN       Time In: 1000  Time Out: 1045  Total Billable Time: 38 minutes (1:1)    PTA Visit #: 0/5       Subjective     Patient reports: sore coming in today. Feels she will need to take pain meds prior to PT.  She was compliant with home exercise program.  Response to previous treatment: no adverse effect  Functional change: tbd    Pain: 2/10  Location: right knee     Objective      Objective Measures updated at progress report unless specified.     Treatment     Curtis received the treatments listed below:      therapeutic exercises to develop strength, endurance, ROM, and flexibility for 20 minutes including:  Nu step x 8 mins for knee ROM  HS stretch 3 x 30"  PROM knee extension and flexion x 10 each  Static extension stretch x 3 mins    manual therapy techniques: Joint mobilizations were applied to the: right knee for 08 minutes, including:  Patellar mobs all directions    neuromuscular re-education activities to improve: Balance, Sense, Proprioception, and education for 15 minutes. The following activities were included:  QS with self OP ankle on 1/2 wedge " "20 x 5"  AAROM heel slides with board and strap 10 x 15"  QS + SLR after MT x 10  LONG ARC QUAD 3 x 10    Patient Education and Home Exercises       Education provided:   - Cont HEP.    Written Home Exercises Provided: Pt instructed to continue prior HEP. Exercises were reviewed and Curtis was able to demonstrate them prior to the end of the session.  Curtis demonstrated good  understanding of the education provided. See Electronic Medical Record under Patient Instructions for exercises provided during therapy sessions    Assessment     Curtis tolerated tx reporting inc pain during tx, but resolved with the end of tx. Recommended pt take prescribed or OTC pain medication as advised by MD to assist in her recovery and tolerate PT. We discussed again avoidance of recliner and improving position of knee into extension. She demonstrated improve right knee extension after interventions, but had limited tolerance.    Curtis Is progressing well towards her goals.   Patient prognosis is Good.     Patient will continue to benefit from skilled outpatient physical therapy to address the deficits listed in the problem list box on initial evaluation, provide pt/family education and to maximize pt's level of independence in the home and community environment.     Patient's spiritual, cultural and educational needs considered and pt agreeable to plan of care and goals.     Anticipated barriers to physical therapy: coping style     Goals: (ongoing)  Short Term Goals: 3 weeks   Pt will reach -5 deg right knee AROM to improve gt.  Pt will reach 100 deg right knee AROM for improving squat abilities.  Right knee strength grossly 4-/5 for improving strength.  Improve right knee edema by 1 cm for improving quad fx.  FOTO score improved to 60 for improving fx.     Long Term Goals: 6 weeks   FOTO score improved to 70 for improving fx.  Right LE strength grossly 4/5 for improving strength and d/c to home program s/p.    Plan     Plan of " care Certification: 10/8/2024 to 11/22/2024.     Outpatient Physical Therapy 2-3 times weekly for 6 weeks to include the following interventions: Gait Training, Manual Therapy, Moist Heat/ Ice, Neuromuscular Re-ed, Patient Education, Therapeutic Activities, and Therapeutic Exercise.     Leatha Santana, PT       No

## 2024-10-14 ENCOUNTER — DOCUMENTATION ONLY (OUTPATIENT)
Dept: REHABILITATION | Facility: HOSPITAL | Age: 74
End: 2024-10-14

## 2024-10-14 ENCOUNTER — CLINICAL SUPPORT (OUTPATIENT)
Dept: REHABILITATION | Facility: HOSPITAL | Age: 74
End: 2024-10-14
Payer: MEDICARE

## 2024-10-14 DIAGNOSIS — R29.898 RIGHT LEG WEAKNESS: ICD-10-CM

## 2024-10-14 DIAGNOSIS — M25.661 DECREASED RANGE OF MOTION (ROM) OF RIGHT KNEE: Primary | ICD-10-CM

## 2024-10-14 PROBLEM — R55 SYNCOPE: Status: ACTIVE | Noted: 2024-10-14

## 2024-10-14 PROCEDURE — 97112 NEUROMUSCULAR REEDUCATION: CPT | Mod: HCNC,PO | Performed by: PHYSICAL THERAPIST

## 2024-10-14 PROCEDURE — 97140 MANUAL THERAPY 1/> REGIONS: CPT | Mod: HCNC,PO | Performed by: PHYSICAL THERAPIST

## 2024-10-14 NOTE — PROGRESS NOTES
[facility]   Physical Therapy Treatment Note      Name: Curtis Navarro  Clinic Number: [id number]    Therapy Diagnosis:   Encounter Diagnoses   Name Primary?    Decreased range of motion (ROM) of right knee Yes    Right leg weakness      Physician: Jeromy Rojo PA    Visit Date: 10/14/2024    Physician Orders: PT Eval and Treat   Question Answer   Post Surgical? Yes   Eval and Treat Yes   Type of Therapy Outpatient Therapy   Location: [facility]      Medical Diagnosis from Referral:   Diagnosis   M25.561 (ICD-10-CM) - Right knee pain      Evaluation Date: 10/8/2024  Authorization Period Expiration: 12/31/2024  Plan of Care Expiration: 11/22/2024  Progress Note Due: 11/7/2024  Date of Surgery: 9/18/2024  Visit # / Visits authorized: 1/ 1, 2/20 pending   FOTO: 1/ 3 (10/8/2024)     Precautions: Standard and arthritis, osteopenia, HTN       Time In: 1000  Time Out: 1100  Total Billable Time: 30 minutes (1:1)    PTA Visit #: 0/5       Subjective     Patient reports: she ate breakfast. She feels about to pass out prior to stop and after scar mobs and removal of steri strip.  She was compliant with home exercise program.  Response to previous treatment: no adverse effect  Functional change: tbd    Pain: 2/10  Location: right knee     Objective      Objective Measures updated at progress report unless specified.       EMS: Rebekah and Elaine    She reported feeling weak during scar mobs and removal of 2 steri strips. Cold towel applied to head. Water provided and MT was stopped.    Pt passed out in clinic with eyes staring a few seconds before closing and a few sounds with abnormal eye, tongue, and mouth movements. Pt was already semi reclined. Pt pulse, HR, o2 taken when pt alert. Pulse decreased but increased to normal. Pt able to report name and facility upon alertness. Some slur/confusion initially, with fairly quick recovery. Pt reported no numbness and tingling, pain, limb or face weakness.  "No facial droop noted. Upon episode, legs were elevated, stats taken. EMS was called at onset of episode. EMS arrived and was advised of findings. EMS attended to pt and recommended pt to agree to going to the hospital to R/O clots d/t low O2 sats (93%) in supine when not talking. These were normal during PT testing; however, pt was conversing with PT at the time of stats taken.    10:/80  10:/100  10:57-98% 71 bpm  Supine feet elevated 30 deg.    EMS arrived shortly after results. Pt was dependently transferred and assisted onto [facility] by EMS with PT help.    Last BP was 130/70 in Aug.    Treatment     Curtis received the treatments listed below:      therapeutic exercises to develop strength, endurance, ROM, and flexibility for 20 minutes including:  Nu step x 8 mins for knee ROM  HS stretch 3 x 30"  PROM knee extension and flexion x 10 each  Static extension stretch x 3 mins    manual therapy techniques: Joint mobilizations were applied to the: right knee for 08 minutes, including:  Patellar mobs all directions  Scar mobs.    neuromuscular re-education activities to improve: Balance, Sense, Proprioception, and education for 30 minutes. The following activities were included:  QS with self OP ankle on 1/2 wedge 20 x 5"  AAROM heel slides with board and strap 10 x 15"  QS + SLR after MT x 10-np  LONG ARC QUAD 3 x 10    Sx screening.    Patient Education and Home Exercises       Education provided:   - Cont HEP.    Written Home Exercises Provided: Pt instructed to continue prior HEP. Exercises were reviewed and Curtis was able to demonstrate them prior to the end of the session.  Curtis demonstrated good  understanding of the education provided. See Electronic Medical Record under Patient Instructions for exercises provided during therapy sessions    Assessment     Curtis inquired about when she can drive. We discussed she can drive when she has enough strength, ROM, and is off prescribed pain meds. She " is at -5 deg right knee extension today. Will continue to monitor chart for updates and progress PT as tolerated. See objective for measures related to emergency visit.    Curtis Is progressing well towards her goals.   Patient prognosis is Good.     Patient will continue to benefit from skilled outpatient physical therapy to address the deficits listed in the problem list box on initial evaluation, provide pt/family education and to maximize pt's level of independence in the home and community environment.     Patient's spiritual, cultural and educational needs considered and pt agreeable to plan of care and goals.     Anticipated barriers to physical therapy: coping style     Goals: (ongoing)  Short Term Goals: 3 weeks   Pt will reach -5 deg right knee AROM to improve gt.  Pt will reach 100 deg right knee AROM for improving squat abilities.  Right knee strength grossly 4-/5 for improving strength.  Improve right knee edema by 1 cm for improving quad fx.  FOTO score improved to 60 for improving fx.     Long Term Goals: 6 weeks   FOTO score improved to 70 for improving fx.  Right LE strength grossly 4/5 for improving strength and d/c to home program s/p.    Plan     Plan of care Certification: 10/8/2024 to 11/22/2024.     Outpatient Physical Therapy 2-3 times weekly for 6 weeks to include the following interventions: Gait Training, Manual Therapy, Moist Heat/ Ice, Neuromuscular Re-ed, Patient Education, Therapeutic Activities, and Therapeutic Exercise.     Leatha Santana, PT

## 2024-10-14 NOTE — PROGRESS NOTES
Called to speak to relative for update. Only one number in chart. US of right leg was neg. Neg chest xray. No phone answer. LM for pt to f/u with PCP and get clearance for return to PT if she is admitted to ER over night.

## 2024-10-17 ENCOUNTER — CLINICAL SUPPORT (OUTPATIENT)
Dept: REHABILITATION | Facility: HOSPITAL | Age: 74
End: 2024-10-17
Payer: MEDICARE

## 2024-10-17 DIAGNOSIS — M25.661 DECREASED RANGE OF MOTION (ROM) OF RIGHT KNEE: Primary | ICD-10-CM

## 2024-10-17 DIAGNOSIS — R29.898 RIGHT LEG WEAKNESS: ICD-10-CM

## 2024-10-17 PROCEDURE — 97112 NEUROMUSCULAR REEDUCATION: CPT | Mod: HCNC,PO,CQ

## 2024-10-17 PROCEDURE — 97110 THERAPEUTIC EXERCISES: CPT | Mod: HCNC,PO,CQ

## 2024-10-17 PROCEDURE — 97140 MANUAL THERAPY 1/> REGIONS: CPT | Mod: HCNC,PO,CQ

## 2024-10-17 NOTE — PROGRESS NOTES
"[facility]   Physical Therapy Treatment Note      Name: Curtis Navarro  Clinic Number: [id number]    Therapy Diagnosis:   Encounter Diagnoses   Name Primary?    Decreased range of motion (ROM) of right knee Yes    Right leg weakness      Physician: Jeromy Rojo PA    Visit Date: 10/17/2024    Physician Orders: PT Eval and Treat   Question Answer   Post Surgical? Yes   Eval and Treat Yes   Type of Therapy Outpatient Therapy   Location: [facility]      Medical Diagnosis from Referral:   Diagnosis   M25.561 (ICD-10-CM) - Right knee pain      Evaluation Date: 10/8/2024  Authorization Period Expiration: 12/31/2024  Plan of Care Expiration: 11/22/2024  Progress Note Due: 11/7/2024  Date of Surgery: 9/18/2024  Visit # / Visits authorized: 1/ 1, 3/20 pending   FOTO: 1/ 3 (10/8/2024)     Precautions: Standard and arthritis, osteopenia, HTN       Time In: 0958 AM  Time Out: 1055 AM  Total Billable Time: 55 minutes    PTA Visit #: 1/5     Subjective     Patient reports: she is doing well today. Patient states she was hospitalized due to dehydration and potential vagal nerve event that resulted in drop in BP. Patient states she is having a hard time sleeping in her bed because of knee pain. She was compliant with home exercise program.  Response to previous treatment: no adverse effect  Functional change: tbd    Pain: 2/10  Location: right knee     Objective      (R) knee flexion with strap: 95 degrees     Treatment     Curtis received the treatments listed below:      therapeutic exercises to develop strength, endurance, ROM, and flexibility for 20 minutes including:  Nu step x 8 mins for knee ROM  HS stretch 3 x 30"  PROM knee extension and flexion x 10 each  Static extension stretch x 3 minutes  Standing heel raises 2x10    manual therapy techniques: Joint mobilizations were applied to the: right knee for 08 minutes, including:  Patellar mobs all directions  Scar mobs.    neuromuscular " "re-education activities to improve: Balance, Sense, Proprioception, and education for 30 minutes. The following activities were included:  QS with self OP ankle on 1/2 wedge 20 x 5"  AAROM heel slides with board and strap 10 x 15"  QS + SLR after MT x 10-np  LONG ARC QUAD 3 x 10  Standing hamstring curl 2x10  Sit to stand x 10  Lateral weight shifting x 2 minutes     Patient Education and Home Exercises       Education provided:   - Cont HEP.    Written Home Exercises Provided: Pt instructed to continue prior HEP. Exercises were reviewed and Curtis was able to demonstrate them prior to the end of the session.  Curtis demonstrated good  understanding of the education provided. See Electronic Medical Record under Patient Instructions for exercises provided during therapy sessions    Assessment     Curtis returns today after hospital admission but has clearance to resume outpatient PT. Focused on knee extension both passively and actively with significant improvements in quad function achieved with repetition. Patient able to achieve 95 degrees of AA knee flexion with strap today. Good tolerance to progression of weightbearing activities with emphasis on equal weightbearing.     Curtis Is progressing well towards her goals.   Patient prognosis is Good.     Patient will continue to benefit from skilled outpatient physical therapy to address the deficits listed in the problem list box on initial evaluation, provide pt/family education and to maximize pt's level of independence in the home and community environment.     Patient's spiritual, cultural and educational needs considered and pt agreeable to plan of care and goals.     Anticipated barriers to physical therapy: coping style     Goals: (ongoing)  Short Term Goals: 3 weeks   Pt will reach -5 deg right knee AROM to improve gt.  Pt will reach 100 deg right knee AROM for improving squat abilities.  Right knee strength grossly 4-/5 for improving strength.  Improve " right knee edema by 1 cm for improving quad fx.  FOTO score improved to 60 for improving fx.     Long Term Goals: 6 weeks   FOTO score improved to 70 for improving fx.  Right LE strength grossly 4/5 for improving strength and d/c to home program s/p.    Plan     Plan of care Certification: 10/8/2024 to 11/22/2024.     Outpatient Physical Therapy 2-3 times weekly for 6 weeks to include the following interventions: Gait Training, Manual Therapy, Moist Heat/ Ice, Neuromuscular Re-ed, Patient Education, Therapeutic Activities, and Therapeutic Exercise.     Margaret Mayfield, PTA

## 2024-10-18 ENCOUNTER — CLINICAL SUPPORT (OUTPATIENT)
Dept: REHABILITATION | Facility: HOSPITAL | Age: 74
End: 2024-10-18
Payer: MEDICARE

## 2024-10-18 DIAGNOSIS — R29.898 RIGHT LEG WEAKNESS: ICD-10-CM

## 2024-10-18 DIAGNOSIS — M25.661 DECREASED RANGE OF MOTION (ROM) OF RIGHT KNEE: Primary | ICD-10-CM

## 2024-10-18 PROCEDURE — 97112 NEUROMUSCULAR REEDUCATION: CPT | Mod: HCNC,PO | Performed by: PHYSICAL THERAPIST

## 2024-10-18 PROCEDURE — 97110 THERAPEUTIC EXERCISES: CPT | Mod: HCNC,PO | Performed by: PHYSICAL THERAPIST

## 2024-10-18 PROCEDURE — 97140 MANUAL THERAPY 1/> REGIONS: CPT | Mod: HCNC,PO | Performed by: PHYSICAL THERAPIST

## 2024-10-18 NOTE — PROGRESS NOTES
"[facility]   Physical Therapy Treatment Note      Name: Curtis Navarro  Clinic Number: [id number]    Therapy Diagnosis:   Encounter Diagnoses   Name Primary?    Decreased range of motion (ROM) of right knee Yes    Right leg weakness      Physician: Jeromy Rojo PA    Visit Date: 10/18/2024    Physician Orders: PT Eval and Treat   Question Answer   Post Surgical? Yes   Eval and Treat Yes   Type of Therapy Outpatient Therapy   Location: [facility]      Medical Diagnosis from Referral:   Diagnosis   M25.561 (ICD-10-CM) - Right knee pain      Evaluation Date: 10/8/2024  Authorization Period Expiration: 12/31/2024  Plan of Care Expiration: 11/22/2024  Progress Note Due: 11/7/2024  Date of Surgery: 9/18/2024  Visit # / Visits authorized: 1/ 1, 4/20   FOTO: 1/ 3 (10/8/2024)     Precautions: Standard and arthritis, osteopenia, HTN       Time In: 0902 AM  Time Out: 1000 AM  Total Billable Time: 30 minutes (1:1)    PTA Visit #: 0/5     Subjective     Patient reports: she is doing well today. Patient states she hydrated and ate well. Patient states she is having a hard time sleeping in her bed because of knee pain. H/o restless leg. MD office gave release to drive and pt has stopped prescribed pn medication. She was compliant with home exercise program.  Response to previous treatment: no adverse effect  Functional change: tbd    Pain: 2/10  Location: right knee     Objective      (R) knee flexion with strap: 95 degrees     Treatment     Curtis received the treatments listed below:      therapeutic exercises to develop strength, endurance, ROM, and flexibility for 20 minutes including:  Nu step x 8 mins for knee ROM  HS stretch 3 x 30"  PROM knee extension and flexion x 10 each  Static extension stretch x 3 minutes  Standing heel raises 2x10    manual therapy techniques: Joint mobilizations were applied to the: right knee for 08 minutes, including:  Patellar mobs all directions  Scar " "mobs.    neuromuscular re-education activities to improve: Balance, Sense, Proprioception, and education for 30 minutes. The following activities were included:  QS with self OP ankle on 1/2 wedge 20 x 5"  AAROM heel slides with board and strap 10 x 15"  LONG ARC QUAD 3 x 10  Standing hamstring curl 2x10  Sit to stand x 10  Lateral weight shifting x 2 minutes     Patient Education and Home Exercises       Education provided:   - Cont HEP.    Written Home Exercises Provided: Pt instructed to continue prior HEP. Exercises were reviewed and Curtis was able to demonstrate them prior to the end of the session.  Curtis demonstrated good  understanding of the education provided. See Electronic Medical Record under Patient Instructions for exercises provided during therapy sessions    Assessment     Curtis continues demonstrating ROM and strength gains. She demonstrated only slightly less wt bearing through right LE with SIT TO STAND d/t limited knee flexion. She continues progressing per TKA protocol.    Curtis Is progressing well towards her goals.   Patient prognosis is Good.     Patient will continue to benefit from skilled outpatient physical therapy to address the deficits listed in the problem list box on initial evaluation, provide pt/family education and to maximize pt's level of independence in the home and community environment.     Patient's spiritual, cultural and educational needs considered and pt agreeable to plan of care and goals.     Anticipated barriers to physical therapy: coping style     Goals: (ongoing)  Short Term Goals: 3 weeks   Pt will reach -5 deg right knee AROM to improve gt.  Pt will reach 100 deg right knee AROM for improving squat abilities.  Right knee strength grossly 4-/5 for improving strength.  Improve right knee edema by 1 cm for improving quad fx.  FOTO score improved to 60 for improving fx.     Long Term Goals: 6 weeks   FOTO score improved to 70 for improving fx.  Right LE strength " grossly 4/5 for improving strength and d/c to home program s/p.    Plan     Plan of care Certification: 10/8/2024 to 11/22/2024.     Outpatient Physical Therapy 2-3 times weekly for 6 weeks to include the following interventions: Gait Training, Manual Therapy, Moist Heat/ Ice, Neuromuscular Re-ed, Patient Education, Therapeutic Activities, and Therapeutic Exercise.     Leatha Santana, PT

## 2024-10-21 ENCOUNTER — OFFICE VISIT (OUTPATIENT)
Dept: FAMILY MEDICINE | Facility: CLINIC | Age: 74
End: 2024-10-21
Payer: MEDICARE

## 2024-10-21 VITALS
OXYGEN SATURATION: 99 % | RESPIRATION RATE: 18 BRPM | HEART RATE: 83 BPM | DIASTOLIC BLOOD PRESSURE: 68 MMHG | WEIGHT: 181.44 LBS | SYSTOLIC BLOOD PRESSURE: 116 MMHG | HEIGHT: 63 IN | BODY MASS INDEX: 32.15 KG/M2 | TEMPERATURE: 99 F

## 2024-10-21 DIAGNOSIS — M25.569 KNEE PAIN, UNSPECIFIED CHRONICITY, UNSPECIFIED LATERALITY: ICD-10-CM

## 2024-10-21 DIAGNOSIS — R55 SYNCOPE, UNSPECIFIED SYNCOPE TYPE: Primary | ICD-10-CM

## 2024-10-21 PROCEDURE — 4010F ACE/ARB THERAPY RXD/TAKEN: CPT | Mod: CPTII,S$GLB,, | Performed by: NURSE PRACTITIONER

## 2024-10-21 PROCEDURE — 3288F FALL RISK ASSESSMENT DOCD: CPT | Mod: CPTII,S$GLB,, | Performed by: NURSE PRACTITIONER

## 2024-10-21 PROCEDURE — 99214 OFFICE O/P EST MOD 30 MIN: CPT | Mod: S$GLB,,, | Performed by: NURSE PRACTITIONER

## 2024-10-21 PROCEDURE — 1125F AMNT PAIN NOTED PAIN PRSNT: CPT | Mod: CPTII,S$GLB,, | Performed by: NURSE PRACTITIONER

## 2024-10-21 PROCEDURE — 1101F PT FALLS ASSESS-DOCD LE1/YR: CPT | Mod: CPTII,S$GLB,, | Performed by: NURSE PRACTITIONER

## 2024-10-21 PROCEDURE — 3008F BODY MASS INDEX DOCD: CPT | Mod: CPTII,S$GLB,, | Performed by: NURSE PRACTITIONER

## 2024-10-21 PROCEDURE — 3074F SYST BP LT 130 MM HG: CPT | Mod: CPTII,S$GLB,, | Performed by: NURSE PRACTITIONER

## 2024-10-21 PROCEDURE — 3078F DIAST BP <80 MM HG: CPT | Mod: CPTII,S$GLB,, | Performed by: NURSE PRACTITIONER

## 2024-10-21 PROCEDURE — 1159F MED LIST DOCD IN RCRD: CPT | Mod: CPTII,S$GLB,, | Performed by: NURSE PRACTITIONER

## 2024-10-21 NOTE — PROGRESS NOTES
Patient ID: Curtis Navarro is a 74 y.o. female.     History of Present Illness    CHIEF COMPLAINT:  Curtis presents today for a hospital follow-up after a syncopal episode at physical therapy.    KNEE REPLACEMENT SURGERY AND RECOVERY:  She underwent knee replacement surgery 5 weeks ago performed by Dr. Helton. During physical therapy, she experienced a syncopal episode, resulting in hospitalization for dehydration. An echocardiogram showed normal EF. She reports significant rehabilitation challenges, including pain and discomfort, particularly throbbing at night and sensitivity in the knee area. She has limited range of motion, specifically difficulty pulling her knee forward. Despite these challenges, she notes some recovery progress, including no longer using walking aids and sleeping in bed for the past week.  Her biggest issue was not being able to sleep for the 1st month because she could not sleep in her bed secondary to being unable to sleep on her back. RLE neg for DVT.    PAIN MANAGEMENT:  She discontinued prescribed pain medication due to limited effectiveness, stating it only provided relief for a few hours.  She also had some constipation with her pain medication.  She currently manages pain with occasional use of Tylenol and ibuprofen.    SLEEP ISSUES:  She reports difficulty maintaining sleep following her recent surgery. She is unable to sleep on her back and experiences discomfort when sleeping on her sides, leading to a disturbed sleep pattern. Initially, she was unable to sleep in her bed, but in the past week has started sleeping in bed for portions of the night.      URINARY ISSUES:  She reports having an overactive bladder, causing frequent urination and disrupting her sleep. This urinary issue, combined with post-operative discomfort, has significantly impacted her sleep patterns and quality of life. She expresses interest in nerve stimulation for overactive bladder management.    PAST  "MEDICAL HISTORY:  This knee replacement was her first major surgery, excluding a tonsillectomy at age 5.    CONCERNS ABOUT FUTURE PROCEDURES:  She expresses anxiety about being put under general anesthesia, stating a preference to avoid it unless absolutely necessary.      ROS:  General: -fever, -chills, -fatigue, -weight gain, -weight loss  Eyes: -vision changes, -redness, -discharge  ENT: -ear pain, -nasal congestion, -sore throat  Cardiovascular: -chest pain, -palpitations, -lower extremity edema  Respiratory: -cough, -shortness of breath  Gastrointestinal: -abdominal pain, +nausea, -vomiting, -diarrhea, -constipation, -blood in stool  Genitourinary: -dysuria, -hematuria, +frequency  Musculoskeletal: +joint pain, -muscle pain  Skin: -rash, -lesion  Neurological: -headache, +dizziness, -numbness, -tingling  Psychiatric: +anxiety, -depression, +sleep difficulty         Physical Exam    Vitals:    10/21/24 1142   BP: 116/68   Pulse: 83   Resp: 18   Temp: 98.5 °F (36.9 °C)   SpO2: 99%   Weight: 82.3 kg (181 lb 7 oz)   Height: 5' 3" (1.6 m)   PainSc:   4   PainLoc: Knee     Body mass index is 32.14 kg/m².  Physical Exam    General: No acute distress. Well-developed. Well-nourished.  Eyes: EOMI. Sclerae anicteric.  Cardiovascular: Regular rate. Regular rhythm. No murmurs. No rubs. No gallops. Normal S1, S2.  Respiratory: Normal respiratory effort. Clear to auscultation bilaterally. No rales. No rhonchi. No wheezing.  Musculoskeletal: No  obvious deformity.  Extremities: No lower extremity edema.  Neurological: Alert & oriented x3. No slurred speech. Normal gait.  Psychiatric: Normal mood. Normal affect. Good insight. Good judgment.  Skin: Warm. Dry. No rash. Erythema present on scar. Scar appears normal.                  Assessment & Plan    Reviewed patient's recent knee replacement surgery with Dr. Helton and subsequent hospital admission for syncope during physical therapy  Assessed post-operative recovery " progress at 5 weeks  Evaluated surgical site, observing normal post-operative appearance with redness  Assessed patient's mobility, noting good progress with ambulation without assistive devices at 5 weeks post-op    KNEE REPLACEMENT RECOVERY:  - Explained that post-operative knee discomfort, especially at night, is common and may persist for several months.  - Explained that full recovery from knee replacement can take up to 1 year, with significant improvements often seen around 8 months post-op.  - Terren to continue with current rehabilitation exercises and physical therapy program.  - Continued Tylenol as needed for pain management.  - Continued ibuprofen as needed for pain management.    HYDRATION MANAGEMENT:  - Discussed the importance of maintaining hydration, particularly given the patient's history of syncope and overactive bladder.  - Recommend maintaining adequate hydration to prevent dehydration.    SLEEP ISSUES:  - Terren to gradually increase time spent sleeping in bed to normalize sleep patterns.  - Contact the office if sleep issues persist or if nausea continues daily over the next month.    Of note, patient does have hypertension controlled on losartan 100 mg daily.  No hypotension noted.    FOLLOW UP:  - Follow up if any new concerns arise during recovery.           This note was generated with the assistance of ambient listening technology. Verbal consent was obtained by the patient and accompanying visitor(s) for the recording of patient appointment to facilitate this note. I attest to having reviewed and edited the generated note for accuracy, though some syntax or spelling errors may persist. Please contact the author of this note for any clarification.    I spent 30 minutes on this encounter, time includes face-to-face, chart review, documentation, test review and orders.

## 2024-10-22 ENCOUNTER — CLINICAL SUPPORT (OUTPATIENT)
Dept: REHABILITATION | Facility: HOSPITAL | Age: 74
End: 2024-10-22
Payer: MEDICARE

## 2024-10-22 DIAGNOSIS — M25.661 DECREASED RANGE OF MOTION (ROM) OF RIGHT KNEE: Primary | ICD-10-CM

## 2024-10-22 DIAGNOSIS — R29.898 RIGHT LEG WEAKNESS: ICD-10-CM

## 2024-10-22 PROCEDURE — 97140 MANUAL THERAPY 1/> REGIONS: CPT | Mod: HCNC,PO | Performed by: PHYSICAL THERAPIST

## 2024-10-22 PROCEDURE — 97112 NEUROMUSCULAR REEDUCATION: CPT | Mod: HCNC,PO | Performed by: PHYSICAL THERAPIST

## 2024-10-22 NOTE — PROGRESS NOTES
"[facility]   Physical Therapy Treatment Note      Name: Curtis Navarro  Clinic Number: [id number]    Therapy Diagnosis:   Encounter Diagnoses   Name Primary?    Decreased range of motion (ROM) of right knee Yes    Right leg weakness      Physician: Jeromy Rojo PA    Visit Date: 10/22/2024    Physician Orders: PT Eval and Treat   Question Answer   Post Surgical? Yes   Eval and Treat Yes   Type of Therapy Outpatient Therapy   [facility]      Medical Diagnosis from Referral:   Diagnosis   M25.561 (ICD-10-CM) - Right knee pain      Evaluation Date: 10/8/2024  Authorization Period Expiration: 12/31/2024  Plan of Care Expiration: 11/22/2024  Progress Note Due: 11/7/2024  Date of Surgery: 9/18/2024  Visit # / Visits authorized: 1/ 1, 5/20   FOTO: 1/ 3 (10/8/2024)     Precautions: Standard and arthritis, osteopenia, HTN       Time In: 1005 AM  Time Out: 1100 AM  Total Billable Time: 30 minutes (1:1)    PTA Visit #: 0/5     Subjective     Patient reports: she is still having difficulty sleeping at night d/t right knee pain which extends down right LE. This pain is an ache. She does have a h/o restless leg syndrome. She was compliant with home exercise program.  Response to previous treatment: no adverse effect  Functional change: tbd    Pain: 3-4/10  Location: right knee     Objective      (R) knee flexion with strap: 95 degrees     Treatment     Curtis received the treatments listed below:      therapeutic exercises to develop strength, endurance, ROM, and flexibility for 20 minutes including:  Nu step x 8 mins for knee ROM  HS stretch 3 x 30"  PROM knee extension and flexion x 10 each  Static extension stretch x 3 minutes  Standing heel raises 2x10    manual therapy techniques: Joint mobilizations were applied to the: right knee for 08 minutes, including:  Patellar mobs all directions  Scar mobs.-np    neuromuscular re-education activities to improve: Balance, Sense, Proprioception, " "and education for 25 minutes. The following activities were included:  QS with self OP ankle on 1/2 wedge 20 x 5"  AAROM heel slides with board and strap 10 x 15"  LONG ARC QUAD 3 x 10  Standing hamstring curl 2x10  Sit to stand x 10  Lateral weight shifting x 2 minutes on foam    Patient Education and Home Exercises       Education provided:   - Cont HEP.    Written Home Exercises Provided: Pt instructed to continue prior HEP. Exercises were reviewed and Curtis was able to demonstrate them prior to the end of the session.  Curtis demonstrated good  understanding of the education provided. See Electronic Medical Record under Patient Instructions for exercises provided during therapy sessions    Assessment     Curtis continues demonstrating ROM and strength gains. She continues with right leg pain at night. She takes a daily Baby Aspirin. Recommended home program closer to sleep hours. She is already doing HEP about 9 PM. She has trouble sleeping prior to 12 AM since surgery. Her right leg is uncomfortable. Tibia is with hypomobility limiting right knee flexion today. + gentle EDGE OF MAT distraction next session. She continues progressing per TKA protocol.    Curtis Is progressing well towards her goals.   Patient prognosis is Good.     Patient will continue to benefit from skilled outpatient physical therapy to address the deficits listed in the problem list box on initial evaluation, provide pt/family education and to maximize pt's level of independence in the home and community environment.     Patient's spiritual, cultural and educational needs considered and pt agreeable to plan of care and goals.     Anticipated barriers to physical therapy: coping style     Goals: (ongoing)  Short Term Goals: 3 weeks   Pt will reach -5 deg right knee AROM to improve gt.  Pt will reach 100 deg right knee AROM for improving squat abilities.  Right knee strength grossly 4-/5 for improving strength.  Improve right knee edema by 1 " cm for improving quad fx.  FOTO score improved to 60 for improving fx.     Long Term Goals: 6 weeks   FOTO score improved to 70 for improving fx.  Right LE strength grossly 4/5 for improving strength and d/c to home program s/p.    Plan     Plan of care Certification: 10/8/2024 to 11/22/2024.     Outpatient Physical Therapy 2-3 times weekly for 6 weeks to include the following interventions: Gait Training, Manual Therapy, Moist Heat/ Ice, Neuromuscular Re-ed, Patient Education, Therapeutic Activities, and Therapeutic Exercise.     Leatha Santana, PT

## 2024-10-24 ENCOUNTER — CLINICAL SUPPORT (OUTPATIENT)
Dept: REHABILITATION | Facility: HOSPITAL | Age: 74
End: 2024-10-24
Payer: MEDICARE

## 2024-10-24 DIAGNOSIS — M25.661 DECREASED RANGE OF MOTION (ROM) OF RIGHT KNEE: Primary | ICD-10-CM

## 2024-10-24 DIAGNOSIS — R29.898 RIGHT LEG WEAKNESS: ICD-10-CM

## 2024-10-24 PROCEDURE — 97140 MANUAL THERAPY 1/> REGIONS: CPT | Mod: HCNC,PO | Performed by: PHYSICAL THERAPIST

## 2024-10-24 PROCEDURE — 97110 THERAPEUTIC EXERCISES: CPT | Mod: HCNC,PO | Performed by: PHYSICAL THERAPIST

## 2024-10-24 PROCEDURE — 97112 NEUROMUSCULAR REEDUCATION: CPT | Mod: HCNC,PO | Performed by: PHYSICAL THERAPIST

## 2024-10-24 NOTE — PROGRESS NOTES
"[facility]   Physical Therapy Treatment Note      Name: Curtis Navarro  Clinic Number: [id number]    Therapy Diagnosis:   Encounter Diagnoses   Name Primary?    Decreased range of motion (ROM) of right knee Yes    Right leg weakness      Physician: Jeromy Rojo PA    Visit Date: 10/24/2024    Physician Orders: PT Eval and Treat   Question Answer   Post Surgical? Yes   Eval and Treat Yes   Type of Therapy Outpatient Therapy   Location: [facility]      Medical Diagnosis from Referral:   Diagnosis   M25.561 (ICD-10-CM) - Right knee pain      Evaluation Date: 10/8/2024  Authorization Period Expiration: 12/31/2024  Plan of Care Expiration: 11/22/2024  Progress Note Due: 11/7/2024  Date of Surgery: 9/18/2024  Visit # / Visits authorized: 1/ 1, 6/20   FOTO: 2/ 3 (10/8/2024, 10/24/2024)     Precautions: Standard and arthritis, osteopenia, HTN       Time In: 1400  Time Out: 1500  Total Billable Time: 55 minutes (1:1)    PTA Visit #: 0/5     Subjective     Patient reports: she is still having difficulty sleeping at night d/t right knee pain which extends down right LE. This pain is an ache. She does have a h/o restless leg syndrome. She did have one good nights sleep 2 nights ago. She was compliant with home exercise program.  Response to previous treatment: no adverse effect  Functional change: tbd    Pain: 3/10  Location: right knee     Objective      (R) knee flexion with strap: 95 degrees     10/24/2024: 66    Treatment     Curtis received the treatments listed below:      therapeutic exercises to develop strength, endurance, ROM, and flexibility for 20 minutes including:  Nu step x 8 mins for knee ROM  HS stretch 3 x 30"  PROM knee extension and flexion x 10 each  Static extension stretch x 3 minutes-np  Standing heel raises 2x10    Passive thigh stretching 5 mins    manual therapy techniques: Joint mobilizations were applied to the: right knee for 08 minutes, including:  Patellar mobs all " "directions-np  Scar mobs.    neuromuscular re-education activities to improve: Balance, Sense, Proprioception, and education for 25 minutes. The following activities were included:  QS with self OP ankle on 1/2 wedge 20 x 5"  AAROM heel slides with board and strap 10 x 15"  LONG ARC QUAD 3 x 10, 3#  Standing hamstring curl 2x10  Sit to stand x 10  Lateral weight shifting x 2 minutes on foam-np    Patient Education and Home Exercises       Education provided:   - Cont HEP.    Written Home Exercises Provided: Pt instructed to continue prior HEP. Exercises were reviewed and Curtis was able to demonstrate them prior to the end of the session.  Curtis demonstrated good  understanding of the education provided. See Electronic Medical Record under Patient Instructions for exercises provided during therapy sessions    Assessment     Curtis continues demonstrating ROM and strength gains. She continues with right leg pain at night. Added manual stretching to address stiffness and pain that appears muscular and typical of post op status. Scar continues keloid, not stiff though. She continues progressing per TKA protocol.    Curtis Is progressing well towards her goals.   Patient prognosis is Good.     Patient will continue to benefit from skilled outpatient physical therapy to address the deficits listed in the problem list box on initial evaluation, provide pt/family education and to maximize pt's level of independence in the home and community environment.     Patient's spiritual, cultural and educational needs considered and pt agreeable to plan of care and goals.     Anticipated barriers to physical therapy: coping style     Goals: (ongoing)  Short Term Goals: 3 weeks   Pt will reach -5 deg right knee AROM to improve gt.  Pt will reach 100 deg right knee AROM for improving squat abilities.  Right knee strength grossly 4-/5 for improving strength.  Improve right knee edema by 1 cm for improving quad fx.  FOTO score improved " to 60 for improving fx.     Long Term Goals: 6 weeks   FOTO score improved to 70 for improving fx.  Right LE strength grossly 4/5 for improving strength and d/c to home program s/p.    Plan     Plan of care Certification: 10/8/2024 to 11/22/2024.     Outpatient Physical Therapy 2-3 times weekly for 6 weeks to include the following interventions: Gait Training, Manual Therapy, Moist Heat/ Ice, Neuromuscular Re-ed, Patient Education, Therapeutic Activities, and Therapeutic Exercise.     Leatha Santana, PT

## 2024-10-25 ENCOUNTER — CLINICAL SUPPORT (OUTPATIENT)
Dept: REHABILITATION | Facility: HOSPITAL | Age: 74
End: 2024-10-25
Payer: MEDICARE

## 2024-10-25 DIAGNOSIS — R29.898 RIGHT LEG WEAKNESS: ICD-10-CM

## 2024-10-25 DIAGNOSIS — M25.661 DECREASED RANGE OF MOTION (ROM) OF RIGHT KNEE: Primary | ICD-10-CM

## 2024-10-25 PROCEDURE — 97140 MANUAL THERAPY 1/> REGIONS: CPT | Mod: HCNC,PO | Performed by: PHYSICAL THERAPIST

## 2024-10-25 PROCEDURE — 97110 THERAPEUTIC EXERCISES: CPT | Mod: HCNC,PO | Performed by: PHYSICAL THERAPIST

## 2024-10-25 PROCEDURE — 97112 NEUROMUSCULAR REEDUCATION: CPT | Mod: HCNC,PO | Performed by: PHYSICAL THERAPIST

## 2024-10-25 NOTE — PROGRESS NOTES
"[facility]   Physical Therapy Treatment Note      Name: Curtis Navarro  Clinic Number: [id number]    Therapy Diagnosis:   Encounter Diagnoses   Name Primary?    Decreased range of motion (ROM) of right knee Yes    Right leg weakness      Physician: Jeromy Rojo PA    Visit Date: 10/25/2024    Physician Orders: PT Eval and Treat   Question Answer   Post Surgical? Yes   Eval and Treat Yes   Type of Therapy Outpatient Therapy   Location: [facility]      Medical Diagnosis from Referral:   Diagnosis   M25.561 (ICD-10-CM) - Right knee pain      Evaluation Date: 10/8/2024  Authorization Period Expiration: 12/31/2024  Plan of Care Expiration: 11/22/2024  Progress Note Due: 11/7/2024  Date of Surgery: 9/18/2024  Visit # / Visits authorized: 1/ 1, 7/20   FOTO: 2/ 3 (10/8/2024, 10/24/2024)     Precautions: Standard and arthritis, osteopenia, HTN       Time In: 900  Time Out: 1000  Total Billable Time: 55 minutes (1:1)    PTA Visit #: 0/5     Subjective     Patient reports: she is still having difficulty sleeping at night d/t right knee pain which extends down right LE. This pain is an ache. She does have a h/o restless leg syndrome. She did have one good nights sleep 2 nights ago. She was compliant with home exercise program.  Response to previous treatment: no adverse effect  Functional change: tbd    Pain: 3/10  Location: right knee     Objective      (R) knee flexion with strap: 95 degrees     10/24/2024: 66    Treatment     Curtis received the treatments listed below:      therapeutic exercises to develop strength, endurance, ROM, and flexibility for 25 minutes including:  Nu step x 8 mins for knee ROM (bike 8 mins)  HS stretch 3 x 30"  PROM knee extension and flexion x 10 each  Standing heel raises 2x10, 3#  Leg press 3 x 10 DOUBLE LEG 30#    Np:  Passive thigh stretching 5 mins  Static extension stretch x 3 minutes    manual therapy techniques: Joint mobilizations were applied to the: right " "knee for 10 minutes, including:  Patellar mobs all directions-np  Scar mobs.  Gentle tibial distraction    neuromuscular re-education activities to improve: Balance, Sense, Proprioception, and education for 25 minutes. The following activities were included:    Standing hamstring curl 2x10, 3#  Sit to stand 2 x 10  Standing hip Abduction 3# 2 x 10  Standing hip extension 3# 2 x 10    QS with self OP ankle on 1/2 wedge 20 x 5"  AAROM heel slides with board and strap 10 x 15"  LONG ARC QUAD 3 x 10, 3#  Lateral weight shifting x 2 minutes on foam-np    Patient Education and Home Exercises       Education provided:   - Cont HEP.    Written Home Exercises Provided: Pt instructed to continue prior HEP. Exercises were reviewed and Curtis was able to demonstrate them prior to the end of the session.  Curtis demonstrated good  understanding of the education provided. See Electronic Medical Record under Patient Instructions for exercises provided during therapy sessions    Assessment     Curtis continues demonstrating ROM and strength gains. 100 deg PROM right knee flexion achieved today. She continues with right leg pain at night. Added manual stretching to address stiffness and pain that appears muscular and typical of post op status. Scar continues keloid, not stiff though. Progressed to additional WB strength training s/p. She continues progressing per TKA protocol.    Curtis Is progressing well towards her goals.   Patient prognosis is Good.     Patient will continue to benefit from skilled outpatient physical therapy to address the deficits listed in the problem list box on initial evaluation, provide pt/family education and to maximize pt's level of independence in the home and community environment.     Patient's spiritual, cultural and educational needs considered and pt agreeable to plan of care and goals.     Anticipated barriers to physical therapy: coping style     Goals: (ongoing)  Short Term Goals: 3 weeks   Pt " will reach -5 deg right knee AROM to improve gt.  Pt will reach 100 deg right knee AROM for improving squat abilities.  Right knee strength grossly 4-/5 for improving strength.  Improve right knee edema by 1 cm for improving quad fx.  FOTO score improved to 60 for improving fx.     Long Term Goals: 6 weeks   FOTO score improved to 70 for improving fx.  Right LE strength grossly 4/5 for improving strength and d/c to home program s/p.    Plan     Plan of care Certification: 10/8/2024 to 11/22/2024.     Outpatient Physical Therapy 2-3 times weekly for 6 weeks to include the following interventions: Gait Training, Manual Therapy, Moist Heat/ Ice, Neuromuscular Re-ed, Patient Education, Therapeutic Activities, and Therapeutic Exercise.     Leatha Santana, PT

## 2024-10-27 DIAGNOSIS — K21.9 GASTROESOPHAGEAL REFLUX DISEASE WITHOUT ESOPHAGITIS: Primary | ICD-10-CM

## 2024-10-28 RX ORDER — LEVOTHYROXINE SODIUM 112 UG/1
112 TABLET ORAL
Qty: 90 TABLET | Refills: 1 | Status: SHIPPED | OUTPATIENT
Start: 2024-10-28

## 2024-10-29 RX ORDER — OMEPRAZOLE 20 MG/1
20 CAPSULE, DELAYED RELEASE ORAL
Qty: 90 CAPSULE | Refills: 3 | Status: SHIPPED | OUTPATIENT
Start: 2024-10-29

## 2024-10-30 ENCOUNTER — CLINICAL SUPPORT (OUTPATIENT)
Dept: REHABILITATION | Facility: HOSPITAL | Age: 74
End: 2024-10-30
Payer: MEDICARE

## 2024-10-30 DIAGNOSIS — R29.898 RIGHT LEG WEAKNESS: ICD-10-CM

## 2024-10-30 DIAGNOSIS — M25.661 DECREASED RANGE OF MOTION (ROM) OF RIGHT KNEE: Primary | ICD-10-CM

## 2024-10-30 PROCEDURE — 97112 NEUROMUSCULAR REEDUCATION: CPT | Mod: KX,HCNC,PO | Performed by: PHYSICAL THERAPIST

## 2024-10-30 PROCEDURE — 97140 MANUAL THERAPY 1/> REGIONS: CPT | Mod: KX,HCNC,PO | Performed by: PHYSICAL THERAPIST

## 2024-11-01 ENCOUNTER — CLINICAL SUPPORT (OUTPATIENT)
Dept: REHABILITATION | Facility: HOSPITAL | Age: 74
End: 2024-11-01
Payer: MEDICARE

## 2024-11-01 DIAGNOSIS — R29.898 RIGHT LEG WEAKNESS: ICD-10-CM

## 2024-11-01 DIAGNOSIS — M25.661 DECREASED RANGE OF MOTION (ROM) OF RIGHT KNEE: Primary | ICD-10-CM

## 2024-11-01 PROCEDURE — 97110 THERAPEUTIC EXERCISES: CPT | Mod: HCNC,PO | Performed by: PHYSICAL THERAPIST

## 2024-11-01 PROCEDURE — 97140 MANUAL THERAPY 1/> REGIONS: CPT | Mod: HCNC,PO | Performed by: PHYSICAL THERAPIST

## 2024-11-01 PROCEDURE — 97112 NEUROMUSCULAR REEDUCATION: CPT | Mod: HCNC,PO | Performed by: PHYSICAL THERAPIST

## 2024-11-04 ENCOUNTER — OFFICE VISIT (OUTPATIENT)
Dept: FAMILY MEDICINE | Facility: CLINIC | Age: 74
End: 2024-11-04
Payer: MEDICARE

## 2024-11-04 ENCOUNTER — PATIENT MESSAGE (OUTPATIENT)
Dept: FAMILY MEDICINE | Facility: CLINIC | Age: 74
End: 2024-11-04

## 2024-11-04 VITALS
TEMPERATURE: 98 F | HEIGHT: 63 IN | WEIGHT: 181.88 LBS | OXYGEN SATURATION: 99 % | HEART RATE: 83 BPM | BODY MASS INDEX: 32.23 KG/M2 | SYSTOLIC BLOOD PRESSURE: 116 MMHG | DIASTOLIC BLOOD PRESSURE: 60 MMHG | RESPIRATION RATE: 16 BRPM

## 2024-11-04 DIAGNOSIS — R19.00 MASS OF SOFT TISSUE OF ABDOMEN: Primary | ICD-10-CM

## 2024-11-04 DIAGNOSIS — Z23 IMMUNIZATION DUE: ICD-10-CM

## 2024-11-04 PROCEDURE — 1160F RVW MEDS BY RX/DR IN RCRD: CPT | Mod: CPTII,S$GLB,, | Performed by: NURSE PRACTITIONER

## 2024-11-04 PROCEDURE — 3078F DIAST BP <80 MM HG: CPT | Mod: CPTII,S$GLB,, | Performed by: NURSE PRACTITIONER

## 2024-11-04 PROCEDURE — G0008 ADMIN INFLUENZA VIRUS VAC: HCPCS | Mod: S$GLB,,, | Performed by: NURSE PRACTITIONER

## 2024-11-04 PROCEDURE — 3008F BODY MASS INDEX DOCD: CPT | Mod: CPTII,S$GLB,, | Performed by: NURSE PRACTITIONER

## 2024-11-04 PROCEDURE — 90653 IIV ADJUVANT VACCINE IM: CPT | Mod: S$GLB,,, | Performed by: NURSE PRACTITIONER

## 2024-11-04 PROCEDURE — 1101F PT FALLS ASSESS-DOCD LE1/YR: CPT | Mod: CPTII,S$GLB,, | Performed by: NURSE PRACTITIONER

## 2024-11-04 PROCEDURE — 1126F AMNT PAIN NOTED NONE PRSNT: CPT | Mod: CPTII,S$GLB,, | Performed by: NURSE PRACTITIONER

## 2024-11-04 PROCEDURE — 3288F FALL RISK ASSESSMENT DOCD: CPT | Mod: CPTII,S$GLB,, | Performed by: NURSE PRACTITIONER

## 2024-11-04 PROCEDURE — 3074F SYST BP LT 130 MM HG: CPT | Mod: CPTII,S$GLB,, | Performed by: NURSE PRACTITIONER

## 2024-11-04 PROCEDURE — 4010F ACE/ARB THERAPY RXD/TAKEN: CPT | Mod: CPTII,S$GLB,, | Performed by: NURSE PRACTITIONER

## 2024-11-04 PROCEDURE — 1159F MED LIST DOCD IN RCRD: CPT | Mod: CPTII,S$GLB,, | Performed by: NURSE PRACTITIONER

## 2024-11-04 PROCEDURE — 99213 OFFICE O/P EST LOW 20 MIN: CPT | Mod: S$GLB,,, | Performed by: NURSE PRACTITIONER

## 2024-11-04 RX ORDER — ASPIRIN 81 MG/1
81 TABLET ORAL DAILY
COMMUNITY

## 2024-11-04 NOTE — PROGRESS NOTES
Answers submitted by the patient for this visit:  Review of Systems Questionnaire (Submitted on 11/1/2024)  activity change: No  unexpected weight change: No  neck pain: No  hearing loss: No  rhinorrhea: No  trouble swallowing: No  eye discharge: No  visual disturbance: No  chest tightness: No  wheezing: No  chest pain: No  palpitations: No  blood in stool: No  constipation: No  vomiting: No  diarrhea: No  polydipsia: No  polyuria: No  difficulty urinating: No  hematuria: No  menstrual problem: No  dysuria: No  joint swelling: No  arthralgias: No  headaches: No  weakness: No  confusion: No  dysphoric mood: No    Subjective:       Patient ID: Curtis Navarro is a 74 y.o. female.    Chief Complaint: Cyst (Feels a knot right side of abdomen x 2 weeks / )    Cyst  Pertinent negatives include no abdominal pain, arthralgias, chest pain, coughing, fatigue, headaches, nausea, vomiting or weakness.     Onset over the past few weeks. Feels a knot to her right abdominal area. States is not tender. She does not remember any trauma to the are. She did take a picture of the area that shows a large bruise.  She has been recovering from knee replacement surgery. She does not give herself any medications through injection to abdomen. She was taking Aspirin twice daily but has not been reduced to once daily. She does feel that the lump has gotten smaller. She denies any other concerns  see ROS    She would like to get her flu vaccine today.     The following portion of the patients history was reviewed and updated as appropriate: allergies, current medications, past medical and surgical history. Past social history and problem list reviewed. Family PMH and Past social history reviewed. Tobacco, Illicit drug use reviewed.      Review of patient's allergies indicates:   Allergen Reactions    Lisinopril Other (See Comments)     Cough    Azithromycin      Other reaction(s): Nausea  Other reaction(s): Diarrhea    Metronidazole hcl       Other reaction(s): Rash  Other reaction(s): Itching    Moxifloxacin      Other reaction(s): Rash    Sulfa (sulfonamide antibiotics)      Other reaction(s): Itching         Current Outpatient Medications:     acetaminophen (TYLENOL) 500 MG tablet, Take 500 mg by mouth every 6 (six) hours as needed for Pain., Disp: , Rfl:     aspirin (ECOTRIN) 81 MG EC tablet, Take 81 mg by mouth once daily., Disp: , Rfl:     biotin 5 mg Cap, Take 5 mg by mouth once daily., Disp: , Rfl:     calcium-vitamin D3 500 mg(1,250mg) -200 unit per tablet, Take 1 tablet by mouth 2 (two) times daily with meals., Disp: , Rfl:     ezetimibe (ZETIA) 10 mg tablet, Take 1 tablet (10 mg total) by mouth once daily., Disp: 90 tablet, Rfl: 0    GLUCOSA HAHN 2KCL/CHONDROITIN HAHN (GLUCOSAMINE-CHONDROITIN DS ORAL), Take by mouth 2 (two) times daily., Disp: , Rfl:     KRILL/OM-3/DHA/EPA/PHOSPHO/AST (MEGARED OMEGA-3 KRILL OIL ORAL), Take 1 capsule by mouth every evening., Disp: , Rfl:     Lactobacillus acidophilus (PROBIOTIC ORAL), Take 1 capsule by mouth once daily., Disp: , Rfl:     levothyroxine (SYNTHROID) 112 MCG tablet, TAKE 1 TABLET (112 MCG TOTAL) BY MOUTH BEFORE BREAKFAST., Disp: 90 tablet, Rfl: 1    losartan (COZAAR) 100 MG tablet, TAKE 1 TABLET (100 MG TOTAL) BY MOUTH ONCE DAILY., Disp: 90 tablet, Rfl: 3    metoprolol succinate (TOPROL-XL) 50 MG 24 hr tablet, TAKE 1 TABLET BY MOUTH ONCE DAILY, Disp: 90 tablet, Rfl: 3    miconazole NITRATE 2 % (ZEASORB AF) 2 % top powder, Apply topically as needed for Itching., Disp: 85 g, Rfl: 3    MULTIVITAMIN ORAL, Take 1 tablet by mouth once daily., Disp: , Rfl:     omeprazole (PRILOSEC) 20 MG capsule, TAKE 1 CAPSULE (20 MG TOTAL) BY MOUTH ONCE DAILY., Disp: 90 capsule, Rfl: 3    pimecrolimus (ELIDEL) 1 % cream, Apply topically 2 (two) times daily., Disp: 60 g, Rfl: 2    polyethylene glycol (GLYCOLAX) 17 gram PwPk, Take 17 g by mouth daily as needed for Constipation., Disp: , Rfl:     vibegron (GEMTESA) 75 mg Tab,  "TAKE ONE TABLET BY MOUTH EVERY MORNING FOR OVER ACTIVE BLADDER, Disp: 90 tablet, Rfl: 3    Past Medical History:   Diagnosis Date    Allergy     Arthritis     Cataract     Colon polyps     Diverticulosis     GERD (gastroesophageal reflux disease)     Graves disease     s/p radioactive iodine in 42    H/O cardiovascular stress test     normal 5/23    History of diverticulitis     History of skin cancer     L neck    Hyperlipidemia     Hypertension     Hypothyroidism     s/p radioactive iodine    IBS (irritable bowel syndrome)     Osteopenia     Thyroid nodule     last usg 5/23;  next due 5/25       Past Surgical History:   Procedure Laterality Date    CARDIAC SURGERY  2013    angiogram (negative)    COLONOSCOPY  07/21/2010    Dr. Velázquez; in legacy, repeat in 6-7 years    COLONOSCOPY N/A 06/27/2017    Procedure: COLONOSCOPY;  Surgeon: Hamlet Velázquez Jr., MD;  Location: [facility];  Service: Endoscopy;  Laterality: N/A; repeat in 5 years for surveillance    COLONOSCOPY N/A 1/23/2023    Procedure: COLONOSCOPY;  Surgeon: Hamlet Velázquez Jr., MD;  Location: [facility];  Service: Endoscopy;  Laterality: N/A;    COSMETIC SURGERY      Liposuction to neck    ESOPHAGOGASTRODUODENOSCOPY N/A 07/12/2018    Dr. Velázquez: "White nummular lesions in esophageal mucosa (benign)", antritis, gastric polyps removed    ESOPHAGOGASTRODUODENOSCOPY N/A 1/23/2023    Procedure: EGD (ESOPHAGOGASTRODUODENOSCOPY);  Surgeon: Hamlet Velázquez Jr., MD;  Location: [facility];  Service: Endoscopy;  Laterality: N/A;    SKIN CANCER EXCISION      with Mohs on left neck    TONSILLECTOMY      UPPER GASTROINTESTINAL ENDOSCOPY  08/04/2014    Dr. Velázquez    UPPER GASTROINTESTINAL ENDOSCOPY  06/27/2017    DR. VELÁZQUEZ       Social History     Socioeconomic History    Marital status:    Tobacco Use    Smoking status: Never    Smokeless tobacco: Never   Substance and Sexual Activity    Alcohol use: Never    Drug use: No    Sexual activity: Yes     Partners: Male " "    Birth control/protection: Post-menopausal   Social History Narrative         Social Drivers of Health     Financial Resource Strain: Low Risk  (10/15/2024)    Overall Financial Resource Strain (CARDIA)     Difficulty of Paying Living Expenses: Not very hard   Food Insecurity: No Food Insecurity (10/15/2024)    Hunger Vital Sign     Worried About Running Out of Food in the Last Year: Never true     Ran Out of Food in the Last Year: Never true   Transportation Needs: No Transportation Needs (10/15/2024)    TRANSPORTATION NEEDS     Transportation : No   Physical Activity: Insufficiently Active (10/15/2024)    Exercise Vital Sign     Days of Exercise per Week: 3 days     Minutes of Exercise per Session: 40 min   Stress: Stress Concern Present (10/15/2024)    [employer] - Occupational Stress Questionnaire     Feeling of Stress : To some extent   Housing Stability: Low Risk  (10/15/2024)    Housing Stability Vital Sign     Unable to Pay for Housing in the Last Year: No     Homeless in the Last Year: No     Review of Systems   Constitutional:  Negative for activity change, fatigue and unexpected weight change.   HENT:  Negative for trouble swallowing.    Respiratory:  Negative for cough, chest tightness, shortness of breath and wheezing.    Cardiovascular:  Negative for chest pain and palpitations.   Gastrointestinal:  Negative for abdominal pain, constipation, diarrhea, nausea and vomiting.   Musculoskeletal:  Negative for arthralgias.        Recovering well from knee replacement   Skin:         See HPI.  Small lump to right mid abdomen area.    Neurological:  Negative for weakness and headaches.   Psychiatric/Behavioral:  Negative for dysphoric mood.        Objective:      /60   Pulse 83   Temp 98.4 °F (36.9 °C) (Temporal)   Resp 16   Ht 5' 3" (1.6 m)   Wt 82.5 kg (181 lb 14.1 oz)   SpO2 99%   BMI 32.22 kg/m²      Physical Exam  Constitutional:       Appearance: " Normal appearance. She is obese.   HENT:      Head: Normocephalic.   Eyes:      Pupils: Pupils are equal, round, and reactive to light.   Cardiovascular:      Rate and Rhythm: Normal rate and regular rhythm.      Pulses: Normal pulses.      Heart sounds: Normal heart sounds. No murmur heard.  Pulmonary:      Effort: Pulmonary effort is normal.      Breath sounds: Normal breath sounds. No wheezing.   Abdominal:      General: Bowel sounds are normal.      Palpations: Abdomen is soft.      Tenderness: There is no abdominal tenderness.       Musculoskeletal:         General: Normal range of motion.      Cervical back: Normal range of motion.      Comments: Gait normal.   Skin:     General: Skin is warm and dry.      Capillary Refill: Capillary refill takes less than 2 seconds.   Neurological:      General: No focal deficit present.      Mental Status: She is alert.   Psychiatric:         Attention and Perception: Attention and perception normal.         Mood and Affect: Mood and affect normal.         Speech: Speech normal.         Behavior: Behavior normal.         Assessment:       1. Mass of soft tissue of abdomen    2. Immunization due        Plan:       Mass of soft tissue of abdomen: presents as small hematoma under the surface of the skin.  Discussed monitoring it for a little longer to see if it resolves or getting ultrasound. She agrees to give it more time to resolve. She will follow up in a few weeks if not resolved, sooner if area gets bigger, tender, etc.     Immunization due  -     influenza (adjuvanted) (Fluad) 45 mcg/0.5 mL IM vaccine (> or = 64 yo) 0.5 mL       Continue current medication  Take medications only as prescribed  Healthy diet, exercise  Adequate rest  Adequate hydration  Avoid allergens  Avoid excessive caffeine     Follow up if not improving

## 2024-11-05 ENCOUNTER — CLINICAL SUPPORT (OUTPATIENT)
Dept: REHABILITATION | Facility: HOSPITAL | Age: 74
End: 2024-11-05
Payer: MEDICARE

## 2024-11-05 DIAGNOSIS — R29.898 RIGHT LEG WEAKNESS: ICD-10-CM

## 2024-11-05 DIAGNOSIS — M25.661 DECREASED RANGE OF MOTION (ROM) OF RIGHT KNEE: Primary | ICD-10-CM

## 2024-11-05 PROCEDURE — 97112 NEUROMUSCULAR REEDUCATION: CPT | Mod: HCNC,PO,CQ

## 2024-11-05 PROCEDURE — 97140 MANUAL THERAPY 1/> REGIONS: CPT | Mod: HCNC,PO,CQ

## 2024-11-05 PROCEDURE — 97110 THERAPEUTIC EXERCISES: CPT | Mod: HCNC,PO,CQ

## 2024-11-05 NOTE — PROGRESS NOTES
"[facility]   Physical Therapy Treatment Note      Name: Curtis Navarro  Clinic Number: [id number]    Therapy Diagnosis:   Encounter Diagnoses   Name Primary?    Decreased range of motion (ROM) of right knee Yes    Right leg weakness        Physician: Jeromy Rojo PA    Visit Date: 11/5/2024    Physician Orders: PT Eval and Treat   Question Answer   Post Surgical? Yes   Eval and Treat Yes   Type of Therapy Outpatient Therapy   [facility]      Medical Diagnosis from Referral:   Diagnosis   M25.561 (ICD-10-CM) - Right knee pain      Evaluation Date: 10/8/2024  Authorization Period Expiration: 12/31/2024  Plan of Care Expiration: 11/22/2024  Progress Note Due: 11/7/2024  Date of Surgery: 9/18/2024  Visit # / Visits authorized: 1/ 1, 10/20   FOTO: 2/ 3 (10/8/2024, 10/24/2024)     Precautions: Standard and arthritis, osteopenia, HTN       Time In: 1001 AM  Time Out: 1055 AM   Total Billable Time: 54 minutes (1:1)    PTA Visit #: 1/5     Subjective     Patient reports: she is feeling a little discomfort at her lateral knee when walking. She was compliant with home exercise program.  Response to previous treatment: no adverse effect  Functional change: improving ROM, strength, and gt    Pain: 3/10  Location: right knee     Objective      (R) knee flexion with strap: 95 degrees     10/24/2024: 66    11/1/2024: Pt is walking with a much improved gt and scar is gradually flattening.    Treatment     Curtis received the treatments listed below:      therapeutic exercises to develop strength, endurance, ROM, and flexibility for 32 minutes including:  Nu step x 8 mins for knee ROM (bike 8 mins)  HS stretch 3 x 30" in standing at stairs  PROM knee extension x 10 each  Standing heel raises 2x10, 3#  Leg press 3 x 10 DOUBLE LEG 40#, SINGLE LEG 20# 2 x 10  Calf stretching 2 x 1 mins  Seated hip Abduction 45# 2 x 10   Seated hip adduction 25# 2x10    manual therapy techniques: Joint " "mobilizations were applied to the: right knee for 08 minutes, including:  Patellar mobs all directions-np  Scar mobs  Gentle tibial distraction-np    neuromuscular re-education activities to improve: Balance, Sense, Proprioception, and education for 15 minutes. The following activities were included:  Standing TKE (ball against wall) 2x10, 5 sec hold   Standing hamstring curl 2x10, 3#  Step ups 6" 2 x 10 fwd and lateral  Ball on wall: mini squats 3x5    Patient Education and Home Exercises       Education provided:   - Cont HEP.    Written Home Exercises Provided: Pt instructed to continue prior HEP. Exercises were reviewed and Curtis was able to demonstrate them prior to the end of the session.  Curtis demonstrated good  understanding of the education provided. See Electronic Medical Record under Patient Instructions for exercises provided during therapy sessions    Assessment     Focus of manual therapy on scar mobility as scar is raised and restricted. Progressed to ball on wall mini squats with cues for hip hinge and equal weightbearing. Also progressed to TKE in standing to improve patient's ability to achieve during gait. Patient responding well to consistent exercise progression in order to restore functional mobility and strength.     Curtis Is progressing well towards her goals.   Patient prognosis is Good.     Patient will continue to benefit from skilled outpatient physical therapy to address the deficits listed in the problem list box on initial evaluation, provide pt/family education and to maximize pt's level of independence in the home and community environment.     Patient's spiritual, cultural and educational needs considered and pt agreeable to plan of care and goals.     Anticipated barriers to physical therapy: coping style     Goals: (ongoing)  Short Term Goals: 3 weeks   Pt will reach -5 deg right knee AROM to improve gt.  Pt will reach 100 deg right knee AROM for improving squat " abilities.  Right knee strength grossly 4-/5 for improving strength.  Improve right knee edema by 1 cm for improving quad fx.  FOTO score improved to 60 for improving fx.     Long Term Goals: 6 weeks   FOTO score improved to 70 for improving fx.  Right LE strength grossly 4/5 for improving strength and d/c to home program s/p.    Plan     Plan of care Certification: 10/8/2024 to 11/22/2024.     Outpatient Physical Therapy 2-3 times weekly for 6 weeks to include the following interventions: Gait Training, Manual Therapy, Moist Heat/ Ice, Neuromuscular Re-ed, Patient Education, Therapeutic Activities, and Therapeutic Exercise.     Margaret Mayfield, PTA

## 2024-11-06 DIAGNOSIS — Z96.651 PRESENCE OF RIGHT ARTIFICIAL KNEE JOINT: Primary | ICD-10-CM

## 2024-11-08 ENCOUNTER — CLINICAL SUPPORT (OUTPATIENT)
Dept: REHABILITATION | Facility: HOSPITAL | Age: 74
End: 2024-11-08
Payer: MEDICARE

## 2024-11-08 DIAGNOSIS — M25.661 DECREASED RANGE OF MOTION (ROM) OF RIGHT KNEE: Primary | ICD-10-CM

## 2024-11-08 DIAGNOSIS — R29.898 RIGHT LEG WEAKNESS: ICD-10-CM

## 2024-11-08 PROCEDURE — 97140 MANUAL THERAPY 1/> REGIONS: CPT | Mod: KX,HCNC,PO | Performed by: PHYSICAL THERAPIST

## 2024-11-08 PROCEDURE — 97110 THERAPEUTIC EXERCISES: CPT | Mod: KX,HCNC,PO | Performed by: PHYSICAL THERAPIST

## 2024-11-08 PROCEDURE — 97112 NEUROMUSCULAR REEDUCATION: CPT | Mod: KX,HCNC,PO | Performed by: PHYSICAL THERAPIST

## 2024-11-08 NOTE — PROGRESS NOTES
"[facility]   Physical Therapy Treatment Note      Name: Curtis Navarro  Clinic Number: [id number]    Therapy Diagnosis:   Encounter Diagnoses   Name Primary?    Decreased range of motion (ROM) of right knee Yes    Right leg weakness        Physician: Jeromy Rojo PA    Visit Date: 11/8/2024    Physician Orders: PT Eval and Treat   Question Answer   Post Surgical? Yes   Eval and Treat Yes   Type of Therapy Outpatient Therapy   Location: [facility]      Medical Diagnosis from Referral:   Diagnosis   M25.561 (ICD-10-CM) - Right knee pain      Evaluation Date: 10/8/2024  Authorization Period Expiration: 12/31/2024  Plan of Care Expiration: 11/22/2024  Progress Note Due: 11/7/2024  Date of Surgery: 9/18/2024  Visit # / Visits authorized: 1/ 1, 11/20   FOTO: 2/ 3 (10/8/2024, 10/24/2024)     Precautions: Standard and arthritis, osteopenia, HTN       Time In: 805 AM  Time Out: 900 AM   Total Billable Time: 55 minutes (1:1)    PTA Visit #: 0/5     Subjective     Patient reports: she is feeling inc hip and back soreness bilaterally and asks questions about the ex. She had to inc Tylenol to 2. She said she  She was compliant with home exercise program.  Response to previous treatment: no adverse effect  Functional change: improving ROM, strength, and gt    Pain: 3/10  Location: right knee     Objective      (R) knee flexion with strap: 95 degrees     10/24/2024: 66    11/1/2024: Pt is walking with a much improved gt and scar is gradually flattening.    Treatment     Curtis received the treatments listed below:      therapeutic exercises to develop strength, endurance, ROM, and flexibility for 27 minutes including:  Nu step x 8 mins for knee ROM (bike 8 mins)  HS stretch 3 x 30" in standing at stairs  PROM knee extension x 10 each-np  Standing heel raises 2x10, 3#-np  Leg press 3 x 10 DOUBLE LEG 40#, SINGLE LEG 20# 2 x 10  Calf stretching 2 x 1 mins  Seated hip Abduction 30# 2 x 10   Seated hip " "adduction 25# 2x10    manual therapy techniques: Joint mobilizations were applied to the: right knee for 08 minutes, including:  Patellar mobs all directions-np  Scar mobs  Gentle tibial distraction-np    neuromuscular re-education activities to improve: Balance, Sense, Proprioception, and education for 20 minutes. The following activities were included:  Standing TKE (ball against wall) 3x10, 5 sec hold   Standing hamstring curl 2x10, 3#  Step ups 6" 2 x 10 fwd and lateral  Wall slide: mini squats 2x5 ~45-60 deg  Education in modifications from today, expected pain soreness, but then improvement over a few days.    Patient Education and Home Exercises       Education provided:   - Cont HEP.    Written Home Exercises Provided: Pt instructed to continue prior HEP. Exercises were reviewed and Curtis was able to demonstrate them prior to the end of the session.  Curtis demonstrated good  understanding of the education provided. See Electronic Medical Record under Patient Instructions for exercises provided during therapy sessions    Assessment     Focus of manual therapy on scar mobility as scar is raised and restricted. Spoke with pt about inc sx and explained some soreness is to be expected. Decreased the resistance on hip Abduction, removed the ball on mini squats with + PPT, and reduced sets. Pt left with less concern and pain. Assured pt that she should return to reduced pain over the next few days and reduce Tylenol again. Patient responding well to consistent exercise progression in order to restore functional mobility and strength.     Curtis Is progressing well towards her goals.   Patient prognosis is Good.     Patient will continue to benefit from skilled outpatient physical therapy to address the deficits listed in the problem list box on initial evaluation, provide pt/family education and to maximize pt's level of independence in the home and community environment.     Patient's spiritual, cultural and " educational needs considered and pt agreeable to plan of care and goals.     Anticipated barriers to physical therapy: coping style     Goals: (ongoing)  Short Term Goals: 3 weeks   Pt will reach -5 deg right knee AROM to improve gt.  Pt will reach 100 deg right knee AROM for improving squat abilities.  Right knee strength grossly 4-/5 for improving strength.  Improve right knee edema by 1 cm for improving quad fx.  FOTO score improved to 60 for improving fx.     Long Term Goals: 6 weeks   FOTO score improved to 70 for improving fx.  Right LE strength grossly 4/5 for improving strength and d/c to home program s/p.    Plan     Plan of care Certification: 10/8/2024 to 11/22/2024.     Outpatient Physical Therapy 2-3 times weekly for 6 weeks to include the following interventions: Gait Training, Manual Therapy, Moist Heat/ Ice, Neuromuscular Re-ed, Patient Education, Therapeutic Activities, and Therapeutic Exercise.     Leatha Santana, PT

## 2024-11-12 ENCOUNTER — CLINICAL SUPPORT (OUTPATIENT)
Dept: REHABILITATION | Facility: HOSPITAL | Age: 74
End: 2024-11-12
Payer: MEDICARE

## 2024-11-12 DIAGNOSIS — R29.898 RIGHT LEG WEAKNESS: ICD-10-CM

## 2024-11-12 DIAGNOSIS — M25.661 DECREASED RANGE OF MOTION (ROM) OF RIGHT KNEE: Primary | ICD-10-CM

## 2024-11-12 PROCEDURE — 97140 MANUAL THERAPY 1/> REGIONS: CPT | Mod: HCNC,PO,CQ

## 2024-11-12 PROCEDURE — 97112 NEUROMUSCULAR REEDUCATION: CPT | Mod: HCNC,PO,CQ

## 2024-11-12 NOTE — PROGRESS NOTES
"[facility]   Physical Therapy Treatment Note      Name: Curtis Navarro  Clinic Number: [id number]    Therapy Diagnosis:   Encounter Diagnoses   Name Primary?    Decreased range of motion (ROM) of right knee Yes    Right leg weakness        Physician: Jeromy Rojo PA    Visit Date: 11/12/2024    Physician Orders: PT Eval and Treat   Question Answer   Post Surgical? Yes   Eval and Treat Yes   Type of Therapy Outpatient Therapy   [facility]      Medical Diagnosis from Referral:   Diagnosis   M25.561 (ICD-10-CM) - Right knee pain      Evaluation Date: 10/8/2024  Authorization Period Expiration: 12/31/2024  Plan of Care Expiration: 11/22/2024  Progress Note Due: 11/7/2024  Date of Surgery: 9/18/2024  Visit # / Visits authorized: 1/ 1, 12/20   FOTO: 2/ 3 (10/8/2024, 10/24/2024)     Precautions: Standard and arthritis, osteopenia, HTN       Time In: 1005 AM  Time Out: 1110 AM  Total Billable Time: 30 minutes (1:1)    PTA Visit #: 1/5     Subjective     Patient reports: her knee has been more sore the last couple of days. Patient states was sleeping better but over the last few days she is having trouble sleeping again. She was compliant with home exercise program.  Response to previous treatment: no adverse effect  Functional change: improving ROM, strength, and gt    Pain: 3/10  Location: right knee     Objective      (R) knee flexion with strap: 100 degrees (with PTA overpressure)    11/1/2024: Pt is walking with a much improved gt and scar is gradually flattening.    Treatment     Curtis received the treatments listed below:      therapeutic exercises to develop strength, endurance, ROM, and flexibility for 27 minutes including:  Upright bike x 5 minutes, seat 7 (half to full revolutions)  HS stretch 3 x 30" in standing at stairs  PROM knee extension x 10 each-np  Standing heel raises 2x10, 3#-np  Leg press 3 x 10 DOUBLE LEG 40#, SINGLE LEG 20# 2 x 10  Calf stretching 2 x 1 " "mins  Seated hip Abduction 30# 2 x 10   Seated hip adduction 25# 2x10    manual therapy techniques: Joint mobilizations were applied to the: right knee for 10 minutes, including:  Patellar mobs all directions  Joint mobilizations to improve knee flexion ROM    neuromuscular re-education activities to improve: Balance, Sense, Proprioception, and education for 20 minutes. The following activities were included:  Standing TKE (ball against wall) 3x10, 5 sec hold   Standing hamstring curl 2x10, 3#  Step ups 6" 2 x 10 fwd and lateral  Wall slide: mini squats 2x5 ~45-60 deg    Education in modifications from today, expected pain soreness, but then improvement over a few days.    Patient Education and Home Exercises       Education provided:   - Cont HEP.    Written Home Exercises Provided: Pt instructed to continue prior HEP. Exercises were reviewed and Curtis was able to demonstrate them prior to the end of the session.  Curtis demonstrated good  understanding of the education provided. See Electronic Medical Record under Patient Instructions for exercises provided during therapy sessions    Assessment     Good tolerance to half revolutions on upright bike as she was eventually able to progress to full. Patient demonstrating improving quad control and ability to achieve active knee extension. Initiated AAROM knee flexion with overpressure as this ROM progression has been slow. Appropriate soreness at this end range but patient able to achieve 100 degrees AA knee flexion today. Patient responding well to consistent exercise progression in order to restore functional mobility and strength.     Curtis Is progressing well towards her goals.   Patient prognosis is Good.     Patient will continue to benefit from skilled outpatient physical therapy to address the deficits listed in the problem list box on initial evaluation, provide pt/family education and to maximize pt's level of independence in the home and community " environment.     Patient's spiritual, cultural and educational needs considered and pt agreeable to plan of care and goals.     Anticipated barriers to physical therapy: coping style     Goals: (ongoing)  Short Term Goals: 3 weeks   Pt will reach -5 deg right knee AROM to improve gt.  Pt will reach 100 deg right knee AROM for improving squat abilities.  Right knee strength grossly 4-/5 for improving strength.  Improve right knee edema by 1 cm for improving quad fx.  FOTO score improved to 60 for improving fx.     Long Term Goals: 6 weeks   FOTO score improved to 70 for improving fx.  Right LE strength grossly 4/5 for improving strength and d/c to home program s/p.    Plan     Plan of care Certification: 10/8/2024 to 11/22/2024.     Outpatient Physical Therapy 2-3 times weekly for 6 weeks to include the following interventions: Gait Training, Manual Therapy, Moist Heat/ Ice, Neuromuscular Re-ed, Patient Education, Therapeutic Activities, and Therapeutic Exercise.     Margaret Mayfield, PTA

## 2024-11-15 ENCOUNTER — CLINICAL SUPPORT (OUTPATIENT)
Dept: REHABILITATION | Facility: HOSPITAL | Age: 74
End: 2024-11-15
Payer: MEDICARE

## 2024-11-15 DIAGNOSIS — R29.898 RIGHT LEG WEAKNESS: ICD-10-CM

## 2024-11-15 DIAGNOSIS — M25.661 DECREASED RANGE OF MOTION (ROM) OF RIGHT KNEE: Primary | ICD-10-CM

## 2024-11-15 PROCEDURE — 97140 MANUAL THERAPY 1/> REGIONS: CPT | Mod: KX,HCNC,PO | Performed by: PHYSICAL THERAPIST

## 2024-11-15 PROCEDURE — 97112 NEUROMUSCULAR REEDUCATION: CPT | Mod: KX,HCNC,PO | Performed by: PHYSICAL THERAPIST

## 2024-11-15 PROCEDURE — 97110 THERAPEUTIC EXERCISES: CPT | Mod: KX,HCNC,PO | Performed by: PHYSICAL THERAPIST

## 2024-11-15 NOTE — PROGRESS NOTES
"    [facility]   Physical Therapy Treatment Note      Name: Curtis Navarro  Clinic Number: [id number]    Therapy Diagnosis:   Encounter Diagnoses   Name Primary?    Decreased range of motion (ROM) of right knee Yes    Right leg weakness        Physician: Jeromy Rojo PA    Visit Date: 11/15/2024    Physician Orders: PT Eval and Treat   Question Answer   Post Surgical? Yes   Eval and Treat Yes   Type of Therapy Outpatient Therapy   Location: [facility]      Medical Diagnosis from Referral:   Diagnosis   M25.561 (ICD-10-CM) - Right knee pain      Evaluation Date: 10/8/2024  Authorization Period Expiration: 12/31/2024  Plan of Care Expiration: 11/22/2024  Progress Note Due: 11/7/2024  Date of Surgery: 9/18/2024  Visit # / Visits authorized: 1/ 1, 13/20   FOTO: 2/ 3 (10/8/2024, 10/24/2024)     Precautions: Standard and arthritis, osteopenia, HTN       Time In: 1005 AM  Time Out: 1100 AM  Total Billable Time: 55 minutes (1:1)    PTA Visit #: 0/5     Subjective     Patient reports: HS tightness is improving. 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 She was compliant with home exercise program.  Response to previous treatment: no adverse effect  Functional change: improving ROM, strength, and gt    Pain: 3/10  Location: right knee     Objective      (R) knee flexion with strap: 100 degrees (with PT overpressure)    Bilateral LE strength is equal and normal for pt.. She is reaching 0-100 deg right knee AROM. She has used a theraband, but now has a strap non-elastic that will benefit her to get more flexion. She was reminded that she is healing still for a yr from post op.     Treatment     Curtis received the treatments listed below:      therapeutic exercises to develop strength, endurance, ROM, and flexibility for 30 minutes including:  Upright bike x 5 minutes, seat 7 (half to full revolutions)  HS stretch 3 x 30" in standing at stairs  Standing heel raises 2x10, 3#  Leg press 3 x 10 DOUBLE LEG 40#, " "SINGLE LEG 20# 2 x 10  Calf stretching 2 x 1 mins  Seated hip Abduction 30# 2 x 10-np  Seated hip adduction 25# 2x10-np  Reassessment.    manual therapy techniques: Joint mobilizations were applied to the: right knee for 10 minutes, including:  Patellar mobs all directions-np  Joint mobilizations to improve knee flexion ROM-np    Scar massage    neuromuscular re-education activities to improve: Balance, Sense, Proprioception, and education for 20 minutes. The following activities were included:  Standing TKE (ball against wall) 3x10, 5 sec hold   Standing hamstring curl 2x10, 3#  Step ups 6" 2 x 10 fwd and lateral  Wall slide: mini squats 2x5 ~45-60 deg    Provide updated HEP next visit prior to d/c.    Patient Education and Home Exercises       Education provided:   - Discharge planning.    Written Home Exercises Provided: Pt instructed to continue prior HEP. Exercises were reviewed and Curtis was able to demonstrate them prior to the end of the session.  Curtis demonstrated good  understanding of the education provided. See Electronic Medical Record under Patient Instructions for exercises provided during therapy sessions    Assessment     Curtis is limited to 100 deg knee flexion. We discussed some things she can improve at home to cont working on this. We plan to d/ next Friday for her to cont home program. Her strength is likely normal given her lifestyle as it is equal bilaterally. Patient responding well to consistent exercise progression in order to restore functional mobility and strength.     Curtis Is progressing well towards her goals.   Patient prognosis is Good.     Patient will continue to benefit from skilled outpatient physical therapy to address the deficits listed in the problem list box on initial evaluation, provide pt/family education and to maximize pt's level of independence in the home and community environment.     Patient's spiritual, cultural and educational needs considered and pt " agreeable to plan of care and goals.     Anticipated barriers to physical therapy: coping style     Goals: (ongoing)  Short Term Goals: 3 weeks   Pt will reach -5 deg right knee AROM to improve gt.-MET  Pt will reach 100 deg right knee AROM for improving squat abilities.-MET  Right knee strength grossly 4-/5 for improving strength.-MET, KNEES-4+/5 EACH, HIPS GROSSLY 4/5 BILATERALLY.  Improve right knee edema by 1 cm for improving quad fx.  FOTO score improved to 60 for improving fx.     Long Term Goals: 6 weeks   FOTO score improved to 70 for improving fx.  Right LE strength grossly 4/5 for improving strength and d/c to home program s/p.-MET    Plan     Plan of care Certification: 10/8/2024 to 11/22/2024.     Outpatient Physical Therapy 2-3 times weekly for 6 weeks to include the following interventions: Gait Training, Manual Therapy, Moist Heat/ Ice, Neuromuscular Re-ed, Patient Education, Therapeutic Activities, and Therapeutic Exercise.     Leatha Santana, PT

## 2024-11-19 ENCOUNTER — CLINICAL SUPPORT (OUTPATIENT)
Dept: REHABILITATION | Facility: HOSPITAL | Age: 74
End: 2024-11-19
Payer: MEDICARE

## 2024-11-19 DIAGNOSIS — M25.661 DECREASED RANGE OF MOTION (ROM) OF RIGHT KNEE: Primary | ICD-10-CM

## 2024-11-19 DIAGNOSIS — R29.898 RIGHT LEG WEAKNESS: ICD-10-CM

## 2024-11-19 PROCEDURE — 97110 THERAPEUTIC EXERCISES: CPT | Mod: KX,HCNC,PO,CQ

## 2024-11-19 PROCEDURE — 97140 MANUAL THERAPY 1/> REGIONS: CPT | Mod: KX,HCNC,PO,CQ

## 2024-11-19 PROCEDURE — 97112 NEUROMUSCULAR REEDUCATION: CPT | Mod: KX,HCNC,PO,CQ

## 2024-11-19 NOTE — PROGRESS NOTES
"    [facility]   Physical Therapy Treatment Note      Name: Curtis Navarro  Clinic Number: [id number]    Therapy Diagnosis:   Encounter Diagnoses   Name Primary?    Decreased range of motion (ROM) of right knee Yes    Right leg weakness        Physician: Jeromy Rojo PA    Visit Date: 11/19/2024    Physician Orders: PT Eval and Treat   Question Answer   Post Surgical? Yes   Eval and Treat Yes   Type of Therapy Outpatient Therapy   Location: [facility]      Medical Diagnosis from Referral:   Diagnosis   M25.561 (ICD-10-CM) - Right knee pain      Evaluation Date: 10/8/2024  Authorization Period Expiration: 12/31/2024  Plan of Care Expiration: 11/22/2024  Progress Note Due: 11/7/2024  Date of Surgery: 9/18/2024  Visit # / Visits authorized: 1/ 1, 14/20   FOTO: 2/ 3 (10/8/2024, 10/24/2024)     Precautions: Standard and arthritis, osteopenia, HTN       Time In: 1003 AM  Time Out: 1057 AM  Total Billable Time: 54 minutes     PTA Visit #: 1/5     Subjective     Patient reports: her knee is doing well. Patient states she still has times of soreness but overall, her knee is feeling better. She was compliant with home exercise program.  Response to previous treatment: no adverse effect  Functional change: improving ROM, strength, and gt    Pain: 3/10  Location: right knee     Objective      (R) knee flexion with strap: 104 degrees (with PTA overpressure)    Treatment     Curtis received the treatments listed below:      therapeutic exercises to develop strength, endurance, ROM, and flexibility for 30 minutes including:  Upright bike x 5 minutes, seat 7 (half to full revolutions)  HS stretch 3 x 30" in standing at stairs  Self knee flexion stretch on stairs x 2 minutes, 5 sec hold   Calf stretching 2 x 1 mins  Standing heel raises 2x10, 3#  Leg press 3 x 10 DOUBLE LEG 40#, SINGLE LEG 20# 2 x 10  Seated hip Abduction 30# 2 x 10-np  Seated hip adduction 25# 2x10-np    manual therapy techniques: " "Joint mobilizations were applied to the: right knee for 10 minutes, including:  Patellar mobs all directions-np  Joint mobilizations to improve knee flexion ROM-np    Scar massage    neuromuscular re-education activities to improve: Balance, Sense, Proprioception, and education for 20 minutes. The following activities were included:  Standing TKE (ball against wall) 3x10, 5 sec hold   Standing hamstring curl 2x10, 3#  Step ups 6" 2 x 10 fwd and lateral  Wall slide: mini squats 2x5 ~45-60 deg    Patient Education and Home Exercises       Education provided:   - Discharge planning.    Written Home Exercises Provided: Pt instructed to continue prior HEP. Exercises were reviewed and Curtis was able to demonstrate them prior to the end of the session.  Curtis demonstrated good  understanding of the education provided. See Electronic Medical Record under Patient Instructions for exercises provided during therapy sessions    Assessment     Curtis able to achieve 104 degrees of knee flexion with overpressure from PTA and with strap today. Patient demonstrating ability to perform step ups and sit to stands without complaints of pain but cues needed to achieve proper quad activation without compensations.  Issued updated HEP in preparation for upcoming discharge.    Curtis Is progressing well towards her goals.   Patient prognosis is Good.     Patient will continue to benefit from skilled outpatient physical therapy to address the deficits listed in the problem list box on initial evaluation, provide pt/family education and to maximize pt's level of independence in the home and community environment.     Patient's spiritual, cultural and educational needs considered and pt agreeable to plan of care and goals.     Anticipated barriers to physical therapy: coping style     Goals: (ongoing)  Short Term Goals: 3 weeks   Pt will reach -5 deg right knee AROM to improve gt.-MET  Pt will reach 100 deg right knee AROM for improving " squat abilities.-MET  Right knee strength grossly 4-/5 for improving strength.-MET, KNEES-4+/5 EACH, HIPS GROSSLY 4/5 BILATERALLY.  Improve right knee edema by 1 cm for improving quad fx.  FOTO score improved to 60 for improving fx.     Long Term Goals: 6 weeks   FOTO score improved to 70 for improving fx.  Right LE strength grossly 4/5 for improving strength and d/c to home program s/p.-MET    Plan     Plan of care Certification: 10/8/2024 to 11/22/2024.     Outpatient Physical Therapy 2-3 times weekly for 6 weeks to include the following interventions: Gait Training, Manual Therapy, Moist Heat/ Ice, Neuromuscular Re-ed, Patient Education, Therapeutic Activities, and Therapeutic Exercise.     Margaret Mayfield, PTA

## 2024-11-22 ENCOUNTER — CLINICAL SUPPORT (OUTPATIENT)
Dept: REHABILITATION | Facility: HOSPITAL | Age: 74
End: 2024-11-22
Payer: MEDICARE

## 2024-11-22 DIAGNOSIS — M25.661 DECREASED RANGE OF MOTION (ROM) OF RIGHT KNEE: Primary | ICD-10-CM

## 2024-11-22 DIAGNOSIS — R29.898 RIGHT LEG WEAKNESS: ICD-10-CM

## 2024-11-22 PROCEDURE — 97112 NEUROMUSCULAR REEDUCATION: CPT | Mod: KX,HCNC,PO | Performed by: PHYSICAL THERAPIST

## 2024-11-22 PROCEDURE — 97110 THERAPEUTIC EXERCISES: CPT | Mod: KX,HCNC,PO | Performed by: PHYSICAL THERAPIST

## 2024-11-22 PROCEDURE — 97140 MANUAL THERAPY 1/> REGIONS: CPT | Mod: KX,HCNC,PO | Performed by: PHYSICAL THERAPIST

## 2024-11-22 NOTE — PROGRESS NOTES
[facility]  PT Discharge Note    Name: Curtis GARAY [facility] Number: [id number]    Therapy Diagnosis:   Encounter Diagnoses   Name Primary?    Decreased range of motion (ROM) of right knee Yes    Right leg weakness      Physician: Jeromy Rojo PA    Physician Orders: PT Eval and Treat   Question Answer   Post Surgical? Yes   Eval and Treat Yes   Type of Therapy Outpatient Therapy   Location: Knees      Medical Diagnosis from Referral:   Diagnosis   M25.561 (ICD-10-CM) - Right knee pain      Evaluation Date: 10/8/2024    Date of Last visit: 11/22/2024  Total Visits Received: 16    C/c: 0  NS: 0    ASSESSMENT      Curtis is meeting her goals and now (I) in home program.    Discharge reason: Patient has met all of his/her goals    Discharge FOTO Score: 79 (+29)    Goals: see below    PLAN   This patient is discharged from Physical Therapy.      Leatha Santana, PT    [facility]   Physical Therapy Treatment Note      [facility] [id number]    Therapy Diagnosis:   Encounter Diagnoses   Name Primary?    Decreased range of motion (ROM) of right knee Yes    Right leg weakness        Physician: Jeromy Rojo PA    Visit Date: 11/22/2024    Physician Orders: PT Eval and Treat   Question Answer   Post Surgical? Yes   Eval and Treat Yes   Type of Therapy Outpatient Therapy   Location: [facility]ne[facility]      Medical Diagnosis from Referral:   Diagnosis   M25.561 (ICD-10-CM) - Right knee pain      Evaluation Date: 10/8/2024  Authorization Period Expiration: 12/31/2024  Plan of Care Expiration: 11/22/2024  Progress Note Due: 11/7/2024  Date of Surgery: 9/18/2024  Visit # / Visits authorized: 1/ 1, 15/20   FOTO: 3/ 3 (10/8/2024, 10/24/2024, 11/22/2024)     Precautions: Standard and arthritis, osteopenia, HTN       Time In: 1306 AM  Time Out: 1400 AM  Total Billable Time: 54 minutes     PTA Visit #: 0/5     Subjective     Patient reports: her knee is  "doing well. Patient states she still has times of soreness but overall, her knee is feeling better. ITB of right leg hurts and is tender today. She was compliant with home exercise program.  Response to previous treatment: no adverse effect  Functional change: improving ROM, strength, and gt    Pain: 0/10  Location: right knee     Objective        Treatment     Curtis received the treatments listed below:      therapeutic exercises to develop strength, endurance, ROM, and flexibility for 40 minutes including:  Nu step 8 mins  HS stretch 3 x 30" in standing at stairs  Self knee flexion stretch on stairs x 2 minutes, 5 sec hold   Calf stretching 2 x 1 mins  Standing heel raises 2x10, 3#  Leg press 3 x 10 DOUBLE LEG 40#, SINGLE LEG 20# 2 x 10  Standing hip Abduction 3# 2 x 10  Standing hip adduction 3# 2 x 10  Reassessment.    manual therapy techniques: Joint mobilizations were applied to the: right knee for 08 minutes, including:  ITB rolling  Stretches demo'd for ITB at home.    neuromuscular re-education activities to improve: Balance, Sense, Proprioception, and education for 10 minutes. The following activities were included:  Standing TKE (ball against wall) 3x10, 5 sec hold   Standing hamstring curl 2x10, 3#    Patient Education and Home Exercises       Education provided:   - Discharge planning.    Written Home Exercises Provided: Pt instructed to continue prior HEP. Exercises were reviewed and Curtis was able to demonstrate them prior to the end of the session.  Curtis demonstrated good  understanding of the education provided. See Electronic Medical Record under Patient Instructions for exercises provided during therapy sessions    Assessment     Curtis is meeting all but one goal. She is doing well and will cont HEP.    Goals:  Short Term Goals: 3 weeks   Pt will reach -5 deg right knee AROM to improve gt.-MET  Pt will reach 100 deg right knee AROM for improving squat abilities.-MET  Right knee strength " grossly 4-/5 for improving strength.-MET, KNEES-4+/5 EACH, HIPS GROSSLY 4/5 BILATERALLY.  Improve right knee edema by 1 cm for improving quad fx.  Girth Measurement Joint line 5 cm below 5 cm above   Left 40 cm 35 cm 42.5 cm   Right 40 cm 37 cm 44 cm   Not met today, same, some inc today.  FOTO score improved to 60 for improving fx.-MET     Long Term Goals: 6 weeks   FOTO score improved to 70 for improving fx.-MET  Right LE strength grossly 4/5 for improving strength and d/c to home program s/p.-MET    Plan     Discharge.    Leatha Santana, PT

## 2024-11-22 NOTE — PLAN OF CARE
[facility]  PT Discharge Note    Name: Curtis GARAY [facility] Number: [id number]    Therapy Diagnosis:   Encounter Diagnoses   Name Primary?    Decreased range of motion (ROM) of right knee Yes    Right leg weakness      Physician: Jeromy Rojo PA    Physician Orders: PT Eval and Treat   Question Answer   Post Surgical? Yes   Eval and Treat Yes   Type of Therapy Outpatient Therapy   Location: Knees      Medical Diagnosis from Referral:   Diagnosis   M25.561 (ICD-10-CM) - Right knee pain      Evaluation Date: 10/8/2024    Date of Last visit: 11/22/2024  Total Visits Received: 16    C/c: 0  NS: 0    ASSESSMENT      Curtis is meeting her goals and now (I) in home program.    Discharge reason: Patient has met all of his/her goals    Discharge FOTO Score: 79 (+29)    Goals: see below    PLAN   This patient is discharged from Physical Therapy.      Leatha Santana, PT    [facility]   Physical Therapy Treatment Note      [facility] [id number]    Therapy Diagnosis:   Encounter Diagnoses   Name Primary?    Decreased range of motion (ROM) of right knee Yes    Right leg weakness        Physician: Jeromy Rojo PA    Visit Date: 11/22/2024    Physician Orders: PT Eval and Treat   Question Answer   Post Surgical? Yes   Eval and Treat Yes   Type of Therapy Outpatient Therapy   Location: Knees      Medical Diagnosis from Referral:   Diagnosis   M25.561 (ICD-10-CM) - Right knee pain      Evaluation Date: 10/8/2024  Authorization Period Expiration: 12/31/2024  Plan of Care Expiration: 11/22/2024  Progress Note Due: 11/7/2024  Date of Surgery: 9/18/2024  Visit # / Visits authorized: 1/ 1, 15/20   FOTO: 3/ 3 (10/8/2024, 10/24/2024, 11/22/2024)     Precautions: Standard and arthritis, osteopenia, HTN       Time In: 1306 AM  Time Out: 1400 AM  Total Billable Time: 54 minutes     PTA Visit #: 0/5     Subjective     Patient reports: her knee is  "doing well. Patient states she still has times of soreness but overall, her knee is feeling better. ITB of right leg hurts and is tender today. She was compliant with home exercise program.  Response to previous treatment: no adverse effect  Functional change: improving ROM, strength, and gt    Pain: 0/10  Location: right knee     Objective        Treatment     Curtis received the treatments listed below:      therapeutic exercises to develop strength, endurance, ROM, and flexibility for 40 minutes including:  Nu step 8 mins  HS stretch 3 x 30" in standing at stairs  Self knee flexion stretch on stairs x 2 minutes, 5 sec hold   Calf stretching 2 x 1 mins  Standing heel raises 2x10, 3#  Leg press 3 x 10 DOUBLE LEG 40#, SINGLE LEG 20# 2 x 10  Standing hip Abduction 3# 2 x 10  Standing hip adduction 3# 2 x 10  Reassessment.    manual therapy techniques: Joint mobilizations were applied to the: right knee for 08 minutes, including:  ITB rolling  Stretches demo'd for ITB at home.    neuromuscular re-education activities to improve: Balance, Sense, Proprioception, and education for 10 minutes. The following activities were included:  Standing TKE (ball against wall) 3x10, 5 sec hold   Standing hamstring curl 2x10, 3#    Patient Education and Home Exercises       Education provided:   - Discharge planning.    Written Home Exercises Provided: Pt instructed to continue prior HEP. Exercises were reviewed and Curtis was able to demonstrate them prior to the end of the session.  Curtis demonstrated good  understanding of the education provided. See Electronic Medical Record under Patient Instructions for exercises provided during therapy sessions    Assessment     Curtis is meeting all but one goal. She is doing well and will cont HEP.    Goals:  Short Term Goals: 3 weeks   Pt will reach -5 deg right knee AROM to improve gt.-MET  Pt will reach 100 deg right knee AROM for improving squat abilities.-MET  Right knee strength " grossly 4-/5 for improving strength.-MET, KNEES-4+/5 EACH, HIPS GROSSLY 4/5 BILATERALLY.  Improve right knee edema by 1 cm for improving quad fx.  Girth Measurement Joint line 5 cm below 5 cm above   Left 40 cm 35 cm 42.5 cm   Right 40 cm 37 cm 44 cm   Not met today, same, some inc today.  FOTO score improved to 60 for improving fx.-MET     Long Term Goals: 6 weeks   FOTO score improved to 70 for improving fx.-MET  Right LE strength grossly 4/5 for improving strength and d/c to home program s/p.-MET    Plan     Discharge.    Leatha Santana, PT

## 2024-11-25 ENCOUNTER — OFFICE VISIT (OUTPATIENT)
Dept: FAMILY MEDICINE | Facility: CLINIC | Age: 74
End: 2024-11-25
Payer: MEDICARE

## 2024-11-25 ENCOUNTER — PATIENT MESSAGE (OUTPATIENT)
Dept: FAMILY MEDICINE | Facility: CLINIC | Age: 74
End: 2024-11-25

## 2024-11-25 VITALS
TEMPERATURE: 98 F | BODY MASS INDEX: 32.97 KG/M2 | WEIGHT: 186.06 LBS | HEIGHT: 63 IN | SYSTOLIC BLOOD PRESSURE: 120 MMHG | DIASTOLIC BLOOD PRESSURE: 60 MMHG | OXYGEN SATURATION: 98 % | RESPIRATION RATE: 22 BRPM | HEART RATE: 87 BPM

## 2024-11-25 DIAGNOSIS — J06.9 UPPER RESPIRATORY TRACT INFECTION, UNSPECIFIED TYPE: Primary | ICD-10-CM

## 2024-11-25 PROCEDURE — 1159F MED LIST DOCD IN RCRD: CPT | Mod: CPTII,S$GLB,, | Performed by: FAMILY MEDICINE

## 2024-11-25 PROCEDURE — 1126F AMNT PAIN NOTED NONE PRSNT: CPT | Mod: CPTII,S$GLB,, | Performed by: FAMILY MEDICINE

## 2024-11-25 PROCEDURE — 3288F FALL RISK ASSESSMENT DOCD: CPT | Mod: CPTII,S$GLB,, | Performed by: FAMILY MEDICINE

## 2024-11-25 PROCEDURE — 1101F PT FALLS ASSESS-DOCD LE1/YR: CPT | Mod: CPTII,S$GLB,, | Performed by: FAMILY MEDICINE

## 2024-11-25 PROCEDURE — 99213 OFFICE O/P EST LOW 20 MIN: CPT | Mod: S$GLB,,, | Performed by: FAMILY MEDICINE

## 2024-11-25 PROCEDURE — 4010F ACE/ARB THERAPY RXD/TAKEN: CPT | Mod: CPTII,S$GLB,, | Performed by: FAMILY MEDICINE

## 2024-11-25 PROCEDURE — 3008F BODY MASS INDEX DOCD: CPT | Mod: CPTII,S$GLB,, | Performed by: FAMILY MEDICINE

## 2024-11-25 PROCEDURE — 1160F RVW MEDS BY RX/DR IN RCRD: CPT | Mod: CPTII,S$GLB,, | Performed by: FAMILY MEDICINE

## 2024-11-25 PROCEDURE — 3078F DIAST BP <80 MM HG: CPT | Mod: CPTII,S$GLB,, | Performed by: FAMILY MEDICINE

## 2024-11-25 PROCEDURE — 3074F SYST BP LT 130 MM HG: CPT | Mod: CPTII,S$GLB,, | Performed by: FAMILY MEDICINE

## 2024-11-25 RX ORDER — MUPIROCIN 20 MG/G
OINTMENT TOPICAL 3 TIMES DAILY
Qty: 30 G | Refills: 0 | Status: SHIPPED | OUTPATIENT
Start: 2024-11-25

## 2024-11-25 RX ORDER — BENZONATATE 200 MG/1
200 CAPSULE ORAL 3 TIMES DAILY PRN
Qty: 30 CAPSULE | Refills: 0 | Status: SHIPPED | OUTPATIENT
Start: 2024-11-25 | End: 2024-12-05

## 2024-11-25 NOTE — TELEPHONE ENCOUNTER
Pt having sinus problems x 3 days . Pt has sore throat and cough . Pt was advised to wear a mask . Pt denies fever, denies headaches. Appt sched this afternoon, pt aware .--lp

## 2024-11-25 NOTE — TELEPHONE ENCOUNTER
----- Message from Des sent at 11/25/2024  9:02 AM CST -----  Regarding: advice  Contact: patient  Type:  Sooner Apoointment Request    Caller is requesting a sooner appointment.  Caller declined first available appointment listed below.  Caller will not accept being placed on the waitlist and is requesting a message be sent to doctor.  Name of Caller:patient  When is the first available appointment?wants to be seen today  Symptoms:Sinus issues  Would the patient rather a call back or a response via [facility]?   [facility] Call Back Number:[phone]  Additional Information:

## 2024-11-25 NOTE — PROGRESS NOTES
Subjective:       Patient ID: Curtis Navarro is a 74 y.o. female.    Chief Complaint: Cough (And itchy throat for the last 3 days)    HPI  The patient is a 74-year-old who is here today because she is sick.  She has been sick for the past 3 days.  Initially she started with a scratchy throat but then her symptoms progressed.  Her current symptoms include a sore throat, postnasal drainage, clear occasional yellow rhinorrhea which is blood-tinged from the left nostril, and a nonproductive cough.  She is not sleeping well because of her symptoms.  She has had no fevers or body aches or night sweats.  She has had no GI symptoms.  She is currently using guaifenesin and a 24 hour allergy pill    Review of Systems   Constitutional:  Negative for appetite change, chills, diaphoresis, fatigue, fever and unexpected weight change.   HENT:  Positive for congestion and rhinorrhea. Negative for ear pain, postnasal drip, sinus pressure, sneezing, sore throat and trouble swallowing.    Eyes:  Negative for pain, discharge and visual disturbance.   Respiratory:  Positive for cough. Negative for chest tightness, shortness of breath and wheezing.    Cardiovascular:  Negative for chest pain, palpitations and leg swelling.   Gastrointestinal:  Negative for abdominal distention, abdominal pain, blood in stool, constipation, diarrhea, nausea and vomiting.   Skin:  Negative for rash.         Objective:      Physical Exam  Constitutional:       General: She is not in acute distress.     Appearance: Normal appearance. She is well-developed.   HENT:      Head: Normocephalic and atraumatic.      Right Ear: Hearing, tympanic membrane, ear canal and external ear normal.      Left Ear: Hearing, tympanic membrane, ear canal and external ear normal.      Nose: Congestion present.      Comments: Along the bottom of the left nasal septum, she has a small sore which is bleeding.     Mouth/Throat:      Mouth: No oral lesions.      Pharynx: No  "oropharyngeal exudate or posterior oropharyngeal erythema.   Eyes:      General: Lids are normal. No scleral icterus.     Extraocular Movements: Extraocular movements intact.      Conjunctiva/sclera: Conjunctivae normal.      Pupils: Pupils are equal, round, and reactive to light.   Neck:      Thyroid: No thyroid mass or thyromegaly.      Vascular: No carotid bruit.   Cardiovascular:      Rate and Rhythm: Normal rate and regular rhythm. No extrasystoles are present.     Chest Wall: PMI is not displaced.      Heart sounds: Normal heart sounds. No murmur heard.     No gallop.   Pulmonary:      Effort: Pulmonary effort is normal. No accessory muscle usage or respiratory distress.      Breath sounds: Normal breath sounds.   Abdominal:      General: Bowel sounds are normal. There is no abdominal bruit.      Palpations: Abdomen is soft.      Tenderness: There is no abdominal tenderness. There is no rebound.   Musculoskeletal:      Cervical back: Normal range of motion and neck supple.   Lymphadenopathy:      Head:      Right side of head: No submental or submandibular adenopathy.      Left side of head: No submental or submandibular adenopathy.      Cervical:      Right cervical: No superficial, deep or posterior cervical adenopathy.     Left cervical: No superficial, deep or posterior cervical adenopathy.      Upper Body:      Right upper body: No supraclavicular adenopathy.      Left upper body: No supraclavicular adenopathy.   Skin:     General: Skin is warm and dry.   Neurological:      Mental Status: She is alert and oriented to person, place, and time.       Blood pressure 120/60, pulse 87, temperature 97.9 °F (36.6 °C), resp. rate (!) 22, height 5' 3" (1.6 m), weight 84.4 kg (186 lb 1.1 oz), SpO2 98%.Body mass index is 32.96 kg/m².        Flu and COVID tests are negative    A/P:  1) URI with nasal sore.  Acute.  Continue with symptomatic/supportive care.  I am going to prescribe Tessalon Perles and Bactroban to use " in the left nostril.  If she is not doing better if she develops any new or worsening symptoms, she will let me know

## 2024-12-11 ENCOUNTER — LAB VISIT (OUTPATIENT)
Dept: LAB | Facility: HOSPITAL | Age: 74
End: 2024-12-11
Attending: FAMILY MEDICINE
Payer: MEDICARE

## 2024-12-11 DIAGNOSIS — E78.5 HYPERLIPIDEMIA, UNSPECIFIED HYPERLIPIDEMIA TYPE: ICD-10-CM

## 2024-12-11 LAB
CHOLEST SERPL-MCNC: 250 MG/DL (ref 120–199)
CHOLEST/HDLC SERPL: 4.2 {RATIO} (ref 2–5)
HDLC SERPL-MCNC: 59 MG/DL (ref 40–75)
HDLC SERPL: 23.6 % (ref 20–50)
LDLC SERPL CALC-MCNC: 158.2 MG/DL (ref 63–159)
NONHDLC SERPL-MCNC: 191 MG/DL
TRIGL SERPL-MCNC: 164 MG/DL (ref 30–150)

## 2024-12-11 PROCEDURE — 80061 LIPID PANEL: CPT | Mod: HCNC | Performed by: FAMILY MEDICINE

## 2024-12-11 PROCEDURE — 36415 COLL VENOUS BLD VENIPUNCTURE: CPT | Mod: HCNC,PO | Performed by: FAMILY MEDICINE

## 2024-12-15 ENCOUNTER — PATIENT MESSAGE (OUTPATIENT)
Dept: FAMILY MEDICINE | Facility: CLINIC | Age: 74
End: 2024-12-15
Payer: MEDICARE

## 2024-12-15 RX ORDER — PITAVASTATIN CALCIUM 2.09 MG/1
2 TABLET, FILM COATED ORAL NIGHTLY
Qty: 90 TABLET | Refills: 0 | Status: SHIPPED | OUTPATIENT
Start: 2024-12-15 | End: 2025-12-15

## 2025-01-08 ENCOUNTER — LAB VISIT (OUTPATIENT)
Dept: LAB | Facility: HOSPITAL | Age: 75
End: 2025-01-08
Attending: ORTHOPAEDIC SURGERY
Payer: MEDICARE

## 2025-01-08 DIAGNOSIS — M25.561 RIGHT KNEE PAIN: ICD-10-CM

## 2025-01-08 DIAGNOSIS — M25.561 PAIN IN RIGHT KNEE: Primary | ICD-10-CM

## 2025-01-08 DIAGNOSIS — M25.561 PAIN IN RIGHT KNEE: ICD-10-CM

## 2025-01-08 LAB
CRP SERPL-MCNC: 15.7 MG/L (ref 0–8.2)
ERYTHROCYTE [SEDIMENTATION RATE] IN BLOOD BY PHOTOMETRIC METHOD: 23 MM/HR (ref 0–36)

## 2025-01-08 PROCEDURE — 85652 RBC SED RATE AUTOMATED: CPT | Performed by: ORTHOPAEDIC SURGERY

## 2025-01-08 PROCEDURE — 86140 C-REACTIVE PROTEIN: CPT | Performed by: ORTHOPAEDIC SURGERY

## 2025-01-08 PROCEDURE — 36415 COLL VENOUS BLD VENIPUNCTURE: CPT | Mod: PO | Performed by: ORTHOPAEDIC SURGERY

## 2025-01-10 ENCOUNTER — PATIENT MESSAGE (OUTPATIENT)
Dept: FAMILY MEDICINE | Facility: CLINIC | Age: 75
End: 2025-01-10
Payer: MEDICARE

## 2025-01-10 ENCOUNTER — CLINICAL SUPPORT (OUTPATIENT)
Dept: REHABILITATION | Facility: HOSPITAL | Age: 75
End: 2025-01-10
Payer: OTHER GOVERNMENT

## 2025-01-10 DIAGNOSIS — M25.661 DECREASED RANGE OF MOTION OF RIGHT KNEE: Primary | ICD-10-CM

## 2025-01-10 DIAGNOSIS — R79.82 ELEVATED C-REACTIVE PROTEIN (CRP): Primary | ICD-10-CM

## 2025-01-10 DIAGNOSIS — R26.9 GAIT ABNORMALITY: ICD-10-CM

## 2025-01-10 DIAGNOSIS — R29.898 WEAKNESS OF BOTH LOWER EXTREMITIES: ICD-10-CM

## 2025-01-10 PROCEDURE — 97112 NEUROMUSCULAR REEDUCATION: CPT | Mod: PO | Performed by: PHYSICAL THERAPIST

## 2025-01-10 PROCEDURE — 97162 PT EVAL MOD COMPLEX 30 MIN: CPT | Mod: PO | Performed by: PHYSICAL THERAPIST

## 2025-01-10 NOTE — PLAN OF CARE
"[facility]   Physical Therapy Initial Evaluation      Name: Curtis Navarro  Clinic Number: [id number]    Therapy Diagnosis:   Encounter Diagnoses   Name Primary?    Decreased range of motion of right knee Yes    Weakness of both lower extremities     Gait abnormality         Physician: Jeromy Rojo PA    Physician Orders: PT Eval and Treat   Post Surgical?No Eval and TreatYes Type of TherapyOutpatient Therapy Location:Knees  Medical Diagnosis from Referral: M25.561 (ICD-10-CM) - Pain in right knee  Evaluation Date: 1/10/2025  Authorization Period Expiration: 01/08/2026  Plan of Care Expiration: 2/7/2025  Progress Note Due: 2/9/2025  Date of Surgery: 9/18/2024, right TKA  Visit # / Visits authorized: 1/ 1   FOTO: 1/ 3 (1/10/2025)    Precautions: Standard     Time In: 1000  Time Out: 1050  Total Billable Time: 50 minutes    Subjective     Date of onset: since TKA     History of current condition - Curtis reports: she is referred by Dr. Helton for recurrent right knee pain and stiffness. She reports having been experiencing an infection that is not labelled yet. She had blood work which is in her [employer] pt portal. She has had neck and back pain recently, but none of this is consistent. Both legs tend be bothersome but improving. Sx come in go in all aspects. She was told she has scar tissue of right knee. She has had trouble maintaining gains from PT following right TKA on 9/18/2024 and d/c from PT on 11/22/2024. She has been sitting with her brother who was ill. Sx have returned since d/c from PT. She feels "pinching" at knee cap.    Falls: 0    Imaging: none    Prior Therapy: yes  Social History:  lives with their spouse  Occupation: retired  Prior Level of Function: She had less stiffness at d/c, but feels this is returning.  Current Level of Function: She reports that her right knee bothers her because she can always feel the stiffness and is walking with a limp. She has " trouble crossing her leg to tie or don her shoe. She has trouble getting out of her car.    Pain:  Current 0/10, worst 4/10, best 0/10   Location: right knee    Description: Aching and Dull, stiff  Aggravating Factors: Bending  Easing Factors: elevation and stretching    Patients goals: to improving knee flexibility     Medical History:   Past Medical History:   Diagnosis Date    Allergy     Arthritis     Cataract     Colon polyps     Diverticulosis     GERD (gastroesophageal reflux disease)     Graves disease     s/p radioactive iodine in 42    H/O cardiovascular stress test     normal 5/23    History of diverticulitis     History of skin cancer     L neck    Hyperlipidemia     Hypertension     Hypothyroidism     s/p radioactive iodine    IBS (irritable bowel syndrome)     Osteopenia     Thyroid nodule     last usg 5/23;  next due 5/25       Surgical History:   Curtis Navarro  has a past surgical history that includes Tonsillectomy; Cosmetic surgery; Skin cancer excision; Cardiac surgery (2013); Esophagogastroduodenoscopy (N/A, 07/12/2018); Colonoscopy (07/21/2010); Colonoscopy (N/A, 06/27/2017); Upper gastrointestinal endoscopy (08/04/2014); Upper gastrointestinal endoscopy (06/27/2017); Esophagogastroduodenoscopy (N/A, 1/23/2023); and Colonoscopy (N/A, 1/23/2023).    Medications:   Curtis has a current medication list which includes the following prescription(s): acetaminophen, aspirin, biotin, calcium-vitamin d3, ezetimibe, glucosa holm 2kcl/chondroitin holm, krill/om-3/dha/epa/phospho/ast, lactobacillus acidophilus, levothyroxine, losartan, metoprolol succinate, miconazole nitrate 2 %, multivitamin, mupirocin, omeprazole, pimecrolimus, pitavastatin calcium, polyethylene glycol, and gemtesa.    Allergies:   Review of patient's allergies indicates:   Allergen Reactions    Lisinopril Other (See Comments)     Cough    Azithromycin      Other reaction(s): Nausea  Other reaction(s): Diarrhea    Metronidazole hcl       Other reaction(s): Rash  Other reaction(s): Itching    Moxifloxacin      Other reaction(s): Rash    Sulfa (sulfonamide antibiotics)      Other reaction(s): Itching      Objective      Observation: Pt has returned to more of limp than at prior d/c.    Posture: impaired      Range of Motion:   Knee Left active Right Active   Flexion 120 90   Extension -2 -8       Lower Extremity Strength  Right LE  Left LE    Knee extension: 4/5 Knee extension: 4+/5   Knee flexion: 4-/5 Knee flexion: 4/5   Hip flexion: 4-/5 Hip flexion: 4/5   Hip extension:  4/5 Hip extension: 4/5   Hip abduction: 4-/5 Hip abduction: 4-/5   Hip adduction: 4/5 Hip adduction 4/5   Ankle dorsiflexion: 5/5 Ankle dorsiflexion: 5/5   Ankle plantarflexion: 5/5 Ankle plantarflexion: 5/5     Joint Mobility: hypo in all directions patellar    Palpation: pos right ITB, patellar t, VL    Sensation: itching right lower scar    Intake Outcome Measure for FOTO KNEE Survey    Therapist reviewed FOTO scores for Curtis Navarro on 1/10/2025.   FOTO report - see Media section or FOTO account episode details.    Intake Score: 61  GOAL: 68       Treatment     Total Treatment time (time-based codes) separate from Evaluation: 10 minutes     Curtis received the treatments listed below:      neuromuscular re-education activities to improve: Sense, Proprioception, Posture, and education for 10 minutes. The following activities were included:  Static extension stretch  Static flexion stretch  Mini squats  Hip Abduction  Hip adduction   Hip extension  LONG ARC QUAD  Heel slides  QS  SLR  HS stretch  ITB stretch    Patient Education and Home Exercises     Education provided:   - HEP  - Pt/family was provided educational information, including: role of PT, role of pt/caregiver, goals for PT, POC, scheduling, and attendance policy.- pt verbalized understanding.     Written Home Exercises Provided: Yes. (Prior printouts printed) Exercises were reviewed and Curtis was able to  demonstrate them prior to the end of the session.  Curtis demonstrated good  understanding of the education provided. See EMR under Patient Instructions for exercises provided during therapy sessions.    Assessment     Curtis is a 74 y.o. female referred to outpatient Physical Therapy with a medical diagnosis of Pain in right knee. Patient presents with impaired right knee ROM, gt, mobility, and presence of pain. Pt has received PT following right TKA, but was unable to maintain with HEP that she said she maintained performance several times a week. She states she may have an infection that is not yet diagnosed but thought possible by physician. She has a h/o neck and back pain though these are not currently problems. She states MD thinks she has scar tissue. The scar is well healed with mild restrictions remaining. Pt is tender through VL and ITB and may benefit from MT and DN to this.    Patient prognosis is Good.   Patient will benefit from skilled outpatient Physical Therapy to address the deficits stated above and in the chart below, provide patient /family education, and to maximize patientt's level of independence.     Plan of care discussed with patient: Yes  Patient's spiritual, cultural and educational needs considered and patient is agreeable to the plan of care and goals as stated below:     Anticipated Barriers for therapy: age, lifestyle    Medical Necessity is demonstrated by the following  History  Co-morbidities and personal factors that may impact the plan of care [] LOW: no personal factors / co-morbidities  [] MODERATE: 1-2 personal factors / co-morbidities  [x] HIGH: 3+ personal factors / co-morbidities    Moderate / High Support Documentation:   Co-morbidities affecting plan of care:   Past Medical History:   Diagnosis Date    Allergy     Arthritis     Cataract     Colon polyps     Diverticulosis     GERD (gastroesophageal reflux disease)     Graves disease     s/p radioactive iodine in 42     H/O cardiovascular stress test     normal 5/23    History of diverticulitis     History of skin cancer     L neck    Hyperlipidemia     Hypertension     Hypothyroidism     s/p radioactive iodine    IBS (irritable bowel syndrome)     Osteopenia     Thyroid nodule     last usg 5/23;  next due 5/25     Personal Factors:   age  lifestyle     Examination  Body Structures and Functions, activity limitations and participation restrictions that may impact the plan of care [] LOW: addressing 1-2 elements  [] MODERATE: 3+ elements  [x] HIGH: 4+ elements (please support below)    Moderate / High Support Documentation: Patient presents with impaired right knee ROM, gt, mobility, and presence of pain.     Clinical Presentation [] LOW: stable  [x] MODERATE: Evolving  [] HIGH: Unstable     Decision Making/ Complexity Score: moderate       Goals:  Short Term Goals: 2 weeks   Right knee extension AROM improved to -2 deg for improving fx.  Right knee flexion AROM improved to 100 deg for improving fx.    Long Term Goals: 4 weeks   Right knee flexion AROM improved to 108 deg for improving fx.  FOTO score improved to 68 for improving QOL.    Plan     Plan of care Certification: 1/10/2025 to 2/7/2025.    Outpatient Physical Therapy 2 times weekly for 4 weeks to include the following interventions: Electrical Stimulation IFC, Gait Training, Manual Therapy, Moist Heat/ Ice, Neuromuscular Re-ed, Patient Education, Therapeutic Activities, Therapeutic Exercise, and dry needling .     Leatha Santana, PT, DPT        I CERTIFY THE NEED FOR THESE SERVICES FURNISHED UNDER THIS PLAN OF TREATMENT AND WHILE UNDER MY CARE    Physician's comments:        Physician's Signature: ___________________________________________________ DATE:_______________________

## 2025-01-13 ENCOUNTER — HOSPITAL ENCOUNTER (OUTPATIENT)
Dept: RADIOLOGY | Facility: HOSPITAL | Age: 75
Discharge: HOME | End: 2025-01-13
Attending: FAMILY MEDICINE
Payer: MEDICARE

## 2025-01-13 ENCOUNTER — CLINICAL SUPPORT (OUTPATIENT)
Dept: REHABILITATION | Facility: HOSPITAL | Age: 75
End: 2025-01-13
Payer: MEDICARE

## 2025-01-13 DIAGNOSIS — R29.898 WEAKNESS OF BOTH LOWER EXTREMITIES: ICD-10-CM

## 2025-01-13 DIAGNOSIS — R26.9 GAIT ABNORMALITY: ICD-10-CM

## 2025-01-13 DIAGNOSIS — R79.82 ELEVATED C-REACTIVE PROTEIN (CRP): ICD-10-CM

## 2025-01-13 DIAGNOSIS — M25.661 DECREASED RANGE OF MOTION OF RIGHT KNEE: Primary | ICD-10-CM

## 2025-01-13 PROCEDURE — 71046 X-RAY EXAM CHEST 2 VIEWS: CPT | Mod: 26,,, | Performed by: STUDENT IN AN ORGANIZED HEALTH CARE EDUCATION/TRAINING PROGRAM

## 2025-01-13 PROCEDURE — 97112 NEUROMUSCULAR REEDUCATION: CPT | Mod: PO,CQ

## 2025-01-13 PROCEDURE — 97140 MANUAL THERAPY 1/> REGIONS: CPT | Mod: PO,CQ

## 2025-01-13 PROCEDURE — 71046 X-RAY EXAM CHEST 2 VIEWS: CPT | Mod: TC,FY,PO

## 2025-01-13 NOTE — PROGRESS NOTES
[facility]   Physical Therapy Treatment Note     Name: Curtis Navarro  [facility] Number: [id number]    Therapy Diagnosis:   Encounter Diagnoses   Name Primary?    Decreased range of motion of right knee Yes    Weakness of both lower extremities     Gait abnormality      Physician: Joselo Helton,*    Visit Date: 1/13/2025  Physician Orders: PT Eval and Treat   Post Surgical?No Eval and TreatYes Type of TherapyOutpatient Therapy Location:Knees  Medical Diagnosis from Referral: M25.561 (ICD-10-CM) - Pain in right knee  Evaluation Date: 1/10/2025  Authorization Period Expiration: 01/08/2026  Plan of Care Expiration: 2/7/2025  Progress Note Due: 2/9/2025  Date of Surgery: 9/18/2024, right TKA  Visit # / Visits authorized: 1/ 20  FOTO: 1/ 3 (1/10/2025)    PTA Visit #: 1/5     Precautions: Standard     Time In: 0958 AM  Time Out: 1050 AM  Total Billable Time: 30 minutes    SUBJECTIVE     Pt reports: her knee has been sore. She was compliant with home exercise program.   Response to previous treatment: no adverse effects   Functional change: too soon to determine     Pain: 2/10  Location: right knee      OBJECTIVE     Objective Measures updated at progress report unless specified.     Treatment     Curtis received the treatments listed below:      therapeutic exercises to develop strength, endurance, ROM, flexibility, posture, and core stabilization for 25 minutes including:  Nu step x 7 minutes  Long sitting gastroc stretch with strap x 1 minute x 3   Knee flexion with strap (heel slides) x 3 minutes, 5 sec hold at end range    Seated hamstring curls 2x10, 25# (5 sec pause in flexion)  Recumbent bike x 8 minutes (End of treatment session, increasing knee flexion ROM at 5 minutes)    Not performed today:  Mini squats  Hip Abduction  Hip adduction   Hip extension  HS stretch  ITB stretch    manual therapy techniques: Soft tissue Mobilization were applied to the: (R) knee for 10 minutes,  including:  Patellar mobilizations, global STM    neuromuscular re-education activities to improve: Balance, Coordination, Proprioception, and Posture for 15 minutes. The following activities were included:  Quad set 2x10 without towel  Quad set + SLR 2x10  LAQ (focus on active TKE) 2x20    Patient Education and Home Exercises     Home Exercises Provided and Patient Education Provided     Education provided:   - HEP compliance     Written Home Exercises Provided: Patient instructed to cont prior HEP. Exercises were reviewed and Curtis was able to demonstrate them prior to the end of the session.  Curtis demonstrated good  understanding of the education provided. See EMR under Patient Instructions for exercises provided during therapy sessions    ASSESSMENT     Fatigue and muscle/joint soreness easily achieved with today's interventions. Patient able to demonstrate improvements in active knee extension and quad recruitment as session progressed but she struggled to achieve improvements in knee flexion. Focused on hamstring activation without complaints of increased pain. Ended session on recumbent bike to improve knee flexion ROM; discomfort evident but this did slowly improve with repetition.     Curtis Is progressing well towards her goals.   Pt prognosis is Good.     Pt will continue to benefit from skilled outpatient physical therapy to address the deficits listed in the problem list box on initial evaluation, provide pt/family education and to maximize pt's level of independence in the home and community environment.     Pt's spiritual, cultural and educational needs considered and pt agreeable to plan of care and goals.     Anticipated barriers to physical therapy: age, lifestyle    Goals:   Short Term Goals: 2 weeks   Right knee extension AROM improved to -2 deg for improving fx.  Right knee flexion AROM improved to 100 deg for improving fx.     Long Term Goals: 4 weeks   Right knee flexion AROM improved to  108 deg for improving fx.  FOTO score improved to 68 for improving QOL.    PLAN     Continue current POC with emphasis on achieving symmetrical ROM, strength and function.     Margaret Mayfield, PTA

## 2025-01-15 ENCOUNTER — CLINICAL SUPPORT (OUTPATIENT)
Dept: REHABILITATION | Facility: HOSPITAL | Age: 75
End: 2025-01-15
Payer: MEDICARE

## 2025-01-15 DIAGNOSIS — M25.661 DECREASED RANGE OF MOTION OF RIGHT KNEE: Primary | ICD-10-CM

## 2025-01-15 DIAGNOSIS — R29.898 WEAKNESS OF BOTH LOWER EXTREMITIES: ICD-10-CM

## 2025-01-15 DIAGNOSIS — R26.9 GAIT ABNORMALITY: ICD-10-CM

## 2025-01-15 PROCEDURE — 97110 THERAPEUTIC EXERCISES: CPT | Mod: PO | Performed by: PHYSICAL THERAPIST

## 2025-01-15 PROCEDURE — 97140 MANUAL THERAPY 1/> REGIONS: CPT | Mod: PO | Performed by: PHYSICAL THERAPIST

## 2025-01-15 PROCEDURE — 97112 NEUROMUSCULAR REEDUCATION: CPT | Mod: PO | Performed by: PHYSICAL THERAPIST

## 2025-01-15 NOTE — PROGRESS NOTES
[facility]   Physical Therapy Treatment Note     Name: Curtis Navarro  [facility] Number: [id number]    Therapy Diagnosis:   Encounter Diagnoses   Name Primary?    Decreased range of motion of right knee Yes    Weakness of both lower extremities     Gait abnormality      Physician: Joselo Helton,*    Visit Date: 1/15/2025  Physician Orders: PT Eval and Treat   Post Surgical?No Eval and TreatYes Type of TherapyOutpatient Therapy Location:Knees  Medical Diagnosis from Referral: M25.561 (ICD-10-CM) - Pain in right knee  Evaluation Date: 1/10/2025  Authorization Period Expiration: 01/08/2026  Plan of Care Expiration: 2/7/2025  Progress Note Due: 2/9/2025  Date of Surgery: 9/18/2024, right TKA  Visit # / Visits authorized: 2/ 20  FOTO: 1/ 3 (1/10/2025)    PTA Visit #: 0/5     Precautions: Standard     Time In: 1400  Time Out: 1500  Total Billable Time: 55 minutes    SUBJECTIVE     Pt reports: her knee has been sore. She was compliant with home exercise program.   Response to previous treatment: no adverse effects   Functional change: too soon to determine     Pain: 2/10  Location: right knee      OBJECTIVE     Objective Measures updated at progress report unless specified.     Treatment     Curtis received the treatments listed below:      therapeutic exercises to develop strength, endurance, ROM, flexibility, posture, and core stabilization for 30 minutes including:  Nu step x 7 minutes  Long sitting gastroc stretch with strap x 1 minute x 3   Knee flexion with strap (heel slides) x 3 minutes, 5 sec hold at end range    Seated hamstring curls 2x10, 25# (5 sec pause in flexion)  Recumbent bike x 8 minutes (End of treatment session, increasing knee flexion ROM at 5 minutes)  Mini squats 2 x 10    Not performed today:  Hip Abduction  Hip adduction   Hip extension  HS stretch  ITB stretch    manual therapy techniques: Soft tissue Mobilization were applied to the: (R) knee for 10 minutes,  including:  Patellar mobilizations, global STM    neuromuscular re-education activities to improve: Balance, Coordination, Proprioception, and Posture for 15 minutes. The following activities were included:  Quad set 2x10 without towel  Quad set + SLR 2x10  LAQ (focus on active TKE) 2x20, 2#    Patient Education and Home Exercises     Home Exercises Provided and Patient Education Provided     Education provided:   - HEP compliance     Written Home Exercises Provided: Patient instructed to cont prior HEP. Exercises were reviewed and Curtis was able to demonstrate them prior to the end of the session.  Curtis demonstrated good  understanding of the education provided. See EMR under Patient Instructions for exercises provided during therapy sessions    ASSESSMENT     Assured pt of muscle tightness. Encouraged pt to take freq standing breaks as she continues healing 1 yr out sx and the knee can easily tighten with static sitting. Cont to encourage home program and standing rest breaks. Encouraged continued scar massage at home.    Curtis Is progressing well towards her goals.   Pt prognosis is Good.     Pt will continue to benefit from skilled outpatient physical therapy to address the deficits listed in the problem list box on initial evaluation, provide pt/family education and to maximize pt's level of independence in the home and community environment.     Pt's spiritual, cultural and educational needs considered and pt agreeable to plan of care and goals.     Anticipated barriers to physical therapy: age, lifestyle    Goals:   Short Term Goals: 2 weeks   Right knee extension AROM improved to -2 deg for improving fx.  Right knee flexion AROM improved to 100 deg for improving fx.     Long Term Goals: 4 weeks   Right knee flexion AROM improved to 108 deg for improving fx.  FOTO score improved to 68 for improving QOL.    PLAN     Continue current POC with emphasis on achieving symmetrical ROM, strength and function.      Leatha Santana, PT, DPT

## 2025-01-16 ENCOUNTER — OFFICE VISIT (OUTPATIENT)
Dept: FAMILY MEDICINE | Facility: CLINIC | Age: 75
End: 2025-01-16
Payer: MEDICARE

## 2025-01-16 VITALS
HEART RATE: 91 BPM | WEIGHT: 190.13 LBS | DIASTOLIC BLOOD PRESSURE: 70 MMHG | HEIGHT: 63 IN | BODY MASS INDEX: 33.69 KG/M2 | TEMPERATURE: 98 F | RESPIRATION RATE: 18 BRPM | SYSTOLIC BLOOD PRESSURE: 136 MMHG | OXYGEN SATURATION: 97 %

## 2025-01-16 DIAGNOSIS — Z12.31 ENCOUNTER FOR SCREENING MAMMOGRAM FOR MALIGNANT NEOPLASM OF BREAST: ICD-10-CM

## 2025-01-16 DIAGNOSIS — R79.82 ELEVATED C-REACTIVE PROTEIN (CRP): Primary | ICD-10-CM

## 2025-01-16 DIAGNOSIS — E83.52 HYPERCALCEMIA: ICD-10-CM

## 2025-01-16 DIAGNOSIS — Z12.39 ENCOUNTER FOR SCREENING FOR MALIGNANT NEOPLASM OF BREAST, UNSPECIFIED SCREENING MODALITY: ICD-10-CM

## 2025-01-16 DIAGNOSIS — J32.9 SINUSITIS, UNSPECIFIED CHRONICITY, UNSPECIFIED LOCATION: ICD-10-CM

## 2025-01-16 DIAGNOSIS — N18.31 STAGE 3A CHRONIC KIDNEY DISEASE: ICD-10-CM

## 2025-01-16 PROCEDURE — 3075F SYST BP GE 130 - 139MM HG: CPT | Mod: CPTII,S$GLB,, | Performed by: FAMILY MEDICINE

## 2025-01-16 PROCEDURE — G2211 COMPLEX E/M VISIT ADD ON: HCPCS | Mod: S$GLB,,, | Performed by: FAMILY MEDICINE

## 2025-01-16 PROCEDURE — 1101F PT FALLS ASSESS-DOCD LE1/YR: CPT | Mod: CPTII,S$GLB,, | Performed by: FAMILY MEDICINE

## 2025-01-16 PROCEDURE — 1159F MED LIST DOCD IN RCRD: CPT | Mod: CPTII,S$GLB,, | Performed by: FAMILY MEDICINE

## 2025-01-16 PROCEDURE — 3288F FALL RISK ASSESSMENT DOCD: CPT | Mod: CPTII,S$GLB,, | Performed by: FAMILY MEDICINE

## 2025-01-16 PROCEDURE — 99214 OFFICE O/P EST MOD 30 MIN: CPT | Mod: S$GLB,,, | Performed by: FAMILY MEDICINE

## 2025-01-16 PROCEDURE — 3008F BODY MASS INDEX DOCD: CPT | Mod: CPTII,S$GLB,, | Performed by: FAMILY MEDICINE

## 2025-01-16 PROCEDURE — 1126F AMNT PAIN NOTED NONE PRSNT: CPT | Mod: CPTII,S$GLB,, | Performed by: FAMILY MEDICINE

## 2025-01-16 PROCEDURE — 1160F RVW MEDS BY RX/DR IN RCRD: CPT | Mod: CPTII,S$GLB,, | Performed by: FAMILY MEDICINE

## 2025-01-16 PROCEDURE — 3078F DIAST BP <80 MM HG: CPT | Mod: CPTII,S$GLB,, | Performed by: FAMILY MEDICINE

## 2025-01-16 RX ORDER — FLUTICASONE PROPIONATE 50 MCG
2 SPRAY, SUSPENSION (ML) NASAL DAILY
Qty: 16 G | Refills: 3 | Status: SHIPPED | OUTPATIENT
Start: 2025-01-16

## 2025-01-16 NOTE — PROGRESS NOTES
Subjective:       Patient ID: Curtis Navarro is a 74 y.o. female.    Chief Complaint: Follow-up    History of Present Illness    CHIEF COMPLAINT:  Patient presents today concerned about underlying infection sources.  Her orthopedist recently checked a CRP and ESR and her CRP was elevated at 15.7 but her sed rate was normal.  Her orthopedist tapped her right knee (which was replaced last fall) but there was no evidence of infection on the synovial fluid analysis.  She is concerned that she may have some type of other underlying infection and is here today to discuss that further.  Prior to this appointment, I did order a CBC which was unremarkable with a normal WBC of 9.97, a CMP which was unremarkable other than a slightly elevated calcium at 10.8 and a GFR of 43, a urine study which was not done, and a chest x-ray which was normal.    Today as we discuss possible underlying sources of inflammation/infection, she reports ongoing sinus problems since before Thanksgiving, with symptoms improving and then recurring. Current symptoms include green nasal discharge, morning headaches, persistent dry cough, and nighttime nasal congestion requiring multiple times getting up to blow her nose. She notes postnasal drip that had briefly resolved but may be returning with thickening secretions. Previous symptoms of sore throat and head pain have resolved. Her symptoms have been improving over the last few days. She denies fever.  Of note, her  recently completed antibiotics for similar symptoms. Her grandson (age 5), whom she watches three days per week after school, developed symptoms following a recent trip and her son has also been affected and is currently recovering.  Aside from this, she has no other symptoms concerning for infection/inflammation    Review of systems is unremarkable other than her report of frequent urination due to overactive bladder. She denies dysuria, urgency, or other urinary  symptoms.    Regarding her hypercalcemia, she takes calcium supplements 600 mg twice daily (total 1200 mg) and a vitamin D supplement.         Review of Systems   Constitutional:  Negative for appetite change, chills, diaphoresis, fatigue, fever and unexpected weight change.   HENT:  Positive for congestion, postnasal drip and rhinorrhea. Negative for ear pain, sinus pressure, sneezing, sore throat and trouble swallowing.    Eyes:  Negative for pain, discharge and visual disturbance.   Respiratory:  Positive for cough. Negative for chest tightness, shortness of breath and wheezing.    Cardiovascular:  Negative for chest pain, palpitations and leg swelling.   Gastrointestinal:  Negative for abdominal distention, abdominal pain, blood in stool, constipation, diarrhea, nausea and vomiting.   Genitourinary:  Positive for frequency. Negative for dysuria, hematuria and pelvic pain.   Skin:  Negative for rash.   Neurological:  Positive for headaches.         Objective:      Physical Exam  Constitutional:       General: She is not in acute distress.     Appearance: Normal appearance. She is well-developed.   HENT:      Head: Normocephalic and atraumatic.      Right Ear: Hearing, tympanic membrane, ear canal and external ear normal.      Left Ear: Hearing, tympanic membrane, ear canal and external ear normal.      Nose: Nose normal.      Mouth/Throat:      Mouth: No oral lesions.      Pharynx: No oropharyngeal exudate or posterior oropharyngeal erythema.   Eyes:      General: Lids are normal. No scleral icterus.     Extraocular Movements: Extraocular movements intact.      Conjunctiva/sclera: Conjunctivae normal.      Pupils: Pupils are equal, round, and reactive to light.   Neck:      Thyroid: No thyroid mass or thyromegaly.      Vascular: No carotid bruit.   Cardiovascular:      Rate and Rhythm: Normal rate and regular rhythm. No extrasystoles are present.     Chest Wall: PMI is not displaced.      Heart sounds: Normal  "heart sounds. No murmur heard.     No gallop.   Pulmonary:      Effort: Pulmonary effort is normal. No accessory muscle usage or respiratory distress.      Breath sounds: Normal breath sounds.   Abdominal:      General: Bowel sounds are normal. There is no abdominal bruit.      Palpations: Abdomen is soft.      Tenderness: There is no abdominal tenderness. There is no rebound.   Musculoskeletal:      Cervical back: Normal range of motion and neck supple.   Lymphadenopathy:      Head:      Right side of head: No submental or submandibular adenopathy.      Left side of head: No submental or submandibular adenopathy.      Cervical:      Right cervical: No superficial, deep or posterior cervical adenopathy.     Left cervical: No superficial, deep or posterior cervical adenopathy.      Upper Body:      Right upper body: No supraclavicular adenopathy.      Left upper body: No supraclavicular adenopathy.   Skin:     General: Skin is warm and dry.   Neurological:      Mental Status: She is alert and oriented to person, place, and time.       Blood pressure 136/70, pulse 91, temperature 98.2 °F (36.8 °C), resp. rate 18, height 5' 3" (1.6 m), weight 86.3 kg (190 lb 2.4 oz), SpO2 97%.Body mass index is 33.68 kg/m².            Assessment & Plan    1. Evaluated elevated CRP (15) with normal SED rate; white count and cell types normal, suggesting no systemic infection  2. Considered chronic sinusitis as cause of persistent symptoms  3. Ordered sinus CT to evaluate sinus infection, particularly in sphenoid sinus  4. Monitored elevated calcium levels; previously investigated in May of 2024 with normal calcium and parathyroid hormone tests  5. Noted slight decline in kidney function markers; natural aging process suspected  6. Assessed for possible allergy component to symptoms    SEVERE OBESITY (BMI 35.0-39.9) WITH COMORBIDITY:  - Evaluated the patient's labs and imaging results.  - No significant abnormalities were found " indicating acute complications related to morbid obesity.  - Noted elevated CRP level at 15 (normal range below 8), suggesting potential obesity-related inflammation.  - Blood count, including white blood cell count (9.97) and differential, was normal.  - Chemistry panel showed normal sodium, potassium, and sugar levels, but elevated calcium.  - Liver tests were normal.  - Kidney function marker was slightly elevated at 56, consistently high in recent tests (48, 43 previously).    PROBABLE CHRONIC SINUSITIS:  - Patient reports ongoing sinus problems since before Thanksgiving, with symptoms including green nasal discharge, morning headaches, and dry cough.  - Nasal symptoms have improved, but pharyngeal phlegm or postnasal drip persists.  - Physical exam, including otoscopy, was performed.  - Prescribed Flonase nasal spray: 2 sprays in each nostril twice daily.  - Ordered sinus CT to look sinus infection.  - If CT is normal, will initiate antihistamine therapy to help dry up secretions.  - If sinus infection is found, will consider antibiotics and referral to a sinus specialist.  - Allergies are being considered as a possible cause for chronic sinus problems.    ELEVATED CRP:  - CRP was explained as a non-specific marker of inflammation.  - Plan to repeat labs to recheck inflammatory markers.    HYPERCALCEMIA:  - Recent labs showed SLIGHTLY high calcium level, consistent with a high value from last year.  - Continue calcium supplements   - Plan to recheck calcium levels and perform additional tests, including ionized calcium and parathyroid hormone.    ABNORMAL GFR POSSIBLY DUE TO CKD 3 A:  - Discussed natural decline in kidney function with age.  - Plan to calculate the risk of kidney failure and continue monitoring kidney function.  - Recommend increased hydration on labs days.  - Ordered labs: Renal panel.    BREAST CANCER SCREENING:  - Patient is overdue for mammogram, with the last one likely performed in  January of the previous year.  - Ordered mammogram for breast cancer screening.    FOLLOW UP:  - Patient to increase water intake, especially on day of upcoming labs.  - Follow up to review test results (sinus CT, labs) when available.  - Contact the office if new symptoms develop.   - additional follow up is deferred until testing is completed        This note was generated with the assistance of ambient listening technology. Verbal consent was obtained by the patient and accompanying visitor(s) for the recording of patient appointment to facilitate this note. I attest to having reviewed and edited the generated note for accuracy, though some syntax or spelling errors may persist. Please contact the author of this note for any clarification.

## 2025-01-17 ENCOUNTER — CLINICAL SUPPORT (OUTPATIENT)
Dept: REHABILITATION | Facility: HOSPITAL | Age: 75
End: 2025-01-17
Payer: MEDICARE

## 2025-01-17 DIAGNOSIS — M25.661 DECREASED RANGE OF MOTION OF RIGHT KNEE: Primary | ICD-10-CM

## 2025-01-17 DIAGNOSIS — R26.9 GAIT ABNORMALITY: ICD-10-CM

## 2025-01-17 DIAGNOSIS — R29.898 WEAKNESS OF BOTH LOWER EXTREMITIES: ICD-10-CM

## 2025-01-17 PROCEDURE — 97140 MANUAL THERAPY 1/> REGIONS: CPT | Mod: PO | Performed by: PHYSICAL THERAPIST

## 2025-01-17 PROCEDURE — 97110 THERAPEUTIC EXERCISES: CPT | Mod: PO | Performed by: PHYSICAL THERAPIST

## 2025-01-17 PROCEDURE — 97112 NEUROMUSCULAR REEDUCATION: CPT | Mod: PO | Performed by: PHYSICAL THERAPIST

## 2025-01-17 NOTE — PROGRESS NOTES
[facility]   Physical Therapy Treatment Note     Name: Curtis Navarro  [facility] Number: [id number]    Therapy Diagnosis:   Encounter Diagnoses   Name Primary?    Decreased range of motion of right knee Yes    Weakness of both lower extremities     Gait abnormality      Physician: Joselo Helton,*    Visit Date: 1/17/2025  Physician Orders: PT Eval and Treat   Post Surgical?No Eval and TreatYes Type of TherapyOutpatient Therapy Location:Knees  Medical Diagnosis from Referral: M25.561 (ICD-10-CM) - Pain in right knee  Evaluation Date: 1/10/2025  Authorization Period Expiration: 01/08/2026  Plan of Care Expiration: 2/7/2025  Progress Note Due: 2/9/2025  Date of Surgery: 9/18/2024, right TKA  Visit # / Visits authorized:1/1, 3/ 20  FOTO: 1/ 3 (1/10/2025)    PTA Visit #: 0/5     Precautions: Standard     Time In: 1100  Time Out: 1200  Total Billable Time: 56 minutes    SUBJECTIVE     Pt reports: her knee has been sore with some swelling at ITB distally, but not tender. She was compliant with home exercise program.   Response to previous treatment: no adverse effects   Functional change: too soon to determine     Pain: 2/10  Location: right knee      OBJECTIVE     Objective Measures updated at progress report unless specified.     + VL tenderness proximally right thigh    Treatment     Curtis received the treatments listed below:      therapeutic exercises to develop strength, endurance, ROM, flexibility, posture, and core stabilization for 31 minutes including:  Nu step x 7 minutes (recumbent for knee flexion 8 mins)  Long sitting gastroc stretch with strap x 1 minute x 3   Knee flexion with strap (heel slides) x 3 minutes, 5 sec hold at end range  HS stretch 3 x 1 minimum  ITB next visit    Seated hamstring curls 2x10, 25# (5 sec pause in flexion)  Recumbent bike x 8 minutes (End of treatment session, increasing knee flexion ROM at 5 minutes)-np  Mini squats 2 x 10    Not performed  today:  Hip Abduction  Hip adduction   Hip extension  HS stretch  ITB stretch    manual therapy techniques: Soft tissue Mobilization were applied to the: (R) knee for 10 minutes, including:  Patellar mobilizations, global STM with rolling stick.    neuromuscular re-education activities to improve: Balance, Coordination, Proprioception, and Posture for 15 minutes. The following activities were included:  Quad set 2x10 without towel  Quad set + SLR 2x10  LAQ (focus on active TKE) 2x20, 2#    Patient Education and Home Exercises     Home Exercises Provided and Patient Education Provided     Education provided:   - HEP compliance     Written Home Exercises Provided: Patient instructed to cont prior HEP. Exercises were reviewed and Curtis was able to demonstrate them prior to the end of the session.  Curtis demonstrated good  understanding of the education provided. See EMR under Patient Instructions for exercises provided during therapy sessions    ASSESSMENT     Cont encouraging freq standing rest breaks. Pt continues with gross knee stiffness of joint, HS, and quads. Recommended home tennis ball massage at ITB. Non hot knee. Scar mobility is improving proximally, distally already good mobility. 85 deg right knee flexion.    Curtis Is progressing well towards her goals.   Pt prognosis is Good.     Pt will continue to benefit from skilled outpatient physical therapy to address the deficits listed in the problem list box on initial evaluation, provide pt/family education and to maximize pt's level of independence in the home and community environment.     Pt's spiritual, cultural and educational needs considered and pt agreeable to plan of care and goals.     Anticipated barriers to physical therapy: age, lifestyle    Goals:   Short Term Goals: 2 weeks   Right knee extension AROM improved to -2 deg for improving fx.  Right knee flexion AROM improved to 100 deg for improving fx.     Long Term Goals: 4 weeks   Right knee  flexion AROM improved to 108 deg for improving fx.  FOTO score improved to 68 for improving QOL.    PLAN     Continue current POC with emphasis on achieving symmetrical ROM, strength and function.     Leatha Santana, PT, DPT

## 2025-01-24 ENCOUNTER — CLINICAL SUPPORT (OUTPATIENT)
Dept: REHABILITATION | Facility: HOSPITAL | Age: 75
End: 2025-01-24
Payer: MEDICARE

## 2025-01-24 DIAGNOSIS — R26.9 GAIT ABNORMALITY: Primary | ICD-10-CM

## 2025-01-24 PROCEDURE — 97112 NEUROMUSCULAR REEDUCATION: CPT | Mod: PO | Performed by: PHYSICAL THERAPIST

## 2025-01-24 PROCEDURE — 97140 MANUAL THERAPY 1/> REGIONS: CPT | Mod: PO | Performed by: PHYSICAL THERAPIST

## 2025-01-24 NOTE — PROGRESS NOTES
[facility]   Physical Therapy Treatment Note     Name: Curtis Navarro  [facility] Number: [id number]    Therapy Diagnosis:   Encounter Diagnosis   Name Primary?    Gait abnormality Yes       Physician: Joselo Helton,*    Visit Date: 1/24/2025  Physician Orders: PT Eval and Treat   Post Surgical?No Eval and TreatYes Type of TherapyOutpatient Therapy Location:Knees  Medical Diagnosis from Referral: M25.561 (ICD-10-CM) - Pain in right knee  Evaluation Date: 1/10/2025  Authorization Period Expiration: 01/08/2026  Plan of Care Expiration: 2/7/2025  Progress Note Due: 2/9/2025  Date of Surgery: 9/18/2024, right TKA  Visit # / Visits authorized:1/1, 5/8  FOTO: 1/ 3 (1/10/2025)    PTA Visit #: 0/5     Precautions: Standard     Time In: 1400  Time Out: 1500  Total Billable Time: 30 minutes    SUBJECTIVE     Pt reports: her knee has been sore with some swelling at ITB distally, and tender today. She has a dermatology appt next week to make sure no active skin CA. But would like to try DN at that time. She was compliant with home exercise program.   Response to previous treatment: no adverse effects   Functional change: too soon to determine     Pain: 2/10  Location: right knee      OBJECTIVE     Objective Measures updated at progress report unless specified.     + VL tenderness proximally right thigh    Treatment     Curtis received the treatments listed below:      therapeutic exercises to develop strength, endurance, ROM, flexibility, posture, and core stabilization for 25 minutes including:  Nu step x 7 minutes (recumbent for knee flexion 8 mins)  Long sitting gastroc stretch with strap x 1 minute x 3   Knee flexion with strap (heel slides) x 3 minutes, 5 sec hold at end range  HS stretch 3 x 1 min  ITB 3 x 1 mins    Seated hamstring curls 2x10, 25# (5 sec pause in flexion)-np  Recumbent bike x 8 minutes (End of treatment session, increasing knee flexion ROM at 5 minutes)-np    Not  performed today:  Hip Abduction  Hip adduction   Hip extension  Mini squats 2 x 10    manual therapy techniques: Soft tissue Mobilization were applied to the: (R) knee for 08 minutes, including:  Patellar mobilizations, global STM with rolling stick.    neuromuscular re-education activities to improve: Balance, Coordination, Proprioception, and Posture for 15 minutes. The following activities were included:  Quad set 2x10 without towel-np  Quad set + SLR 2x10-np  LAQ (focus on active TKE) 2x20, 2#  Education in female pelvis structure, knee valgus, and supportive shoe wear.    Patient Education and Home Exercises     Home Exercises Provided and Patient Education Provided     Education provided:   - HEP compliance     Written Home Exercises Provided: Patient instructed to cont prior HEP. Exercises were reviewed and Curtis was able to demonstrate them prior to the end of the session.  Curtis demonstrated good  understanding of the education provided. See EMR under Patient Instructions for exercises provided during therapy sessions    ASSESSMENT     Cont encouraging freq standing rest breaks. Pt continues with gross knee stiffness of joint, HS, and quads. Recommended home tennis ball massage at ITB. Right knee is in more valgus than Left at rest. Education provided to pt to encourage avoidance of valgus collapse with activities. Will reassess next visit and implement DN when cleared from dermatology.    Curtis Is progressing well towards her goals.   Pt prognosis is Good.     Pt will continue to benefit from skilled outpatient physical therapy to address the deficits listed in the problem list box on initial evaluation, provide pt/family education and to maximize pt's level of independence in the home and community environment.     Pt's spiritual, cultural and educational needs considered and pt agreeable to plan of care and goals.     Anticipated barriers to physical therapy: age, lifestyle    Goals:   Short Term  Goals: 2 weeks   Right knee extension AROM improved to -2 deg for improving fx.  Right knee flexion AROM improved to 100 deg for improving fx.     Long Term Goals: 4 weeks   Right knee flexion AROM improved to 108 deg for improving fx.  FOTO score improved to 68 for improving QOL.    PLAN     Continue current POC with emphasis on achieving symmetrical ROM, strength and function.     Leatha Santana, PT, DPT

## 2025-01-27 ENCOUNTER — CLINICAL SUPPORT (OUTPATIENT)
Dept: REHABILITATION | Facility: HOSPITAL | Age: 75
End: 2025-01-27
Payer: MEDICARE

## 2025-01-27 DIAGNOSIS — R29.898 WEAKNESS OF BOTH LOWER EXTREMITIES: ICD-10-CM

## 2025-01-27 DIAGNOSIS — M25.661 DECREASED RANGE OF MOTION OF RIGHT KNEE: Primary | ICD-10-CM

## 2025-01-27 DIAGNOSIS — R26.9 GAIT ABNORMALITY: ICD-10-CM

## 2025-01-27 PROCEDURE — 97112 NEUROMUSCULAR REEDUCATION: CPT | Mod: PO | Performed by: PHYSICAL THERAPIST

## 2025-01-27 PROCEDURE — 97140 MANUAL THERAPY 1/> REGIONS: CPT | Mod: PO | Performed by: PHYSICAL THERAPIST

## 2025-01-27 NOTE — PROGRESS NOTES
[facility]   Physical Therapy Treatment Note     Name: Curtis Navarro  [facility] Number: [id number]    Therapy Diagnosis:   Encounter Diagnoses   Name Primary?    Decreased range of motion of right knee Yes    Weakness of both lower extremities     Gait abnormality        Physician: Joselo Helton,*    Visit Date: 1/27/2025  Physician Orders: PT Eval and Treat   Post Surgical?No Eval and TreatYes Type of TherapyOutpatient Therapy Location:Knees  Medical Diagnosis from Referral: M25.561 (ICD-10-CM) - Pain in right knee  Evaluation Date: 1/10/2025  Authorization Period Expiration: 01/08/2026  Plan of Care Expiration: 2/7/2025  Progress Note Due: 2/9/2025  Date of Surgery: 9/18/2024, right TKA  Visit # / Visits authorized:1/1, 6/ 20  FOTO: 1/ 3 (1/10/2025)    PTA Visit #: 0/5     Precautions: Standard     Time In: 1400  Time Out: 1500  Total Billable Time: 30 minutes    SUBJECTIVE     Pt reports: her knee has been sore with some swelling at ITB distally, but not tender. She was compliant with home exercise program.   Response to previous treatment: no adverse effects   Functional change: too soon to determine     Pain: 2/10  Location: right knee      OBJECTIVE     Objective Measures updated at progress report unless specified.     + VL tenderness proximally right thigh    Treatment     Curtis received the treatments listed below:      therapeutic exercises to develop strength, endurance, ROM, flexibility, posture, and core stabilization for 30 minutes including:  Nu step x 7 minutes (recumbent for knee flexion 8 mins)  Long sitting gastroc stretch with strap x 1 minute x 3   Knee flexion with strap (heel slides) x 3 minutes, 5 sec hold at end range  HS stretch 3 x 1 mins  ITB 3 x 1 min    Seated hamstring curls 2x10, 25# (5 sec pause in flexion)-np  Recumbent bike x 8 minutes (End of treatment session, increasing knee flexion ROM at 5 minutes)  Mini squats 2 x 10    Not performed  today:  Hip Abduction  Hip adduction   Hip extension  HS stretch  ITB stretch    manual therapy techniques: Soft tissue Mobilization were applied to the: (R) knee for 10 minutes, including:  Patellar mobilizations, global STM with rolling stick.    neuromuscular re-education activities to improve: Balance, Coordination, Proprioception, and Posture for 15 minutes. The following activities were included:  Quad set 2x10 without towel  Quad set + SLR 2x10  LAQ (focus on active TKE) 2x20, 2#    Patient Education and Home Exercises     Home Exercises Provided and Patient Education Provided     Education provided:   - HEP compliance     Written Home Exercises Provided: Patient instructed to cont prior HEP. Exercises were reviewed and Curtis was able to demonstrate them prior to the end of the session.  Curtis demonstrated good  understanding of the education provided. See EMR under Patient Instructions for exercises provided during therapy sessions    ASSESSMENT     Curtis is seeing Dermatology to rule out cancer and then would like to explore DN. She has had a trendelenburg as long as I have known her that has improved but still present. She has a h/o back pain which is better. 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 She asked about improvements to sitting in a recliner with answers and recommendations given. She continues with difficulty bending right knee and restrictions throughout right thigh. Will attempt DN to help pt improve in PT.    Curtis Is progressing well towards her goals.   Pt prognosis is Good.     Pt will continue to benefit from skilled outpatient physical therapy to address the deficits listed in the problem list box on initial evaluation, provide pt/family education and to maximize pt's level of independence in the home and community environment.     Pt's spiritual, cultural and educational needs considered and pt agreeable to plan of care and goals.      Anticipated barriers to physical therapy: age, lifestyle    Goals:   Short Term Goals: 2 weeks   Right knee extension AROM improved to -2 deg for improving fx.  Right knee flexion AROM improved to 100 deg for improving fx.     Long Term Goals: 4 weeks   Right knee flexion AROM improved to 108 deg for improving fx.  FOTO score improved to 68 for improving QOL.    PLAN     Continue current POC with emphasis on achieving symmetrical ROM, strength and function.     Leatha Santana, PT, DPT

## 2025-01-29 ENCOUNTER — CLINICAL SUPPORT (OUTPATIENT)
Dept: REHABILITATION | Facility: HOSPITAL | Age: 75
End: 2025-01-29
Payer: MEDICARE

## 2025-01-29 DIAGNOSIS — R29.898 WEAKNESS OF BOTH LOWER EXTREMITIES: ICD-10-CM

## 2025-01-29 DIAGNOSIS — R26.9 GAIT ABNORMALITY: ICD-10-CM

## 2025-01-29 DIAGNOSIS — M25.661 DECREASED RANGE OF MOTION OF RIGHT KNEE: Primary | ICD-10-CM

## 2025-01-29 PROCEDURE — 97112 NEUROMUSCULAR REEDUCATION: CPT | Mod: PO,CQ

## 2025-01-29 PROCEDURE — 97140 MANUAL THERAPY 1/> REGIONS: CPT | Mod: PO,CQ

## 2025-01-29 PROCEDURE — 97110 THERAPEUTIC EXERCISES: CPT | Mod: PO,CQ

## 2025-01-29 NOTE — PROGRESS NOTES
[facility]   Physical Therapy Treatment Note     Name: Curtis Navarro  [facility] Number: [id number]    Therapy Diagnosis:   Encounter Diagnoses   Name Primary?    Decreased range of motion of right knee Yes    Weakness of both lower extremities     Gait abnormality        Physician: Joselo Helton,*    Visit Date: 1/29/2025  Physician Orders: PT Eval and Treat   Post Surgical?No Eval and TreatYes Type of TherapyOutpatient Therapy Location:Knees  Medical Diagnosis from Referral: M25.561 (ICD-10-CM) - Pain in right knee  Evaluation Date: 1/10/2025  Authorization Period Expiration: 01/08/2026  Plan of Care Expiration: 2/7/2025  Progress Note Due: 2/9/2025  Date of Surgery: 9/18/2024, right TKA  Visit # / Visits authorized:1/1, 3/ 20  FOTO: 1/ 3 (1/10/2025)    PTA Visit #: 0/5     Precautions: Standard     Time In: 1100  Time Out: 1200  Total Billable Time: 60 minutes    SUBJECTIVE     Pt reports:that she is currently w/o c/o knee pn but that it feels stiff. She was compliant with home exercise program.   Response to previous treatment: no adverse effects   Functional change: too soon to determine     Pain: 0/10  Location: right knee      OBJECTIVE     Objective Measures updated at progress report unless specified.     + VL tenderness proximally right thigh    Treatment     Curtis received the treatments listed below:      therapeutic exercises to develop strength, endurance, ROM, flexibility, posture, and core stabilization for 30 minutes including:  Nu step x 7 minutes (recumbent for knee flexion 10 mins) NP  Long sitting gastroc stretch with strap x 1 minute x 3   Knee flexion with strap (heel slides) x 3 minutes, 5 sec hold at end range  HS stretch 3 x 1 minimum  ITB stretch 3x 30sec  Seated hamstring curls 2x10, 25# (5 sec pause in flexion)  Stat bike x 10 minutes   Mini squats 2 x 10  Hip extension 2x 10    Not performed today:  Hip adduction           manual therapy techniques:  Soft tissue Mobilization were applied to the: (R) knee for 10 minutes, including:  Patellar mobilizations, global STM with rolling stick.    neuromuscular re-education activities to improve: Balance, Coordination, Proprioception, and Posture for 20 minutes. The following activities were included:  Quad set 2x10 3 sec hold without towel  Quad set + SLR 2x10  LAQ (focus on active TKE) 2x20, 2#  Hip ABD (focus on form position for glut med) 20x    Patient Education and Home Exercises     Home Exercises Provided and Patient Education Provided     Education provided:   - HEP compliance     Written Home Exercises Provided: Patient instructed to cont prior HEP. Exercises were reviewed and Curtis was able to demonstrate them prior to the end of the session.  Curtis demonstrated good  understanding of the education provided. See EMR under Patient Instructions for exercises provided during therapy sessions    ASSESSMENT     Curtis tyson today's tx with ther ex, neuromuscular re-ed and manual intervention well. She was able to perform all activities w/o difficulty. She demonstrates good initiation and maintenance of quad contract with quad sets and SLR. Her patella mobility improved with mobs.  Pt continues with gross knee stiffness of joint, HS, and quads. Scar mobility is improving proximally, distally already good mobility. .    Curtis Is progressing well towards her goals.   Pt prognosis is Good.     Pt will continue to benefit from skilled outpatient physical therapy to address the deficits listed in the problem list box on initial evaluation, provide pt/family education and to maximize pt's level of independence in the home and community environment.     Pt's spiritual, cultural and educational needs considered and pt agreeable to plan of care and goals.     Anticipated barriers to physical therapy: age, lifestyle    Goals:   Short Term Goals: 2 weeks   Right knee extension AROM improved to -2 deg for improving fx.  Right  knee flexion AROM improved to 100 deg for improving fx.     Long Term Goals: 4 weeks   Right knee flexion AROM improved to 108 deg for improving fx.  FOTO score improved to 68 for improving QOL.    PLAN     Continue current POC with emphasis on achieving symmetrical ROM, strength and function.     Sloan Dominguez, PTA

## 2025-01-31 ENCOUNTER — CLINICAL SUPPORT (OUTPATIENT)
Dept: REHABILITATION | Facility: HOSPITAL | Age: 75
End: 2025-01-31
Payer: MEDICARE

## 2025-01-31 DIAGNOSIS — R26.9 GAIT ABNORMALITY: ICD-10-CM

## 2025-01-31 DIAGNOSIS — R29.898 WEAKNESS OF BOTH LOWER EXTREMITIES: ICD-10-CM

## 2025-01-31 DIAGNOSIS — M25.661 DECREASED RANGE OF MOTION OF RIGHT KNEE: Primary | ICD-10-CM

## 2025-01-31 PROCEDURE — 20560 NDL INSJ W/O NJX 1 OR 2 MUSC: CPT | Mod: PO | Performed by: PHYSICAL THERAPIST

## 2025-01-31 PROCEDURE — 97112 NEUROMUSCULAR REEDUCATION: CPT | Mod: PO | Performed by: PHYSICAL THERAPIST

## 2025-01-31 NOTE — PROGRESS NOTES
[facility] OUTPATIENT THERAPY AND WELLNESS   Physical Therapy Treatment Note     Name: Curtis Navarro  [facility] Number: [id number]    Therapy Diagnosis:   Encounter Diagnoses   Name Primary?    Decreased range of motion of right knee Yes    Weakness of both lower extremities     Gait abnormality        Physician: Joselo Helton,*    Visit Date: 1/31/2025  Physician Orders: PT Eval and Treat   Post Surgical?No Eval and TreatYes Type of TherapyOutpatient Therapy Location:Knees  Medical Diagnosis from Referral: M25.561 (ICD-10-CM) - Pain in right knee  Evaluation Date: 1/10/2025  Authorization Period Expiration: 01/08/2026  Plan of Care Expiration: 2/7/2025  Progress Note Due: 2/9/2025  Date of Surgery: 9/18/2024, right TKA  Visit # / Visits authorized:1/1, 7/ 8  FOTO: 1/ 3 (1/10/2025)    PTA Visit #: 0/5     Precautions: Standard     Time In: 1100  Time Out: 1200  Total Billable Time: 30 minutes    SUBJECTIVE     Pt reports:that she is currently w/o c/o knee pn but that it feels stiff. She was compliant with home exercise program.   Response to previous treatment: no adverse effects   Functional change: too soon to determine     Pain: 0/10  Location: right knee      OBJECTIVE     Objective Measures updated at progress report unless specified.     1/30/2025:        Right knee AROM: 0-85 deg.  Pt is limited by joint and muscle stiffness. She continues with pain.    Lower Extremity Strength  Right LE   Left LE     Knee extension: 4/5 Knee extension: 4+/5   Knee flexion: 4-/5 Knee flexion: 4/5   Hip flexion: 4-/5 Hip flexion: 4/5   Hip extension:  4/5 Hip extension: 4/5   Hip abduction: 4-/5 Hip abduction: 4-/5   Hip adduction: 4/5 Hip adduction 4/5   Ankle dorsiflexion: 5/5 Ankle dorsiflexion: 5/5   Ankle plantarflexion: 5/5 Ankle plantarflexion: 5/5     Treatment     Curtis received the treatments listed below:      therapeutic exercises to develop strength, endurance, ROM, flexibility, posture, and core  stabilization for 30 minutes including:  Nu step x 7 minutes   Long sitting gastroc stretch with strap x 1 minute x 3   Knee flexion with strap (heel slides) x 3 minutes, 5 sec hold at end range  HS stretch 3 x 1 minimum  ITB stretch 3x 30sec  Seated hamstring curls 2x10, 25# (5 sec pause in flexion)  Stat bike x 10 minutes   Mini squats 2 x 10  Hip extension 2x 10    manual therapy techniques: Soft tissue Mobilization were applied to the: (R) knee for 00 minutes, including:  Patellar mobilizations, global STM with rolling stick.    Dry needling x 10 mins  Discussed the purpose, mechanism, and indications for dry needling with Curtis . Patient was cleared of all precautions and contraindications and pt signed written consent and gave verbal consent to dry needling Rx today.   Palpation used to determine dry needling sites. Increased tone noted at right VL. Pt rec'd dry needling in hooklying position to right VL with 0.30 x 40 mm needles.  Pt tolerated treatment well and was not in any distress at the completion of treatment.      neuromuscular re-education activities to improve: Balance, Coordination, Proprioception, and Posture for 20 minutes. The following activities were included:  Quad set 2x10 3 sec hold without towel  Quad set + SLR 2x10  LAQ (focus on active TKE) 2x20, 2#  Hip ABD (focus on form position for glut med) 20x    Patient Education and Home Exercises     Home Exercises Provided and Patient Education Provided     Education provided:   - HEP compliance     Written Home Exercises Provided: Patient instructed to cont prior HEP. Exercises were reviewed and Curtis was able to demonstrate them prior to the end of the session.  Curtis demonstrated good  understanding of the education provided. See EMR under Patient Instructions for exercises provided during therapy sessions    ASSESSMENT     Curtis tyson today's tx with ther ex, neuromuscular re-ed and manual intervention well. She was able to perform all  activities w/o difficulty. She demonstrates good initiation and maintenance of quad contract with quad sets and SLR. Pt continues with gross knee stiffness of joint, HS, and quads. Right knee AROM is 0-85 deg. Scar mobility is improving proximally, distally already good mobility. She is able to report little tenderness to muscles prior and even less today. It was determined by both PT and pt that DN be discontinued d/t lack of support from palpation and response today to warrant future DN in tx. Will cont and attempt slightly more aggressive ROSELIA Acevedo Is progressing well towards her goals.   Pt prognosis is Good.     Pt will continue to benefit from skilled outpatient physical therapy to address the deficits listed in the problem list box on initial evaluation, provide pt/family education and to maximize pt's level of independence in the home and community environment.     Pt's spiritual, cultural and educational needs considered and pt agreeable to plan of care and goals.     Anticipated barriers to physical therapy: age, lifestyle    Goals:   Short Term Goals: 2 weeks   Right knee extension AROM improved to -2 deg for improving fx.  Right knee flexion AROM improved to 100 deg for improving fx.     Long Term Goals: 4 weeks   Right knee flexion AROM improved to 108 deg for improving fx.  FOTO score improved to 68 for improving QOL.    PLAN     Continue current POC with emphasis on achieving symmetrical ROM, strength and function.     Leatha Santana, PT, DPT

## 2025-02-03 ENCOUNTER — HOSPITAL ENCOUNTER (OUTPATIENT)
Dept: RADIOLOGY | Facility: HOSPITAL | Age: 75
Discharge: HOME | End: 2025-02-03
Attending: FAMILY MEDICINE
Payer: MEDICARE

## 2025-02-03 DIAGNOSIS — Z12.39 ENCOUNTER FOR SCREENING FOR MALIGNANT NEOPLASM OF BREAST, UNSPECIFIED SCREENING MODALITY: ICD-10-CM

## 2025-02-03 DIAGNOSIS — Z12.31 ENCOUNTER FOR SCREENING MAMMOGRAM FOR MALIGNANT NEOPLASM OF BREAST: ICD-10-CM

## 2025-02-03 PROCEDURE — 77067 SCR MAMMO BI INCL CAD: CPT | Mod: TC,PO

## 2025-02-03 PROCEDURE — 77063 BREAST TOMOSYNTHESIS BI: CPT | Mod: 26,,, | Performed by: RADIOLOGY

## 2025-02-03 PROCEDURE — 77067 SCR MAMMO BI INCL CAD: CPT | Mod: 26,,, | Performed by: RADIOLOGY

## 2025-02-04 ENCOUNTER — CLINICAL SUPPORT (OUTPATIENT)
Dept: REHABILITATION | Facility: HOSPITAL | Age: 75
End: 2025-02-04
Payer: MEDICARE

## 2025-02-04 DIAGNOSIS — R26.9 GAIT ABNORMALITY: ICD-10-CM

## 2025-02-04 DIAGNOSIS — R29.898 WEAKNESS OF BOTH LOWER EXTREMITIES: ICD-10-CM

## 2025-02-04 DIAGNOSIS — M25.661 DECREASED RANGE OF MOTION OF RIGHT KNEE: Primary | ICD-10-CM

## 2025-02-04 PROCEDURE — 97110 THERAPEUTIC EXERCISES: CPT | Mod: PO | Performed by: PHYSICAL THERAPIST

## 2025-02-04 PROCEDURE — 97112 NEUROMUSCULAR REEDUCATION: CPT | Mod: PO | Performed by: PHYSICAL THERAPIST

## 2025-02-04 NOTE — PROGRESS NOTES
[facility]   Reassessment: Physical Therapy Treatment Note     Name: Curtis Navarro  Clinic Number: [id number]    Therapy Diagnosis:   Encounter Diagnoses   Name Primary?    Decreased range of motion of right knee Yes    Weakness of both lower extremities     Gait abnormality        Physician: Joselo Helton,*    Visit Date: 2/4/2025  Physician Orders: PT Eval and Treat   Post Surgical?No Eval and TreatYes Type of TherapyOutpatient Therapy Location:Knees  Medical Diagnosis from Referral: M25.561 (ICD-10-CM) - Pain in right knee  Evaluation Date: 1/10/2025  Authorization Period Expiration: 01/08/2026  Plan of Care Expiration: 2/7/2025, 3/7/2025  Progress Note Due: 2/9/2025, 3/3/2025  Date of Surgery: 9/18/2024, right TKA  Visit # / Visits authorized:1/1, 8/ 8  FOTO: 2/ 3 (1/10/2025, 1/24/2025)    PTA Visit #: 0/5     Precautions: Standard     Time In: 1400  Time Out: 1500  Total Billable Time: 55 minutes    SUBJECTIVE     Pt reports: that she feels her right knee is worsening. She see Dr. Helton on Friday. She was compliant with home exercise program.   Response to previous treatment: no adverse effects   Functional change: too soon to determine     Pain: 0/10  Location: right knee      OBJECTIVE     Objective Measures updated at progress report unless specified.     1/30/2025:        Right knee AROM: 0-85 deg.  Pt is limited by joint and muscle stiffness. She continues with pain.    Lower Extremity Strength  Right LE   Left LE     Knee extension: 4/5 Knee extension: 4+/5   Knee flexion: 4-/5 Knee flexion: 4/5   Hip flexion: 4-/5 Hip flexion: 4/5   Hip extension:  4/5 Hip extension: 4/5   Hip abduction: 3+/5 without allowing hip flexor compensations  Hip abduction: 3+/5 without allowing spinal rotation   Hip adduction: 4-/5 Hip adduction 4/5   Ankle dorsiflexion: 5/5 Ankle dorsiflexion: 5/5   Ankle plantarflexion: 5/5 Ankle plantarflexion: 5/5     Treatment     Curtis  received the treatments listed below:      therapeutic exercises to develop strength, endurance, ROM, flexibility, posture, and core stabilization for 25 minutes including:  Nu step x 7 minutes   Long sitting gastroc stretch with strap x 1 minute x 3   Knee flexion with strap (heel slides) x 3 minutes, 5 sec hold at end range    Clams 2 x 10 side lying (right)    REASSESSMENT    Np:  HS stretch 3 x 1 minimum  ITB stretch 3x 30sec  Seated hamstring curls 2x10, 25# (5 sec pause in flexion)  Stat bike x 10 minutes   Mini squats 2 x 10  Hip extension 2x 10    manual therapy techniques: Soft tissue Mobilization were applied to the: (R) knee for 00 minutes, including:  Patellar mobilizations, global STM with rolling stick.    neuromuscular re-education activities to improve: Balance, Coordination, Proprioception, and Posture for 30 minutes. The following activities were included:    Supine bridge gtb 3 x 10 (modified to right knee ROM)  Supine (back) bent knee fall out gtb 2 x 10 (clams)  Side lying hip Abduction right/Left x 10 each  Supine hip Abduction 2 x 10 (addis)  Supine Glute set x 50    Np:  Quad set 2x10 3 sec hold without towel  Quad set + SLR 2x10  LAQ (focus on active TKE) 2x20, 2#  Hip ABD (focus on form position for glut med) 20x    Patient Education and Home Exercises     Home Exercises Provided and Patient Education Provided     Education provided:   - HEP compliance   - limitations    Written Home Exercises Provided: Patient instructed to cont prior HEP. Exercises were reviewed and Curtis was able to demonstrate them prior to the end of the session.  Curtis demonstrated good  understanding of the education provided. See EMR under Patient Instructions for exercises provided during therapy sessions    ASSESSMENT     Curtis: She was able to perform all activities w/o difficulty. She demonstrates good initiation and maintenance of quad contract with quad sets and SLR. Pt continues with gross knee stiffness  of joint, HS, and quads. Right knee AROM is 0-85 deg. She has loss motion since d/c s/p. External scar mobility is improving proximally, distally already good mobility.  It was determined by both PT and pt that DN be discontinued d/t lack of support from palpation and response today to warrant future DN in tx. Curtis states she is doing the home program. She has weaknesses listed above. Curtis also continues with pain that she states is in her lateral right knee near VL. Curtis wears a heal lift in the Left shoe d/t a previously noted LLD when treated for her back in the past. Will cont and attempt slightly more aggressive MT. PT went back to the basics with side lying glut strengthening. Pt needed considerable re-training and less so with side lying clams. Curtis demos significant glute weakness using hip flexors and spinal rotations for compensations Left>right. She has always had a glute weakness with Trendelenburg as long as I have known her and this did improve a little after PT for s/p right TKA. Recommending continued PT to address strength and ROM deficits. PT with MD f/u Friday for additional advice. My incorporate KT tape next visit.    Curtis Is progressing well towards her goals.   Pt prognosis is Good.     Pt will continue to benefit from skilled outpatient physical therapy to address the deficits listed in the problem list box on initial evaluation, provide pt/family education and to maximize pt's level of independence in the home and community environment.     Pt's spiritual, cultural and educational needs considered and pt agreeable to plan of care and goals.     Anticipated barriers to physical therapy: age, lifestyle    Goals:   Short Term Goals: 2 weeks   Right knee extension AROM improved to -2 deg for improving fx.-MET  Right knee flexion AROM improved to 100 deg for improving fx.-ongoing     Long Term Goals: 4 weeks   Right knee flexion AROM improved to 108 deg for improving fx.-ongoing  FOTO  score improved to 68 for improving QOL.-ongoing    PLAN     Continue current POC with emphasis on achieving symmetrical ROM, strength and function.     Leatha Santana, PT, DPT

## 2025-02-07 ENCOUNTER — PATIENT MESSAGE (OUTPATIENT)
Dept: FAMILY MEDICINE | Facility: CLINIC | Age: 75
End: 2025-02-07
Payer: MEDICARE

## 2025-02-07 NOTE — PROGRESS NOTES
[facility]  Physical Therapy Plan of Care Note     Name: Curtis Navarro  [facility] Number: [id number]    Therapy Diagnosis:   Encounter Diagnoses   Name Primary?    Decreased range of motion of right knee Yes    Weakness of both lower extremities     Gait abnormality      Physician: Joselo Helton,*    Visit Date: 2/4/2025    Physician Orders: PT Eval and Treat   Post Surgical?No Eval and TreatYes Type of TherapyOutpatient Therapy Location:Knees  Medical Diagnosis from Referral: M25.561 (ICD-10-CM) - Pain in right knee  Evaluation Date: 1/10/2025  Authorization Period Expiration: 01/08/2026  Plan of Care Expiration: 2/7/2025, 3/7/2025  Progress Note Due: 2/9/2025, 3/3/2025  Date of Surgery: 9/18/2024, right TKA  Visit # / Visits authorized:1/1, 8/ 8  FOTO: 2/ 3 (1/10/2025, 1/24/2025)     PTA Visit #: 0/5      Precautions: Standard   Functional Level Prior to Evaluation:  Pt was s/p right TKA with regression.    SUBJECTIVE     Update:   Pt reports: that she feels her right knee is worsening. She see Dr. Helton on Friday. She was compliant with home exercise program.   Response to previous treatment: no adverse effects   Functional change: too soon to determine      Pain: 0/10  Location: right knee      OBJECTIVE     Update:   Objective Measures updated at progress report unless specified.      1/30/2025:          Right knee AROM: 0-85 deg.  Pt is limited by joint and muscle stiffness. She continues with pain.     Lower Extremity Strength  Right LE   Left LE     Knee extension: 4/5 Knee extension: 4+/5   Knee flexion: 4-/5 Knee flexion: 4/5   Hip flexion: 4-/5 Hip flexion: 4/5   Hip extension:  4/5 Hip extension: 4/5   Hip abduction: 3+/5 without allowing hip flexor compensations  Hip abduction: 3+/5 without allowing spinal rotation   Hip adduction: 4-/5 Hip adduction 4/5   Ankle dorsiflexion: 5/5 Ankle dorsiflexion: 5/5   Ankle plantarflexion: 5/5 Ankle plantarflexion: 5/5         ASSESSMENT     Update:   Curtis: She was able to perform all activities w/o difficulty. She demonstrates good initiation and maintenance of quad contract with quad sets and SLR. Pt continues with gross knee stiffness of joint, HS, and quads. Right knee AROM is 0-85 deg. She has loss motion since d/c s/p. External scar mobility is improving proximally, distally already good mobility.  It was determined by both PT and pt that DN be discontinued d/t lack of support from palpation and response today to warrant future DN in tx. Curtis states she is doing the home program. She has weaknesses listed above. Curtis also continues with pain that she states is in her lateral right knee near VL. Curtis wears a heal lift in the Left shoe d/t a previously noted LLD when treated for her back in the past. Will cont and attempt slightly more aggressive MT. PT went back to the basics with side lying glut strengthening. Pt needed considerable re-training and less so with side lying clams. Curtis demos significant glute weakness using hip flexors and spinal rotations for compensations Left>right. She has always had a glute weakness with Trendelenburg as long as I have known her and this did improve a little after PT for s/p right TKA. Recommending continued PT to address strength and ROM deficits. PT with MD f/u Friday for additional advice. My incorporate KT tape next visit.     Curtis Is progressing well towards her goals.   Pt prognosis is Good.      Pt will continue to benefit from skilled outpatient physical therapy to address the deficits listed in the problem list box on initial evaluation, provide pt/family education and to maximize pt's level of independence in the home and community environment.      Pt's spiritual, cultural and educational needs considered and pt agreeable to plan of care and goals.     Anticipated barriers to physical therapy: age, lifestyle    Previous Short Term Goals Status:     Short Term Goals: 2  weeks   Right knee extension AROM improved to -2 deg for improving fx.-MET  Right knee flexion AROM improved to 100 deg for improving fx.-ongoing  New Short Term Goals Status:     Cont:  Right knee flexion AROM improved to 100 deg for improving fx.-ongoing   Long Term Goal Status: continue per initial plan of care.  Long Term Goals: 4 weeks   Right knee flexion AROM improved to 108 deg for improving fx.-ongoing  FOTO score improved to 68 for improving QOL.-ongoing  Reasons for Recertification of Therapy:   Pt has less right knee AROM than when discharged from PT at the end of last year.    GOALS  Short Term Goals: 2 weeks   Right knee extension AROM improved to -2 deg for improving fx.-MET  Right knee flexion AROM improved to 100 deg for improving fx.-ongoing     Long Term Goals: 4 weeks   Right knee flexion AROM improved to 108 deg for improving fx.-ongoing  FOTO score improved to 68 for improving QOL.-ongoing    PLAN     Updated Certification Period: 2/7/2025  to 3/7/2025    Recommended Treatment Plan: 1-2 times per week for 4 weeks:  Aquatic Therapy, Electrical Stimulation IFC, Gait Training, Manual Therapy, Moist Heat/ Ice, Neuromuscular Re-ed, Patient Education, Therapeutic Activities, Therapeutic Exercise, and dry needling  Other Recommendations: na    I CERTIFY THE NEED FOR THESE SERVICES FURNISHED UNDER THIS PLAN OF TREATMENT AND WHILE UNDER MY CARE    Physician's comments:        Physician's Signature: ___________________________________________________ DATE:_______________________     Leatha Santana, PT, DPT

## 2025-02-11 ENCOUNTER — CLINICAL SUPPORT (OUTPATIENT)
Dept: REHABILITATION | Facility: HOSPITAL | Age: 75
End: 2025-02-11
Payer: MEDICARE

## 2025-02-11 DIAGNOSIS — M25.661 DECREASED RANGE OF MOTION OF RIGHT KNEE: Primary | ICD-10-CM

## 2025-02-11 DIAGNOSIS — R26.9 GAIT ABNORMALITY: ICD-10-CM

## 2025-02-11 DIAGNOSIS — R29.898 WEAKNESS OF BOTH LOWER EXTREMITIES: ICD-10-CM

## 2025-02-11 PROCEDURE — 97112 NEUROMUSCULAR REEDUCATION: CPT | Mod: PO | Performed by: PHYSICAL THERAPIST

## 2025-02-11 PROCEDURE — 97110 THERAPEUTIC EXERCISES: CPT | Mod: PO | Performed by: PHYSICAL THERAPIST

## 2025-02-11 PROCEDURE — 97140 MANUAL THERAPY 1/> REGIONS: CPT | Mod: PO | Performed by: PHYSICAL THERAPIST

## 2025-02-11 NOTE — PROGRESS NOTES
[facility]    Physical Therapy Treatment Note     Name: Curtis Navarro  [facility] Number: [id number]    Therapy Diagnosis:   Encounter Diagnoses   Name Primary?    Decreased range of motion of right knee Yes    Weakness of both lower extremities     Gait abnormality        Physician: Joselo Helton,*    Visit Date: 2/11/2025  Physician Orders: PT Eval and Treat   Post Surgical?No Eval and TreatYes Type of TherapyOutpatient Therapy Location:Knees  Medical Diagnosis from Referral: M25.561 (ICD-10-CM) - Pain in right knee  Evaluation Date: 1/10/2025  Authorization Period Expiration: 01/08/2026  Plan of Care Expiration: 2/7/2025, 3/7/2025  Progress Note Due: 2/9/2025, 3/3/2025  Date of Surgery: 9/18/2024, right TKA  Visit # / Visits authorized:1/1, 9/16  FOTO: 2/ 3 (1/10/2025, 1/24/2025)    PTA Visit #: 0/5     Precautions: Standard     Time In: 1105  Time Out: 1200  Total Billable Time: 55 minutes    SUBJECTIVE     Pt reports: that Dr. Helton explained the genu valgus, patellar tracking, and VMO weakness again. He also may have suggested SEFERINO would be next step if improvements not achieved with PT. She was compliant with home exercise program.   Response to previous treatment: no adverse effects   Functional change: too soon to determine     Pain: 0/10  Location: right knee      OBJECTIVE     Objective Measures updated at progress report unless specified.     1/30/2025:        Right knee AROM: 0-85 deg.  Pt is limited by joint and muscle stiffness. She continues with pain.    Lower Extremity Strength  Right LE   Left LE     Knee extension: 4/5 Knee extension: 4+/5   Knee flexion: 4-/5 Knee flexion: 4/5   Hip flexion: 4-/5 Hip flexion: 4/5   Hip extension:  4/5 Hip extension: 4/5   Hip abduction: 3+/5 without allowing hip flexor compensations  Hip abduction: 3+/5 without allowing spinal rotation   Hip adduction: 4-/5 Hip adduction 4/5   Ankle dorsiflexion: 5/5 Ankle dorsiflexion:  5/5   Ankle plantarflexion: 5/5 Ankle plantarflexion: 5/5     Treatment     Curtis received the treatments listed below:      therapeutic exercises to develop strength, endurance, ROM, flexibility, posture, and core stabilization for 15 minutes including:  Bike for knee flexion 8 mins  Long sitting gastroc stretch with strap x 1 minute x 3   Knee flexion with strap (heel slides) x 3 minutes, 5 sec hold at end range-np    Np:  HS stretch 3 x 1 minimum  ITB stretch 3x 30sec  Seated hamstring curls 2x10, 25# (5 sec pause in flexion)  Stat bike x 10 minutes   Mini squats 2 x 10  Hip extension 2x 10    manual therapy techniques: Soft tissue Mobilization were applied to the: (R) knee for 10 minutes, including:  STM and ttp hold right VL  KT tape application for edema to medial knee    neuromuscular re-education activities to improve: Balance, Coordination, Proprioception, and Posture for 30 minutes. The following activities were included:    Supine bridge gtb 3 x 10 (modified to right knee ROM)  Supine (back) bent knee fall out gtb 3 x 10 (clams)  Side lying hip Abduction right/Left x 10 each  Supine hip Abduction 2 x 10 (addis)  Side lying clam blue band 3 x 10 each  LONG ARC QUAD with adductor ball squeeze 3 x 10  Supine Glute set x 50    Np:  Quad set 2x10 3 sec hold without towel  Quad set + SLR 2x10  LAQ (focus on active TKE) 2x20, 2#  Hip ABD (focus on form position for glut med) 20x    Patient Education and Home Exercises     Home Exercises Provided and Patient Education Provided     Education provided:   - HEP compliance   - limitations    Written Home Exercises Provided: Patient instructed to cont prior HEP. Exercises were reviewed and Curtis was able to demonstrate them prior to the end of the session.  Curtis demonstrated good  understanding of the education provided. See EMR under Patient Instructions for exercises provided during therapy sessions    ASSESSMENT     Update:   We discussed the importance of  supportive footwear again: [employer], [employer], [employer], or [employer] and footwear stores that can help. We discussed anatomy, stress-strains, and patellar tracking and the importance of early intervention. DN has not helped. We decreased back to table ex d/t pain. Pt reports less pain since going back to unweighted ex and being on Celebrex for inflammation. KT tape applied to reduce medial knee pain and edema to facilitate improved tolerance to therapy. Curtis demonstrates glute med weakness in that she uses hip flexors during side lying hip Abduction. Cont hip and knee ROM and strengthening to impact pain and function.    Curtis Is progressing well towards her goals.   Pt prognosis is Good.     Pt will continue to benefit from skilled outpatient physical therapy to address the deficits listed in the problem list box on initial evaluation, provide pt/family education and to maximize pt's level of independence in the home and community environment.     Pt's spiritual, cultural and educational needs considered and pt agreeable to plan of care and goals.     Anticipated barriers to physical therapy: age, lifestyle    Goals:   Short Term Goals: 2 weeks   Right knee extension AROM improved to -2 deg for improving fx.-MET  Right knee flexion AROM improved to 100 deg for improving fx.-ongoing     Long Term Goals: 4 weeks   Right knee flexion AROM improved to 108 deg for improving fx.-ongoing  FOTO score improved to 68 for improving QOL.-ongoing    PLAN     Continue current POC with emphasis on achieving symmetrical ROM, strength and function.     Leatha Santana, PT, DPT                      weight-bearing as tolerated

## 2025-02-13 ENCOUNTER — CLINICAL SUPPORT (OUTPATIENT)
Dept: REHABILITATION | Facility: HOSPITAL | Age: 75
End: 2025-02-13
Payer: MEDICARE

## 2025-02-13 DIAGNOSIS — M25.661 DECREASED RANGE OF MOTION OF RIGHT KNEE: Primary | ICD-10-CM

## 2025-02-13 DIAGNOSIS — R26.9 GAIT ABNORMALITY: ICD-10-CM

## 2025-02-13 DIAGNOSIS — R29.898 WEAKNESS OF BOTH LOWER EXTREMITIES: ICD-10-CM

## 2025-02-13 PROCEDURE — 97112 NEUROMUSCULAR REEDUCATION: CPT | Mod: PO | Performed by: PHYSICAL THERAPIST

## 2025-02-13 PROCEDURE — 97140 MANUAL THERAPY 1/> REGIONS: CPT | Mod: PO | Performed by: PHYSICAL THERAPIST

## 2025-02-13 NOTE — PROGRESS NOTES
[facility]    Physical Therapy Treatment Note     Name: Curtis Navarro  [facility] Number: [id number]    Therapy Diagnosis:   Encounter Diagnoses   Name Primary?    Decreased range of motion of right knee Yes    Weakness of both lower extremities     Gait abnormality        Physician: Joselo Helton,*    Visit Date: 2/13/2025  Physician Orders: PT Eval and Treat   Post Surgical?No Eval and TreatYes Type of TherapyOutpatient Therapy Location:Knees  Medical Diagnosis from Referral: M25.561 (ICD-10-CM) - Pain in right knee  Evaluation Date: 1/10/2025  Authorization Period Expiration: 01/08/2026  Plan of Care Expiration: 2/7/2025, 3/7/2025  Progress Note Due: 2/9/2025, 3/3/2025  Date of Surgery: 9/18/2024, right TKA  Visit # / Visits authorized:1/1, 10/16  FOTO: 2/ 3 (1/10/2025, 1/24/2025)    PTA Visit #: 0/5     Precautions: Standard     Time In: 1302  Time Out: 1400  Total Billable Time: 30 minutes 1:1    SUBJECTIVE     Pt reports: right calf and HS pain that began while on the upright bike. We discussed using the quads and HS correctly and not compensate with foot and ankle to get around. She was compliant with home exercise program.   Response to previous treatment: no adverse effects   Functional change: too soon to determine     Pain: 0/10  Location: right knee      OBJECTIVE     Objective Measures updated at progress report unless specified.     1/30/2025:    Right knee AROM: 0-85 deg.  Pt is limited by joint and muscle stiffness. She continues with pain.    Lower Extremity Strength  Right LE   Left LE     Knee extension: 4/5 Knee extension: 4+/5   Knee flexion: 4-/5 Knee flexion: 4/5   Hip flexion: 4-/5 Hip flexion: 4/5   Hip extension:  4/5 Hip extension: 4/5   Hip abduction: 3+/5 without allowing hip flexor compensations  Hip abduction: 3+/5 without allowing spinal rotation   Hip adduction: 4-/5 Hip adduction 4/5   Ankle dorsiflexion: 5/5 Ankle dorsiflexion: 5/5   Ankle  plantarflexion: 5/5 Ankle plantarflexion: 5/5     Treatment     Curtis received the treatments listed below:      therapeutic exercises to develop strength, endurance, ROM, flexibility, posture, and core stabilization for 15 minutes including:  Bike for knee flexion 8 mins upright  Long sitting gastroc stretch with strap x 1 minute x 3   Knee flexion with strap (heel slides) x 3 minutes, 5 sec hold at end range-np    HS stretch 3 x 1 mins    ITB stretch 3x 30sec    Np:  Seated hamstring curls 2x10, 25# (5 sec pause in flexion)  Stat bike x 10 minutes   Mini squats 2 x 10  Hip extension 2x 10    manual therapy techniques: Soft tissue Mobilization were applied to the: (R) knee for 10 minutes, including:  STM right calf, HS, ITB  Patellar mobs all directions grade III    neuromuscular re-education activities to improve: Balance, Coordination, Proprioception, and Posture for 10 minutes. The following activities were included:    Supine bridge gtb 3 x 10 (modified to right knee ROM)  Supine (back) bent knee fall out gtb 3 x 10 (clams)    Np:  Side lying hip Abduction right/Left x 10 each  Supine hip Abduction 2 x 10 (addis)  Side lying clam blue band 3 x 10 each  LONG ARC QUAD with adductor ball squeeze 3 x 10  Supine Glute set x 50  Quad set 2x10 3 sec hold without towel  Quad set + SLR 2x10  LAQ (focus on active TKE) 2x20, 2#  Hip ABD (focus on form position for glut med) 20x    Patient Education and Home Exercises     Home Exercises Provided and Patient Education Provided     Education provided:   - HEP compliance   - limitations    Written Home Exercises Provided: Patient instructed to cont prior HEP. Exercises were reviewed and Curtis was able to demonstrate them prior to the end of the session.  Curtis demonstrated good  understanding of the education provided. See EMR under Patient Instructions for exercises provided during therapy sessions    ASSESSMENT     Curtis has tightness of the gastrocs, HS, and ITB  causing pain and discomfort. MT used to address this. Curtis often compensates to unload the right knee unconsciously. She has decreased knee flexion and extension due to joint and myofascial restrictions. Continue emphasizing home program to support PT interventions. Additional learner assistance needed.    Curtis Is progressing well towards her goals.   Pt prognosis is Good.     Pt will continue to benefit from skilled outpatient physical therapy to address the deficits listed in the problem list box on initial evaluation, provide pt/family education and to maximize pt's level of independence in the home and community environment.     Pt's spiritual, cultural and educational needs considered and pt agreeable to plan of care and goals.     Anticipated barriers to physical therapy: age, lifestyle    Goals:   Short Term Goals: 2 weeks   Right knee extension AROM improved to -2 deg for improving fx.-MET  Right knee flexion AROM improved to 100 deg for improving fx.-ongoing     Long Term Goals: 4 weeks   Right knee flexion AROM improved to 108 deg for improving fx.-ongoing  FOTO score improved to 68 for improving QOL.-ongoing    PLAN     Continue current POC with emphasis on achieving symmetrical ROM, strength and function.     Leatha Santana, PT, DPT

## 2025-02-17 ENCOUNTER — OFFICE VISIT (OUTPATIENT)
Dept: FAMILY MEDICINE | Facility: CLINIC | Age: 75
End: 2025-02-17
Payer: MEDICARE

## 2025-02-17 DIAGNOSIS — S46.812A STRAIN OF LEFT TRAPEZIUS MUSCLE, INITIAL ENCOUNTER: ICD-10-CM

## 2025-02-17 DIAGNOSIS — M54.2 NECK PAIN: Primary | ICD-10-CM

## 2025-02-17 RX ORDER — CELECOXIB 200 MG/1
200 CAPSULE ORAL DAILY
COMMUNITY
Start: 2025-02-02

## 2025-02-17 RX ORDER — CYCLOBENZAPRINE HCL 5 MG
5 TABLET ORAL 3 TIMES DAILY PRN
Qty: 30 TABLET | Refills: 0 | Status: SHIPPED | OUTPATIENT
Start: 2025-02-17 | End: 2025-02-27

## 2025-02-17 NOTE — PROGRESS NOTES
Answers submitted by the patient for this visit:  Review of Systems Questionnaire (Submitted on 2/15/2025)  activity change: No  unexpected weight change: No  neck pain: Yes  hearing loss: No  rhinorrhea: No  trouble swallowing: No  eye discharge: No  visual disturbance: No  chest tightness: No  wheezing: No  chest pain: No  palpitations: No  blood in stool: No  constipation: No  vomiting: No  diarrhea: No  polydipsia: No  polyuria: No  difficulty urinating: No  hematuria: No  menstrual problem: No  dysuria: No  arthralgias: No  headaches: No  weakness: No  confusion: No  dysphoric mood: No    The patient location is: [place]  The chief complaint leading to consultation is: neck pain    Visit type: audiovisual    Face to Face time with patient: 16  20 minutes of total time spent on the encounter, which includes face to face time and non-face to face time preparing to see the patient (eg, review of tests), Obtaining and/or reviewing separately obtained history, Documenting clinical information in the electronic or other health record, Independently interpreting results (not separately reported) and communicating results to the patient/family/caregiver, or Care coordination (not separately reported).     Each patient to whom he or she provides medical services by telemedicine is:  (1) informed of the relationship between the physician and patient and the respective role of any other health care provider with respect to management of the patient; and (2) notified that he or she may decline to receive medical services by telemedicine and may withdraw from such care at any time.    Subjective:       Patient ID: Curtis Navarro is a 74 y.o. female.    Chief Complaint: neck spasms    HPI pain to base of neck that radiates down toward the left scapula, trapezius. Onset over the past week.  States over the weekend it go so bad she had to take a hot shower and lay down. States activity makes it worse, resting/sleeping  is fine. Heating pad, hot shower.  No pain with deep breath. No CP or SOB. States she has been doing PT and home exercises for her knee. Feels that doing the exercises might have caused her to put more strain on her neck and now has it flared up. She was given Flexeril and gabapentin in the past by her PCP for some lower back issues. States that worked well. She would like to try that again. No recent or history of neck injury. See ROS    The following portion of the patients history was reviewed and updated as appropriate: allergies, current medications, past medical and surgical history. Past social history and problem list reviewed. Family PMH and Past social history reviewed. Tobacco, Illicit drug use reviewed.      Review of patient's allergies indicates:   Allergen Reactions    Lisinopril Other (See Comments)     Cough    Azithromycin      Other reaction(s): Nausea  Other reaction(s): Diarrhea    Metronidazole hcl      Other reaction(s): Rash  Other reaction(s): Itching    Moxifloxacin      Other reaction(s): Rash    Sulfa (sulfonamide antibiotics)      Other reaction(s): Itching       Current Medications[1]    Past Medical History:   Diagnosis Date    Allergy     Arthritis     Cataract     Colon polyps     Diverticulosis     GERD (gastroesophageal reflux disease)     Graves disease     s/p radioactive iodine in 42    H/O cardiovascular stress test     normal 5/23    History of diverticulitis     History of skin cancer     L neck    Hyperlipidemia     Hypertension     Hypothyroidism     s/p radioactive iodine    IBS (irritable bowel syndrome)     Osteopenia     Thyroid nodule     last usg 5/23;  next due 5/25       Past Surgical History:   Procedure Laterality Date    CARDIAC SURGERY  2013    angiogram (negative)    COLONOSCOPY  07/21/2010    Dr. Ceballos; in legacy, repeat in 6-7 years    COLONOSCOPY N/A 06/27/2017    Procedure: COLONOSCOPY;  Surgeon: Hamlet Ceballos Jr., MD;  Location: [facility] ENDO;  Service:  "Endoscopy;  Laterality: N/A; repeat in 5 years for surveillance    COLONOSCOPY N/A 01/23/2023    Procedure: COLONOSCOPY;  Surgeon: Hamlet Velázquez Jr., MD;  Location: [facility];  Service: Endoscopy;  Laterality: N/A;    COSMETIC SURGERY      Liposuction to neck    ESOPHAGOGASTRODUODENOSCOPY N/A 07/12/2018    Dr. Velázquez: "White nummular lesions in esophageal mucosa (benign)", antritis, gastric polyps removed    ESOPHAGOGASTRODUODENOSCOPY N/A 01/23/2023    Procedure: EGD (ESOPHAGOGASTRODUODENOSCOPY);  Surgeon: Hamlet Velázquez Jr., MD;  Location: [facility];  Service: Endoscopy;  Laterality: N/A;    SKIN CANCER EXCISION      with Mohs on left neck    TONSILLECTOMY      TOTAL KNEE ARTHROPLASTY Right     UPPER GASTROINTESTINAL ENDOSCOPY  08/04/2014    Dr. Velázquez    UPPER GASTROINTESTINAL ENDOSCOPY  06/27/2017    DR. VELÁZQUEZ       Social History[2]      Review of Systems   Constitutional:  Negative for activity change and unexpected weight change.   HENT:  Negative for hearing loss, rhinorrhea and trouble swallowing.    Eyes:  Negative for discharge and visual disturbance.   Respiratory:  Negative for chest tightness and wheezing.    Cardiovascular:  Negative for chest pain and palpitations.   Gastrointestinal:  Negative for blood in stool, constipation, diarrhea and vomiting.   Endocrine: Negative for polydipsia and polyuria.   Genitourinary:  Negative for difficulty urinating, dysuria, hematuria and menstrual problem.   Musculoskeletal:  Positive for neck pain. Negative for arthralgias.   Neurological:  Negative for weakness and headaches.   Psychiatric/Behavioral:  Negative for confusion and dysphoric mood.        Objective:         Physical Exam  Constitutional:       General: She is not in acute distress.     Appearance: She is well-developed.   Pulmonary:      Effort: No accessory muscle usage or respiratory distress.   Musculoskeletal:      Cervical back: Normal range of motion.      Comments: Coordination appears " normal per video   Neurological:      Mental Status: She is alert and oriented to person, place, and time.   Psychiatric:         Speech: Speech normal.         Behavior: Behavior normal.         Thought Content: Thought content normal.         Assessment:       1. Neck pain    2. Strain of left trapezius muscle, initial encounter        Plan:       Neck pain: gabapentin 300 mg one at HS. She has old prescription given by her PCP she will take.    Strain of left trapezius muscle, initial encounter: flexeril TID. Can take 1/2 tablet during the day if needed due to sedation. Moist heat. Light stretching. If not improving will add to her PT orders.     Other orders  -     cyclobenzaprine (FLEXERIL) 5 MG tablet; Take 1 tablet (5 mg total) by mouth 3 (three) times daily as needed for Muscle spasms.  Dispense: 30 tablet; Refill: 0       Continue current medication  Take medications only as prescribed  Healthy diet, exercise  Adequate rest  Adequate hydration  Avoid allergens  Avoid excessive caffeine     Follow up if not improving         [1]   Current Outpatient Medications:     acetaminophen (TYLENOL) 500 MG tablet, Take 500 mg by mouth every 6 (six) hours as needed for Pain., Disp: , Rfl:     aspirin (ECOTRIN) 81 MG EC tablet, Take 81 mg by mouth once daily., Disp: , Rfl:     biotin 5 mg Cap, Take 5 mg by mouth once daily., Disp: , Rfl:     calcium-vitamin D3 500 mg(1,250mg) -200 unit per tablet, Take 1 tablet by mouth 2 (two) times daily with meals., Disp: , Rfl:     fluticasone propionate (FLONASE) 50 mcg/actuation nasal spray, 2 sprays (100 mcg total) by Each Nostril route once daily., Disp: 16 g, Rfl: 3    GLUCOSA HAHN 2KCL/CHONDROITIN HAHN (GLUCOSAMINE-CHONDROITIN DS ORAL), Take by mouth 2 (two) times daily., Disp: , Rfl:     ketoconazole (NIZORAL) 2 % shampoo, Apply topically twice a week., Disp: 120 mL, Rfl: 3    KRILL/OM-3/DHA/EPA/PHOSPHO/AST (MEGARED OMEGA-3 KRILL OIL ORAL), Take 1 capsule by mouth every evening.,  Disp: , Rfl:     Lactobacillus acidophilus (PROBIOTIC ORAL), Take 1 capsule by mouth once daily., Disp: , Rfl:     levothyroxine (SYNTHROID) 112 MCG tablet, TAKE 1 TABLET (112 MCG TOTAL) BY MOUTH BEFORE BREAKFAST., Disp: 90 tablet, Rfl: 1    losartan (COZAAR) 100 MG tablet, TAKE 1 TABLET (100 MG TOTAL) BY MOUTH ONCE DAILY., Disp: 90 tablet, Rfl: 3    metoprolol succinate (TOPROL-XL) 50 MG 24 hr tablet, TAKE 1 TABLET BY MOUTH ONCE DAILY, Disp: 90 tablet, Rfl: 3    miconazole NITRATE 2 % (ZEASORB AF) 2 % top powder, Apply topically as needed for Itching., Disp: 85 g, Rfl: 3    minoxidiL (LONITEN) 2.5 MG tablet, Take 0.5 tablets (1.25 mg total) by mouth once daily., Disp: 15 tablet, Rfl: 2    MULTIVITAMIN ORAL, Take 1 tablet by mouth once daily., Disp: , Rfl:     mupirocin (BACTROBAN) 2 % ointment, by Nasal route 3 (three) times daily., Disp: 30 g, Rfl: 0    omeprazole (PRILOSEC) 20 MG capsule, TAKE 1 CAPSULE (20 MG TOTAL) BY MOUTH ONCE DAILY., Disp: 90 capsule, Rfl: 3    pimecrolimus (ELIDEL) 1 % cream, Apply topically 2 (two) times daily., Disp: 60 g, Rfl: 2    pitavastatin calcium (LIVALO) 2 mg Tab tablet, Take 1 tablet (2 mg total) by mouth every evening., Disp: 90 tablet, Rfl: 0    polyethylene glycol (GLYCOLAX) 17 gram PwPk, Take 17 g by mouth daily as needed for Constipation., Disp: , Rfl:     vibegron (GEMTESA) 75 mg Tab, TAKE ONE TABLET BY MOUTH EVERY MORNING FOR OVER ACTIVE BLADDER, Disp: 90 tablet, Rfl: 3  [2]   Social History  Socioeconomic History    Marital status:    Tobacco Use    Smoking status: Never    Smokeless tobacco: Never   Substance and Sexual Activity    Alcohol use: Never    Drug use: No    Sexual activity: Yes     Partners: Male     Birth control/protection: Post-menopausal   Social History Narrative         Social Drivers of Health     Financial Resource Strain: Low Risk  (10/15/2024)    Overall Financial Resource Strain (CARDIA)     Difficulty of Paying Living Expenses: Not  very hard   Food Insecurity: No Food Insecurity (10/15/2024)    Hunger Vital Sign     Worried About Running Out of Food in the Last Year: Never true     Ran Out of Food in the Last Year: Never true   Transportation Needs: No Transportation Needs (10/15/2024)    TRANSPORTATION NEEDS     Transportation : No   Physical Activity: Insufficiently Active (10/15/2024)    Exercise Vital Sign     Days of Exercise per Week: 3 days     Minutes of Exercise per Session: 40 min   Stress: Stress Concern Present (10/15/2024)    [employer] [place] [employer] - Occupational Stress Questionnaire     Feeling of Stress : To some extent   Housing Stability: Unknown (10/15/2024)    Housing Stability Vital Sign     Unable to Pay for Housing in the Last Year: No     Homeless in the Last Year: No

## 2025-02-19 ENCOUNTER — CLINICAL SUPPORT (OUTPATIENT)
Dept: REHABILITATION | Facility: HOSPITAL | Age: 75
End: 2025-02-19
Payer: MEDICARE

## 2025-02-19 DIAGNOSIS — M25.661 DECREASED RANGE OF MOTION OF RIGHT KNEE: Primary | ICD-10-CM

## 2025-02-19 DIAGNOSIS — R26.9 GAIT ABNORMALITY: ICD-10-CM

## 2025-02-19 DIAGNOSIS — R29.898 WEAKNESS OF BOTH LOWER EXTREMITIES: ICD-10-CM

## 2025-02-19 PROCEDURE — 97140 MANUAL THERAPY 1/> REGIONS: CPT | Mod: PO | Performed by: PHYSICAL THERAPIST

## 2025-02-19 PROCEDURE — 97112 NEUROMUSCULAR REEDUCATION: CPT | Mod: PO | Performed by: PHYSICAL THERAPIST

## 2025-02-19 NOTE — PROGRESS NOTES
[facility]    Physical Therapy Treatment Note     Name: Curtis Navarro  [facility] Number: [id number]    Therapy Diagnosis:   Encounter Diagnoses   Name Primary?    Decreased range of motion of right knee Yes    Weakness of both lower extremities     Gait abnormality        Physician: Joselo Helton,*    Visit Date: 2/19/2025  Physician Orders: PT Eval and Treat   Post Surgical?No Eval and TreatYes Type of TherapyOutpatient Therapy Location:Knees  Medical Diagnosis from Referral: M25.561 (ICD-10-CM) - Pain in right knee  Evaluation Date: 1/10/2025  Authorization Period Expiration:     03/13/2025     Plan of Care Expiration: 2/7/2025, 3/7/2025  Progress Note Due: 2/9/2025, 3/3/2025  Date of Surgery: 9/18/2024, right TKA  Visit # / Visits authorized:1/1, 11/16  FOTO: 2/ 3 (1/10/2025, 1/24/2025)    PTA Visit #: 0/5     Precautions: Standard     Time In: 1100  Time Out: 1200  Total Billable Time: 30 minutes 1:1    SUBJECTIVE     Pt reports: she's been feeling better since Celebrex with noted dec in swelling. She was compliant with home exercise program.   Response to previous treatment: no adverse effects   Functional change: too soon to determine     Pain: 0/10  Location: right knee      OBJECTIVE     Objective Measures updated at progress report unless specified.     2/19/2025: -2 to 101 deg.    1/30/2025:    Right knee AROM: 0-85 deg.  Pt is limited by joint and muscle stiffness. She continues with pain.    Lower Extremity Strength  Right LE   Left LE     Knee extension: 4/5 Knee extension: 4+/5   Knee flexion: 4-/5 Knee flexion: 4/5   Hip flexion: 4-/5 Hip flexion: 4/5   Hip extension:  4/5 Hip extension: 4/5   Hip abduction: 3+/5 without allowing hip flexor compensations  Hip abduction: 3+/5 without allowing spinal rotation   Hip adduction: 4-/5 Hip adduction 4/5   Ankle dorsiflexion: 5/5 Ankle dorsiflexion: 5/5   Ankle plantarflexion: 5/5 Ankle plantarflexion: 5/5     Treatment      Curtis received the treatments listed below:      therapeutic exercises to develop strength, endurance, ROM, flexibility, posture, and core stabilization for 30 minutes including:  Bike for knee flexion 8 mins upright  Long sitting gastroc stretch with strap x 1 minute x 3   Knee flexion with strap (heel slides) x 3 minutes, 5 sec hold at end range-np    HS stretch 3 x 1 mins    ITB stretch 3x 30sec    Np:  Seated hamstring curls 2x10, 25# (5 sec pause in flexion)  Stat bike x 10 minutes   Mini squats 2 x 10  Hip extension 2x 10    manual therapy techniques: Soft tissue Mobilization were applied to the: (R) knee for 10 minutes, including:  STM right calf, HS, ITB-np  Patellar mobs all directions grade III  INTERNAL ROTATION tibial mobs gd II-III    neuromuscular re-education activities to improve: Balance, Coordination, Proprioception, and Posture for 15 minutes. The following activities were included:  Side lying hip Abduction right/Left x 10 each  Supine hip Abduction 2 x 10 (addis)  Supine Glute set x 50    Np:  Supine bridge gtb 3 x 10 (modified to right knee ROM)  Supine (back) bent knee fall out gtb 3 x 10 (clams)  Side lying clam blue band 3 x 10 each  LONG ARC QUAD with adductor ball squeeze 3 x 10  Quad set 2x10 3 sec hold without towel  Quad set + SLR 2x10  LAQ (focus on active TKE) 2x20, 2#  Hip ABD (focus on form position for glut med) 20x    Patient Education and Home Exercises     Home Exercises Provided and Patient Education Provided     Education provided:   - HEP compliance   - limitations    Written Home Exercises Provided: Patient instructed to cont prior HEP. Exercises were reviewed and Curtis was able to demonstrate them prior to the end of the session.  Curtis demonstrated good  understanding of the education provided. See EMR under Patient Instructions for exercises provided during therapy sessions    ASSESSMENT     Curtis is doing better since the edema has decreased allowing for  improving right knee ROM. The pain is improving. She does have some limitations of body habitus and lifestyle. She does require additional learner assistance. We discussed being cautious to adhere to body position during ex to avoid other joint pain/injury. Curtis is consistently reporting performing home program and is consistently present for PT appts.    Curtis Is progressing well towards her goals.   Pt prognosis is Good.     Pt will continue to benefit from skilled outpatient physical therapy to address the deficits listed in the problem list box on initial evaluation, provide pt/family education and to maximize pt's level of independence in the home and community environment.     Pt's spiritual, cultural and educational needs considered and pt agreeable to plan of care and goals.     Anticipated barriers to physical therapy: age, lifestyle    Goals:   Short Term Goals: 2 weeks   Right knee extension AROM improved to -2 deg for improving fx.-MET  Right knee flexion AROM improved to 100 deg for improving fx.-ongoing     Long Term Goals: 4 weeks   Right knee flexion AROM improved to 108 deg for improving fx.-ongoing  FOTO score improved to 68 for improving QOL.-ongoing    PLAN     Continue current POC with emphasis on achieving symmetrical ROM, strength and function.     Leatha Santana, PT, DPT

## 2025-02-21 ENCOUNTER — CLINICAL SUPPORT (OUTPATIENT)
Dept: REHABILITATION | Facility: HOSPITAL | Age: 75
End: 2025-02-21
Attending: PHYSICAL THERAPIST
Payer: MEDICARE

## 2025-02-21 DIAGNOSIS — R29.898 WEAKNESS OF BOTH LOWER EXTREMITIES: ICD-10-CM

## 2025-02-21 DIAGNOSIS — R26.9 GAIT ABNORMALITY: ICD-10-CM

## 2025-02-21 DIAGNOSIS — M25.661 DECREASED RANGE OF MOTION OF RIGHT KNEE: Primary | ICD-10-CM

## 2025-02-21 PROCEDURE — 97112 NEUROMUSCULAR REEDUCATION: CPT | Mod: PO | Performed by: PHYSICAL THERAPIST

## 2025-02-21 PROCEDURE — 97140 MANUAL THERAPY 1/> REGIONS: CPT | Mod: PO | Performed by: PHYSICAL THERAPIST

## 2025-02-21 NOTE — PROGRESS NOTES
[facility]    Physical Therapy Treatment Note     Name: Curtis Navarro  [facility] Number: [id number]    Therapy Diagnosis:   Encounter Diagnoses   Name Primary?    Decreased range of motion of right knee Yes    Weakness of both lower extremities     Gait abnormality      Physician: Joselo Helton,*    Visit Date: 2/21/2025  Physician Orders: PT Eval and Treat   Post Surgical?No Eval and TreatYes Type of TherapyOutpatient Therapy Location:Knees  Medical Diagnosis from Referral: M25.561 (ICD-10-CM) - Pain in right knee  Evaluation Date: 1/10/2025  Authorization Period Expiration:     03/13/2025     Plan of Care Expiration: 2/7/2025, 3/7/2025  Progress Note Due: 2/9/2025, 3/3/2025  Date of Surgery: 9/18/2024, right TKA  Visit # / Visits authorized:1/1, 12/16  FOTO: 2/ 3 (1/10/2025, 1/24/2025)    PTA Visit #: 0/5     Precautions: Standard     Time In: 1100  Time Out: 1200  Total Billable Time: 30 minutes 1:1    SUBJECTIVE     Pt reports: she's been feeling better since Celebrex with noted dec in swelling. She was compliant with home exercise program.   Response to previous treatment: no adverse effects   Functional change: too soon to determine     Pain: 0/10  Location: right knee      OBJECTIVE     Objective Measures updated at progress report unless specified.     2/23/2025: -2 to 105 deg AROM.    2/19/2025: -2 to 101 deg.    1/30/2025:    Right knee AROM: 0-85 deg.  Pt is limited by joint and muscle stiffness. She continues with pain.    Lower Extremity Strength  Right LE   Left LE     Knee extension: 4/5 Knee extension: 4+/5   Knee flexion: 4-/5 Knee flexion: 4/5   Hip flexion: 4-/5 Hip flexion: 4/5   Hip extension:  4/5 Hip extension: 4/5   Hip abduction: 3+/5 without allowing hip flexor compensations  Hip abduction: 3+/5 without allowing spinal rotation   Hip adduction: 4-/5 Hip adduction 4/5   Ankle dorsiflexion: 5/5 Ankle dorsiflexion: 5/5   Ankle plantarflexion: 5/5 Ankle  plantarflexion: 5/5     Treatment     Curtis received the treatments listed below:      therapeutic exercises to develop strength, endurance, ROM, flexibility, posture, and core stabilization for 30 minutes including:  Bike for knee flexion 8 mins upright  Long sitting gastroc stretch with strap x 1 minute x 3   Knee flexion with strap (heel slides) x 3 minutes, 5 sec hold at end range-np    HS stretch 3 x 1 mins    ITB stretch 3x 30 sec  Mini squats 2 x 10  Hip extension 2x 10    Np:  Seated hamstring curls 2x10, 25# (5 sec pause in flexion)  Stat bike x 10 minutes     manual therapy techniques: Soft tissue Mobilization were applied to the: (R) knee for 10 minutes, including:  STM right calf, HS, ITB-np  Patellar mobs all directions grade III  INTERNAL ROTATION tibial mobs gd II-III    neuromuscular re-education activities to improve: Balance, Coordination, Proprioception, and Posture for 20 minutes. The following activities were included:  Side lying hip Abduction right/Left x 10 each  Supine hip Abduction 2 x 10 (addis)  Supine Glute set x 50  Supine bridge gtb 3 x 10 (modified to right knee ROM)  Supine (back) bent knee fall out gtb 3 x 10 (clams)  Side lying clam blue band 3 x 10 each  LONG ARC QUAD with adductor ball squeeze 3 x 10  Quad set 2x10 3 sec hold without towel  Quad set + SLR 2x10    Np:  LAQ (focus on active TKE) 2x20, 2#  Hip ABD (focus on form position for glut med) 20x    Patient Education and Home Exercises     Home Exercises Provided and Patient Education Provided     Education provided:   - HEP compliance   - limitations    Written Home Exercises Provided: Patient instructed to cont prior HEP. Exercises were reviewed and Curtis was able to demonstrate them prior to the end of the session.  Curtis demonstrated good  understanding of the education provided. See EMR under Patient Instructions for exercises provided during therapy sessions    ASSESSMENT     Curtis is doing better since the edema  has decreased allowing for improving right knee ROM. The pain is improving. She does have some limitations of body habitus and lifestyle. She does require additional learner assistance. Curtis is doing well with exercise and we may be ready to progress back to wt bearing ex and cont ROM.    Curtis Is progressing well towards her goals.   Pt prognosis is Good.     Pt will continue to benefit from skilled outpatient physical therapy to address the deficits listed in the problem list box on initial evaluation, provide pt/family education and to maximize pt's level of independence in the home and community environment.     Pt's spiritual, cultural and educational needs considered and pt agreeable to plan of care and goals.     Anticipated barriers to physical therapy: age, lifestyle    Goals:   Short Term Goals: 2 weeks   Right knee extension AROM improved to -2 deg for improving fx.-MET  Right knee flexion AROM improved to 100 deg for improving fx.-ongoing     Long Term Goals: 4 weeks   Right knee flexion AROM improved to 108 deg for improving fx.-ongoing  FOTO score improved to 68 for improving QOL.-ongoing    PLAN     Continue current POC with emphasis on achieving symmetrical ROM, strength and function.     Leatha Santana, PT, DPT

## 2025-02-25 ENCOUNTER — CLINICAL SUPPORT (OUTPATIENT)
Dept: REHABILITATION | Facility: HOSPITAL | Age: 75
End: 2025-02-25
Payer: MEDICARE

## 2025-02-25 DIAGNOSIS — M25.661 DECREASED RANGE OF MOTION OF RIGHT KNEE: Primary | ICD-10-CM

## 2025-02-25 DIAGNOSIS — R26.9 GAIT ABNORMALITY: ICD-10-CM

## 2025-02-25 DIAGNOSIS — R29.898 WEAKNESS OF BOTH LOWER EXTREMITIES: ICD-10-CM

## 2025-02-25 PROCEDURE — 97112 NEUROMUSCULAR REEDUCATION: CPT | Mod: PO | Performed by: PHYSICAL THERAPIST

## 2025-02-25 PROCEDURE — 97110 THERAPEUTIC EXERCISES: CPT | Mod: PO | Performed by: PHYSICAL THERAPIST

## 2025-02-25 NOTE — PROGRESS NOTES
[facility]    Physical Therapy Treatment Note Updated POC    Name: Curtis Navarro  Clinic Number: [id number]    Therapy Diagnosis:   Encounter Diagnoses   Name Primary?    Decreased range of motion of right knee Yes    Weakness of both lower extremities     Gait abnormality      Physician: Joselo Helton,*    Visit Date: 2/25/2025  Physician Orders: PT Eval and Treat   Post Surgical?No Eval and TreatYes Type of TherapyOutpatient Therapy Location:Knees  Medical Diagnosis from Referral: M25.561 (ICD-10-CM) - Pain in right knee  Evaluation Date: 1/10/2025  Authorization Period Expiration:     03/13/2025     Plan of Care Expiration: 2/7/2025, 3/7/2025, 4/7/2025  Progress Note Due: 2/9/2025, 3/3/2025  Date of Surgery: 9/18/2024, right TKA  Visit # / Visits authorized:1/1, 13/16  FOTO: 3/ 3 (1/10/2025, 1/24/2025, 2/25/2025)    PTA Visit #: 0/5     Precautions: Standard     Time In: 1100  Time Out: 1200  Total Billable Time: 30 minutes 1:1    SUBJECTIVE     Pt reports: she's been feeling better since Celebrex with noted dec in swelling. She was compliant with home exercise program.   Response to previous treatment: no adverse effects   Functional change: improving ROM and strength, as well as swelling.    Pain: 0/10  Location: right knee      OBJECTIVE     Objective Measures updated at progress report unless specified.     2/23/2025: -2 to 105 deg AROM.    2/19/2025: -2 to 101 deg.    1/30/2025:    Right knee AROM: 0-85 deg.  Pt is limited by joint and muscle stiffness. She continues with pain.    Lower Extremity Strength  Right LE   Left LE     Knee extension: 4+/5 Knee extension: 4+/5   Knee flexion: 4/5 Knee flexion: 4/5   Hip flexion: 4-/5 Hip flexion: 4/5   Hip extension:  4/5 Hip extension: 4/5   Hip abduction: 3+/5 without allowing hip flexor compensations  Hip abduction: 3+/5 without allowing spinal rotation   Hip adduction: 4-/5 Hip adduction 4/5   Ankle dorsiflexion: 5/5  "Ankle dorsiflexion: 5/5   Ankle plantarflexion: 5/5 Ankle plantarflexion: 5/5       FOTO: 2/26/2025: 61 (+0) functional score    Treatment     Curtis received the treatments listed below:      therapeutic exercises to develop strength, endurance, ROM, flexibility, posture, and core stabilization for 30 minutes including:  Bike for knee flexion 8 mins upright  Long sitting gastroc stretch with strap x 1 minute x 3   Knee flexion with strap (heel slides) x 3 minutes, 5 sec hold at end range-np    HS stretch 3 x 1 mins    ITB stretch 3x 30 sec  Mini squats 2 x 10  Hip extension 2x 10  Reassessment.    manual therapy techniques: Soft tissue Mobilization were applied to the: (R) knee for 00 minutes, including:  STM right calf, HS, ITB-np  Patellar mobs all directions grade III  INTERNAL ROTATION tibial mobs gd II-III    neuromuscular re-education activities to improve: Balance, Coordination, Proprioception, and Posture for 20 minutes. The following activities were included:  Side lying hip Abduction right/Left x 10 each  Supine hip Abduction 2 x 10 (addis)  Supine Glute set x 50  Supine bridge gtb 3 x 10 (modified to right knee ROM)  Supine (back) bent knee fall out gtb 3 x 10 (clams)  Side lying clam blue band 3 x 10 each  LONG ARC QUAD with adductor ball squeeze 3 x 10  Quad set 2x10 3 sec hold without towel  Quad set + SLR 2x10    PNF stretching 5 x 30" x 10"-np    Np:  LAQ (focus on active TKE) 2x20, 2#  Hip ABD (focus on form position for glut med) 20x    Patient Education and Home Exercises     Home Exercises Provided and Patient Education Provided     Education provided:   - HEP compliance   - limitations    Written Home Exercises Provided: Patient instructed to cont prior HEP. Exercises were reviewed and Curtis was able to demonstrate them prior to the end of the session.  Curtis demonstrated good  understanding of the education provided. See EMR under Patient Instructions for exercises provided during therapy " sessions    ASSESSMENT     Curtis is doing better since the edema has decreased allowing for improving right knee ROM. The pain is improving. She does have some limitations of body habitus and lifestyle. She does require additional learner assistance. Curtis is doing well with exercise and we may be ready to progress back to wt bearing ex and cont ROM.  -2 to 105 deg of right knee AROM is present limited by pain. Patient will benefit from continued PNF stretching to gain ROM. Cont 1-2x/week for 4 weeks in attempt to inc ROM, and strength to inc QOL.    Curtis Is progressing well towards her goals.   Pt prognosis is Good.     Pt will continue to benefit from skilled outpatient physical therapy to address the deficits listed in the problem list box on initial evaluation, provide pt/family education and to maximize pt's level of independence in the home and community environment.     Pt's spiritual, cultural and educational needs considered and pt agreeable to plan of care and goals.     Anticipated barriers to physical therapy: age, lifestyle    Goals:   Short Term Goals: 2 weeks   Right knee extension AROM improved to -2 deg for improving fx.-MET  Right knee flexion AROM improved to 100 deg for improving fx.-MET     Long Term Goals: 4 weeks   Right knee flexion AROM improved to 108 deg for improving fx.-ongoing  FOTO score improved to 68 for improving QOL.-ongoing    PLAN     Continue current POC with emphasis on achieving symmetrical ROM, strength and function.     I CERTIFY THE NEED FOR THESE SERVICES FURNISHED UNDER THIS PLAN OF TREATMENT AND WHILE UNDER MY CARE    Physician's comments:        Physician's Signature: ___________________________________________________ DATE:_______________________     Leatha Santana, PT, DPT

## 2025-02-28 ENCOUNTER — CLINICAL SUPPORT (OUTPATIENT)
Dept: REHABILITATION | Facility: HOSPITAL | Age: 75
End: 2025-02-28
Payer: MEDICARE

## 2025-02-28 DIAGNOSIS — M25.661 DECREASED RANGE OF MOTION OF RIGHT KNEE: Primary | ICD-10-CM

## 2025-02-28 DIAGNOSIS — R26.9 GAIT ABNORMALITY: ICD-10-CM

## 2025-02-28 DIAGNOSIS — R29.898 WEAKNESS OF BOTH LOWER EXTREMITIES: ICD-10-CM

## 2025-02-28 PROCEDURE — 97112 NEUROMUSCULAR REEDUCATION: CPT | Mod: PO | Performed by: PHYSICAL THERAPIST

## 2025-02-28 PROCEDURE — 97110 THERAPEUTIC EXERCISES: CPT | Mod: PO | Performed by: PHYSICAL THERAPIST

## 2025-02-28 NOTE — PROGRESS NOTES
[facility]    Physical Therapy Treatment Note     Name: Curtis Navarro  [facility] Number: [id number]    Therapy Diagnosis:   Encounter Diagnoses   Name Primary?    Decreased range of motion of right knee Yes    Weakness of both lower extremities     Gait abnormality      Physician: Joselo Helton,*    Visit Date: 2/28/2025  Physician Orders: PT Eval and Treat   Post Surgical?No Eval and TreatYes Type of TherapyOutpatient Therapy Location:Knees  Medical Diagnosis from Referral: M25.561 (ICD-10-CM) - Pain in right knee  Evaluation Date: 1/10/2025  Authorization Period Expiration:     03/13/2025     Plan of Care Expiration: 2/7/2025, 3/7/2025, 4/7/2025  Progress Note Due: 2/9/2025, 3/3/2025  Date of Surgery: 9/18/2024, right TKA  Visit # / Visits authorized:1/1, 13/16  FOTO: 3/ 3 (1/10/2025, 1/24/2025, 2/25/2025)    PTA Visit #: 0/5     Precautions: Standard     Time In: 1100  Time Out: 1151  Total Billable Time: 48 minutes 1:1    SUBJECTIVE     Pt reports: she's been feeling better since Celebrex with noted dec in swelling. She was compliant with home exercise program.   Response to previous treatment: no adverse effects   Functional change: improving ROM and strength, as well as swelling.    Pain: 0/10  Location: right knee      OBJECTIVE     Objective Measures updated at progress report unless specified.     2/28/2025: -2 to 105 deg AROM.    2/19/2025: -2 to 101 deg.    1/30/2025:    Right knee AROM: 0-85 deg.  Pt is limited by joint and muscle stiffness. She continues with pain.    Lower Extremity Strength  Right LE   Left LE     Knee extension: 4+/5 Knee extension: 4+/5   Knee flexion: 4/5 Knee flexion: 4/5   Hip flexion: 4-/5 Hip flexion: 4/5   Hip extension:  4/5 Hip extension: 4/5   Hip abduction: 3+/5 without allowing hip flexor compensations  Hip abduction: 3+/5 without allowing spinal rotation   Hip adduction: 4-/5 Hip adduction 4/5   Ankle dorsiflexion: 5/5 Ankle  "dorsiflexion: 5/5   Ankle plantarflexion: 5/5 Ankle plantarflexion: 5/5       FOTO: 2/26/2025: 61 (+0) functional score    Treatment     Curtis received the treatments listed below:      therapeutic exercises to develop strength, endurance, ROM, flexibility, posture, and core stabilization for 30 minutes including:  Bike for knee flexion 8 mins upright  Long sitting gastroc stretch with strap x 1 minute x 3   Knee flexion with strap (heel slides) x 3 minutes, 5 sec hold at end range-np    HS stretch 3 x 1 mins-np    ITB stretch 3x 30 sec-np  Mini squats 2 x 10  Standing Hip extension 2x 10  Standing hip Abduction 2 x 10  Standing HSC 2 x 10    manual therapy techniques: Soft tissue Mobilization were applied to the: (R) knee for 0 minutes, including:    neuromuscular re-education activities to improve: Balance, Coordination, Proprioception, and Posture for 18 minutes. The following activities were included:  Side lying hip Abduction right/Left x 10 each  Supine hip Abduction 2 x 10 (addis)  Supine Glute set x 50  Supine bridge gtb 3 x 10 (modified to right knee ROM)  Supine (back) bent knee fall out gtb 3 x 10 (clams)  Side lying clam blue band 3 x 10 each  PNF stretching 5 x 30" x 10"    Np:  LONG ARC QUAD with adductor ball squeeze 3 x 10  Quad set 2x10 3 sec hold without towel  Quad set + SLR 2x10    Np:  LAQ (focus on active TKE) 2x20, 2#  Hip ABD (focus on form position for glut med) 20x    Patient Education and Home Exercises     Home Exercises Provided and Patient Education Provided     Education provided:   - HEP compliance   - limitations    Written Home Exercises Provided: Patient instructed to cont prior HEP. Exercises were reviewed and Curtis was able to demonstrate them prior to the end of the session.  Curtis demonstrated good  understanding of the education provided. See EMR under Patient Instructions for exercises provided during therapy sessions    ASSESSMENT     Curtis is doing better since the " edema has decreased allowing for improving right knee ROM. The pain is improving. She does have some limitations of body habitus and lifestyle. She does require additional learner assistance. Curtis is doing well with exercise and we may be ready to progress back to wt bearing ex and cont ROM.  -2 to 105 deg of right knee AROM is present limited by pain. Patient will benefit from continued PNF stretching to gain ROM. It continues difficult for patient to maintain ROM gains on her own. Cont 1-2x/week for 4 weeks in attempt to inc ROM, and strength to inc QOL.    Curtis Is progressing well towards her goals.   Pt prognosis is Good.     Pt will continue to benefit from skilled outpatient physical therapy to address the deficits listed in the problem list box on initial evaluation, provide pt/family education and to maximize pt's level of independence in the home and community environment.     Pt's spiritual, cultural and educational needs considered and pt agreeable to plan of care and goals.     Anticipated barriers to physical therapy: age, lifestyle    Goals:   Short Term Goals: 2 weeks   Right knee extension AROM improved to -2 deg for improving fx.-MET  Right knee flexion AROM improved to 100 deg for improving fx.-MET     Long Term Goals: 4 weeks   Right knee flexion AROM improved to 108 deg for improving fx.-ongoing  FOTO score improved to 68 for improving QOL.-ongoing    PLAN     Continue current POC with emphasis on achieving symmetrical ROM, strength and function.     I CERTIFY THE NEED FOR THESE SERVICES FURNISHED UNDER THIS PLAN OF TREATMENT AND WHILE UNDER MY CARE    Physician's comments:        Physician's Signature: ___________________________________________________ DATE:_______________________     Leatha Santana, PT, DPT

## 2025-03-05 ENCOUNTER — CLINICAL SUPPORT (OUTPATIENT)
Dept: REHABILITATION | Facility: HOSPITAL | Age: 75
End: 2025-03-05
Payer: MEDICARE

## 2025-03-05 DIAGNOSIS — R26.9 GAIT ABNORMALITY: ICD-10-CM

## 2025-03-05 DIAGNOSIS — R29.898 WEAKNESS OF BOTH LOWER EXTREMITIES: ICD-10-CM

## 2025-03-05 DIAGNOSIS — M25.661 DECREASED RANGE OF MOTION OF RIGHT KNEE: Primary | ICD-10-CM

## 2025-03-05 PROCEDURE — 97112 NEUROMUSCULAR REEDUCATION: CPT | Mod: PO,CQ

## 2025-03-05 PROCEDURE — 97110 THERAPEUTIC EXERCISES: CPT | Mod: PO,CQ

## 2025-03-05 NOTE — PROGRESS NOTES
[facility]    Physical Therapy Treatment Note     Name: Curtis Navarro  [facility] Number: [id number]    Therapy Diagnosis:   Encounter Diagnoses   Name Primary?    Decreased range of motion of right knee Yes    Weakness of both lower extremities     Gait abnormality      Physician: Joselo Helton,*    Visit Date: 3/5/2025  Physician Orders: PT Eval and Treat   Post Surgical?No Eval and TreatYes Type of TherapyOutpatient Therapy Location:Knees  Medical Diagnosis from Referral: M25.561 (ICD-10-CM) - Pain in right knee  Evaluation Date: 1/10/2025  Authorization Period Expiration:     03/13/2025     Plan of Care Expiration: 2/7/2025, 3/7/2025, 4/7/2025  Progress Note Due: 2/9/2025, 3/3/2025  Date of Surgery: 9/18/2024, right TKA  Visit # / Visits authorized:1/1, 13/16  FOTO: 3/ 3 (1/10/2025, 1/24/2025, 2/25/2025)    PTA Visit #: 0/5     Precautions: Standard     Time In: 1100  Time Out: 1151  Total Billable Time: 48 minutes 1:1    SUBJECTIVE     Pt reports:that her knee pn is ~ 1/10 this morning . She was compliant with home exercise program.   Response to previous treatment: no adverse effects   Functional change: improving ROM and strength, as well as swelling.    Pain: 0/10  Location: right knee      OBJECTIVE     Objective Measures updated at progress report unless specified.     2/28/2025: -2 to 105 deg AROM.    2/19/2025: -2 to 101 deg.    1/30/2025:    Right knee AROM: 0-85 deg.  Pt is limited by joint and muscle stiffness. She continues with pain.    Lower Extremity Strength  Right LE   Left LE     Knee extension: 4+/5 Knee extension: 4+/5   Knee flexion: 4/5 Knee flexion: 4/5   Hip flexion: 4-/5 Hip flexion: 4/5   Hip extension:  4/5 Hip extension: 4/5   Hip abduction: 3+/5 without allowing hip flexor compensations  Hip abduction: 3+/5 without allowing spinal rotation   Hip adduction: 4-/5 Hip adduction 4/5   Ankle dorsiflexion: 5/5 Ankle dorsiflexion: 5/5   Ankle  "plantarflexion: 5/5 Ankle plantarflexion: 5/5       FOTO: 2/26/2025: 61 (+0) functional score    Treatment     Curtis received the treatments listed below:      therapeutic exercises to develop strength, endurance, ROM, flexibility, posture, and core stabilization for 30 minutes including:  Bike for knee flexion 10 mins upright  Long sitting gastroc stretch with strap x 1 minute x 3   Knee flexion with strap (heel slides) x 3 minutes, 5 sec hold at end range-np    HS stretch 3 x 1 mins    ITB stretch 3x 30 sec  Mini squats 2 x 10  Standing Hip extension 2x 10  Standing hip Abduction 2 x 10  Standing HSC 2 x 10    manual therapy techniques: Soft tissue Mobilization were applied to the: (R) knee for 0 minutes, including:    neuromuscular re-education activities to improve: Balance, Coordination, Proprioception, and Posture for 30 minutes. The following activities were included:  Side lying hip Abduction right/Left x 10 each  Supine hip Abduction 2 x 10 (addis)  Supine Glute set x 50  Supine bridge gtb 3 x 10 (modified to right knee ROM)  Supine (back) bent knee fall out gtb 3 x 10 (clams)  Side lying clam blue band 3 x 10 each  PNF stretching 5 x 30" x 10"  TKE yellow t-band 2x20,   Hip ABD (focus on form position for glut med) 20x  LONG ARC QUAD with adductor ball squeeze 3 x 10  Quad set 2x10 3 sec hold without towel  Quad set + SLR 2x10    Np:  LONG ARC QUAD with adductor ball squeeze 3 x 10  Patient Education and Home Exercises     Home Exercises Provided and Patient Education Provided     Education provided:   - HEP compliance   - limitations    Written Home Exercises Provided: Patient instructed to cont prior HEP. Exercises were reviewed and Curtis was able to demonstrate them prior to the end of the session.  Curtis demonstrated good  understanding of the education provided. See EMR under Patient Instructions for exercises provided during therapy sessions    ASSESSMENT     Curtis tyson today's tx with progression " of ther ex and neuromuscular re-ed well. She was able to demonstrate proper form with standing TKE  with yellow t-band post verbal and tactile cues for quad contraction and elimination of glutes The pain is improving. She does have some limitations of body habitus and lifestyle. She does require additional learner assistance. Curtis is doing well with exercise and we may be ready to progress back to wt bearing ex Patient will benefit from continued PNF stretching to gain ROM. It continues difficult for patient to maintain ROM gains on her own. Cont 1-2x/week for 4 weeks in attempt to inc ROM, and strength to inc QOL.    Curtis Is progressing well towards her goals.   Pt prognosis is Good.     Pt will continue to benefit from skilled outpatient physical therapy to address the deficits listed in the problem list box on initial evaluation, provide pt/family education and to maximize pt's level of independence in the home and community environment.     Pt's spiritual, cultural and educational needs considered and pt agreeable to plan of care and goals.     Anticipated barriers to physical therapy: age, lifestyle    Goals:   Short Term Goals: 2 weeks   Right knee extension AROM improved to -2 deg for improving fx.-MET  Right knee flexion AROM improved to 100 deg for improving fx.-MET     Long Term Goals: 4 weeks   Right knee flexion AROM improved to 108 deg for improving fx.-ongoing  FOTO score improved to 68 for improving QOL.-ongoing    PLAN     Continue current POC with emphasis on achieving symmetrical ROM, strength and function.     I CERTIFY THE NEED FOR THESE SERVICES FURNISHED UNDER THIS PLAN OF TREATMENT AND WHILE UNDER MY CARE    Physician's comments:        Physician's Signature: ___________________________________________________ DATE:_______________________     Sloan Dominguez PTA

## 2025-03-10 ENCOUNTER — CLINICAL SUPPORT (OUTPATIENT)
Dept: REHABILITATION | Facility: HOSPITAL | Age: 75
End: 2025-03-10
Payer: MEDICARE

## 2025-03-10 DIAGNOSIS — R26.9 GAIT ABNORMALITY: ICD-10-CM

## 2025-03-10 DIAGNOSIS — R29.898 WEAKNESS OF BOTH LOWER EXTREMITIES: ICD-10-CM

## 2025-03-10 DIAGNOSIS — M25.661 DECREASED RANGE OF MOTION OF RIGHT KNEE: Primary | ICD-10-CM

## 2025-03-10 PROCEDURE — 97112 NEUROMUSCULAR REEDUCATION: CPT | Mod: PO | Performed by: PHYSICAL THERAPIST

## 2025-03-10 PROCEDURE — 97110 THERAPEUTIC EXERCISES: CPT | Mod: PO | Performed by: PHYSICAL THERAPIST

## 2025-03-15 NOTE — TELEPHONE ENCOUNTER
Care Due:                  Date            Visit Type   Department     Provider  --------------------------------------------------------------------------------                                EP -                              PRIMARY      [facility]    Komal Lee Abundio  Last Visit: 01-      CARE ([facility])   MEDICINE       Anger                              ESTABLISHED                              PATIENT -    [facility]    Komal Lee Abundio  Next Visit: 04-      [facility]      MEDICINE       Anger                                                            Last  Test          Frequency    Reason                     Performed    Due Date  --------------------------------------------------------------------------------    TSH.........  12 months..  levothyroxine............  05- 05-    [facility] Embedded Care Due Messages. Reference number: [id number].   3/15/2025 11:38:42 AM CDT

## 2025-03-16 RX ORDER — PITAVASTATIN CALCIUM 2.09 MG/1
2 TABLET, FILM COATED ORAL NIGHTLY
Qty: 90 TABLET | Refills: 3 | Status: SHIPPED | OUTPATIENT
Start: 2025-03-16

## 2025-03-16 NOTE — TELEPHONE ENCOUNTER
Refill Decision Note   Curtis Navarro  is requesting a refill authorization.  Brief Assessment and Rationale for Refill:  Approve     Medication Therapy Plan:         Comments:     Note composed:4:02 PM 03/16/2025             Appointments     Last Visit   1/16/2025 Komal Del Castillo MD   Next Visit   4/10/2025 Komal Del Castillo MD

## 2025-03-18 ENCOUNTER — DOCUMENTATION ONLY (OUTPATIENT)
Dept: REHABILITATION | Facility: HOSPITAL | Age: 75
End: 2025-03-18
Payer: MEDICARE

## 2025-03-19 ENCOUNTER — CLINICAL SUPPORT (OUTPATIENT)
Dept: REHABILITATION | Facility: HOSPITAL | Age: 75
End: 2025-03-19
Payer: MEDICARE

## 2025-03-19 DIAGNOSIS — M25.661 DECREASED RANGE OF MOTION OF RIGHT KNEE: Primary | ICD-10-CM

## 2025-03-19 DIAGNOSIS — R29.898 WEAKNESS OF BOTH LOWER EXTREMITIES: ICD-10-CM

## 2025-03-19 DIAGNOSIS — R26.9 GAIT ABNORMALITY: ICD-10-CM

## 2025-03-19 PROCEDURE — 97112 NEUROMUSCULAR REEDUCATION: CPT | Mod: PO | Performed by: PHYSICAL THERAPIST

## 2025-03-19 PROCEDURE — 97110 THERAPEUTIC EXERCISES: CPT | Mod: PO | Performed by: PHYSICAL THERAPIST

## 2025-03-19 NOTE — PROGRESS NOTES
[facility]    Physical Therapy Treatment Note     Name: Curtis Navarro  [facility] Number: [id number]    Therapy Diagnosis:   Encounter Diagnoses   Name Primary?    Decreased range of motion of right knee Yes    Weakness of both lower extremities     Gait abnormality      Physician: Joselo Helton,*    Visit Date: 3/19/2025  Physician Orders: PT Eval and Treat   Post Surgical?No Eval and TreatYes Type of TherapyOutpatient Therapy Location:Knees  Medical Diagnosis from Referral: M25.561 (ICD-10-CM) - Pain in right knee  Evaluation Date: 1/10/2025  Authorization Period Expiration:     03/13/2025     Plan of Care Expiration: 2/7/2025, 3/7/2025, 4/7/2025  Progress Note Due: 2/9/2025, 3/3/2025  Date of Surgery: 9/18/2024, right TKA  Visit # / Visits authorized:1/1, 16/16 (corrected), 16/20  FOTO: 3/ 3 (1/10/2025, 1/24/2025, 2/25/2025)    PTA Visit #: 0/5     Precautions: Standard     Time In: 1300  Time Out: 1400  Total Billable Time: 30 minutes 1:1    SUBJECTIVE     Pt reports: feeling better knee motion and less pain. She would like to wean the Celebrex. She has trouble getting out of her mini SUV a little, but improved. She also has trouble donning LE clothing and crossing right leg. She understands this will improve with repetition and time. She was compliant with home exercise program.   Response to previous treatment: no adverse effects   Functional change: improving ROM and strength, as well as swelling.    Pain: 0/10  Location: right knee      OBJECTIVE     Objective Measures updated at progress report unless specified.     3/10/2025: -2 to 103 deg with hip flexion    2/28/2025: -2 to 105 deg AROM.    2/19/2025: -2 to 101 deg.    1/30/2025:    Right knee AROM: 0-85 deg.  Pt is limited by joint and muscle stiffness. She continues with pain.    Lower Extremity Strength  Right LE   Left LE     Knee extension: 4+/5 Knee extension: 4+/5   Knee flexion: 4/5 Knee flexion: 4/5   Hip  "flexion: 4-/5 Hip flexion: 4/5   Hip extension:  4/5 Hip extension: 4/5   Hip abduction: 3+/5 without allowing hip flexor compensations  Hip abduction: 3+/5 without allowing spinal rotation   Hip adduction: 4-/5 Hip adduction 4/5   Ankle dorsiflexion: 5/5 Ankle dorsiflexion: 5/5   Ankle plantarflexion: 5/5 Ankle plantarflexion: 5/5       FOTO: 2/26/2025: 61 (+0) functional score    Treatment     Curtis received the treatments listed below:      therapeutic exercises to develop strength, endurance, ROM, flexibility, posture, and core stabilization for 25 minutes including:  Bike for knee flexion 10 mins upright  Long sitting gastroc stretch with strap x 1 minute x 3   Knee flexion with strap (heel slides) x 3 minutes, 5 sec hold at end range    HS stretch 3 x 1 mins    ITB stretch 3x 30 sec  Mini squats 2 x 10  Standing Hip extension 2x 10  Standing hip Abduction 2 x 10  Standing HSC 2 x 10    neuromuscular re-education activities to improve: Balance, Coordination, Proprioception, and Posture for 30 minutes. The following activities were included:  Side lying hip Abduction right/Left x 10 each  Supine hip Abduction 2 x 10 (addis)  Quad set 2x10 3 sec hold without towel  Quad set + SLR 2x10  PNF stretching 5 x 30" x 10"    Np:  Supine Glute set x 50  Supine bridge gtb 3 x 10 (modified to right knee ROM)  Supine (back) bent knee fall out gtb 3 x 10 (clams)  Side lying clam blue band 3 x 10 each  TKE yellow t-band 2x20,   Hip ABD (focus on form position for glut med) 20x  LONG ARC QUAD with adductor ball squeeze 3 x 10    Patient Education and Home Exercises     Home Exercises Provided and Patient Education Provided     Education provided:   - HEP compliance   - limitations    Written Home Exercises Provided: Patient instructed to cont prior HEP. Exercises were reviewed and Curtis was able to demonstrate them prior to the end of the session.  Curtis demonstrated good  understanding of the education provided. See EMR " under Patient Instructions for exercises provided during therapy sessions    ASSESSMENT     Curtis is approved 4 visits. We will attempt to improve right knee flexion for donning leg clothing and leg crossing. Then d/c to HEP.    Curtis Is progressing well towards her goals.   Pt prognosis is Good.     Pt will continue to benefit from skilled outpatient physical therapy to address the deficits listed in the problem list box on initial evaluation, provide pt/family education and to maximize pt's level of independence in the home and community environment.     Pt's spiritual, cultural and educational needs considered and pt agreeable to plan of care and goals.     Anticipated barriers to physical therapy: age, lifestyle    Goals:   Short Term Goals: 2 weeks   Right knee extension AROM improved to -2 deg for improving fx.-MET  Right knee flexion AROM improved to 100 deg for improving fx.-MET     Long Term Goals: 4 weeks   Right knee flexion AROM improved to 108 deg for improving fx.-ongoing  FOTO score improved to 68 for improving QOL.-ongoing    PLAN     Continue current POC with emphasis on achieving symmetrical ROM, strength and function.     I CERTIFY THE NEED FOR THESE SERVICES FURNISHED UNDER THIS PLAN OF TREATMENT AND WHILE UNDER MY CARE    Physician's comments:        Physician's Signature: ___________________________________________________ DATE:_______________________     Leatha Santana, PT, DPT

## 2025-03-26 ENCOUNTER — CLINICAL SUPPORT (OUTPATIENT)
Dept: REHABILITATION | Facility: HOSPITAL | Age: 75
End: 2025-03-26
Payer: MEDICARE

## 2025-03-26 DIAGNOSIS — R26.9 GAIT ABNORMALITY: ICD-10-CM

## 2025-03-26 DIAGNOSIS — R29.898 WEAKNESS OF BOTH LOWER EXTREMITIES: ICD-10-CM

## 2025-03-26 DIAGNOSIS — M25.661 DECREASED RANGE OF MOTION OF RIGHT KNEE: Primary | ICD-10-CM

## 2025-03-26 PROCEDURE — 97110 THERAPEUTIC EXERCISES: CPT | Mod: PO | Performed by: PHYSICAL THERAPIST

## 2025-03-26 PROCEDURE — 97112 NEUROMUSCULAR REEDUCATION: CPT | Mod: PO | Performed by: PHYSICAL THERAPIST

## 2025-03-26 PROCEDURE — 97140 MANUAL THERAPY 1/> REGIONS: CPT | Mod: PO | Performed by: PHYSICAL THERAPIST

## 2025-03-26 NOTE — PROGRESS NOTES
[facility]    Physical Therapy Treatment Note     Name: Curtis Navarro  [facility] Number: [id number]    Therapy Diagnosis:   Encounter Diagnoses   Name Primary?    Decreased range of motion of right knee Yes    Weakness of both lower extremities     Gait abnormality      Physician: Joselo Helton,*    Visit Date: 3/26/2025  Physician Orders: PT Eval and Treat   Post Surgical?No Eval and TreatYes Type of TherapyOutpatient Therapy Location:Knees  Medical Diagnosis from Referral: M25.561 (ICD-10-CM) - Pain in right knee  Evaluation Date: 1/10/2025  Authorization Period Expiration:     03/13/2025     Plan of Care Expiration: 2/7/2025, 3/7/2025, 4/7/2025  Progress Note Due: 2/9/2025, 3/3/2025, 4/4/25  Date of Surgery: 9/18/2024, right TKA  Visit # / Visits authorized:1/1, 16/16 (corrected), 18/20  FOTO: 3/ 3 (1/10/2025, 1/24/2025, 2/25/2025)    PTA Visit #: 0/5     Precautions: Standard     Time In: 1315 (late arrival)  Time Out: 1400  Total Billable Time: 40 minutes 1:1    SUBJECTIVE     Pt reports: trying to come off Celebrex, but then the back of her knee was uncomfortable. She was compliant with home exercise program.   Response to previous treatment: no adverse effects   Functional change: improving ROM and strength, as well as swelling.    Pain: 0/10  Location: right knee      OBJECTIVE     Objective Measures updated at progress report unless specified.     3/10/2025: -2 to 103 deg with hip flexion    2/28/2025: -2 to 105 deg AROM.    2/19/2025: -2 to 101 deg.    1/30/2025:    Right knee AROM: 0-85 deg.  Pt is limited by joint and muscle stiffness. She continues with pain.    Lower Extremity Strength  Right LE   Left LE     Knee extension: 4+/5 Knee extension: 4+/5   Knee flexion: 4/5 Knee flexion: 4/5   Hip flexion: 4-/5 Hip flexion: 4/5   Hip extension:  4/5 Hip extension: 4/5   Hip abduction: 3+/5 without allowing hip flexor compensations  Hip abduction: 3+/5 without allowing  "spinal rotation   Hip adduction: 4-/5 Hip adduction 4/5   Ankle dorsiflexion: 5/5 Ankle dorsiflexion: 5/5   Ankle plantarflexion: 5/5 Ankle plantarflexion: 5/5       FOTO: 2/26/2025: 61 (+0) functional score    Treatment     Curtis received the treatments listed below:      therapeutic exercises to develop strength, endurance, ROM, flexibility, posture, and core stabilization for 15 minutes including:  Bike for knee flexion 10 mins upright  Calf stretch on wedge 2 mins    Np:  Long sitting gastroc stretch with strap x 1 minute x 3   Knee flexion with strap (heel slides) x 3 minutes, 5 sec hold at end range  HS stretch 3 x1 mins  ITB stretch 3x 30 sec  Mini squats 2 x 10  Standing Hip extension 2x 10  Standing hip Abduction 2 x 10  Standing HSC 2 x 10    Manual therapy x 10 mins to calf and HS in prone.    neuromuscular re-education activities to improve: Balance, Coordination, Proprioception, and Posture for 15 minutes. The following activities were included:    Quad set 2x10 3 sec hold without towel  Quad set + SLR 2x10  PNF stretching 5 x 30" x 10"  LONG ARC QUAD 3# 3 x 10    Np:  Side lying hip Abduction right/Left x 10 each  Supine hip Abduction 2 x 10 (addis)  Supine Glute set x 50  Supine bridge gtb 3 x 10 (modified to right knee ROM)  Supine (back) bent knee fall out gtb 3 x 10 (clams)  Side lying clam blue band 3 x 10 each  TKE yellow t-band 2x20,   Hip ABD (focus on form position for glut med) 20x  LONG ARC QUAD with adductor ball squeeze 3 x 10    Patient Education and Home Exercises     Home Exercises Provided and Patient Education Provided     Education provided:   - HEP compliance   - limitations    Written Home Exercises Provided: Patient instructed to cont prior HEP. Exercises were reviewed and Curtis was able to demonstrate them prior to the end of the session.  Curtis demonstrated good  understanding of the education provided. See EMR under Patient Instructions for exercises provided during therapy " sessions    ASSESSMENT     Curtis is approved 4 visits. We will attempt to improve right knee flexion for donning leg clothing and leg crossing. Then d/c to HEP.    Curtis Is progressing well towards her goals.   Pt prognosis is Good.     Pt will continue to benefit from skilled outpatient physical therapy to address the deficits listed in the problem list box on initial evaluation, provide pt/family education and to maximize pt's level of independence in the home and community environment.     Pt's spiritual, cultural and educational needs considered and pt agreeable to plan of care and goals.     Anticipated barriers to physical therapy: age, lifestyle    Goals:   Short Term Goals: 2 weeks   Right knee extension AROM improved to -2 deg for improving fx.-MET  Right knee flexion AROM improved to 100 deg for improving fx.-MET     Long Term Goals: 4 weeks   Right knee flexion AROM improved to 108 deg for improving fx.-ongoing  FOTO score improved to 68 for improving QOL.-ongoing    PLAN     Continue current POC with emphasis on achieving symmetrical ROM, strength and function.     I CERTIFY THE NEED FOR THESE SERVICES FURNISHED UNDER THIS PLAN OF TREATMENT AND WHILE UNDER MY CARE    Physician's comments:        Physician's Signature: ___________________________________________________ DATE:_______________________     Leatha Santana, PT, DPT

## 2025-03-31 RX ORDER — VIBEGRON 75 MG/1
TABLET, FILM COATED ORAL
Qty: 90 TABLET | Refills: 0 | Status: SHIPPED | OUTPATIENT
Start: 2025-03-31

## 2025-03-31 NOTE — TELEPHONE ENCOUNTER
This patient has not been seen by urology in over a year.      A prescription was refilled for gemtesa 90 days with NO refills but the patient will need a one year FOLLOW UP with me or urology nurse practitioner (whomever has first availability).    Patient can ask their pcp to refill until then.     Please see their last note and see if they needed labs and/or imaging and confirm the patient was not discharged from our clinic back to their pcp.

## 2025-04-02 ENCOUNTER — CLINICAL SUPPORT (OUTPATIENT)
Dept: REHABILITATION | Facility: HOSPITAL | Age: 75
End: 2025-04-02
Payer: MEDICARE

## 2025-04-02 DIAGNOSIS — R29.898 WEAKNESS OF BOTH LOWER EXTREMITIES: ICD-10-CM

## 2025-04-02 DIAGNOSIS — R26.9 GAIT ABNORMALITY: ICD-10-CM

## 2025-04-02 DIAGNOSIS — M25.661 DECREASED RANGE OF MOTION OF RIGHT KNEE: Primary | ICD-10-CM

## 2025-04-02 PROCEDURE — 97112 NEUROMUSCULAR REEDUCATION: CPT | Mod: PO | Performed by: PHYSICAL THERAPIST

## 2025-04-02 NOTE — PROGRESS NOTES
[facility]    Physical Therapy Treatment Note     Name: Curtis Navarro  [facility] Number: [id number]    Therapy Diagnosis:   Encounter Diagnoses   Name Primary?    Decreased range of motion of right knee Yes    Weakness of both lower extremities     Gait abnormality        Physician: Joselo Helton,*    Visit Date: 4/2/2025  Physician Orders: PT Eval and Treat   Post Surgical?No Eval and TreatYes Type of TherapyOutpatient Therapy Location:Knees  Medical Diagnosis from Referral: M25.561 (ICD-10-CM) - Pain in right knee  Evaluation Date: 1/10/2025  Authorization Period Expiration:     03/13/2025     Plan of Care Expiration: 2/7/2025, 3/7/2025, 4/7/2025  Progress Note Due: 2/9/2025, 3/3/2025, 4/4/25  Date of Surgery: 9/18/2024, right TKA  Visit # / Visits authorized:1/1, 16/16 (corrected), 19/20  FOTO: 3/ 3 (1/10/2025, 1/24/2025, 2/25/2025)    PTA Visit #: 0/5     Precautions: Standard     Time In: 1100  Time Out: 1150  Total Billable Time: 50 minutes 1:1    SUBJECTIVE     Pt reports: trying to come off Celebrex, but swelling returns. She was compliant with home exercise program.   Response to previous treatment: no adverse effects   Functional change: improving ROM and strength, as well as swelling.    Pain: 0/10  Location: right knee      OBJECTIVE     Objective Measures updated at progress report unless specified.     4/2/2025: 0-98 deg before MT, 103 deg after MT.    3/10/2025: -2 to 103 deg with hip flexion    2/28/2025: -2 to 105 deg AROM.    2/19/2025: -2 to 101 deg.    1/30/2025:    Pt is limited by joint and muscle stiffness. She continues with pain.    Lower Extremity Strength  Right LE   Left LE     Knee extension: 4+/5 Knee extension: 4+/5   Knee flexion: 4/5 Knee flexion: 4/5   Hip flexion: 4-/5 Hip flexion: 4/5   Hip extension:  4/5 Hip extension: 4/5   Hip abduction: 3+/5 without allowing hip flexor compensations  Hip abduction: 3+/5 without allowing spinal rotation  "  Hip adduction: 4-/5 Hip adduction 4/5   Ankle dorsiflexion: 5/5 Ankle dorsiflexion: 5/5   Ankle plantarflexion: 5/5 Ankle plantarflexion: 5/5       FOTO: 2/26/2025: 61 (+0) functional score    Treatment     Curtis received the treatments listed below:      therapeutic exercises to develop strength, endurance, ROM, flexibility, posture, and core stabilization for 08 minutes including:  Bike for knee flexion 8 mins upright, L2    Np:  Calf stretch on wedge 2 mins  Long sitting gastroc stretch with strap x 1 minute x 3   Knee flexion with strap (heel slides) x 3 minutes, 5 sec hold at end range  HS stretch 3 x1 mins  ITB stretch 3x 30 sec  Mini squats 2 x 10  Standing Hip extension 2x 10  Standing hip Abduction 2 x 10  Standing HSC 2 x 10    Manual therapy x 00 mins to calf and HS in prone.    neuromuscular re-education activities to improve: Balance, Coordination, Proprioception, and Posture for 40 minutes. The following activities were included:    Quad set + SLR 2x10  PNF stretching 5 x 30" x 10"  LONG ARC QUAD 3# 3 x 10  Supine hip Abduction 2 x 10 (addis)  Side lying hip Abduction right/Left 2 x 10 each  Supine Glute set x 50  Supine bridge gtb 3 x 10 (modified to right knee ROM)  Supine (back) bent knee fall out gtb 3 x 10 (clams)  Side lying clam blue band 3 x 10 each  TKE green t-band 2x20    Np:  Hip ABD (focus on form position for glut med) 20x  LONG ARC QUAD with adductor ball squeeze 3 x 10    Patient Education and Home Exercises     Home Exercises Provided and Patient Education Provided     Education provided:   - HEP compliance   - limitations    Written Home Exercises Provided: Patient instructed to cont prior HEP. Exercises were reviewed and Curtis was able to demonstrate them prior to the end of the session.  Curtis demonstrated good  understanding of the education provided. See EMR under Patient Instructions for exercises provided during therapy sessions    ASSESSMENT     Curtis has trouble " maintaining 0-103 deg right knee AROM on her own. We are able to get back to this after PNF stretching each visit. Swelling nil today. D/c next visit.    Curtis Is progressing well towards her goals.   Pt prognosis is Good.     Pt will continue to benefit from skilled outpatient physical therapy to address the deficits listed in the problem list box on initial evaluation, provide pt/family education and to maximize pt's level of independence in the home and community environment.     Pt's spiritual, cultural and educational needs considered and pt agreeable to plan of care and goals.     Anticipated barriers to physical therapy: age, lifestyle    Goals:   Short Term Goals: 2 weeks   Right knee extension AROM improved to -2 deg for improving fx.-MET  Right knee flexion AROM improved to 100 deg for improving fx.-MET     Long Term Goals: 4 weeks   Right knee flexion AROM improved to 108 deg for improving fx.-ongoing  FOTO score improved to 68 for improving QOL.-ongoing    PLAN     Continue current POC with emphasis on achieving symmetrical ROM, strength and function.     I CERTIFY THE NEED FOR THESE SERVICES FURNISHED UNDER THIS PLAN OF TREATMENT AND WHILE UNDER MY CARE    Physician's comments:        Physician's Signature: ___________________________________________________ DATE:_______________________     Leatha Santana, PT, DPT

## 2025-04-09 ENCOUNTER — CLINICAL SUPPORT (OUTPATIENT)
Dept: REHABILITATION | Facility: HOSPITAL | Age: 75
End: 2025-04-09
Payer: MEDICARE

## 2025-04-09 DIAGNOSIS — R29.898 WEAKNESS OF BOTH LOWER EXTREMITIES: Primary | ICD-10-CM

## 2025-04-09 DIAGNOSIS — M25.661 DECREASED RANGE OF MOTION OF RIGHT KNEE: ICD-10-CM

## 2025-04-09 DIAGNOSIS — R26.9 GAIT ABNORMALITY: ICD-10-CM

## 2025-04-09 PROCEDURE — 97110 THERAPEUTIC EXERCISES: CPT | Mod: PO | Performed by: PHYSICAL THERAPIST

## 2025-04-09 PROCEDURE — 97112 NEUROMUSCULAR REEDUCATION: CPT | Mod: PO | Performed by: PHYSICAL THERAPIST

## 2025-04-09 NOTE — PROGRESS NOTES
Outpatient Rehab    Physical Therapy Discharge    Patient Name: Curtis Navarro  MRN: [id number]  YOB: 1950  Encounter Date: 4/9/2025    Therapy Diagnosis:   Encounter Diagnoses   Name Primary?    Weakness of both lower extremities Yes    Decreased range of motion of right knee     Gait abnormality      Physician: Joselo Helton,*    Physician Orders: Eval and Treat  Medical Diagnosis: Presence of right artificial knee joint    Visit # / Visits Authorized:  20 / 20  Insurance Authorization Period: 1/13/2025 to 4/11/2025  Date of Evaluation: 1/10/2025   Plan of Care Certification: 3/7/2025 to 4/7/2025      PT/PTA: PT   Number of PTA visits since last PT visit:0  Time In: 1101   Time Out: 1200  Total Time: 59   Total Billable Time: 56    FOTO:  Intake Score: 61%  Survey Score 1: 61 (+0)%  Survey Score 2: 79 (+18)%         Subjective   Curtis continues with quad stiffness, but this is manageable. She does require continued use of Celebrex to control edema..  Pain reported as 1/10. knee    Objective          Objective Measures updated at progress report unless specified.      4/9/2025: 0-105 deg right knee AROM.      Lower Extremity Strength  Right LE   Left LE     Knee extension: 4+/5 Knee extension: 4+/5   Knee flexion: 4/5 Knee flexion: 4/5   Hip flexion: 4-/5 Hip flexion: 4/5   Hip extension:  4/5 Hip extension: 4/5   Hip abduction: 4-/5 without allowing hip flexor compensations  Hip abduction: 4-/5 without allowing spinal rotation   Hip adduction: 4-/5 Hip adduction 4/5   Ankle dorsiflexion: 5/5 Ankle dorsiflexion: 5/5   Ankle plantarflexion: 5/5 Ankle plantarflexion: 5/5      Treatment:     Curtis received the treatments listed below:       therapeutic exercises to develop strength, endurance, ROM, flexibility, posture, and core stabilization for 25 minutes including:  Bike for knee flexion 8 mins, L2  Calf stretch on wedge 2 mins  Knee flexion with strap (heel slides) x 3 minutes, 5 sec  hold at end range  HS stretch 3 x1 mins  ITB stretch 3x 30 sec  Updated HEP     neuromuscular re-education activities to improve: Balance, Coordination, Proprioception, and Posture for 35 minutes. The following activities were included:     Quad set + SLR 2x10  Supine hip Abduction 2 x 10 (addis)  Supine Glute set x 50  Supine bridge gtb 3 x 10 (modified to right knee ROM)  Supine (back) bent knee fall out gtb 3 x 10 (clams)  Side lying clam blue band 3 x 10 each  TKE green t-band 2x20     Assessment & Plan   Assessment: Curtis has improved R knee and LE ROM and strength that is functional for her. She will cont [facility].  Evaluation/Treatment Tolerance: Patient tolerated treatment well    Patient's spiritual, cultural, and educational needs considered and patient agreeable to plan of care and goals.           Plan: Discharge to [facility].    Goals:   Goals:   Short Term Goals: 2 weeks   Right knee extension AROM improved to -2 deg for improving fx.-MET  Right knee flexion AROM improved to 100 deg for improving fx.-MET     Long Term Goals: 4 weeks   Right knee flexion AROM improved to 108 deg for improving fx.-ongoing  FOTO score improved to 68 for improving QOL.-MET    Leatha Santana, PT, DPT

## 2025-04-10 PROBLEM — M25.661 DECREASED RANGE OF MOTION OF RIGHT KNEE: Status: RESOLVED | Noted: 2025-01-10 | Resolved: 2025-04-10

## 2025-04-10 PROBLEM — R29.898 WEAKNESS OF BOTH LOWER EXTREMITIES: Status: RESOLVED | Noted: 2025-01-10 | Resolved: 2025-04-10

## 2025-04-10 PROBLEM — R26.9 GAIT ABNORMALITY: Status: RESOLVED | Noted: 2025-01-10 | Resolved: 2025-04-10

## 2025-04-28 ENCOUNTER — OFFICE VISIT (OUTPATIENT)
Dept: OBSTETRICS AND GYNECOLOGY | Facility: CLINIC | Age: 75
End: 2025-04-28
Payer: MEDICARE

## 2025-04-28 VITALS — DIASTOLIC BLOOD PRESSURE: 74 MMHG | WEIGHT: 196 LBS | BODY MASS INDEX: 34.72 KG/M2 | SYSTOLIC BLOOD PRESSURE: 152 MMHG

## 2025-04-28 DIAGNOSIS — Z01.419 WOMEN'S ANNUAL ROUTINE GYNECOLOGICAL EXAMINATION: Primary | ICD-10-CM

## 2025-04-28 PROCEDURE — 1126F AMNT PAIN NOTED NONE PRSNT: CPT | Mod: CPTII,S$GLB,, | Performed by: OBSTETRICS & GYNECOLOGY

## 2025-04-28 PROCEDURE — G0101 CA SCREEN;PELVIC/BREAST EXAM: HCPCS | Mod: S$GLB,,, | Performed by: OBSTETRICS & GYNECOLOGY

## 2025-04-28 PROCEDURE — 4010F ACE/ARB THERAPY RXD/TAKEN: CPT | Mod: CPTII,S$GLB,, | Performed by: OBSTETRICS & GYNECOLOGY

## 2025-04-28 PROCEDURE — 1101F PT FALLS ASSESS-DOCD LE1/YR: CPT | Mod: CPTII,S$GLB,, | Performed by: OBSTETRICS & GYNECOLOGY

## 2025-04-28 PROCEDURE — 3078F DIAST BP <80 MM HG: CPT | Mod: CPTII,S$GLB,, | Performed by: OBSTETRICS & GYNECOLOGY

## 2025-04-28 PROCEDURE — 3077F SYST BP >= 140 MM HG: CPT | Mod: CPTII,S$GLB,, | Performed by: OBSTETRICS & GYNECOLOGY

## 2025-04-28 PROCEDURE — 3288F FALL RISK ASSESSMENT DOCD: CPT | Mod: CPTII,S$GLB,, | Performed by: OBSTETRICS & GYNECOLOGY

## 2025-04-28 PROCEDURE — 1159F MED LIST DOCD IN RCRD: CPT | Mod: CPTII,S$GLB,, | Performed by: OBSTETRICS & GYNECOLOGY

## 2025-04-28 PROCEDURE — 88175 CYTOPATH C/V AUTO FLUID REDO: CPT | Mod: TC | Performed by: OBSTETRICS & GYNECOLOGY

## 2025-04-28 PROCEDURE — 99999 PR PBB SHADOW E&M-EST. PATIENT-LVL IV: CPT | Mod: PBBFAC,,, | Performed by: OBSTETRICS & GYNECOLOGY

## 2025-04-28 NOTE — PROGRESS NOTES
"Chief Complaint   Patient presents with    Annual Exam       History and Physical:  No LMP recorded. Patient is postmenopausal.       Curtis Navarro is a 74 y.o.   female who presents today for her routine annual GYN exam. The patient has no Gynecology complaints today. No bowel or bladder complaints. No breast masses or Vaginal Bleeding       Allergies:   Review of patient's allergies indicates:   Allergen Reactions    Lisinopril Other (See Comments)     Cough    Azithromycin      Other reaction(s): Nausea  Other reaction(s): Diarrhea    Metronidazole hcl      Other reaction(s): Rash  Other reaction(s): Itching    Moxifloxacin      Other reaction(s): Rash    Sulfa (sulfonamide antibiotics)      Other reaction(s): Itching       Past Medical History:   Diagnosis Date    Allergy     Arthritis     Cataract     Colon polyps     Diverticulosis     GERD (gastroesophageal reflux disease)     Graves disease     s/p radioactive iodine in 42    H/O cardiovascular stress test     normal     History of diverticulitis     History of skin cancer     L neck    Hyperlipidemia     Hypertension     Hypothyroidism     s/p radioactive iodine    IBS (irritable bowel syndrome)     Osteopenia     Thyroid nodule     last usg ;  next due        Past Surgical History:   Procedure Laterality Date    CARDIAC SURGERY      angiogram (negative)    COLONOSCOPY  2010    Dr. Ceballos; in legacy, repeat in 6-7 years    COLONOSCOPY N/A 2017    Procedure: COLONOSCOPY;  Surgeon: Hamlet Ceballos Jr., MD;  Location: [facility];  Service: Endoscopy;  Laterality: N/A; repeat in 5 years for surveillance    COLONOSCOPY N/A 2023    Procedure: COLONOSCOPY;  Surgeon: Hamlet Ceballos Jr., MD;  Location: [facility];  Service: Endoscopy;  Laterality: N/A;    COSMETIC SURGERY      Liposuction to neck    ESOPHAGOGASTRODUODENOSCOPY N/A 2018    Dr. Ceballos: "White nummular lesions in esophageal mucosa (benign)", " antritis, gastric polyps removed    ESOPHAGOGASTRODUODENOSCOPY N/A 2023    Procedure: EGD (ESOPHAGOGASTRODUODENOSCOPY);  Surgeon: Hamlet Velázquez Jr., MD;  Location[facility];  Service: Endoscopy;  Laterality: N/A;    SKIN CANCER EXCISION      with Mohs on left neck    TONSILLECTOMY      TOTAL KNEE ARTHROPLASTY Right     UPPER GASTROINTESTINAL ENDOSCOPY  2014    Dr. Velázquez    UPPER GASTROINTESTINAL ENDOSCOPY  2017    DR. VELÁZQUEZ       MEDS: Medications Ordered Prior to Encounter[1]    OB History          1    Para   1    Term   1            AB        Living             SAB        IAB        Ectopic        Multiple        Live Births                     Social History[2]    Family History   Problem Relation Name Age of Onset    Cataracts Mother      Colon polyps Mother          history of colitis- part colon removed    Cancer Sister x2     Ovarian cancer Sister x2 40    Cataracts Maternal Grandfather      Amblyopia Neg Hx      Blindness Neg Hx      Glaucoma Neg Hx      Hypertension Neg Hx      Macular degeneration Neg Hx      Retinal detachment Neg Hx      Strabismus Neg Hx      Stroke Neg Hx      Thyroid disease Neg Hx      Diabetes Neg Hx      Colon cancer Neg Hx      Esophageal cancer Neg Hx      Stomach cancer Neg Hx           Past medical and surgical history reviewed.   I have reviewed the patient's medical history in detail and updated the computerized patient record.      Review of System:   General: no chills, fever, night sweats, weight gain or weight loss  Psychological: no depression or suicidal ideation  Breasts: no new or changing breast lumps, nipple discharge or masses.  Respiratory: no cough, shortness of breath, or wheezing  Cardiovascular: no chest pain or dyspnea on exertion  Gastrointestinal: no abdominal pain, change in bowel habits, or black or bloody stools  Genito-Urinary: no incontinence, urinary frequency/urgency or vulvar/vaginal symptoms, pelvic pain or  abnormal vaginal bleeding.  Musculoskeletal: no gait disturbance or muscular weakness      Physical Exam:   BP (!) 152/74 (Patient Position: Sitting)   Wt 88.9 kg (195 lb 15.8 oz)   BMI 34.72 kg/m²   Constitutional: She appears alert and responsive. She appears well-developed, well-groomed, and well-nourished. No distress. OverWeight   HENT:   Head: Normocephalic and atraumatic.   Eyes: Conjunctivae and EOM are normal. No scleral icterus.   Neck: Symmetrical. Normal range of motion. Neck supple. No tracheal deviation present.   Cardiovascular: Normal rate, no rhythm abnormality noted. Extremities without swelling or edema, warm.    Pulmonary/Chest: Normal respiratory Effort. No distress or retractions. She exhibits no tenderness.  Breasts: are symmetrical.   Right breast exhibits no inverted nipple, no mass, no nipple discharge, no skin change and no tenderness.   Left breast exhibits no inverted nipple, no mass, no nipple discharge, no skin change and no tenderness.  Abdominal: Soft. She exhibits no distension, hernias or masses. There is no tenderness. No enlargement of liver edge or spleen.  There is no rebound and no guarding.   Genitourinary:    External rectal exam shows no thrombosed external hemorrhoids, no lesions.     Pelvic exam was performed with patient supine.   No labial fusion, and symmetrical.    There is no rash, lesion or injury on the right labia.   There is no rash, lesion or injury on the left labia.   No bleeding and no signs of injury around the vaginal introitus, urethral meatus is normal size and without prolapse or lesions, urethra well supported. The cervix is visualized with no discharge, lesions or friability.   No vaginal discharge found.    No significant Cystocele, Enterocele or rectocele, and cervix and uterus well supported.   Bimanual exam:   The urethra is normal to palpation and there are no palpable vaginal wall masses.   Uterus is not deviated, not enlarged, not fixed,  normal shape and not tender.   Cervix exhibits no motion tenderness.    Right adnexum displays no mass or nodularity and no tenderness.   Left adnexum displays no mass or nodularity and no tenderness.  Musculoskeletal: Normal range of motion.   Neurological: She is alert and oriented to person, place, and time. Coordination normal.   Skin: Skin is warm and dry. She is not diaphoretic. No rashes, lesions or ulcers.   Psychiatric: She has a normal mood and affect, oriented to person, place, and time.      Assessment:   Normal annual GYN exam  No complaints, UTD on mammgoram    Plan:   PAP, repeat every other year  Follow up in 1 year.  Patient informed will be contacted with results within 2 weeks. Encouraged to please call back or email if she has not heard from us by then.         [1]   Current Outpatient Medications on File Prior to Visit   Medication Sig Dispense Refill    acetaminophen (TYLENOL) 500 MG tablet Take 500 mg by mouth every 6 (six) hours as needed for Pain.      aspirin (ECOTRIN) 81 MG EC tablet Take 81 mg by mouth once daily.      calcium-vitamin D3 500 mg(1,250mg) -200 unit per tablet Take 1 tablet by mouth 2 (two) times daily with meals.      CELEBREX 200 mg capsule Take 200 mg by mouth once daily.      fluticasone propionate (FLONASE) 50 mcg/actuation nasal spray 2 sprays (100 mcg total) by Each Nostril route once daily. 16 g 3    GEMTESA 75 mg Tab TAKE ONE TABLET BY MOUTH EVERY MORNING FOR OVER ACTIVE BLADDER 90 tablet 00    GLUCOSA HAHN 2KCL/CHONDROITIN HAHN (GLUCOSAMINE-CHONDROITIN DS ORAL) Take by mouth 2 (two) times daily.      ketoconazole (NIZORAL) 2 % shampoo Apply topically twice a week. 120 mL 3    KRILL/OM-3/DHA/EPA/PHOSPHO/AST (MEGARED OMEGA-3 KRILL OIL ORAL) Take 1 capsule by mouth every evening.      Lactobacillus acidophilus (PROBIOTIC ORAL) Take 1 capsule by mouth once daily.      levothyroxine (SYNTHROID) 112 MCG tablet TAKE 1 TABLET (112 MCG TOTAL) BY MOUTH BEFORE BREAKFAST. 90  tablet 1    losartan (COZAAR) 100 MG tablet TAKE 1 TABLET (100 MG TOTAL) BY MOUTH ONCE DAILY. 90 tablet 3    metoprolol succinate (TOPROL-XL) 50 MG 24 hr tablet TAKE 1 TABLET BY MOUTH ONCE DAILY 90 tablet 3    miconazole NITRATE 2 % (ZEASORB AF) 2 % top powder Apply topically as needed for Itching. 85 g 3    MULTIVITAMIN ORAL Take 1 tablet by mouth once daily.      omeprazole (PRILOSEC) 20 MG capsule TAKE 1 CAPSULE (20 MG TOTAL) BY MOUTH ONCE DAILY. 90 capsule 3    pitavastatin calcium (LIVALO) 2 mg Tab tablet TAKE ONE TABLET BY MOUTH EVERY DAY IN THE EVENING 90 tablet 3    biotin 5 mg Cap Take 5 mg by mouth once daily.      minoxidiL (LONITEN) 2.5 MG tablet Take 0.5 tablets (1.25 mg total) by mouth once daily. 15 tablet 2    mupirocin (BACTROBAN) 2 % ointment by Nasal route 3 (three) times daily. 30 g 0    pimecrolimus (ELIDEL) 1 % cream Apply topically 2 (two) times daily. (Patient not taking: Reported on 4/28/2025) 60 g 2    polyethylene glycol (GLYCOLAX) 17 gram PwPk Take 17 g by mouth daily as needed for Constipation.       No current facility-administered medications on file prior to visit.   [2]   Social History  Socioeconomic History    Marital status:    Tobacco Use    Smoking status: Never    Smokeless tobacco: Never   Substance and Sexual Activity    Alcohol use: Never    Drug use: No    Sexual activity: Yes     Partners: Male     Birth control/protection: Post-menopausal   Social History Narrative         Social Drivers of Health     Financial Resource Strain: Low Risk  (10/15/2024)    Overall Financial Resource Strain (CARDIA)     Difficulty of Paying Living Expenses: Not very hard   Food Insecurity: No Food Insecurity (10/15/2024)    Hunger Vital Sign     Worried About Running Out of Food in the Last Year: Never true     Ran Out of Food in the Last Year: Never true   Transportation Needs: No Transportation Needs (10/15/2024)    TRANSPORTATION NEEDS     Transportation : No   Physical  Activity: Insufficiently Active (10/15/2024)    Exercise Vital Sign     Days of Exercise per Week: 3 days     Minutes of Exercise per Session: 40 min   Stress: Stress Concern Present (10/15/2024)    Citizen of [place] of Occupational Health - Occupational Stress Questionnaire     Feeling of Stress : To some extent   Housing Stability: Unknown (10/15/2024)    Housing Stability Vital Sign     Unable to Pay for Housing in the Last Year: No     Homeless in the Last Year: No

## 2025-04-30 ENCOUNTER — OFFICE VISIT (OUTPATIENT)
Dept: CARDIOLOGY | Facility: CLINIC | Age: 75
End: 2025-04-30
Payer: MEDICARE

## 2025-04-30 ENCOUNTER — PATIENT MESSAGE (OUTPATIENT)
Dept: OBSTETRICS AND GYNECOLOGY | Facility: CLINIC | Age: 75
End: 2025-04-30
Payer: MEDICARE

## 2025-04-30 VITALS
SYSTOLIC BLOOD PRESSURE: 139 MMHG | HEART RATE: 77 BPM | HEIGHT: 63 IN | BODY MASS INDEX: 34.8 KG/M2 | WEIGHT: 196.44 LBS | DIASTOLIC BLOOD PRESSURE: 66 MMHG

## 2025-04-30 DIAGNOSIS — R06.02 SOB (SHORTNESS OF BREATH): ICD-10-CM

## 2025-04-30 DIAGNOSIS — E78.5 DYSLIPIDEMIA: Chronic | ICD-10-CM

## 2025-04-30 DIAGNOSIS — I10 PRIMARY HYPERTENSION: Primary | ICD-10-CM

## 2025-04-30 DIAGNOSIS — R00.2 PALPITATIONS: ICD-10-CM

## 2025-04-30 DIAGNOSIS — E66.01 SEVERE OBESITY (BMI 35.0-39.9) WITH COMORBIDITY: ICD-10-CM

## 2025-04-30 LAB
INSULIN SERPL-ACNC: NORMAL U[IU]/ML
LAB AP BETHESDA CATEGORY: NORMAL
LAB AP CLINICAL FINDINGS: NORMAL
LAB AP CONTRACEPTIVES: NORMAL
LAB AP GYN ADDITIONAL FINDINGS: NORMAL
LAB AP OCHS PAP SPECIMEN ADEQUACY: NORMAL
LAB AP OHS PAP INTERPRETATION: NORMAL
LAB AP PAP DISCLAIMER COMMENTS: NORMAL
LAB AP PAP ESTROGEN REPLACEMENT THERAPY: NORMAL
LAB AP PAP PMP: NORMAL
LAB AP PAP PREVIOUS BX: NORMAL
LAB AP PAP PRIOR TREATMENT: NORMAL
LAB AP PERFORMING LOCATION(S): NORMAL

## 2025-04-30 PROCEDURE — 4010F ACE/ARB THERAPY RXD/TAKEN: CPT | Mod: CPTII,S$GLB,, | Performed by: INTERNAL MEDICINE

## 2025-04-30 PROCEDURE — 3075F SYST BP GE 130 - 139MM HG: CPT | Mod: CPTII,S$GLB,, | Performed by: INTERNAL MEDICINE

## 2025-04-30 PROCEDURE — 3288F FALL RISK ASSESSMENT DOCD: CPT | Mod: CPTII,S$GLB,, | Performed by: INTERNAL MEDICINE

## 2025-04-30 PROCEDURE — 1101F PT FALLS ASSESS-DOCD LE1/YR: CPT | Mod: CPTII,S$GLB,, | Performed by: INTERNAL MEDICINE

## 2025-04-30 PROCEDURE — 3078F DIAST BP <80 MM HG: CPT | Mod: CPTII,S$GLB,, | Performed by: INTERNAL MEDICINE

## 2025-04-30 PROCEDURE — 3008F BODY MASS INDEX DOCD: CPT | Mod: CPTII,S$GLB,, | Performed by: INTERNAL MEDICINE

## 2025-04-30 PROCEDURE — 1126F AMNT PAIN NOTED NONE PRSNT: CPT | Mod: CPTII,S$GLB,, | Performed by: INTERNAL MEDICINE

## 2025-04-30 PROCEDURE — 99999 PR PBB SHADOW E&M-EST. PATIENT-LVL IV: CPT | Mod: PBBFAC,,, | Performed by: INTERNAL MEDICINE

## 2025-04-30 PROCEDURE — 1160F RVW MEDS BY RX/DR IN RCRD: CPT | Mod: CPTII,S$GLB,, | Performed by: INTERNAL MEDICINE

## 2025-04-30 PROCEDURE — 1159F MED LIST DOCD IN RCRD: CPT | Mod: CPTII,S$GLB,, | Performed by: INTERNAL MEDICINE

## 2025-04-30 PROCEDURE — 99214 OFFICE O/P EST MOD 30 MIN: CPT | Mod: S$GLB,,, | Performed by: INTERNAL MEDICINE

## 2025-04-30 NOTE — PROGRESS NOTES
Subjective:    Patient ID:  Curtis Navarro is a 74 y.o. female who presents for follow-up of hypertension    HPI  She comes with no complaints, no chest pain, no shortness of breath  She did have knee surgery within the last six-month, uneventful.    Blood pressure normal at home.    Dyslipidemia being followed by Dr. Del Castillo      Review of Systems   Constitutional: Negative for decreased appetite, malaise/fatigue, weight gain and weight loss.   Cardiovascular:  Negative for chest pain, dyspnea on exertion, leg swelling, palpitations and syncope.   Respiratory:  Negative for cough and shortness of breath.    Gastrointestinal: Negative.    Neurological:  Negative for weakness.   All other systems reviewed and are negative.       Objective:      Physical Exam  Vitals and nursing note reviewed.   Constitutional:       Appearance: Normal appearance. She is well-developed.   HENT:      Head: Normocephalic.   Eyes:      Pupils: Pupils are equal, round, and reactive to light.   Neck:      Thyroid: No thyromegaly.      Vascular: No carotid bruit or JVD.   Cardiovascular:      Rate and Rhythm: Normal rate and regular rhythm.      Chest Wall: PMI is not displaced.      Pulses: Normal pulses and intact distal pulses.      Heart sounds: Normal heart sounds. No murmur heard.     No gallop.   Pulmonary:      Effort: Pulmonary effort is normal.      Breath sounds: Normal breath sounds.   Abdominal:      Palpations: Abdomen is soft. There is no mass.      Tenderness: There is no abdominal tenderness.   Musculoskeletal:         General: Normal range of motion.      Cervical back: Normal range of motion and neck supple.   Skin:     General: Skin is warm.   Neurological:      Mental Status: She is alert and oriented to person, place, and time.      Sensory: No sensory deficit.      Deep Tendon Reflexes: Reflexes are normal and symmetric.             Most Recent EKG Results  Results for orders placed or performed during the  hospital encounter of 10/14/24   EKG 12-lead    Collection Time: 10/14/24 11:28 AM   Result Value Ref Range    QRS Duration 90 ms    [facility] QTC Calculation 386 ms    Narrative    Test Reason : R55,    Vent. Rate : 071 BPM     Atrial Rate : 071 BPM     P-R Int : 128 ms          QRS Dur : 090 ms      QT Int : 356 ms       P-R-T Axes : -08 041 040 degrees     QTc Int : 386 ms    Normal sinus rhythm  Normal ECG  When compared with ECG of 12-MAY-2023 09:36,  No significant change was found    Confirmed by Sebastian Ramirez MD ([id number]) on 10/14/2024 6:14:38 PM    Referred By: PRESTON ERNANDEZ           Confirmed By:Sebastian Ramirez MD       Most Recent Echocardiogram Results  Results for orders placed during the hospital encounter of 10/14/24    Echo    Interpretation Summary    Left Ventricle: The left ventricle is normal in size. Normal wall thickness. There is normal systolic function. Ejection fraction is approximately 65%. There is normal diastolic function.    Right Ventricle: Normal right ventricular cavity size. Wall thickness is normal. Systolic function is normal.    Left Atrium: Left atrium is mildly dilated. Agitated saline study of the atrial septum is negative after vasalva maneuver, suggesting absence of intracardiac shunt at the atrial level.    Aortic Valve: The aortic valve is a trileaflet valve. There is no significant regurgitation.    Mitral Valve: There is no significant regurgitation.    Tricuspid Valve: There is no significant regurgitation.      Most Recent Nuclear Stress Test Results  Results for orders placed during the hospital encounter of 05/12/23    Nuclear Stress - Cardiology Interpreted    Interpretation Summary    Normal myocardial perfusion scan. There is no evidence of myocardial ischemia or infarction.    The gated perfusion images showed an ejection fraction of 69% post stress.    The ECG portion of the study is negative for ischemia.    The patient reported no chest pain during the stress test.     The exercise capacity was mildly impaired.      Most Recent Cardiac PET Stress Test Results  No results found for this or any previous visit.      Most Recent Cardiovascular Angiogram results  No results found for this or any previous visit.      Other Most Recent Cardiology Results  Results for orders placed during the hospital encounter of 10/14/24    Cardiac monitoring strips      Labs reviewed    Assessment:         1. Primary hypertension    2. Severe obesity (BMI 35.0-39.9) with comorbidity    3. Palpitations    4. Dyslipidemia         Plan:   Continue:  ARB, ASA, Beta blocker, and Statin  Regular exercise program  Weight loss  Low cholesterol diet     Follow-up in 1 year with nuclear stress test

## 2025-05-06 NOTE — TELEPHONE ENCOUNTER
No care due was identified.  [facility] Embedded Care Due Messages. Reference number: [id number].   5/06/2025 2:56:15 PM CDT

## 2025-05-06 NOTE — TELEPHONE ENCOUNTER
Refill Routing Note   Medication(s) are not appropriate for processing by [facility] for the following reason(s):        Required labs outdated    [facility] action(s):  Defer             Appointments  past 12m or future 3m with PCP    Date Provider   Last Visit   1/16/2025 Komal Del Castillo MD   Next Visit   6/23/2025 Komal Del Castillo MD   ED visits in past 90 days: 0        Note composed:3:12 PM 05/06/2025

## 2025-05-07 RX ORDER — LEVOTHYROXINE SODIUM 112 UG/1
112 TABLET ORAL
Qty: 90 TABLET | Refills: 0 | Status: SHIPPED | OUTPATIENT
Start: 2025-05-07

## 2025-05-11 ENCOUNTER — PATIENT MESSAGE (OUTPATIENT)
Dept: OPTOMETRY | Facility: CLINIC | Age: 75
End: 2025-05-11
Payer: MEDICARE

## 2025-05-14 ENCOUNTER — PATIENT MESSAGE (OUTPATIENT)
Dept: FAMILY MEDICINE | Facility: CLINIC | Age: 75
End: 2025-05-14
Payer: MEDICARE

## 2025-05-14 DIAGNOSIS — R39.9 UTI SYMPTOMS: Primary | ICD-10-CM

## 2025-05-15 ENCOUNTER — PATIENT MESSAGE (OUTPATIENT)
Dept: FAMILY MEDICINE | Facility: CLINIC | Age: 75
End: 2025-05-15
Payer: MEDICARE

## 2025-05-15 ENCOUNTER — LAB VISIT (OUTPATIENT)
Dept: LAB | Facility: HOSPITAL | Age: 75
End: 2025-05-15
Attending: FAMILY MEDICINE
Payer: MEDICARE

## 2025-05-15 DIAGNOSIS — R39.9 UTI SYMPTOMS: ICD-10-CM

## 2025-05-15 LAB
BACTERIA #/AREA URNS HPF: ABNORMAL /HPF
BILIRUB UR QL STRIP.AUTO: NEGATIVE
CLARITY UR: CLEAR
COLOR UR AUTO: YELLOW
GLUCOSE UR QL STRIP: NEGATIVE
HGB UR QL STRIP: ABNORMAL
KETONES UR QL STRIP: NEGATIVE
LEUKOCYTE ESTERASE UR QL STRIP: ABNORMAL
MICROSCOPIC COMMENT: ABNORMAL
NITRITE UR QL STRIP: POSITIVE
PH UR STRIP: 6 [PH]
PROT UR QL STRIP: NEGATIVE
RBC #/AREA URNS HPF: 5 /HPF (ref 0–4)
SP GR UR STRIP: 1.01
SQUAMOUS #/AREA URNS HPF: 1 /HPF
WBC #/AREA URNS HPF: 25 /HPF (ref 0–5)

## 2025-05-15 PROCEDURE — 87186 SC STD MICRODIL/AGAR DIL: CPT

## 2025-05-15 PROCEDURE — 81003 URINALYSIS AUTO W/O SCOPE: CPT | Mod: PO

## 2025-05-17 RX ORDER — NITROFURANTOIN 25; 75 MG/1; MG/1
100 CAPSULE ORAL 2 TIMES DAILY
Qty: 10 CAPSULE | Refills: 0 | Status: SHIPPED | OUTPATIENT
Start: 2025-05-17 | End: 2025-05-22

## 2025-05-17 RX ORDER — PREDNISONE 20 MG/1
TABLET ORAL
Qty: 10 TABLET | Refills: 0 | Status: SHIPPED | OUTPATIENT
Start: 2025-05-17 | End: 2025-05-24

## 2025-05-19 ENCOUNTER — OFFICE VISIT (OUTPATIENT)
Dept: OPTOMETRY | Facility: CLINIC | Age: 75
End: 2025-05-19
Payer: MEDICARE

## 2025-05-19 DIAGNOSIS — H52.7 REFRACTIVE ERROR: Primary | ICD-10-CM

## 2025-05-19 DIAGNOSIS — H52.7 REFRACTIVE ERROR: ICD-10-CM

## 2025-05-19 DIAGNOSIS — H25.13 NUCLEAR SCLEROSIS, BILATERAL: Primary | ICD-10-CM

## 2025-05-19 LAB — BACTERIA UR CULT: ABNORMAL

## 2025-05-19 PROCEDURE — 1160F RVW MEDS BY RX/DR IN RCRD: CPT | Mod: CPTII,S$GLB,, | Performed by: OPTOMETRIST

## 2025-05-19 PROCEDURE — 1126F AMNT PAIN NOTED NONE PRSNT: CPT | Mod: CPTII,S$GLB,, | Performed by: OPTOMETRIST

## 2025-05-19 PROCEDURE — 99499 UNLISTED E&M SERVICE: CPT | Mod: S$GLB,,, | Performed by: OPTOMETRIST

## 2025-05-19 PROCEDURE — 99999 PR PBB SHADOW E&M-EST. PATIENT-LVL III: CPT | Mod: PBBFAC,,, | Performed by: OPTOMETRIST

## 2025-05-19 PROCEDURE — 92014 COMPRE OPH EXAM EST PT 1/>: CPT | Mod: S$GLB,,, | Performed by: OPTOMETRIST

## 2025-05-19 PROCEDURE — 99999 PR PBB SHADOW E&M-EST. PATIENT-LVL II: CPT | Mod: PBBFAC,,, | Performed by: OPTOMETRIST

## 2025-05-19 PROCEDURE — 1159F MED LIST DOCD IN RCRD: CPT | Mod: CPTII,S$GLB,, | Performed by: OPTOMETRIST

## 2025-05-19 PROCEDURE — 92015 DETERMINE REFRACTIVE STATE: CPT | Mod: S$GLB,,, | Performed by: OPTOMETRIST

## 2025-05-19 PROCEDURE — 4010F ACE/ARB THERAPY RXD/TAKEN: CPT | Mod: CPTII,S$GLB,, | Performed by: OPTOMETRIST

## 2025-05-19 NOTE — PROGRESS NOTES
HPI     Annual Exam            Comments: Ldle 04/29/2024          Comments    Pt states vision is blurrier w contacts not sure if its her or the rx   Contacts are bothersome , feels likes FBS , feels better when they are out     No gtts used   Ocass floaters denies flashes           Last edited by Chris Clarke on 5/19/2025  9:35 AM.            Assessment /Plan     For exam results, see Encounter Report.    Nuclear sclerosis, bilateral    Refractive error      Educated pt on presence of cataracts and effects on vision. No surgery at this time. Recheck in one year.  2. New Spectacle Rx given and Contact lens Rx released to pt. Daily wear only advised, with education to risks of extended wear.  Discussed lens care, compliance and solutions. RTC yearly contact lens follow up. , discussed different options for glasses. RTC 1 year routine eye exam.

## 2025-05-21 ENCOUNTER — LAB VISIT (OUTPATIENT)
Dept: LAB | Facility: HOSPITAL | Age: 75
End: 2025-05-21
Attending: FAMILY MEDICINE
Payer: MEDICARE

## 2025-05-21 DIAGNOSIS — E78.5 HYPERLIPIDEMIA, UNSPECIFIED HYPERLIPIDEMIA TYPE: ICD-10-CM

## 2025-05-21 LAB
CHOLEST SERPL-MCNC: 181 MG/DL (ref 120–199)
CHOLEST/HDLC SERPL: 2.9 {RATIO} (ref 2–5)
HDLC SERPL-MCNC: 62 MG/DL (ref 40–75)
HDLC SERPL: 34.3 % (ref 20–50)
LDLC SERPL CALC-MCNC: 99.6 MG/DL (ref 63–159)
NONHDLC SERPL-MCNC: 119 MG/DL
TRIGL SERPL-MCNC: 97 MG/DL (ref 30–150)

## 2025-05-21 PROCEDURE — 36415 COLL VENOUS BLD VENIPUNCTURE: CPT | Mod: PO

## 2025-05-21 PROCEDURE — 80061 LIPID PANEL: CPT

## 2025-06-18 ENCOUNTER — PATIENT MESSAGE (OUTPATIENT)
Dept: OPTOMETRY | Facility: CLINIC | Age: 75
End: 2025-06-18
Payer: MEDICARE

## 2025-06-22 ENCOUNTER — TELEPHONE (OUTPATIENT)
Dept: FAMILY MEDICINE | Facility: CLINIC | Age: 75
End: 2025-06-22
Payer: MEDICARE

## 2025-06-23 ENCOUNTER — OFFICE VISIT (OUTPATIENT)
Dept: FAMILY MEDICINE | Facility: CLINIC | Age: 75
End: 2025-06-23
Payer: MEDICARE

## 2025-06-23 VITALS
HEART RATE: 71 BPM | SYSTOLIC BLOOD PRESSURE: 132 MMHG | HEIGHT: 63 IN | TEMPERATURE: 98 F | OXYGEN SATURATION: 96 % | WEIGHT: 191.25 LBS | DIASTOLIC BLOOD PRESSURE: 60 MMHG | RESPIRATION RATE: 18 BRPM | BODY MASS INDEX: 33.89 KG/M2

## 2025-06-23 DIAGNOSIS — R79.82 ELEVATED C-REACTIVE PROTEIN (CRP): ICD-10-CM

## 2025-06-23 DIAGNOSIS — E03.4 HYPOTHYROIDISM DUE TO ACQUIRED ATROPHY OF THYROID: ICD-10-CM

## 2025-06-23 DIAGNOSIS — E04.1 THYROID NODULE: ICD-10-CM

## 2025-06-23 DIAGNOSIS — E78.5 HYPERLIPIDEMIA, UNSPECIFIED HYPERLIPIDEMIA TYPE: ICD-10-CM

## 2025-06-23 DIAGNOSIS — N18.30 STAGE 3 CHRONIC KIDNEY DISEASE, UNSPECIFIED WHETHER STAGE 3A OR 3B CKD: ICD-10-CM

## 2025-06-23 DIAGNOSIS — I10 HYPERTENSION, ESSENTIAL: Primary | ICD-10-CM

## 2025-06-23 LAB
ALBUMIN/CREAT UR: 6.2 UG/MG
BILIRUB UR QL STRIP.AUTO: NEGATIVE
CLARITY UR: CLEAR
COLOR UR AUTO: YELLOW
CREAT UR-MCNC: 161 MG/DL (ref 15–325)
CRP SERPL-MCNC: 2.6 MG/L
ERYTHROCYTE [SEDIMENTATION RATE] IN BLOOD BY PHOTOMETRIC METHOD: 19 MM/HR
GLUCOSE UR QL STRIP: NEGATIVE
HGB UR QL STRIP: NEGATIVE
KETONES UR QL STRIP: NEGATIVE
LEUKOCYTE ESTERASE UR QL STRIP: NEGATIVE
MICROALBUMIN UR-MCNC: 10 UG/ML (ref ?–5000)
NITRITE UR QL STRIP: NEGATIVE
PH UR STRIP: 5 [PH]
PROT UR QL STRIP: NEGATIVE
RHEUMATOID FACT SERPL-ACNC: <13 IU/ML
SP GR UR STRIP: 1.01
TSH SERPL-ACNC: 1.09 UIU/ML (ref 0.4–4)
UROBILINOGEN UR STRIP-ACNC: NEGATIVE EU/DL

## 2025-06-23 PROCEDURE — 86431 RHEUMATOID FACTOR QUANT: CPT | Performed by: FAMILY MEDICINE

## 2025-06-23 PROCEDURE — 1160F RVW MEDS BY RX/DR IN RCRD: CPT | Mod: CPTII,S$GLB,, | Performed by: FAMILY MEDICINE

## 2025-06-23 PROCEDURE — 1126F AMNT PAIN NOTED NONE PRSNT: CPT | Mod: CPTII,S$GLB,, | Performed by: FAMILY MEDICINE

## 2025-06-23 PROCEDURE — 82570 ASSAY OF URINE CREATININE: CPT | Performed by: FAMILY MEDICINE

## 2025-06-23 PROCEDURE — 1159F MED LIST DOCD IN RCRD: CPT | Mod: CPTII,S$GLB,, | Performed by: FAMILY MEDICINE

## 2025-06-23 PROCEDURE — 81003 URINALYSIS AUTO W/O SCOPE: CPT | Performed by: FAMILY MEDICINE

## 2025-06-23 PROCEDURE — 3066F NEPHROPATHY DOC TX: CPT | Mod: CPTII,S$GLB,, | Performed by: FAMILY MEDICINE

## 2025-06-23 PROCEDURE — 3078F DIAST BP <80 MM HG: CPT | Mod: CPTII,S$GLB,, | Performed by: FAMILY MEDICINE

## 2025-06-23 PROCEDURE — 3061F NEG MICROALBUMINURIA REV: CPT | Mod: CPTII,S$GLB,, | Performed by: FAMILY MEDICINE

## 2025-06-23 PROCEDURE — 4010F ACE/ARB THERAPY RXD/TAKEN: CPT | Mod: CPTII,S$GLB,, | Performed by: FAMILY MEDICINE

## 2025-06-23 PROCEDURE — 85652 RBC SED RATE AUTOMATED: CPT | Performed by: FAMILY MEDICINE

## 2025-06-23 PROCEDURE — 86140 C-REACTIVE PROTEIN: CPT | Performed by: FAMILY MEDICINE

## 2025-06-23 PROCEDURE — 3075F SYST BP GE 130 - 139MM HG: CPT | Mod: CPTII,S$GLB,, | Performed by: FAMILY MEDICINE

## 2025-06-23 PROCEDURE — 84443 ASSAY THYROID STIM HORMONE: CPT | Performed by: FAMILY MEDICINE

## 2025-06-23 PROCEDURE — G2211 COMPLEX E/M VISIT ADD ON: HCPCS | Mod: S$GLB,,, | Performed by: FAMILY MEDICINE

## 2025-06-23 PROCEDURE — 3008F BODY MASS INDEX DOCD: CPT | Mod: CPTII,S$GLB,, | Performed by: FAMILY MEDICINE

## 2025-06-23 PROCEDURE — 86038 ANTINUCLEAR ANTIBODIES: CPT | Performed by: FAMILY MEDICINE

## 2025-06-23 PROCEDURE — 1101F PT FALLS ASSESS-DOCD LE1/YR: CPT | Mod: CPTII,S$GLB,, | Performed by: FAMILY MEDICINE

## 2025-06-23 PROCEDURE — 99214 OFFICE O/P EST MOD 30 MIN: CPT | Mod: S$GLB,,, | Performed by: FAMILY MEDICINE

## 2025-06-23 PROCEDURE — 3288F FALL RISK ASSESSMENT DOCD: CPT | Mod: CPTII,S$GLB,, | Performed by: FAMILY MEDICINE

## 2025-06-24 LAB — ANA (OHS): NORMAL

## 2025-06-26 ENCOUNTER — HOSPITAL ENCOUNTER (OUTPATIENT)
Dept: RADIOLOGY | Facility: HOSPITAL | Age: 75
Discharge: HOME | End: 2025-06-26
Attending: FAMILY MEDICINE
Payer: MEDICARE

## 2025-06-26 DIAGNOSIS — E04.1 THYROID NODULE: ICD-10-CM

## 2025-06-26 DIAGNOSIS — N18.30 STAGE 3 CHRONIC KIDNEY DISEASE, UNSPECIFIED WHETHER STAGE 3A OR 3B CKD: ICD-10-CM

## 2025-06-26 PROCEDURE — 76770 US EXAM ABDO BACK WALL COMP: CPT | Mod: 26,,, | Performed by: RADIOLOGY

## 2025-06-26 PROCEDURE — 76770 US EXAM ABDO BACK WALL COMP: CPT | Mod: TC,PO

## 2025-06-26 PROCEDURE — 76536 US EXAM OF HEAD AND NECK: CPT | Mod: 26,,, | Performed by: RADIOLOGY

## 2025-06-26 PROCEDURE — 76536 US EXAM OF HEAD AND NECK: CPT | Mod: TC,PO

## 2025-06-29 ENCOUNTER — PATIENT MESSAGE (OUTPATIENT)
Dept: FAMILY MEDICINE | Facility: CLINIC | Age: 75
End: 2025-06-29
Payer: MEDICARE

## 2025-07-06 NOTE — PROGRESS NOTES
"Subjective:       Patient ID: Curtis Navarro is a 74 y.o. female.    Chief Complaint: Follow-up    History of Present Illness    CHIEF COMPLAINT:  Patient presents today for follow up.      POST-OPERATIVE KNEE STATUS:  Surgery was performed by Dr. Helton approximately 9 months ago with one post-operative follow-up visit. X-rays showed appropriate surgical site healing. She completed two courses of physical therapy (pre and post-surgery) due to ADDRESS range of motion and stiffening. She continues home exercises. Current symptoms include indentation at surgical incision site, occasional sensation of knee "moving around" and "crunching" with bending activities, and a bump noted at surgical site around post-operative day 21. She reports reduced knee function compared to pre-surgical expectations but denies significant weight-bearing complications.  She is taking ibuprofen every day    WEIGHT MANAGEMENT:  She reports intentional weight loss of approximately 10 lbs, noting improved knee symptoms with decreased body weight.    CARDIOVASCULAR:  Home blood pressure readings are variable, typically normal and occasionally dropping to the 120s.  Today her blood pressure is 132/60.  Is taking Cozaar and Toprol which she is tolerating well    HYPERLIPIDEMIA:  In May, her total cholesterol was 181 (down from prior value of 250) her triglycerides were 97 (down from prior value 164) and her LDL was 99 (down from prior value of 158).  She is tolerating the Livalo with no side effects    GENITOURINARY:  She reports recent urinary tract infection which is unusual for her. UTI symptoms have resolved with treatment. Despite bladder medication, she continues to experience multiple nighttime urinations.    HYPOTHYROIDISM:  She is taking her Synthroid consistently.  She would be willing to have this rechecked today    THYROID NODULES:  She is due for repeat thyroid ultrasound would be willing to get this scheduled    Review of " Systems   Constitutional:  Negative for appetite change, chills, diaphoresis, fatigue, fever and unexpected weight change.   HENT:  Negative for congestion, ear pain, postnasal drip, rhinorrhea, sinus pressure, sneezing, sore throat and trouble swallowing.    Eyes:  Negative for pain, discharge and visual disturbance.   Respiratory:  Negative for cough, chest tightness, shortness of breath and wheezing.    Cardiovascular:  Negative for chest pain, palpitations and leg swelling.   Gastrointestinal:  Negative for abdominal distention, abdominal pain, blood in stool, constipation, diarrhea, nausea and vomiting.   Skin:  Negative for rash.         Objective:      Physical Exam  Constitutional:       General: She is not in acute distress.     Appearance: Normal appearance. She is well-developed.   HENT:      Head: Normocephalic and atraumatic.      Right Ear: Hearing, tympanic membrane, ear canal and external ear normal.      Left Ear: Hearing, tympanic membrane, ear canal and external ear normal.      Nose: Nose normal.      Mouth/Throat:      Mouth: No oral lesions.      Pharynx: No oropharyngeal exudate or posterior oropharyngeal erythema.   Eyes:      General: Lids are normal. No scleral icterus.     Extraocular Movements: Extraocular movements intact.      Conjunctiva/sclera: Conjunctivae normal.      Pupils: Pupils are equal, round, and reactive to light.   Neck:      Thyroid: No thyroid mass or thyromegaly.      Vascular: No carotid bruit.   Cardiovascular:      Rate and Rhythm: Normal rate and regular rhythm. No extrasystoles are present.     Chest Wall: PMI is not displaced.      Heart sounds: Normal heart sounds. No murmur heard.     No gallop.   Pulmonary:      Effort: Pulmonary effort is normal. No accessory muscle usage or respiratory distress.      Breath sounds: Normal breath sounds.   Abdominal:      General: Bowel sounds are normal. There is no abdominal bruit.      Palpations: Abdomen is soft.       "Tenderness: There is no abdominal tenderness. There is no rebound.   Musculoskeletal:      Cervical back: Normal range of motion and neck supple.   Lymphadenopathy:      Head:      Right side of head: No submental or submandibular adenopathy.      Left side of head: No submental or submandibular adenopathy.      Cervical:      Right cervical: No superficial, deep or posterior cervical adenopathy.     Left cervical: No superficial, deep or posterior cervical adenopathy.      Upper Body:      Right upper body: No supraclavicular adenopathy.      Left upper body: No supraclavicular adenopathy.   Skin:     General: Skin is warm and dry.   Neurological:      Mental Status: She is alert and oriented to person, place, and time.       Blood pressure 132/60, pulse 71, temperature 98.4 °F (36.9 °C), temperature source Temporal, resp. rate 18, height 5' 3" (1.6 m), weight 86.7 kg (191 lb 4 oz), SpO2 96%.Body mass index is 33.88 kg/m².            Assessment & Plan    1. Assessed ongoing knee issues 9 months post-op, noting unusual prolonged symptoms, but noting that recovery can take up to a year for total recovery.  2. Evaluated cholesterol levels, noting significant improvement across all markers with the Livalo.  3. BP slightly elevated at 132/60.  4. Chronic renal disease currently at stage 3A.  5. Considered potential autoimmune factors given prior elevated CRP and family history of gout.    HYPERTENSION:  - well controlled with Cozaar and Toprol which we will continue  - Home blood pressure monitoring shows readings are usually good, sometimes lower in the 120s.  - Today's measurement was 132/60, with systolic pressure slightly elevated.    HYPERLIPIDEMIA:  - Continue Livalo for cholesterol management.  - Discontinue krill oil supplementation.  - Cholesterol levels have improved significantly: total cholesterol decreased from 250 to 181, triglycerides from 164 to 97, and LDL from 158 to 99.    HYPOTHYROIDISM:  - Continue " Synthroid.  - Ordered TSH today    THYROID NODULES:  - we will recheck thyroid ultrasound to re-evaluate    BLADDER DISORDER:  - Continue current bladder medication, which helps during the day, though patient still needs to urinate 2 times at night.  - Ordered urinalysis to check for infection clearance and protein levels given her CKD.    GASTROESOPHAGEAL REFLUX DISEASE:  - Continue current reflux medication (omeprazole/Prilosec).  - Patient experiences heartburn if omeprazole is not taken daily.    CHRONIC KIDNEY DISEASE:  - Ordered kidney ultrasound due to chronic kidney disease stage 3A and special urine tests to check for infection and protein in urine.  - Educated patient about the natural decline in renal function starting at age 50 and discussed risks of chronic NSAID use on kidney function.  - we will check labs today and schedule a renal ultrasound    INFLAMMATION CONCERNS:  - Ordered labs including inflammatory panel (CRP) to reevaluate inflammation concerns.    FAMILY HISTORY OF GOUT:  - Noted family history of gout in patient's father.    KNEE ISSUES:  - Continue weight loss efforts to potentially improve knee symptoms.  - Discontinue glucosamine chondroitin sulfate supplement and monitor for any changes in symptoms.  - Recommend follow-up with Dr. Helton (orthopedic surgeon) for ongoing knee issues.  - Prescribed extra-strength Tylenol (acetaminophen) 500-650 mg, 1-2 tablets up to 3 times daily, not to exceed 3000 mg per day, as alternative to ibuprofen for knee pain.    PREVENTIVE CARE:  - Obtain COVID vaccine booster at the pharmacy.  - Continue multivitamin, calcium, and vitamin D supplements.    FOLLOW-UP:  - Follow up in 6 months.          This note was generated with the assistance of ambient listening technology. Verbal consent was obtained by the patient and accompanying visitor(s) for the recording of patient appointment to facilitate this note. I attest to having reviewed and edited the  generated note for accuracy, though some syntax or spelling errors may persist. Please contact the author of this note for any clarification.

## 2025-07-07 RX ORDER — VIBEGRON 75 MG/1
TABLET, FILM COATED ORAL
Qty: 90 TABLET | Refills: 0 | OUTPATIENT
Start: 2025-07-07

## 2025-07-08 ENCOUNTER — PATIENT MESSAGE (OUTPATIENT)
Dept: UROLOGY | Facility: CLINIC | Age: 75
End: 2025-07-08
Payer: MEDICARE

## 2025-07-08 RX ORDER — VIBEGRON 75 MG/1
75 TABLET, FILM COATED ORAL EVERY MORNING
Qty: 90 TABLET | Refills: 0 | Status: SHIPPED | OUTPATIENT
Start: 2025-07-08

## 2025-07-10 RX ORDER — METOPROLOL SUCCINATE 50 MG/1
50 TABLET, EXTENDED RELEASE ORAL
Qty: 90 TABLET | Refills: 3 | Status: SHIPPED | OUTPATIENT
Start: 2025-07-10

## 2025-07-10 NOTE — TELEPHONE ENCOUNTER
No care due was identified.  Health [facility] Embedded Care Due Messages. Reference number: [id number].   7/10/2025 5:27:08 PM CDT

## 2025-07-11 NOTE — TELEPHONE ENCOUNTER
Refill Decision Note   Curtis Ramon  is requesting a refill authorization.  Brief Assessment and Rationale for Refill:  Approve     Medication Therapy Plan:         Comments:     Note composed:10:46 PM 07/10/2025

## 2025-07-28 ENCOUNTER — PATIENT MESSAGE (OUTPATIENT)
Dept: OPTOMETRY | Facility: CLINIC | Age: 75
End: 2025-07-28
Payer: MEDICARE

## 2025-07-28 DIAGNOSIS — H00.14 CHALAZION OF LEFT UPPER EYELID: Primary | ICD-10-CM

## 2025-07-28 RX ORDER — TOBRAMYCIN AND DEXAMETHASONE 3; 1 MG/ML; MG/ML
1 SUSPENSION/ DROPS OPHTHALMIC 4 TIMES DAILY
Qty: 5 ML | Refills: 0 | Status: SHIPPED | OUTPATIENT
Start: 2025-07-28 | End: 2025-08-07

## 2025-07-28 NOTE — PROGRESS NOTES
Assessment /Plan     For exam results, see Encounter Report.    Chalazion of left upper eyelid  -     tobramycin-dexAMETHasone 0.3-0.1% (TOBRADEX) 0.3-0.1 % DrpS; Place 1 drop into the left eye 4 (four) times daily. for 10 days  Dispense: 5 mL; Refill: 0      Sent drops to [place] for chalazion

## 2025-08-04 ENCOUNTER — PATIENT MESSAGE (OUTPATIENT)
Dept: OPTOMETRY | Facility: CLINIC | Age: 75
End: 2025-08-04
Payer: MEDICARE

## 2025-08-07 RX ORDER — LEVOTHYROXINE SODIUM 112 UG/1
112 TABLET ORAL
Qty: 90 TABLET | Refills: 2 | Status: SHIPPED | OUTPATIENT
Start: 2025-08-07

## 2025-08-07 NOTE — TELEPHONE ENCOUNTER
Refill Routing Note   Medication(s) are not appropriate for processing by [facility] for the following reason(s):        Patient not seen by provider within 15 months  ED/Hospital Visit since last OV with provider  Responsible provider unclear    [facility] action(s):  Defer               Appointments  past 12m or future 3m with PCP    Date Provider   Last Visit   6/23/2025 Komal Del Csatillo MD   Next Visit   12/29/2025 Komal Del Castillo MD   ED visits in past 90 days: 0        Note composed:11:59 AM 08/07/2025

## 2025-08-13 RX ORDER — FLUTICASONE PROPIONATE 50 MCG
2 SPRAY, SUSPENSION (ML) NASAL
Qty: 48 G | Refills: 3 | Status: SHIPPED | OUTPATIENT
Start: 2025-08-13

## 2025-08-22 ENCOUNTER — OFFICE VISIT (OUTPATIENT)
Dept: UROLOGY | Facility: CLINIC | Age: 75
End: 2025-08-22
Payer: MEDICARE

## 2025-08-22 VITALS — WEIGHT: 192 LBS | HEIGHT: 63 IN | BODY MASS INDEX: 34.02 KG/M2

## 2025-08-22 DIAGNOSIS — N39.41 URGE INCONTINENCE: Primary | ICD-10-CM

## 2025-08-22 DIAGNOSIS — N32.81 OAB (OVERACTIVE BLADDER): ICD-10-CM

## 2025-08-22 PROCEDURE — 99999 PR PBB SHADOW E&M-EST. PATIENT-LVL IV: CPT | Mod: PBBFAC,,, | Performed by: NURSE PRACTITIONER

## 2025-08-22 RX ORDER — VIBEGRON 75 MG/1
75 TABLET, FILM COATED ORAL EVERY MORNING
Qty: 90 TABLET | Refills: 3 | Status: SHIPPED | OUTPATIENT
Start: 2025-08-22